# Patient Record
Sex: FEMALE | Race: WHITE | NOT HISPANIC OR LATINO | Employment: OTHER | URBAN - METROPOLITAN AREA
[De-identification: names, ages, dates, MRNs, and addresses within clinical notes are randomized per-mention and may not be internally consistent; named-entity substitution may affect disease eponyms.]

---

## 2017-01-31 ENCOUNTER — OFFICE VISIT (OUTPATIENT)
Dept: INTERNAL MEDICINE | Facility: CLINIC | Age: 77
End: 2017-01-31
Payer: COMMERCIAL

## 2017-01-31 VITALS
WEIGHT: 172.5 LBS | HEART RATE: 84 BPM | OXYGEN SATURATION: 95 % | TEMPERATURE: 98.1 F | BODY MASS INDEX: 29.45 KG/M2 | HEIGHT: 64 IN | SYSTOLIC BLOOD PRESSURE: 128 MMHG | DIASTOLIC BLOOD PRESSURE: 70 MMHG

## 2017-01-31 DIAGNOSIS — R30.0 DYSURIA: ICD-10-CM

## 2017-01-31 DIAGNOSIS — N39.46 MIXED INCONTINENCE URGE AND STRESS (MALE)(FEMALE): Primary | ICD-10-CM

## 2017-01-31 DIAGNOSIS — Z12.31 VISIT FOR SCREENING MAMMOGRAM: ICD-10-CM

## 2017-01-31 DIAGNOSIS — L30.9 DERMATITIS: ICD-10-CM

## 2017-01-31 DIAGNOSIS — Z23 NEED FOR PROPHYLACTIC VACCINATION AND INOCULATION AGAINST INFLUENZA: ICD-10-CM

## 2017-01-31 LAB
ALBUMIN UR-MCNC: NEGATIVE MG/DL
APPEARANCE UR: CLEAR
BACTERIA #/AREA URNS HPF: ABNORMAL /HPF
BILIRUB UR QL STRIP: NEGATIVE
COLOR UR AUTO: YELLOW
GLUCOSE UR STRIP-MCNC: NEGATIVE MG/DL
HGB UR QL STRIP: NEGATIVE
KETONES UR STRIP-MCNC: ABNORMAL MG/DL
LEUKOCYTE ESTERASE UR QL STRIP: NEGATIVE
MUCOUS THREADS #/AREA URNS LPF: PRESENT /LPF
NITRATE UR QL: NEGATIVE
NON-SQ EPI CELLS #/AREA URNS LPF: ABNORMAL /LPF
PH UR STRIP: 5.5 PH (ref 5–7)
RBC #/AREA URNS AUTO: ABNORMAL /HPF (ref 0–2)
SP GR UR STRIP: >1.03 (ref 1–1.03)
URN SPEC COLLECT METH UR: ABNORMAL
UROBILINOGEN UR STRIP-ACNC: 0.2 EU/DL (ref 0.2–1)
WBC #/AREA URNS AUTO: ABNORMAL /HPF (ref 0–2)

## 2017-01-31 PROCEDURE — G0008 ADMIN INFLUENZA VIRUS VAC: HCPCS | Performed by: INTERNAL MEDICINE

## 2017-01-31 PROCEDURE — 81001 URINALYSIS AUTO W/SCOPE: CPT | Performed by: INTERNAL MEDICINE

## 2017-01-31 PROCEDURE — 99214 OFFICE O/P EST MOD 30 MIN: CPT | Mod: 25 | Performed by: INTERNAL MEDICINE

## 2017-01-31 PROCEDURE — 90662 IIV NO PRSV INCREASED AG IM: CPT | Performed by: INTERNAL MEDICINE

## 2017-01-31 RX ORDER — TRIAMCINOLONE ACETONIDE 5 MG/G
CREAM TOPICAL
Qty: 15 G | Refills: 5 | Status: SHIPPED | OUTPATIENT
Start: 2017-01-31 | End: 2019-07-10

## 2017-01-31 NOTE — PROGRESS NOTES
SUBJECTIVE:                                                    Brielle Marvin is a 76 year old female who presents to clinic today for the following health issues:      URINARY TRACT SYMPTOMS     Onset: on going    Description:   Painful urination (Dysuria): no   Blood in urine (Hematuria): no   Delay in urine (Hesitency): no     Intensity: moderate    Progression of Symptoms:  spiratic    Accompanying Signs & Symptoms:  Fever/chills: no   Flank pain no   Nausea and vomiting: no   Any vaginal symptoms: none  Abdominal/Pelvic Pain: no    History:   History of frequent UTI's: no   History of kidney stones: no   Sexually Active: no   Possibility of pregnancy: No    Precipitating factors:   na         Therapies Tried and outcome:             Problem list and histories reviewed & adjusted, as indicated.  Additional history: as documented        Past Medical History:  ---------------------------  Past Medical History   Diagnosis Date     Unspecified menopausal and postmenopausal disorder      Family history of malignant neoplasm of gastrointestinal tract      Outcome of delivery, single liveborn 1972     Outcome of delivery, single liveborn 1977     Esophageal reflux      NONSPECIFIC MEDICAL HISTORY      [facility]bral [facility]x as teen in [facility]     Muscle weakness (generalized) 5/6/2010       Past Surgical History:  ---------------------------  Past Surgical History   Procedure Laterality Date     C curtis w/o facetec foramot/dskc 1/2 vrt seg, lumbar  1962     Lami, Lumbar     Hc correct bunion,simple  1981     Hc colonoscopy thru stoma, diagnostic  8/31/02       Current Medications:  ---------------------------  Current Outpatient Prescriptions   Medication Sig Dispense Refill     omeprazole (PRILOSEC) 20 MG capsule Take 1 capsule (20 mg) by mouth daily Take by mouth 30-60 minutes before a meal. Take 1 capsule by mouth daily 90 capsule 3     pravastatin (PRAVACHOL) 40 MG tablet Take 1 tablet (40 mg) by mouth At Bedtime 90 tablet 3      ORDER FOR DME, SET TO LOCAL PRINT, Equipment being ordered: tennis elbow strap 1 Device 0     vitamin E 400 UNIT capsule Take 1 capsule (400 Units) by mouth daily       triamcinolone (KENALOG) 0.5 % cream Apply sparingly once or twice per day as needed to affected area until the skin is better, then stop 60 g 2     Cholecalciferol (VITAMIN D) 2000 UNIT tablet Take 2,000 Units by mouth daily.       ALEVE 220 MG OR CAPS 1 CAPSULE EVERY 12 HOURS AS NEEDED       CALCIUM 500 + D OR None Entered       VITAMIN B-12 1000 MCG OR TABS 1 tablet daily       FISH  MG OR CAPS 1 tablet daily  0     VITAMIN C 500 MG OR TABS 1 tablet daily 60 0     ASPIRIN 81 MG OR TABS ONE DAILY       GLUCOSAMINE 500 MG OR TABS 1 tablet prn       naproxen (NAPROSYN) 500 MG tablet Take 1 tablet (500 mg) by mouth 2 times daily (with meals) 60 tablet 0       Allergies:  -------------  Allergies   Allergen Reactions     Codeine Nausea and Vomiting     vomiting     Morphine Nausea and Vomiting     nausea     Vicodin [Hydrocodone-Acetaminophen] Nausea and Vomiting       Social History:  -------------------  Social History     Social History     Marital Status:      Spouse Name: N/A     Number of Children: N/A     Years of Education: N/A     Occupational History      Retired     [employer]  fills in PT     Social History Main Topics     Smoking status: Never Smoker      Smokeless tobacco: Never Used     Alcohol Use: No     Drug Use: No     Sexual Activity: No      Comment: postmenopausal     Other Topics Concern     Not on file     Social History Narrative       Family Medical History:  ------------------------------  Family History   Problem Relation Age of Onset     CANCER Mother      D:84 Lung CA     Cardiovascular Father      D:72 heart Attack     CANCER Sister      D:62, probably colon cancer     Family History Negative Sister      B:1947 alive     Family History Negative Brother      B:1935 Alive     Family  "History Negative Brother      B:1943 Alive     Family History Negative Brother      B:1939 Alive     C.A.D. Brother      B: 1937  HTN, CAD, had PTCA         ROS:  REVIEW OF SYSTEMS:    RESP: negative for cough, dyspnea, wheezing, hemoptysis  CV: negative for chest pain, palpitations, PND, [place], orthopnea; reports no changes in their ability to perform physical activity (from cardiovascular standpoint)  GI: negative for dysphagia, N/V, pain, melena, diarrhea and constipation  NEURO: negative for numbness/tingling, paralysis, incoordination, or focal weakness     OBJECTIVE:                                                    /70 mmHg  Pulse 84  Temp(Src) 98.1  F (36.7  C) (Oral)  Ht 5' 4\" (1.626 m)  Wt 172 lb 8 oz (78.245 kg)  BMI 29.59 kg/m2  SpO2 95%  LMP 11/08/1991     GENERAL alert and no distress  EYES:  Normal sclera,conjunctiva, EOMI  HENT: oral and posterior pharynx without lesions or erythema, facies symmetric  NECK: Neck supple. No LAD, without thyroidmegaly or JVD., Carotids without bruits.  RESP: Clear to ausculation bilaterally without wheezes or crackles. Normal BS in all fields.  CV: RRR normal S1S2 without murmurs, rubs or gallops. PMI normal  LYMPH: no cervical lymph adenopathy appreciated  MS: extremities- no gross deformities of the visible extremities noted, no edema  PSYCH: Alert and oriented times 3; speech- coherent  SKIN:  No obvious significant skin lesions on visible portions of face     Results for orders placed or performed in visit on 01/31/17   UA with Microscopic reflex to Culture   Result Value Ref Range    Color Urine Yellow     Appearance Urine Clear     Glucose Urine Negative NEG mg/dL    Bilirubin Urine Negative NEG    Ketones Urine Trace (A) NEG mg/dL    Specific Gravity Urine >1.030 1.003 - 1.035    pH Urine 5.5 5.0 - 7.0 pH    Protein Albumin Urine Negative NEG mg/dL    Urobilinogen Urine 0.2 0.2 - 1.0 EU/dL    Nitrite Urine Negative NEG    Blood Urine Negative NEG    " Leukocyte Esterase Urine Negative NEG    Source Midstream Urine     WBC Urine O - 2 0 - 2 /HPF    RBC Urine O - 2 0 - 2 /HPF    Squamous Epithelial /LPF Urine Few FEW /LPF    Bacteria Urine Few (A) NEG /HPF    Mucous Urine Present (A) NEG /LPF         ASSESSMENT/PLAN:                                                      (N39.46) Mixed incontinence urge and stress (male)(female)  (primary encounter diagnosis)  Comment:   Plan: UROLOGY ADULT REFERRAL            (R30.0) Dysuria  Comment:   Plan: UA with Microscopic reflex to Culture, UROLOGY         ADULT REFERRAL            (Z23) Need for prophylactic vaccination and inoculation against influenza  Comment:   Plan: FLU VACCINE, INCREASED ANTIGEN, PRESV FREE, AGE        65+ [72892], Vaccine Administration, Initial         [10942]            *  Visit [facility] [place] [phone]   [email or web address] for further evaluation of the issue. Based on their evaluation, they can determien what the next best course of treatment might be.                 See Patient Instructions    EVELYNE MARC M.D., MD  [facility]         Injectable Influenza Immunization Documentation    1.  Is the person to be vaccinated sick today?  No    2. Does the person to be vaccinated have an allergy to eggs or to a component of the vaccine?  No    3. Has the person to be vaccinated today ever had a serious reaction to influenza vaccine in the past?  No    4. Has the person to be vaccinated ever had Guillain-Oxford syndrome?  No     Form completed by Coleen Rinaldi

## 2017-01-31 NOTE — NURSING NOTE
"Chief Complaint   Patient presents with     Dysuria     for awhile now        Initial /70 mmHg  Pulse 84  Temp(Src) 98.1  F (36.7  C) (Oral)  Ht 5' 4\" (1.626 m)  Wt 172 lb 8 oz (78.245 kg)  BMI 29.59 kg/m2  SpO2 95%  LMP 11/08/1991 Estimated body mass index is 29.59 kg/(m^2) as calculated from the following:    Height as of this encounter: 5' 4\" (1.626 m).    Weight as of this encounter: 172 lb 8 oz (78.245 kg).  BP completed using cuff size: regular    "

## 2017-01-31 NOTE — MR AVS SNAPSHOT
After Visit Summary   1/31/2017    Brielle Marvin    MRN: [id number]           Patient Information     Date Of Birth          1940        Visit Information        Provider Department      1/31/2017 10:00 AM Jared Emmanuel MD [facility]        Today's Diagnoses     Mixed incontinence urge and stress (male)(female)    -  1     Dysuria         Need for prophylactic vaccination and inoculation against influenza           Care Instructions    *  Visit [facility] [phone]   [email or web address] for further evaluation of the issue. Based on their evaluation, they can determien what the next best course of treatment might be.                Follow-ups after your visit        Additional Services     UROLOGY ADULT REFERRAL       Your provider has referred you to:     [facility]: [facility] - [place] [phone]   [email or web address]    Please be aware that coverage of these services is subject to the terms and limitations of your health insurance plan.  Call member services at your health plan with any benefit or coverage questions.      Please bring the following with you to your appointment:    (1) Any X-Rays, CTs or MRIs which have been performed.  Contact the facility where they were done to arrange for  prior to your scheduled appointment.    (2) List of current medications  (3) This referral request   (4) Any documents/labs given to you for this referral                  Who to contact     If you have questions or need follow up information about today's clinic visit or your schedule please contact [facility] directly at [phone].  Normal or non-critical lab and imaging results will be communicated to you by MyChart, letter or phone within 4 business days after the clinic has received the results. If you do  "not hear from us within 7 days, please contact the clinic through [employer] or phone. If you have a critical or abnormal lab result, we will notify you by phone as soon as possible.  Submit refill requests through [employer] or call your pharmacy and they will forward the refill request to us. Please allow 3 business days for your refill to be completed.          Additional Information About Your Visit        [facility] Information     [employer] gives you secure access to your electronic health record. If you see a primary care provider, you can also send messages to your care team and make appointments. If you have questions, please call your primary care clinic.  If you do not have a primary care provider, please call [phone] and they will assist you.        Care EveryWhere ID     This is your Care EveryWhere ID. This could be used by other organizations to access your [facility] medical records  [id number]        Your Vitals Were     Pulse Temperature Height BMI (Body Mass Index) Pulse Oximetry Last Period    84 98.1  F (36.7  C) (Oral) 5' 4\" (1.626 m) 29.59 kg/m2 95% 11/08/1991       Blood Pressure from Last 3 Encounters:   01/31/17 128/70   11/06/15 164/89   11/06/15 126/76    Weight from Last 3 Encounters:   01/31/17 172 lb 8 oz (78.245 kg)   11/06/15 160 lb (72.576 kg)   06/09/15 169 lb 1.6 oz (76.703 kg)              We Performed the Following     FLU VACCINE, INCREASED ANTIGEN, PRESV FREE, AGE 65+ [11305]     UA with Microscopic reflex to Culture     UROLOGY ADULT REFERRAL     Vaccine Administration, Initial [[id number]]        Primary Care Provider Office Phone # Fax #    Jared Emmanuel [phone]       [facility] [address]  [facility] [address]        Thank you!     Thank you for choosing [facility]  for your care. Our goal is always to provide you with excellent care. Hearing back from our patients is one way we can continue to improve our " services. Please take a few minutes to complete the written survey that you may receive in the mail after your visit with us. Thank you!             Your Updated Medication List - Protect others around you: Learn how to safely use, store and throw away your medicines at www.disposemymeds.org.          This list is accurate as of: 1/31/17 10:42 AM.  Always use your most recent med list.                   Brand Name Dispense Instructions for use    ALEVE 220 MG capsule   Generic drug:  naproxen sodium      1 CAPSULE EVERY 12 HOURS AS NEEDED       ascorbic acid 500 MG tablet    VITAMIN C    60    1 tablet daily       aspirin 81 MG tablet      ONE DAILY       CALCIUM 500 + D PO      None Entered       cyanocobalamin 1000 MCG tablet    vitamin  B-12     1 tablet daily       Fish Oil 500 MG Caps      1 tablet daily       glucosamine 500 MG Tabs      1 tablet prn       naproxen 500 MG tablet    NAPROSYN    60 tablet    Take 1 tablet (500 mg) by mouth 2 times daily (with meals)       omeprazole 20 MG CR capsule    priLOSEC    90 capsule    Take 1 capsule (20 mg) by mouth daily Take by mouth 30-60 minutes before a meal. Take 1 capsule by mouth daily       order for DME     1 Device    Equipment being ordered: tennis elbow strap       pravastatin 40 MG tablet    PRAVACHOL    90 tablet    Take 1 tablet (40 mg) by mouth At Bedtime       triamcinolone 0.5 % cream    KENALOG    60 g    Apply sparingly once or twice per day as needed to affected area until the skin is better, then stop       vitamin D 2000 UNITS tablet      Take 2,000 Units by mouth daily.       vitamin E 400 UNIT capsule      Take 1 capsule (400 Units) by mouth daily

## 2017-02-08 ENCOUNTER — OFFICE VISIT (OUTPATIENT)
Dept: UROLOGY | Facility: CLINIC | Age: 77
End: 2017-02-08
Payer: COMMERCIAL

## 2017-02-08 DIAGNOSIS — N39.46 MIXED INCONTINENCE: Primary | ICD-10-CM

## 2017-02-08 DIAGNOSIS — N32.81 OAB (OVERACTIVE BLADDER): ICD-10-CM

## 2017-02-08 DIAGNOSIS — N95.2 ATROPHIC VAGINITIS: ICD-10-CM

## 2017-02-08 LAB
ALBUMIN UR-MCNC: NEGATIVE MG/DL
APPEARANCE UR: CLEAR
BILIRUB UR QL STRIP: NEGATIVE
COLOR UR AUTO: YELLOW
GLUCOSE UR STRIP-MCNC: NEGATIVE MG/DL
HGB UR QL STRIP: ABNORMAL
KETONES UR STRIP-MCNC: NEGATIVE MG/DL
LEUKOCYTE ESTERASE UR QL STRIP: NEGATIVE
NITRATE UR QL: NEGATIVE
PH UR STRIP: 6.5 PH (ref 5–7)
SP GR UR STRIP: 1.02 (ref 1–1.03)
URN SPEC COLLECT METH UR: ABNORMAL
UROBILINOGEN UR STRIP-ACNC: 0.2 EU/DL (ref 0.2–1)

## 2017-02-08 PROCEDURE — 52000 CYSTOURETHROSCOPY: CPT | Performed by: UROLOGY

## 2017-02-08 PROCEDURE — 99205 OFFICE O/P NEW HI 60 MIN: CPT | Mod: 25 | Performed by: UROLOGY

## 2017-02-08 PROCEDURE — 81003 URINALYSIS AUTO W/O SCOPE: CPT | Mod: QW | Performed by: UROLOGY

## 2017-02-08 RX ORDER — TOLTERODINE 4 MG/1
4 CAPSULE, EXTENDED RELEASE ORAL DAILY
Qty: 30 CAPSULE | Refills: 1 | Status: SHIPPED | OUTPATIENT
Start: 2017-02-08 | End: 2017-12-05

## 2017-02-08 RX ORDER — ESTRADIOL 0.1 MG/G
1 CREAM VAGINAL
Qty: 42.5 G | Refills: 1 | Status: SHIPPED | OUTPATIENT
Start: 2017-02-09 | End: 2017-06-26

## 2017-02-08 NOTE — PROGRESS NOTES
Brielle Marvin is a 76 year old female seen in consultation for incont. Consult from Jared Emmanuel.    Pt reports a 2+ yr progression of urge and urge incont, now quite bothersome, now wearing 2 liners per day, one liner per nite. Voids q 2 hrs during the day, noc x 0-1. Denies dysuria, gross hematuria, signif UTI's, bladder surgery, use of bladder meds. Was seen by  about 5 yrs ago, encouraged to do Kegel's >> no improvement >> no f/u.    Hx 2 vag deliveries (both 10#), no hyster, not on HRT.     Denies constipation, fecal incont.    Moderate fluids, little caffeine. Retired, enjoys cards in the winter and golf in the summer. Reviewed voiding diary; reflects above.      Past Medical History   Diagnosis Date     Unspecified menopausal and postmenopausal disorder      Family history of malignant neoplasm of gastrointestinal tract      Outcome of delivery, single liveborn 1972     Outcome of delivery, single liveborn 1977     Esophageal reflux      NONSPECIFIC MEDICAL HISTORY      vertbral fx as teen in [facility]     Muscle weakness (generalized) 5/6/2010       Past Surgical History   Procedure Laterality Date     C curtis w/o facetec foramot/dskc 1/2 vrt seg, lumbar  1962     Lami, Lumbar     Hc correct bunion,simple  1981     Hc colonoscopy thru stoma, diagnostic  8/31/02       Social History     Social History     Marital Status:      Spouse Name: N/A     Number of Children: N/A     Years of Education: N/A     Occupational History      Retired     [employer]  fills in PT     Social History Main Topics     Smoking status: Never Smoker      Smokeless tobacco: Never Used     Alcohol Use: No     Drug Use: No     Sexual Activity: No      Comment: postmenopausal     Other Topics Concern     Not on file     Social History Narrative       Current Outpatient Prescriptions   Medication Sig Dispense Refill     triamcinolone (KENALOG) 0.5 % cream Apply sparingly once or twice per day as needed  to affected area until the skin is better, then stop; REPEAT AS NEEDED 15 g 5     omeprazole (PRILOSEC) 20 MG capsule Take 1 capsule (20 mg) by mouth daily Take by mouth 30-60 minutes before a meal. Take 1 capsule by mouth daily 90 capsule 3     pravastatin (PRAVACHOL) 40 MG tablet Take 1 tablet (40 mg) by mouth At Bedtime 90 tablet 3     ORDER FOR DME, SET TO LOCAL PRINT, Equipment being ordered: tennis elbow strap 1 Device 0     naproxen (NAPROSYN) 500 MG tablet Take 1 tablet (500 mg) by mouth 2 times daily (with meals) 60 tablet 0     vitamin E 400 UNIT capsule Take 1 capsule (400 Units) by mouth daily       Cholecalciferol (VITAMIN D) 2000 UNIT tablet Take 2,000 Units by mouth daily.       ALEVE 220 MG OR CAPS 1 CAPSULE EVERY 12 HOURS AS NEEDED       CALCIUM 500 + D OR None Entered       VITAMIN B-12 1000 MCG OR TABS 1 tablet daily       FISH  MG OR CAPS 1 tablet daily  0     VITAMIN C 500 MG OR TABS 1 tablet daily 60 0     ASPIRIN 81 MG OR TABS ONE DAILY       GLUCOSAMINE 500 MG OR TABS 1 tablet prn         Physical Exam:    GENL: NAD.    ABD: Soft, non-tender, no masses.     EG: Poorly-estrogenized, no masses.    VAGINA: Poorly-estrogenized, no masses.    BN HYPERMOBILITY: Minimal.    CYSTOCELE: Grade 1.    APICAL PROLAPSE: Minimal.    RECTOCELE: Minimal.    BIMANUAL: No mass or tenderness.    Cysto:    (Informed consent obtained. Pause for cause performed)   Sterile prep.    17 Fr scope inserted through urethra. Systematic examination w 70 degree lens.   PVR: 5 cc   MUCOSA: Normal without lesion   ORIFICES: Normal location and morphology   CAPACITY: 550 cc; no pain with filling >> apparent UDC at that point with small amount of leakage around the scope   Scope withdrawn without untoward effect.    Valsalva:   Mild hypermobility, moderate leakage noted.    (Pt tolerated procedure without difficulty).      Results for orders placed or performed in visit on 02/08/17   UA without Microscopic   Result Value  Ref Range    Color Urine Yellow     Appearance Urine Clear     Glucose Urine Negative NEG mg/dL    Bilirubin Urine Negative NEG    Ketones Urine Negative NEG mg/dL    Specific Gravity Urine 1.020 1.003 - 1.035    Blood Urine Trace (A) NEG    pH Urine 6.5 5.0 - 7.0 pH    Protein Albumin Urine Negative NEG mg/dL    Urobilinogen Urine 0.2 0.2 - 1.0 EU/dL    Nitrite Urine Negative NEG    Leukocyte Esterase Urine Negative NEG    Source Midstream Urine          IMP:  1. OAB wet  2. Atrophic vaginitis  3. PRINCESS; looks like ISD component      PLAN:  1. Discussed situation with patient in detail.  2. Consider Detrol; discussed in detail mech of action, rationale, side effects, etc; pt elects trial  3. Consider topical HRT; discussed rationale, mech of action, application, risk, etc; pt elects trial  4. RTC 2 mos to review progress  5. Set realistic expectations  6. Might benefit from sling at some point if PRINCESS is bothersome  7. 60 minutes spent with patient, more than 50% in counseling and coordination of care for OAB/incont/vaginitis, which did not include time spent for the procedure.  8. Copy C Emmanuel

## 2017-04-05 ENCOUNTER — OFFICE VISIT (OUTPATIENT)
Dept: UROLOGY | Facility: CLINIC | Age: 77
End: 2017-04-05
Payer: COMMERCIAL

## 2017-04-05 DIAGNOSIS — N39.46 MIXED INCONTINENCE: Primary | ICD-10-CM

## 2017-04-05 LAB
ALBUMIN UR-MCNC: NEGATIVE MG/DL
APPEARANCE UR: CLEAR
BILIRUB UR QL STRIP: NEGATIVE
COLOR UR AUTO: YELLOW
GLUCOSE UR STRIP-MCNC: NEGATIVE MG/DL
HGB UR QL STRIP: ABNORMAL
KETONES UR STRIP-MCNC: NEGATIVE MG/DL
LEUKOCYTE ESTERASE UR QL STRIP: NEGATIVE
NITRATE UR QL: NEGATIVE
PH UR STRIP: 5.5 PH (ref 5–7)
SP GR UR STRIP: 1.01 (ref 1–1.03)
URN SPEC COLLECT METH UR: ABNORMAL
UROBILINOGEN UR STRIP-ACNC: 0.2 EU/DL (ref 0.2–1)

## 2017-04-05 PROCEDURE — 81003 URINALYSIS AUTO W/O SCOPE: CPT | Mod: QW | Performed by: UROLOGY

## 2017-04-05 PROCEDURE — 99214 OFFICE O/P EST MOD 30 MIN: CPT | Performed by: UROLOGY

## 2017-04-05 RX ORDER — TOLTERODINE 4 MG/1
4 CAPSULE, EXTENDED RELEASE ORAL DAILY
Qty: 90 CAPSULE | Refills: 1 | Status: SHIPPED | OUTPATIENT
Start: 2017-04-05 | End: 2017-06-26

## 2017-04-05 NOTE — MR AVS SNAPSHOT
After Visit Summary   4/5/2017    Brielle Marvin    MRN: [id number]           Patient Information     Date Of Birth          1940        Visit Information        Provider Department      4/5/2017 9:00 AM Michael Lin MD [facility]        Today's Diagnoses     Mixed incontinence    -  1       Follow-ups after your visit        Follow-up notes from your care team     Return in about 6 months (around 10/5/2017).      Your next 10 appointments already scheduled     Apr 26, 2017  7:45 AM CDT   MA SCREENING DIGITAL BILATERAL with OXMA1   [facility] ([facility])    [address] [id number] [phone]           Do not use any powder, lotion or deodorant under your arms or on your breast. If you do, we will ask you to remove it before your exam.  Wear comfortable, two-piece clothing.  If you have any allergies, tell your care team.  Bring any previous mammograms from other facilities or have them mailed to the breast center.            Oct 05, 2017  9:00 AM CDT   Return Visit with Michael Lin MD   [facility] for [facility]adder Control [facility] ([facility] [place])    [facility] [address] [place] [address] [phone]              Who to contact     If you have questions or need follow up information about today's clinic visit or your schedule please contact [facility] BLADDER [facility] directly at [phone].  Normal or non-critical lab and imaging results will be communicated to you by MyChart, letter or phone within 4 business days after the clinic has received the results. If you do not hear from us within 7 days, please contact the clinic through [employer] or phone. If you have a critical or abnormal lab result, we will notify you by phone as soon as possible.  Submit refill requests through [employer]  or call your pharmacy and they will forward the refill request to us. Please allow 3 business days for your refill to be completed.          Additional Information About Your Visit        [employer] Information     [employer] gives you secure access to your electronic health record. If you see a primary care provider, you can also send messages to your care team and make appointments. If you have questions, please call your primary care clinic.  If you do not have a primary care provider, please call [phone] and they will assist you.        Care EveryWhere ID     This is your Care EveryWhere ID. This could be used by other organizations to access your [place] medical records  [id number]        Your Vitals Were     Last Period                   11/08/1991            Blood Pressure from Last 3 Encounters:   01/31/17 128/70   11/06/15 164/89   11/06/15 126/76    Weight from Last 3 Encounters:   01/31/17 172 lb 8 oz (78.2 kg)   11/06/15 160 lb (72.6 kg)   06/09/15 169 lb 1.6 oz (76.7 kg)              We Performed the Following     UA without Microscopic          Today's Medication Changes          These changes are accurate as of: 4/5/17  9:40 AM.  If you have any questions, ask your nurse or doctor.               These medicines have changed or have updated prescriptions.        Dose/Directions    * tolterodine 4 MG 24 hr capsule   Commonly known as:  DETROL LA   This may have changed:  Another medication with the same name was added. Make sure you understand how and when to take each.   Used for:  OAB (overactive bladder)        Dose:  4 mg   Take 1 capsule (4 mg) by mouth daily   Quantity:  30 capsule   Refills:  1       * tolterodine 4 MG 24 hr capsule   Commonly known as:  DETROL LA   This may have changed:  You were already taking a medication with the same name, and this prescription was added. Make sure you understand how and when to take each.   Used for:  Mixed incontinence        Dose:  4 mg   Take 1  capsule (4 mg) by mouth daily   Quantity:  90 capsule   Refills:  1       * Notice:  This list has 2 medication(s) that are the same as other medications prescribed for you. Read the directions carefully, and ask your doctor or other care provider to review them with you.         Where to get your medicines      These medications were sent to [id number] - [place], [place] - [address] AT [facility] [address]  [address], [facility] [address]     Phone:  [phone]     tolterodine 4 MG 24 hr capsule                Primary Care Provider Office Phone # Fax #    Jared Emmanuel [phone]       [facility] [address]  [facility] [address]        Thank you!     Thank you for choosing [facility] FOR [facility]ADDER [facility]  for your care. Our goal is always to provide you with excellent care. Hearing back from our patients is one way we can continue to improve our services. Please take a few minutes to complete the written survey that you may receive in the mail after your visit with us. Thank you!             Your Updated Medication List - Protect others around you: Learn how to safely use, store and throw away your medicines at www.disposemymeds.org.          This list is accurate as of: 4/5/17  9:40 AM.  Always use your most recent med list.                   Brand Name Dispense Instructions for use    ALEVE 220 MG capsule   Generic drug:  naproxen sodium      Reported on 4/5/2017       ascorbic acid 500 MG tablet    VITAMIN C    60    1 tablet daily       aspirin 81 MG tablet      ONE DAILY       CALCIUM 500 + D PO      None Entered       cyanocobalamin 1000 MCG tablet    vitamin  B-12     1 tablet daily       estradiol 0.1 MG/GM cream    ESTRACE VAGINAL    42.5 g    Place 1 g vaginally twice a week       Fish Oil 500 MG Caps      1 tablet daily       glucosamine 500 MG Tabs      1 tablet prn       omeprazole 20 MG  CR capsule    priLOSEC    90 capsule    Take 1 capsule (20 mg) by mouth daily Take by mouth 30-60 minutes before a meal. Take 1 capsule by mouth daily       order for DME     1 Device    Equipment being ordered: tennis elbow strap       pravastatin 40 MG tablet    PRAVACHOL    90 tablet    Take 1 tablet (40 mg) by mouth At Bedtime       * tolterodine 4 MG 24 hr capsule    DETROL LA    30 capsule    Take 1 capsule (4 mg) by mouth daily       * tolterodine 4 MG 24 hr capsule    DETROL LA    90 capsule    Take 1 capsule (4 mg) by mouth daily       triamcinolone 0.5 % cream    KENALOG    15 g    Apply sparingly once or twice per day as needed to affected area until the skin is better, then stop; REPEAT AS NEEDED       vitamin D 2000 UNITS tablet      Take 2,000 Units by mouth daily.       vitamin E 400 UNIT capsule      Take 1 capsule (400 Units) by mouth daily       * Notice:  This list has 2 medication(s) that are the same as other medications prescribed for you. Read the directions carefully, and ask your doctor or other care provider to review them with you.

## 2017-04-05 NOTE — PROGRESS NOTES
F/u OAB wet (Detrol), atrophic vaginitis (Estrace), PRINCESS with apparent ISD    Compliant with both meds.    Bladder control markedly improved, now with a single liner during the day and a single liner at nite; very pleased.    Control goes down if she drinks more coffee or postpones voiding.    Denies dysuria, gross hematuria, frequency, constipation, drug side effects. Very pleased.    Caring for several friends and family members with cancer.      Current Outpatient Prescriptions   Medication     tolterodine (DETROL LA) 4 MG 24 hr capsule     estradiol (ESTRACE VAGINAL) 0.1 MG/GM cream     omeprazole (PRILOSEC) 20 MG capsule     pravastatin (PRAVACHOL) 40 MG tablet     vitamin E 400 UNIT capsule     Cholecalciferol (VITAMIN D) 2000 UNIT tablet     CALCIUM 500 + D OR     VITAMIN B-12 1000 MCG OR TABS     FISH  MG OR CAPS     VITAMIN C 500 MG OR TABS     ASPIRIN 81 MG OR TABS     GLUCOSAMINE 500 MG OR TABS     triamcinolone (KENALOG) 0.5 % cream     ORDER FOR DME, SET TO LOCAL PRINT,     ALEVE 220 MG OR CAPS     No current facility-administered medications for this visit.          Results for orders placed or performed in visit on 04/05/17   UA without Microscopic   Result Value Ref Range    Color Urine Yellow     Appearance Urine Clear     Glucose Urine Negative NEG mg/dL    Bilirubin Urine Negative NEG    Ketones Urine Negative NEG mg/dL    Specific Gravity Urine 1.015 1.003 - 1.035    Blood Urine Trace (A) NEG    pH Urine 5.5 5.0 - 7.0 pH    Protein Albumin Urine Negative NEG mg/dL    Urobilinogen Urine 0.2 0.2 - 1.0 EU/dL    Nitrite Urine Negative NEG    Leukocyte Esterase Urine Negative NEG    Source Midstream Urine        IMP:  1. OAB wet and vaginitis, improving  2. Some emotional stressors      PLAN:  1. Discussed situation with patient in detail.  2. Continue Detrol LA4 and Estrace cream; again discussed in detail  3. Emotional support offered  4. RTC 6 mos or sooner prn  5. 25 minutes spent with  patient, more than 50% spent in counseling and coordination of care for OAB/vaginitis.  6. Copy C Emmanuel

## 2017-04-26 ENCOUNTER — RADIANT APPOINTMENT (OUTPATIENT)
Dept: MAMMOGRAPHY | Facility: CLINIC | Age: 77
End: 2017-04-26
Attending: INTERNAL MEDICINE
Payer: COMMERCIAL

## 2017-04-26 DIAGNOSIS — E78.5 HYPERLIPIDEMIA LDL GOAL <130: ICD-10-CM

## 2017-04-26 DIAGNOSIS — K21.9 GASTROESOPHAGEAL REFLUX DISEASE WITHOUT ESOPHAGITIS: ICD-10-CM

## 2017-04-26 DIAGNOSIS — Z12.31 VISIT FOR SCREENING MAMMOGRAM: ICD-10-CM

## 2017-04-26 PROCEDURE — G0202 SCR MAMMO BI INCL CAD: HCPCS | Mod: TC

## 2017-04-27 NOTE — TELEPHONE ENCOUNTER
pravastatin (PRAVACHOL) 40 MG tablet     Last Written Prescription Date: 2/16/16  Last Fill Quantity: 90, # refills: 3  Last Office Visit with [facility] or [facility] prescribing provider: 1/31/17       Lab Results   Component Value Date    CHOL 153 02/16/2016     Lab Results   Component Value Date    HDL 63 02/16/2016     Lab Results   Component Value Date    LDL 76 02/16/2016     Lab Results   Component Value Date    TRIG 70 02/16/2016     Lab Results   Component Value Date    CHOLHDLRATIO 3.1 05/22/2014     omeprazole (PRILOSEC) 20 MG capsule      Last Written Prescription Date: 2/16/16  Last Fill Quantity: 90,  # refills: 3   Last Office Visit with [facility], [facility] or [facility] prescribing provider: 1/31/17

## 2017-04-28 RX ORDER — PRAVASTATIN SODIUM 40 MG
TABLET ORAL
Qty: 30 TABLET | Refills: 0 | Status: SHIPPED | OUTPATIENT
Start: 2017-04-28 | End: 2017-05-31

## 2017-05-31 DIAGNOSIS — E78.5 HYPERLIPIDEMIA LDL GOAL <130: ICD-10-CM

## 2017-05-31 RX ORDER — PRAVASTATIN SODIUM 40 MG
40 TABLET ORAL DAILY
Qty: 30 TABLET | Refills: 0 | Status: SHIPPED | OUTPATIENT
Start: 2017-05-31 | End: 2017-06-20

## 2017-05-31 NOTE — TELEPHONE ENCOUNTER
Routing refill request to provider for review/approval because:  Ursula given x1 and patient did not follow up, please advise  Labs not current:  Lipids.  2/2016

## 2017-06-01 NOTE — TELEPHONE ENCOUNTER
1 month prescription sent electronically to the specified pharmacy.  Due for follow up appointment and labs.   Future orders placed.

## 2017-06-17 ENCOUNTER — HEALTH MAINTENANCE LETTER (OUTPATIENT)
Age: 77
End: 2017-06-17

## 2017-06-20 DIAGNOSIS — E78.5 HYPERLIPIDEMIA LDL GOAL <130: ICD-10-CM

## 2017-06-20 RX ORDER — PRAVASTATIN SODIUM 40 MG
40 TABLET ORAL DAILY
Qty: 90 TABLET | Refills: 0 | Status: SHIPPED | OUTPATIENT
Start: 2017-06-20 | End: 2017-06-26

## 2017-06-20 NOTE — TELEPHONE ENCOUNTER
Called patient and informed per Dr. Emmanuel's message from 5/31/17, patient due for follow up appointment and labs.  Patient scheduled appointment with Dr. Emmanuel on 6/26/17, transferred patient to scheduling to make lab appointment.  Patient requesting 3 months supply of pravastatin.  Please advise.    Pravastatin      Last Written Prescription Date: 5/31/17  Last Fill Quantity: 30, # refills: 0  Last Office Visit with [facility] or [facility] prescribing provider: 1/31/17  Next 5 appointments (look out 90 days)     Jun 26, 2017  8:40 AM CDT   PHYSICAL with Jared Emmanuel MD   [facility] ([facility])    [address] [address] [phone]                   Lab Results   Component Value Date    CHOL 153 02/16/2016     Lab Results   Component Value Date    HDL 63 02/16/2016     Lab Results   Component Value Date    LDL 76 02/16/2016     Lab Results   Component Value Date    TRIG 70 02/16/2016     Lab Results   Component Value Date    CHOLHDLRATIO 3.1 05/22/2014

## 2017-06-23 DIAGNOSIS — E78.5 HYPERLIPIDEMIA LDL GOAL <130: ICD-10-CM

## 2017-06-23 LAB
ALT SERPL W P-5'-P-CCNC: 19 U/L (ref 0–50)
AST SERPL W P-5'-P-CCNC: 17 U/L (ref 0–45)
CHOLEST SERPL-MCNC: 157 MG/DL
CREAT SERPL-MCNC: 0.66 MG/DL (ref 0.52–1.04)
GFR SERPL CREATININE-BSD FRML MDRD: 87 ML/MIN/1.7M2
GLUCOSE SERPL-MCNC: 97 MG/DL (ref 70–99)
HDLC SERPL-MCNC: 62 MG/DL
LDLC SERPL CALC-MCNC: 80 MG/DL
NONHDLC SERPL-MCNC: 95 MG/DL
TRIGL SERPL-MCNC: 77 MG/DL

## 2017-06-23 PROCEDURE — 82565 ASSAY OF CREATININE: CPT | Performed by: INTERNAL MEDICINE

## 2017-06-23 PROCEDURE — 84450 TRANSFERASE (AST) (SGOT): CPT | Performed by: INTERNAL MEDICINE

## 2017-06-23 PROCEDURE — 36415 COLL VENOUS BLD VENIPUNCTURE: CPT | Performed by: INTERNAL MEDICINE

## 2017-06-23 PROCEDURE — 84460 ALANINE AMINO (ALT) (SGPT): CPT | Performed by: INTERNAL MEDICINE

## 2017-06-23 PROCEDURE — 82947 ASSAY GLUCOSE BLOOD QUANT: CPT | Performed by: INTERNAL MEDICINE

## 2017-06-23 PROCEDURE — 80061 LIPID PANEL: CPT | Performed by: INTERNAL MEDICINE

## 2017-06-26 ENCOUNTER — OFFICE VISIT (OUTPATIENT)
Dept: INTERNAL MEDICINE | Facility: CLINIC | Age: 77
End: 2017-06-26
Payer: COMMERCIAL

## 2017-06-26 VITALS — DIASTOLIC BLOOD PRESSURE: 80 MMHG | HEART RATE: 68 BPM | SYSTOLIC BLOOD PRESSURE: 130 MMHG

## 2017-06-26 DIAGNOSIS — K21.9 GASTROESOPHAGEAL REFLUX DISEASE WITHOUT ESOPHAGITIS: ICD-10-CM

## 2017-06-26 DIAGNOSIS — N32.81 OAB (OVERACTIVE BLADDER): ICD-10-CM

## 2017-06-26 DIAGNOSIS — N95.2 ATROPHIC VAGINITIS: ICD-10-CM

## 2017-06-26 DIAGNOSIS — Z00.00 MEDICARE ANNUAL WELLNESS VISIT, SUBSEQUENT: Primary | ICD-10-CM

## 2017-06-26 DIAGNOSIS — E78.5 HYPERLIPIDEMIA LDL GOAL <130: ICD-10-CM

## 2017-06-26 PROCEDURE — 99397 PER PM REEVAL EST PAT 65+ YR: CPT | Performed by: INTERNAL MEDICINE

## 2017-06-26 RX ORDER — ESTRADIOL 0.1 MG/G
1 CREAM VAGINAL
Qty: 42.5 G | Refills: 5 | Status: SHIPPED | OUTPATIENT
Start: 2017-06-26 | End: 2019-04-03

## 2017-06-26 RX ORDER — TOLTERODINE 2 MG/1
2 CAPSULE, EXTENDED RELEASE ORAL DAILY
Qty: 90 CAPSULE | Refills: 1 | Status: SHIPPED | OUTPATIENT
Start: 2017-06-26 | End: 2017-12-05

## 2017-06-26 RX ORDER — PRAVASTATIN SODIUM 40 MG
40 TABLET ORAL DAILY
Qty: 90 TABLET | Refills: 3 | Status: SHIPPED | OUTPATIENT
Start: 2017-06-26 | End: 2018-06-12

## 2017-06-26 NOTE — MR AVS SNAPSHOT
"              After Visit Summary   6/26/2017    Brielle Marvin    MRN: [id number]           Patient Information     Date Of Birth          1940        Visit Information        Provider Department      6/26/2017 8:40 AM Jared Emmanuel MD [facility]        Today's Diagnoses     Medicare annual wellness visit, subsequent    -  1    Hyperlipidemia LDL goal <130        Gastroesophageal reflux disease without esophagitis        OAB (overactive bladder)        Atrophic vaginitis          Care Instructions      *  Trial of lower dose Detrol (Tolterodine) 2 mg to see if the bladder control happens with less lower extremity edema     *  Continue all other medications at the same doses.  Contact your usual pharmacy if you need refills.               5 GOALS TO PREVENT VASCULAR DISEASE:     1.  Aggressive blood pressure control, under 130/80 ideally.  Using medications if needed.    Your blood pressure is under good control    BP Readings from Last 4 Encounters:   06/26/17 130/80   01/31/17 128/70   11/06/15 164/89   11/06/15 126/76       2.  Aggressive LDL cholesterol (\"bad cholesterol\") lowering as indicated.    Your goal is an LDL under 130 for sure, preferably under 100.  (If you have diabetes or previous vascular disease, the the LDL goals would be under 100 for sure, preferably under 70.)    New guidelines identify four high-risk groups who could benefit from statins:   *people with pre-existing heart disease, such as those who have had a heart attack;   *people ages 40 to 75 who have diabetes of any type  *patients ages 40 to 75 with at least a 7.5% risk of developing cardiovascular disease over the next decade, according to a formula described in the guidelines  *patients with the sort of super-high cholesterol that sometimes runs in families, as evidenced by an LDL of 190 milligrams per deciliter or higher    Your cholesterol levels are well controlled.    Recent Labs   Lab Test  " "06/23/17   0957  02/16/16   0930  05/22/14   1013  06/03/13   0927   CHOL  157  153  171  161   HDL  62  63  54  51   LDL  80  76  101  91   TRIG  77  70  78  96   CHOLHDLRATIO   --    --   3.1  3.1       3.  Aggressive diabetic prevention, screening and/or management.      You do not have diabetes as of the most recent blood tests.     4.  No smoking    5.  Consider taking low dose aspirin (81 mg) tablet once per day over the age of 50, every day unless there is a specific reason that you cannot take aspirin (such as side effect, allergy, or you are on another \"blood thinner\").        --Based on your current cardiac risk factors, you should take Aspirin 81 mg once per day if you are over 50 years of age.             Preventive Health Recommendations    Female Ages 65 +    Yearly exam:     See your health care provider every year in order to  o Review health changes.   o Discuss preventive care.    o Review your medicines if your doctor has prescribed any.      You no longer need a yearly Pap test unless you've had an abnormal Pap test in the past 10 years. If you have vaginal symptoms, such as bleeding or discharge, be sure to talk with your provider about a Pap test.      Every 1 to 2 years, have a mammogram.  If you are over 69, talk with your health care provider about whether or not you want to continue having screening mammograms.      Every 10 years, have a colonoscopy. Or, have a yearly FIT test (stool test). These exams will check for colon cancer.       Have a cholesterol test every 5 years, or more often if your doctor advises it.       Have a diabetes test (fasting glucose) every three years. If you are at risk for diabetes, you should have this test more often.       At age 65, have a bone density scan (DEXA) to check for osteoporosis (brittle bone disease).    Shots:    Get a flu shot each year.    Get a tetanus shot every 10 years.    Talk to your doctor about your pneumonia vaccines. There are now " "two you should receive - Pneumovax (PPSV 23) and Prevnar (PCV 13).    Talk to your doctor about the shingles vaccine.    Talk to your doctor about the hepatitis B vaccine.    Nutrition:     Eat at least 5 servings of fruits and vegetables each day.      Eat whole-grain bread, whole-wheat pasta and brown rice instead of white grains and rice.      Talk to your provider about Calcium and Vitamin D.        --Good Grains:  Oats, brown rice, Quinoa (these do not raise the blood sugar as much)     --Bad grains:  Anything made from wheat or white rice     (because these raise the blood sugars significantly, and the possible gluten issue from wheat for some people).      --Proteins:  Aim for \"lean proteins\" including chicken, fish, seafood, pork, turkey, and eggs (in moderation); Eat red meat only occasionally        Lifestyle    Exercise at least 150 minutes a week (30 minutes a day, 5 days a week). This will help you control your weight and prevent disease.      Limit alcohol to one drink per day.      No smoking.       Wear sunscreen to prevent skin cancer.       See your dentist twice a year for an exam and cleaning.      See your eye doctor every 1 to 2 years to screen for conditions such as glaucoma, macular degeneration and cataracts.          Follow-ups after your visit        Who to contact     If you have questions or need follow up information about today's clinic visit or your schedule please contact [facility] directly at [phone].  Normal or non-critical lab and imaging results will be communicated to you by MyChart, letter or phone within 4 business days after the clinic has received the results. If you do not hear from us within 7 days, please contact the clinic through MyChart or phone. If you have a critical or abnormal lab result, we will notify you by phone as soon as possible.  Submit refill requests through [employer] or call your pharmacy and they will forward the refill " request to us. Please allow 3 business days for your refill to be completed.          Additional Information About Your Visit        [employer] Information     [employer] gives you secure access to your electronic health record. If you see a primary care provider, you can also send messages to your care team and make appointments. If you have questions, please call your primary care clinic.  If you do not have a primary care provider, please call [phone] and they will assist you.        Care EveryWhere ID     This is your Care EveryWhere ID. This could be used by other organizations to access your [place] medical records  [id number]        Your Vitals Were     Pulse Last Period                68 11/08/1991           Blood Pressure from Last 3 Encounters:   06/26/17 130/80   01/31/17 128/70   11/06/15 164/89    Weight from Last 3 Encounters:   01/31/17 172 lb 8 oz (78.2 kg)   11/06/15 160 lb (72.6 kg)   06/09/15 169 lb 1.6 oz (76.7 kg)              Today, you had the following     No orders found for display         Today's Medication Changes          These changes are accurate as of: 6/26/17  9:57 AM.  If you have any questions, ask your nurse or doctor.               These medicines have changed or have updated prescriptions.        Dose/Directions    omeprazole 20 MG CR capsule   Commonly known as:  priLOSEC   This may have changed:  See the new instructions.   Used for:  Gastroesophageal reflux disease without esophagitis   Changed by:  Jared Emmanuel MD        Dose:  20 mg   Take 1 capsule (20 mg) by mouth daily   Quantity:  90 capsule   Refills:  3       * tolterodine 4 MG 24 hr capsule   Commonly known as:  DETROL LA   This may have changed:  Another medication with the same name was added. Make sure you understand how and when to take each.   Used for:  OAB (overactive bladder)   Changed by:  Michael Lin MD        Dose:  4 mg   Take 1 capsule (4 mg) by mouth daily   Quantity:  30 capsule    Refills:  1       * tolterodine 2 MG 24 hr capsule   Commonly known as:  DETROL LA   This may have changed:  You were already taking a medication with the same name, and this prescription was added. Make sure you understand how and when to take each.   Used for:  OAB (overactive bladder)   Changed by:  Jared Emmanuel MD        Dose:  2 mg   Take 1 capsule (2 mg) by mouth daily   Quantity:  90 capsule   Refills:  1       * Notice:  This list has 2 medication(s) that are the same as other medications prescribed for you. Read the directions carefully, and ask your doctor or other care provider to review them with you.         Where to get your medicines      These medications were sent to [facility] - [facility] [address]  [address], [facility] [address]     Phone:  [phone]     estradiol 0.1 MG/GM cream    omeprazole 20 MG CR capsule    pravastatin 40 MG tablet    tolterodine 2 MG 24 hr capsule                Primary Care Provider Office Phone # Fax #    Jared Emmanuel [phone]       [facility] [address] [place] [id number]        Equal Access to Services     YADIRA [facility] County[facility]ARMEN : Hadii aad ku hadasho Soomaali, waaxda luqadaha, qaybta kaalmada adeegyada, gwyn hess haybettina duran . So [facility] [phone].    ATENCIÓN: Si habla español, tiene a lucio disposición servicios gratuitos de asistencia lingüística. [facility] [phone].    We comply with applicable federal civil rights laws and [place] laws. We do not discriminate on the basis of race, color, national origin, age, disability sex, sexual orientation or gender identity.            Thank you!     Thank you for choosing [facility]  for your care. Our goal is always to provide you with excellent care. Hearing back from our patients is one way we can continue to improve our services. Please take a few minutes to complete the  written survey that you may receive in the mail after your visit with us. Thank you!             Your Updated Medication List - Protect others around you: Learn how to safely use, store and throw away your medicines at www.disposemymeds.org.          This list is accurate as of: 6/26/17  9:57 AM.  Always use your most recent med list.                   Brand Name Dispense Instructions for use Diagnosis    ALEVE 220 MG capsule   Generic drug:  naproxen sodium      Reported on 4/5/2017        ascorbic acid 500 MG tablet    VITAMIN C    60    1 tablet daily    Routine gynecological examination       aspirin 81 MG tablet      ONE DAILY    Other and unspecified hyperlipidemia       CALCIUM 500 + D PO      None Entered        cyanocobalamin 1000 MCG tablet    vitamin  B-12     1 tablet daily    Esophageal reflux       estradiol 0.1 MG/GM cream    ESTRACE VAGINAL    42.5 g    Place 1 g vaginally twice a week    Atrophic vaginitis       Fish Oil 500 MG Caps      1 tablet daily    Routine gynecological examination       glucosamine 500 MG Tabs      1 tablet prn        omeprazole 20 MG CR capsule    priLOSEC    90 capsule    Take 1 capsule (20 mg) by mouth daily    Gastroesophageal reflux disease without esophagitis       order for DME     1 Device    Equipment being ordered: tennis elbow strap    Lateral epicondylitis, right       pravastatin 40 MG tablet    PRAVACHOL    90 tablet    Take 1 tablet (40 mg) by mouth daily    Hyperlipidemia LDL goal <130       * tolterodine 4 MG 24 hr capsule    DETROL LA    30 capsule    Take 1 capsule (4 mg) by mouth daily    OAB (overactive bladder)       * tolterodine 2 MG 24 hr capsule    DETROL LA    90 capsule    Take 1 capsule (2 mg) by mouth daily    OAB (overactive bladder)       triamcinolone 0.5 % cream    KENALOG    15 g    Apply sparingly once or twice per day as needed to affected area until the skin is better, then stop; REPEAT AS NEEDED    Dermatitis       vitamin D 2000 UNITS  tablet      Take 2,000 Units by mouth daily.    Major depressive disorder, single episode, mild (H)       vitamin E 400 UNIT capsule      Take 1 capsule (400 Units) by mouth daily        * Notice:  This list has 2 medication(s) that are the same as other medications prescribed for you. Read the directions carefully, and ask your doctor or other care provider to review them with you.
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
## 2017-06-26 NOTE — PATIENT INSTRUCTIONS
"  *  Trial of lower dose Detrol (Tolterodine) 2 mg to see if the bladder control happens with less lower extremity edema     *  Continue all other medications at the same doses.  Contact your usual pharmacy if you need refills.               5 GOALS TO PREVENT VASCULAR DISEASE:     1.  Aggressive blood pressure control, under 130/80 ideally.  Using medications if needed.    Your blood pressure is under good control    BP Readings from Last 4 Encounters:   06/26/17 130/80   01/31/17 128/70   11/06/15 164/89   11/06/15 126/76       2.  Aggressive LDL cholesterol (\"bad cholesterol\") lowering as indicated.    Your goal is an LDL under 130 for sure, preferably under 100.  (If you have diabetes or previous vascular disease, the the LDL goals would be under 100 for sure, preferably under 70.)    New guidelines identify four high-risk groups who could benefit from statins:   *people with pre-existing heart disease, such as those who have had a heart attack;   *people ages 40 to 75 who have diabetes of any type  *patients ages 40 to 75 with at least a 7.5% risk of developing cardiovascular disease over the next decade, according to a formula described in the guidelines  *patients with the sort of super-high cholesterol that sometimes runs in families, as evidenced by an LDL of 190 milligrams per deciliter or higher    Your cholesterol levels are well controlled.    Recent Labs   Lab Test  06/23/17   0957  02/16/16   0930  05/22/14   1013  06/03/13   0927   CHOL  157  153  171  161   HDL  62  63  54  51   LDL  80  76  101  91   TRIG  77  70  78  96   CHOLHDLRATIO   --    --   3.1  3.1       3.  Aggressive diabetic prevention, screening and/or management.      You do not have diabetes as of the most recent blood tests.     4.  No smoking    5.  Consider taking low dose aspirin (81 mg) tablet once per day over the age of 50, every day unless there is a specific reason that you cannot take aspirin (such as side effect, allergy, " "or you are on another \"blood thinner\").        --Based on your current cardiac risk factors, you should take Aspirin 81 mg once per day if you are over 50 years of age.             Preventive Health Recommendations    Female Ages 65 +    Yearly exam:     See your health care provider every year in order to  o Review health changes.   o Discuss preventive care.    o Review your medicines if your doctor has prescribed any.      You no longer need a yearly Pap test unless you've had an abnormal Pap test in the past 10 years. If you have vaginal symptoms, such as bleeding or discharge, be sure to talk with your provider about a Pap test.      Every 1 to 2 years, have a mammogram.  If you are over 69, talk with your health care provider about whether or not you want to continue having screening mammograms.      Every 10 years, have a colonoscopy. Or, have a yearly FIT test (stool test). These exams will check for colon cancer.       Have a cholesterol test every 5 years, or more often if your doctor advises it.       Have a diabetes test (fasting glucose) every three years. If you are at risk for diabetes, you should have this test more often.       At age 65, have a bone density scan (DEXA) to check for osteoporosis (brittle bone disease).    Shots:    Get a flu shot each year.    Get a tetanus shot every 10 years.    Talk to your doctor about your pneumonia vaccines. There are now two you should receive - Pneumovax (PPSV 23) and Prevnar (PCV 13).    Talk to your doctor about the shingles vaccine.    Talk to your doctor about the hepatitis B vaccine.    Nutrition:     Eat at least 5 servings of fruits and vegetables each day.      Eat whole-grain bread, whole-wheat pasta and brown rice instead of white grains and rice.      Talk to your provider about Calcium and Vitamin D.        --Good Grains:  Oats, brown rice, Quinoa (these do not raise the blood sugar as much)     --Bad grains:  Anything made from wheat or white " "rice     (because these raise the blood sugars significantly, and the possible gluten issue from wheat for some people).      --Proteins:  Aim for \"lean proteins\" including chicken, fish, seafood, pork, turkey, and eggs (in moderation); Eat red meat only occasionally        Lifestyle    Exercise at least 150 minutes a week (30 minutes a day, 5 days a week). This will help you control your weight and prevent disease.      Limit alcohol to one drink per day.      No smoking.       Wear sunscreen to prevent skin cancer.       See your dentist twice a year for an exam and cleaning.      See your eye doctor every 1 to 2 years to screen for conditions such as glaucoma, macular degeneration and cataracts.    Service Typically covered by medicare Recommended Completed   Vaccines    Pneumococcal     Once for patients after age 65       Influenza   Yearly       Hepatitis B           (if medium/high risk) Medium/high risk factors:    End Stage Kidney Disease    Hemophiliacs who received Factor XIII or IX concentrates    Clients of institutions for developmentally disabled    Persons who live in same house as a Hepatitis B carrier    Homosexual men    Illicit injectable drug users    Health care workers    Staff of institutions for developmentally disabled     Mammogram Covered: One-time screen between age 35-39, annually for age 40+     Pap and Pelvic Exam Covered: Annually if  high risk,  or childbearing age with abnormal Pap in last 3 years.  Q24 months for all other women     Prostate Cancer Screening    Digital rectal exam    PSA Covered: Annually for all men > age 50     Colorectal Cancer Screening Screening colonoscopy every 10 years, more often for high risk patients     Diabetes self-management training Requires referral by treating physician for patient with diabetes     Diabetes screening    Fasting blood sugar or glucose tolerance test Once yearly, twice yearly if prediabetic     Cardiovascular screening blood " tests    Total cholesterol    HDL    Triglycerides Every 5 years     Medical nutrition therapy for diabetes or renal disease Requires referral by treating physician for patient with diabetes or kidney disease     Glaucoma screening Annually for patients with one of the following risk factors:    Diabetes mellitus    Family history of glaucoma    -American age 50 and over    -American age 65 and over     Bone mass measurement Every 24 months if one of the following risk factors:    Estrogen deficiency    Vertebral abnormalities on x-ray indicative of osteoporosis, osteopenia, or vertebral fracture    Receiving/expected to receive the equivalent of at least 5 mg of Prednisone per day for > 3 months.    Hyperparathyroidism    Patient being monitored for response to osteoporosis therapy     One-time AAA screen  Must be ordered as part of Medicare IPPE   Any patient with a family history of AAA    Males Age 65-75, with history of smoking at least 100 cigarettes in lifetime     Smoking Cessation Counseling Beneficiaries who use tobacco are eligible to receive 2 cessation attempts per year; each attempt includes maximum of 4 sessions.     HIV Screening Annually for beneficiaries at increased risk:       Increased risk for HIV infection is defined in the  National Coverage Determinations (NCD) Manual,  Publication 100-03 Sections 190.14 (diagnostic) and 210.7 (screening). See http://www.cms.gov/manuals/downloads/kes981a9_Uvvy4.pdf and http://www.cms.gov/manuals/downloads/hgj320e7_Icmy1.pdf on the Internet.  Three times per pregnancy for beneficiaries who are pregnant.       Future Annual Wellness Visit Annually, for all beneficiaries.

## 2017-08-01 ENCOUNTER — RADIANT APPOINTMENT (OUTPATIENT)
Dept: GENERAL RADIOLOGY | Facility: CLINIC | Age: 77
End: 2017-08-01
Attending: PHYSICIAN ASSISTANT
Payer: COMMERCIAL

## 2017-08-01 ENCOUNTER — OFFICE VISIT (OUTPATIENT)
Dept: URGENT CARE | Facility: URGENT CARE | Age: 77
End: 2017-08-01
Payer: COMMERCIAL

## 2017-08-01 VITALS
BODY MASS INDEX: 28.39 KG/M2 | HEART RATE: 73 BPM | OXYGEN SATURATION: 97 % | WEIGHT: 165.4 LBS | SYSTOLIC BLOOD PRESSURE: 130 MMHG | TEMPERATURE: 98.1 F | DIASTOLIC BLOOD PRESSURE: 80 MMHG

## 2017-08-01 DIAGNOSIS — R07.81 RIB PAIN ON RIGHT SIDE: Primary | ICD-10-CM

## 2017-08-01 DIAGNOSIS — R07.81 RIB TENDERNESS: ICD-10-CM

## 2017-08-01 DIAGNOSIS — R07.81 RIB PAIN ON RIGHT SIDE: ICD-10-CM

## 2017-08-01 PROCEDURE — 71101 X-RAY EXAM UNILAT RIBS/CHEST: CPT | Mod: RT

## 2017-08-01 PROCEDURE — 99213 OFFICE O/P EST LOW 20 MIN: CPT | Performed by: PHYSICIAN ASSISTANT

## 2017-08-01 NOTE — MR AVS SNAPSHOT
After Visit Summary   8/1/2017    Brielle Marvin    MRN: [id number]           Patient Information     Date Of Birth          1940        Visit Information        Provider Department      8/1/2017 11:10 AM Enmanuel Carbajal PA-C [facility]        Today's Diagnoses     Rib pain on right side    -  1    Rib tenderness           Follow-ups after your visit        Who to contact     If you have questions or need follow up information about today's clinic visit or your schedule please contact [facility] directly at [phone].  Normal or non-critical lab and imaging results will be communicated to you by [employer], letter or phone within 4 business days after the clinic has received the results. If you do not hear from us within 7 days, please contact the clinic through [employer] or phone. If you have a critical or abnormal lab result, we will notify you by phone as soon as possible.  Submit refill requests through [employer] or call your pharmacy and they will forward the refill request to us. Please allow 3 business days for your refill to be completed.          Additional Information About Your Visit        MyChart Information     [employer] gives you secure access to your electronic health record. If you see a primary care provider, you can also send messages to your care team and make appointments. If you have questions, please call your primary care clinic.  If you do not have a primary care provider, please call [phone] and they will assist you.        Care EveryWhere ID     This is your Care EveryWhere ID. This could be used by other organizations to access your [facility] medical records  [id number]        Your Vitals Were     Pulse Temperature Last Period Pulse Oximetry BMI (Body Mass Index)       73 98.1  F (36.7  C) (Oral) 11/08/1991 97% 28.39 kg/m2        Blood Pressure from Last 3 Encounters:   08/01/17 130/80   06/26/17 130/80    01/31/17 128/70    Weight from Last 3 Encounters:   08/01/17 165 lb 6.4 oz (75 kg)   01/31/17 172 lb 8 oz (78.2 kg)   11/06/15 160 lb (72.6 kg)               Primary Care Provider Office Phone # Fax #    Jared Emmanuel [phone]       [facility] [address] [facility] [address]        Equal Access to Services     GINNA MERCEDES : Hadii aad ku hadasho Soomaali, waaxda luqadaha, qaybta kaalmada adeegyada, waxay idiin hayaan adeeg kharash la'aan ah. So [facility] [phone].    ATENCIÓN: Si habla español, tiene a lucio disposición servicios gratuitos de asistencia lingüística. [facility] [phone].    We comply with applicable federal civil rights laws and [place] laws. We do not discriminate on the basis of race, color, national origin, age, disability sex, sexual orientation or gender identity.            Thank you!     Thank you for choosing [facility]  for your care. Our goal is always to provide you with excellent care. Hearing back from our patients is one way we can continue to improve our services. Please take a few minutes to complete the written survey that you may receive in the mail after your visit with us. Thank you!             Your Updated Medication List - Protect others around you: Learn how to safely use, store and throw away your medicines at www.disposemymeds.org.          This list is accurate as of: 8/1/17 12:02 PM.  Always use your most recent med list.                   Brand Name Dispense Instructions for use Diagnosis    ALEVE 220 MG capsule   Generic drug:  naproxen sodium      Reported on 4/5/2017        ascorbic acid 500 MG tablet    VITAMIN C    60    1 tablet daily    Routine gynecological examination       aspirin 81 MG tablet      ONE DAILY    Other and unspecified hyperlipidemia       CALCIUM 500 + D PO      None Entered        cyanocobalamin 1000 MCG tablet    vitamin  B-12     1 tablet daily    Esophageal reflux        estradiol 0.1 MG/GM cream    ESTRACE VAGINAL    42.5 g    Place 1 g vaginally twice a week    Atrophic vaginitis       Fish Oil 500 MG Caps      1 tablet daily    Routine gynecological examination       glucosamine 500 MG Tabs      1 tablet prn        omeprazole 20 MG CR capsule    priLOSEC    90 capsule    Take 1 capsule (20 mg) by mouth daily    Gastroesophageal reflux disease without esophagitis       order for DME     1 Device    Equipment being ordered: tennis elbow strap    Lateral epicondylitis, right       pravastatin 40 MG tablet    PRAVACHOL    90 tablet    Take 1 tablet (40 mg) by mouth daily    Hyperlipidemia LDL goal <130       * tolterodine 4 MG 24 hr capsule    DETROL LA    30 capsule    Take 1 capsule (4 mg) by mouth daily    OAB (overactive bladder)       * tolterodine 2 MG 24 hr capsule    DETROL LA    90 capsule    Take 1 capsule (2 mg) by mouth daily    OAB (overactive bladder)       triamcinolone 0.5 % cream    KENALOG    15 g    Apply sparingly once or twice per day as needed to affected area until the skin is better, then stop; REPEAT AS NEEDED    Dermatitis       vitamin D 2000 UNITS tablet      Take 2,000 Units by mouth daily.    Major depressive disorder, single episode, mild (H)       vitamin E 400 UNIT capsule      Take 1 capsule (400 Units) by mouth daily        * Notice:  This list has 2 medication(s) that are the same as other medications prescribed for you. Read the directions carefully, and ask your doctor or other care provider to review them with you.
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
## 2017-08-01 NOTE — NURSING NOTE
"Chief Complaint   Patient presents with     Back Pain     Rt side back injury and pain x 2 days. Pt states that she got up in the middle of the night and hit her head in the closet which made her lose balance and hit her back in the door knob.        Initial /80 (BP Location: Right arm, Patient Position: Chair, Cuff Size: Adult Regular)  Pulse 73  Temp 98.1  F (36.7  C) (Oral)  Wt 165 lb 6.4 oz (75 kg)  LMP 11/08/1991  SpO2 97%  BMI 28.39 kg/m2 Estimated body mass index is 28.39 kg/(m^2) as calculated from the following:    Height as of 1/31/17: 5' 4\" (1.626 m).    Weight as of this encounter: 165 lb 6.4 oz (75 kg).  Medication Reconciliation: complete    "

## 2017-08-08 ENCOUNTER — TELEPHONE (OUTPATIENT)
Dept: INTERNAL MEDICINE | Facility: CLINIC | Age: 77
End: 2017-08-08

## 2017-08-08 NOTE — TELEPHONE ENCOUNTER
Patient called back. Writer asked about the redness to the patient's right side. Pt stated that the redness is from the right side below the bra line to the hip and is curving to the back. Per triage questions, patient stated she stopped using the Salonpas patch last Friday and the redness is still present, warm to the touch, and was itching earlier. Writer advised to be seen by MD. Appointment scheduled for tomorrow.

## 2017-08-08 NOTE — TELEPHONE ENCOUNTER
Reason for Call:  Other     Detailed comments: Patient was seen in urgent care on 08/01/17. She said, provider prescribe her jelly patch to put on her back. She didn't notice the first time, where she puts the patch on the back. Now it is red and hot. Would like to get a call back from nurse whether she should be seen for that?    Phone Number Patient can be reached at: Cell number on file:    Telephone Information:   Mobile [phone]       Best Time: Anytime    Can we leave a detailed message on this number? YES    Call taken on 8/8/2017 at 12:48 PM by AL ROACH

## 2017-08-09 ENCOUNTER — OFFICE VISIT (OUTPATIENT)
Dept: INTERNAL MEDICINE | Facility: CLINIC | Age: 77
End: 2017-08-09
Payer: COMMERCIAL

## 2017-08-09 VITALS
DIASTOLIC BLOOD PRESSURE: 72 MMHG | WEIGHT: 166.4 LBS | TEMPERATURE: 97.7 F | OXYGEN SATURATION: 95 % | BODY MASS INDEX: 28.56 KG/M2 | HEART RATE: 85 BPM | SYSTOLIC BLOOD PRESSURE: 132 MMHG

## 2017-08-09 DIAGNOSIS — R07.89 RIGHT-SIDED CHEST WALL PAIN: ICD-10-CM

## 2017-08-09 DIAGNOSIS — L23.1 CONTACT DERMATITIS DUE TO ADHESIVES, UNSPECIFIED CONTACT DERMATITIS TYPE: Primary | ICD-10-CM

## 2017-08-09 PROCEDURE — 99213 OFFICE O/P EST LOW 20 MIN: CPT | Performed by: INTERNAL MEDICINE

## 2017-08-09 RX ORDER — FLUOCINONIDE 0.5 MG/G
CREAM TOPICAL
Qty: 30 G | Refills: 1 | Status: SHIPPED | OUTPATIENT
Start: 2017-08-09 | End: 2019-07-10
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
## 2017-08-09 NOTE — NURSING NOTE
"Chief Complaint   Patient presents with     Derm Problem     reaction from med- Salonpas. Redness and warmth X 1 week       Initial /72  Pulse 85  Temp 97.7  F (36.5  C) (Oral)  Wt 166 lb 6.4 oz (75.5 kg)  LMP 11/08/1991  SpO2 95%  BMI 28.56 kg/m2 Estimated body mass index is 28.56 kg/(m^2) as calculated from the following:    Height as of 1/31/17: 5' 4\" (1.626 m).    Weight as of this encounter: 166 lb 6.4 oz (75.5 kg).  Medication Reconciliation: complete   Peggy Peres CMA      "

## 2017-08-09 NOTE — PATIENT INSTRUCTIONS
*  Contact dermatitis from the adhesive from the pain patch that you wore.     *  Steroid cream on the area:    Apply sparingly once or twice per day as needed to affected area until the skin is better, then stop; REPEAT AS NEEDED

## 2017-08-09 NOTE — MR AVS SNAPSHOT
After Visit Summary   8/9/2017    Brielle Marvin    MRN: [id number]           Patient Information     Date Of Birth          1940        Visit Information        Provider Department      8/9/2017 2:00 PM Jared Emmanuel MD [facility]        Today's Diagnoses     Contact dermatitis due to adhesives, unspecified contact dermatitis type    -  1    Right-sided chest wall pain          Care Instructions    *  Contact dermatitis from the adhesive from the pain patch that you wore.     *  Steroid cream on the area:    Apply sparingly once or twice per day as needed to affected area until the skin is better, then stop; REPEAT AS NEEDED               Follow-ups after your visit        Who to contact     If you have questions or need follow up information about today's clinic visit or your schedule please contact [facility] directly at [phone].  Normal or non-critical lab and imaging results will be communicated to you by [employer], letter or phone within 4 business days after the clinic has received the results. If you do not hear from us within 7 days, please contact the clinic through [employer] or phone. If you have a critical or abnormal lab result, we will notify you by phone as soon as possible.  Submit refill requests through [employer] or call your pharmacy and they will forward the refill request to us. Please allow 3 business days for your refill to be completed.          Additional Information About Your Visit        MyChart Information     [employer] gives you secure access to your electronic health record. If you see a primary care provider, you can also send messages to your care team and make appointments. If you have questions, please call your primary care clinic.  If you do not have a primary care provider, please call [phone] and they will assist you.        Care EveryWhere ID     This is your Care EveryWhere ID. This could be used  by other organizations to access your [facility] medical records  [id number]        Your Vitals Were     Pulse Temperature Last Period Pulse Oximetry BMI (Body Mass Index)       85 97.7  F (36.5  C) (Oral) 11/08/1991 95% 28.56 kg/m2        Blood Pressure from Last 3 Encounters:   08/09/17 132/72   08/01/17 130/80   06/26/17 130/80    Weight from Last 3 Encounters:   08/09/17 166 lb 6.4 oz (75.5 kg)   08/01/17 165 lb 6.4 oz (75 kg)   01/31/17 172 lb 8 oz (78.2 kg)              Today, you had the following     No orders found for display         Today's Medication Changes          These changes are accurate as of: 8/9/17  2:41 PM.  If you have any questions, ask your nurse or doctor.               Start taking these medicines.        Dose/Directions    fluocinonide 0.05 % cream   Commonly known as:  LIDEX   Used for:  Contact dermatitis due to adhesives, unspecified contact dermatitis type   Started by:  Jared Emmanuel MD        Apply sparingly once or twice per day as needed to affected area until the skin is better, then stop; REPEAT AS NEEDED   Quantity:  30 g   Refills:  1            Where to get your medicines      These medications were sent to [facility] [place] [id number]     Phone:  [phone]     fluocinonide 0.05 % cream                Primary Care Provider Office Phone # Fax #    Jared Emamnuel [phone]       [address] [place] [address]        Equal Access to Services     GINNA MERCEDES : Hadii aad ku hadasho Soomaali, waaxda luqadaha, qaybta kaalmada adevera, gwyn ididhiraj figueroa. So [facility] [phone].    ATENCIÓN: Si habla español, tiene a lucio disposición servicios gratuitos de asistencia lingüística. Llame al [phone].    We comply with applicable federal civil rights laws and [place] laws. We do not discriminate on the basis of race, color, national  origin, age, disability sex, sexual orientation or gender identity.            Thank you!     Thank you for choosing [facility]  for your care. Our goal is always to provide you with excellent care. Hearing back from our patients is one way we can continue to improve our services. Please take a few minutes to complete the written survey that you may receive in the mail after your visit with us. Thank you!             Your Updated Medication List - Protect others around you: Learn how to safely use, store and throw away your medicines at www.disposemymeds.org.          This list is accurate as of: 8/9/17  2:41 PM.  Always use your most recent med list.                   Brand Name Dispense Instructions for use Diagnosis    ALEVE 220 MG capsule   Generic drug:  naproxen sodium      Reported on 4/5/2017        ascorbic acid 500 MG tablet    VITAMIN C    60    1 tablet daily    Routine gynecological examination       aspirin 81 MG tablet      ONE DAILY    Other and unspecified hyperlipidemia       CALCIUM 500 + D PO      None Entered        cyanocobalamin 1000 MCG tablet    vitamin  B-12     1 tablet daily    Esophageal reflux       estradiol 0.1 MG/GM cream    ESTRACE VAGINAL    42.5 g    Place 1 g vaginally twice a week    Atrophic vaginitis       Fish Oil 500 MG Caps      1 tablet daily    Routine gynecological examination       fluocinonide 0.05 % cream    LIDEX    30 g    Apply sparingly once or twice per day as needed to affected area until the skin is better, then stop; REPEAT AS NEEDED    Contact dermatitis due to adhesives, unspecified contact dermatitis type       glucosamine 500 MG Tabs      1 tablet prn        omeprazole 20 MG CR capsule    priLOSEC    90 capsule    Take 1 capsule (20 mg) by mouth daily    Gastroesophageal reflux disease without esophagitis       pravastatin 40 MG tablet    PRAVACHOL    90 tablet    Take 1 tablet (40 mg) by mouth daily    Hyperlipidemia LDL goal <130        * tolterodine 4 MG 24 hr capsule    DETROL LA    30 capsule    Take 1 capsule (4 mg) by mouth daily    OAB (overactive bladder)       * tolterodine 2 MG 24 hr capsule    DETROL LA    90 capsule    Take 1 capsule (2 mg) by mouth daily    OAB (overactive bladder)       triamcinolone 0.5 % cream    KENALOG    15 g    Apply sparingly once or twice per day as needed to affected area until the skin is better, then stop; REPEAT AS NEEDED    Dermatitis       vitamin D 2000 UNITS tablet      Take 2,000 Units by mouth daily.    Major depressive disorder, single episode, mild (H)       vitamin E 400 UNIT capsule      Take 1 capsule (400 Units) by mouth daily        * Notice:  This list has 2 medication(s) that are the same as other medications prescribed for you. Read the directions carefully, and ask your doctor or other care provider to review them with you.

## 2017-11-22 ENCOUNTER — OFFICE VISIT (OUTPATIENT)
Dept: INTERNAL MEDICINE | Facility: CLINIC | Age: 77
End: 2017-11-22
Payer: COMMERCIAL

## 2017-11-22 VITALS
BODY MASS INDEX: 28.67 KG/M2 | HEART RATE: 90 BPM | RESPIRATION RATE: 16 BRPM | OXYGEN SATURATION: 96 % | TEMPERATURE: 97.6 F | WEIGHT: 167 LBS | DIASTOLIC BLOOD PRESSURE: 64 MMHG | SYSTOLIC BLOOD PRESSURE: 122 MMHG

## 2017-11-22 DIAGNOSIS — Z23 NEED FOR PROPHYLACTIC VACCINATION AND INOCULATION AGAINST INFLUENZA: ICD-10-CM

## 2017-11-22 DIAGNOSIS — M54.50 ACUTE RIGHT-SIDED LOW BACK PAIN WITHOUT SCIATICA: Primary | ICD-10-CM

## 2017-11-22 PROCEDURE — 99213 OFFICE O/P EST LOW 20 MIN: CPT | Mod: 25 | Performed by: INTERNAL MEDICINE

## 2017-11-22 PROCEDURE — 90662 IIV NO PRSV INCREASED AG IM: CPT | Performed by: INTERNAL MEDICINE

## 2017-11-22 PROCEDURE — G0008 ADMIN INFLUENZA VIRUS VAC: HCPCS | Performed by: INTERNAL MEDICINE

## 2017-11-22 NOTE — NURSING NOTE
"Chief Complaint   Patient presents with     Musculoskeletal Problem     Medication Problem     feels medication for incontinance is no longer working        Initial /64 (BP Location: Left arm, Cuff Size: Adult Regular)  Pulse 90  Temp 97.6  F (36.4  C) (Oral)  Resp 16  Wt 167 lb (75.8 kg)  LMP 11/08/1991  SpO2 96%  BMI 28.67 kg/m2 Estimated body mass index is 28.67 kg/(m^2) as calculated from the following:    Height as of 1/31/17: 5' 4\" (1.626 m).    Weight as of this encounter: 167 lb (75.8 kg).  Medication Reconciliation: complete   Yen Martinez MA      "

## 2017-11-22 NOTE — MR AVS SNAPSHOT
After Visit Summary   11/22/2017    Brielle Marvin    MRN: [id number]           Patient Information     Date Of Birth          1940        Visit Information        Provider Department      11/22/2017 3:00 PM Jared Emmanuel MD [facility]        Today's Diagnoses     Need for prophylactic vaccination and inoculation against influenza    -  1      Care Instructions    *  Lumbar strain/sprain/spasm    *  based on your history,  No evidence for herniated disc, spinal stenosis, or vertebral fracture.    *  take over the counter Motrin/Ibuprofen/Advil or Aleve to help relieve pain and inflammation.  follow the directions on the bottle.  Be sure to take with a small meal to prevent stomach upset.      --Motrin 600 mg ( 3 x 200 mg tablets) three times per day every day for 5-7 days, then 2-3 timers per day as needed (take with food to avoid stomach side effects)     OR     --Aleve 2 tablets twice per day for 5-7 days every day, then twice per day as needed     *  We may consider anti-spasm medication if the spasms continue.     *  do not go out of your way for activity, however, do not avoid basic activates and gentle activity.  Do not lay on the sofa, do not go on bed rest.      *  moist heat as needed ( do not use a heating pad for longer than 20 minutes to lower the risk of burns)    *  avoid any lifting for the next 1 week, then a slow return to activity.  Remember to always use proper lifting technique, always bending at the knees rather than the waist.  Your thigh muscles are MUCH stronger than the smaller muscle of your lower back.    *  Physical therapy through [facility] (many locations throughout the south and [facility]) as needed.    *  expect your back to be more easily re-aggravated over the next few months.  the soft tissue and muscles are going to be more easily re-irritated over the next several weeks so be careful to return to physical  action slowly.         resouces for low back pain exercises:     --[email or web address]/[email or web address]     --[email or web address]                     Follow-ups after your visit        Who to contact     If you have questions or need follow up information about today's clinic visit or your schedule please contact [facility] directly at [phone].  Normal or non-critical lab and imaging results will be communicated to you by [employer], letter or phone within 4 business days after the clinic has received the results. If you do not hear from us within 7 days, please contact the clinic through [employer] or phone. If you have a critical or abnormal lab result, we will notify you by phone as soon as possible.  Submit refill requests through [employer] or call your pharmacy and they will forward the refill request to us. Please allow 3 business days for your refill to be completed.          Additional Information About Your Visit        MyChart Information     [employer] gives you secure access to your electronic health record. If you see a primary care provider, you can also send messages to your care team and make appointments. If you have questions, please call your primary care clinic.  If you do not have a primary care provider, please call [phone] and they will assist you.        Care EveryWhere ID     This is your Care EveryWhere ID. This could be used by other organizations to access your [place] medical records  [id number]        Your Vitals Were     Pulse Temperature Respirations Last Period Pulse Oximetry BMI (Body Mass Index)    90 97.6  F (36.4  C) (Oral) 16 11/08/1991 96% 28.67 kg/m2       Blood Pressure from Last 3 Encounters:   11/22/17 122/64   08/09/17 132/72   08/01/17 130/80    Weight from Last 3 Encounters:   11/22/17 167 lb (75.8 kg)   08/09/17 166 lb 6.4 oz (75.5 kg)   08/01/17 165  lb 6.4 oz (75 kg)              We Performed the Following     FLU VACCINE, INCREASED ANTIGEN, PRESV FREE, AGE 65+ [12716]     Vaccine Administration, Initial [00013]        Primary Care Provider Office Phone # Fax #    Jared Emmanuel [phone]       [address] [facility] [address]        Equal Access to Services     [facility]: Hadii aad ku hadasho Soomaali, waaxda luqadaha, qaybta kaalmada adeegyada, waxay idiin hayaan adeeg kharash la'aan . So [facility] [phone].    ATENCIÓN: Si habla español, tiene a lucio disposición servicios gratuitos de asistencia lingüística. Llame al [phone].    We comply with applicable federal civil rights laws and [place] laws. We do not discriminate on the basis of race, color, national origin, age, disability, sex, sexual orientation, or gender identity.            Thank you!     Thank you for choosing [facility]  for your care. Our goal is always to provide you with excellent care. Hearing back from our patients is one way we can continue to improve our services. Please take a few minutes to complete the written survey that you may receive in the mail after your visit with us. Thank you!             Your Updated Medication List - Protect others around you: Learn how to safely use, store and throw away your medicines at www.disposemymeds.org.          This list is accurate as of: 11/22/17  4:05 PM.  Always use your most recent med list.                   Brand Name Dispense Instructions for use Diagnosis    ALEVE 220 MG capsule   Generic drug:  naproxen sodium      Reported on 4/5/2017        ascorbic acid 500 MG tablet    VITAMIN C    60    1 tablet daily    Routine gynecological examination       aspirin 81 MG tablet      ONE DAILY    Other and unspecified hyperlipidemia       CALCIUM 500 + D PO      None Entered        cyanocobalamin 1000 MCG tablet    vitamin  B-12     1 tablet daily    Esophageal reflux        estradiol 0.1 MG/GM cream    ESTRACE VAGINAL    42.5 g    Place 1 g vaginally twice a week    Atrophic vaginitis       Fish Oil 500 MG Caps      1 tablet daily    Routine gynecological examination       fluocinonide 0.05 % cream    LIDEX    30 g    Apply sparingly once or twice per day as needed to affected area until the skin is better, then stop; REPEAT AS NEEDED    Contact dermatitis due to adhesives, unspecified contact dermatitis type       glucosamine 500 MG Tabs      1 tablet prn        omeprazole 20 MG CR capsule    priLOSEC    90 capsule    Take 1 capsule (20 mg) by mouth daily    Gastroesophageal reflux disease without esophagitis       pravastatin 40 MG tablet    PRAVACHOL    90 tablet    Take 1 tablet (40 mg) by mouth daily    Hyperlipidemia LDL goal <130       * tolterodine 4 MG 24 hr capsule    DETROL LA    30 capsule    Take 1 capsule (4 mg) by mouth daily    OAB (overactive bladder)       * tolterodine 2 MG 24 hr capsule    DETROL LA    90 capsule    Take 1 capsule (2 mg) by mouth daily    OAB (overactive bladder)       triamcinolone 0.5 % cream    KENALOG    15 g    Apply sparingly once or twice per day as needed to affected area until the skin is better, then stop; REPEAT AS NEEDED    Dermatitis       vitamin D 2000 UNITS tablet      Take 2,000 Units by mouth daily.    Major depressive disorder, single episode, mild (H)       vitamin E 400 UNIT capsule      Take 1 capsule (400 Units) by mouth daily        * Notice:  This list has 2 medication(s) that are the same as other medications prescribed for you. Read the directions carefully, and ask your doctor or other care provider to review them with you.

## 2017-11-22 NOTE — PATIENT INSTRUCTIONS
*  Lumbar strain/sprain/spasm    *  based on your history,  No evidence for herniated disc, spinal stenosis, or vertebral fracture.    *  take over the counter Motrin/Ibuprofen/Advil or Aleve to help relieve pain and inflammation.  follow the directions on the bottle.  Be sure to take with a small meal to prevent stomach upset.      --Motrin 600 mg ( 3 x 200 mg tablets) three times per day every day for 5-7 days, then 2-3 timers per day as needed (take with food to avoid stomach side effects)     OR     --Aleve 2 tablets twice per day for 5-7 days every day, then twice per day as needed     *  We may consider anti-spasm medication if the spasms continue.     *  do not go out of your way for activity, however, do not avoid basic activates and gentle activity.  Do not lay on the sofa, do not go on bed rest.      *  moist heat as needed ( do not use a heating pad for longer than 20 minutes to lower the risk of burns)    *  avoid any lifting for the next 1 week, then a slow return to activity.  Remember to always use proper lifting technique, always bending at the knees rather than the waist.  Your thigh muscles are MUCH stronger than the smaller muscle of your lower back.    *  Physical therapy through [facility] (many locations throughout the south and [facility]) as needed.    *  expect your back to be more easily re-aggravated over the next few months.  the soft tissue and muscles are going to be more easily re-irritated over the next several weeks so be careful to return to physical action slowly.         resouces for low back pain exercises:     --[email or web address]     --[email or web address]
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
## 2017-12-05 ENCOUNTER — TELEPHONE (OUTPATIENT)
Dept: INTERNAL MEDICINE | Facility: CLINIC | Age: 77
End: 2017-12-05

## 2017-12-05 DIAGNOSIS — N32.81 OAB (OVERACTIVE BLADDER): ICD-10-CM

## 2017-12-05 RX ORDER — MIRABEGRON 25 MG/1
25 TABLET, FILM COATED, EXTENDED RELEASE ORAL DAILY
Qty: 30 TABLET | Refills: 0 | Status: SHIPPED | OUTPATIENT
Start: 2017-12-05 | End: 2017-12-29

## 2017-12-05 NOTE — TELEPHONE ENCOUNTER
If the Detrol did not help at all, then she should stop it.    There is a newer ( and possibly much more expensive) medication to try called Myrbetriq.  Take 25 mg once per day.  Prescription(s) sent electronically to [facility] pharmacy.  I dont want to send a 90 day RX for her until we know it works and no side effects.      If this does not do much or if she does not want to pay for it, then she needs to see either GYN Urology (Dr. Suarez) or else Urology clinic    No referral needed to see Dr. Suarez, simply make an appointment to see him in the GYN department.     Referral ordered for [facility] -[phone] if she wants to go this route.

## 2017-12-05 NOTE — TELEPHONE ENCOUNTER
Brielle Marvin is a 77 year old female who calls with urinary frequency.    NURSING ASSESSMENT:  Description:  Pt states that she has been on Detrol LA for urinary frequency and it is not working well. Continues to have issues with the medication.  Is urinating every 2 hours and at least once at noc.  Pt was changed from 4mg to 2mg due to leg edema but she had the 4mg tablets left and she started them again, but the symptoms did not change.  She is questioning if she would need to try a different medication or what PCP would advise.  Onset/duration:  Several months  Precip. factors:  none  Associated symptoms:  None, is afebrile and denies any dysuria  Improves/worsens symptoms:  none  Pain scale (0-10)   0/10  LMP/preg/breast feeding:  NA  Last exam/Treatment:  11/22/17  Allergies:   Allergies   Allergen Reactions     Codeine Nausea and Vomiting     vomiting     Morphine Nausea and Vomiting     nausea     Vicodin [Hydrocodone-Acetaminophen] Nausea and Vomiting       MEDICATIONS:   Taking medication(s) as prescribed? Yes  Taking over the counter medication(s?) No  Any medication side effects? No significant side effects    Any barriers to taking medication(s) as prescribed?  No  Medication(s) improving/managing symptoms?  No  Medication reconciliation completed: Yes      NURSING PLAN: Routed to provider YES, pt also wanted to report that her back pain that she was seen for last week is much better.  Please advise on urinary symmptoms.     RECOMMENDED DISPOSITION:  Home care advice - see above  Will comply with recommendation: Yes  If further questions/concerns or if symptoms do not improve, worsen or new symptoms develop, call your PCP or [facility] as soon as possible.      Guideline used:  Telephone Triage Protocols for Nurses, Fifth Edition, Emperatriz Gandhi RN

## 2017-12-05 NOTE — TELEPHONE ENCOUNTER
Patient has been advised.    Prescription has been called to [facility] pharmacy per pt request.  Cancelled RX at [facility] pharmacy.

## 2017-12-29 DIAGNOSIS — N32.81 OAB (OVERACTIVE BLADDER): ICD-10-CM

## 2017-12-29 RX ORDER — MIRABEGRON 25 MG/1
25 TABLET, FILM COATED, EXTENDED RELEASE ORAL DAILY
Qty: 90 TABLET | Refills: 0 | Status: SHIPPED | OUTPATIENT
Start: 2017-12-29 | End: 2018-03-28

## 2018-01-18 ENCOUNTER — MYC MEDICAL ADVICE (OUTPATIENT)
Dept: INTERNAL MEDICINE | Facility: CLINIC | Age: 78
End: 2018-01-18

## 2018-02-06 DIAGNOSIS — K21.9 GASTROESOPHAGEAL REFLUX DISEASE WITHOUT ESOPHAGITIS: ICD-10-CM

## 2018-02-22 ENCOUNTER — OFFICE VISIT (OUTPATIENT)
Dept: INTERNAL MEDICINE | Facility: CLINIC | Age: 78
End: 2018-02-22
Payer: COMMERCIAL

## 2018-02-22 VITALS
DIASTOLIC BLOOD PRESSURE: 82 MMHG | RESPIRATION RATE: 12 BRPM | BODY MASS INDEX: 27.81 KG/M2 | HEIGHT: 64 IN | SYSTOLIC BLOOD PRESSURE: 138 MMHG | TEMPERATURE: 97.6 F | WEIGHT: 162.9 LBS | HEART RATE: 66 BPM

## 2018-02-22 DIAGNOSIS — Z71.84 TRAVEL ADVICE ENCOUNTER: ICD-10-CM

## 2018-02-22 DIAGNOSIS — Z23 NEED FOR SHINGLES VACCINE: ICD-10-CM

## 2018-02-22 DIAGNOSIS — Z23 NEED FOR TDAP VACCINATION: ICD-10-CM

## 2018-02-22 DIAGNOSIS — Z23 NEED FOR HEPATITIS A AND B VACCINATION: Primary | ICD-10-CM

## 2018-02-22 PROCEDURE — 90636 HEP A/HEP B VACC ADULT IM: CPT | Performed by: INTERNAL MEDICINE

## 2018-02-22 PROCEDURE — 90471 IMMUNIZATION ADMIN: CPT | Performed by: INTERNAL MEDICINE

## 2018-02-22 PROCEDURE — 90715 TDAP VACCINE 7 YRS/> IM: CPT | Performed by: INTERNAL MEDICINE

## 2018-02-22 PROCEDURE — 90472 IMMUNIZATION ADMIN EACH ADD: CPT | Performed by: INTERNAL MEDICINE

## 2018-02-22 PROCEDURE — 99213 OFFICE O/P EST LOW 20 MIN: CPT | Mod: 25 | Performed by: INTERNAL MEDICINE

## 2018-02-22 ASSESSMENT — PATIENT HEALTH QUESTIONNAIRE - PHQ9
SUM OF ALL RESPONSES TO PHQ QUESTIONS 1-9: 0
SUM OF ALL RESPONSES TO PHQ QUESTIONS 1-9: 0
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
## 2018-02-22 NOTE — MR AVS SNAPSHOT
After Visit Summary   2/22/2018    Brielle Marvin    MRN: [id number]           Patient Information     Date Of Birth          1940        Visit Information        Provider Department      2/22/2018 8:00 AM Jared Emmanuel MD [facility]        Today's Diagnoses     Need for hepatitis A and B vaccination    -  1    Need for Tdap vaccination        Need for shingles vaccine        Travel advice encounter          Care Instructions    Issue to consider for travel overseas:  =========================================================    Try to drink only clean water.  This will lower the chance of traveller's diarrhea.    Bring OTC meds such as Tylenol, Mortin, cold medication and benadryl.    Benadryl can help with sleeping, help with allergic reactions.    TUMS, Maalox, Zantac all can help upset stomachs.    Insect bite prevention.    Malaria prevention as indicated by prevalence of malaria in your destination:    *  Based on your destination, malaria prevetnion is not indicated.      *  Based on your destination, vaccination against Typhoid fever is probably indicated.  The typhoid vaccine shot that you received is good for 2 years.     *  Antiobiotic for 3-5 the event of SEVERE diarrhea only.    Do not take for simple loose stools or mild diarrhea.       *  For simple mild loose stools, take Pepto Bismol tablest or liquid.  This will usually work.  Beware, this may make your stools turn dark or black, but not the kind of black stools that we consider dangerous or indicating intestinal problems.      *  Tetanus shot up to date, but you are due this year.   We will update this vaccine today.     *  Hepatitis A vaccine would be a good idea.       *  Hepatis B vaccine less likely needed, but are a good idea.     *  Hepatitis A and B combination vaccine first shot now, second in 1 month, then thrid anf final in 6 months.    *  If you find out about any other vaccinations  "or recommendation specific to your destination, be sure to let us know.        SHINGLES VACCINE:     I would recommend that you consider getting a \"shingles vaccine\".  The shingles vaccine does not stop you from getting shingles, but it decreases the intensity of the event, the duration of the event, and decreases the painful nerve condition that results     There are two options available:     --Shingrix (approved late 2017), 2 shots, 2-6 months apart    OR   --Zostavax, one shot    --Based on the available data, the Shingrix vaccine has superior benefit and should be considered even if you have had the Zostavax vaccine before.      --Contact your insurance provider and ask them if either shingles vaccine is covered and is so, how much it will cost you.  Usually this will be cheaper to get in a pharmacy given by the pharmacist.    --Regardless of the coverage, I would recommend that you consider the vaccine since shingles is very painful, just ask anyone who has ever had it.                       Follow-ups after your visit        Future tests that were ordered for you today     Open Standing Orders        Priority Remaining Interval Expires Ordered    HEPA/HEPB VACCINE ADULT IM Routine 3/3 one month, then 6 month 2/22/2019 2/22/2018            Who to contact     If you have questions or need follow up information about today's clinic visit or your schedule please contact [facility] directly at [phone].  Normal or non-critical lab and imaging results will be communicated to you by MyChart, letter or phone within 4 business days after the clinic has received the results. If you do not hear from us within 7 days, please contact the clinic through MyChart or phone. If you have a critical or abnormal lab result, we will notify you by phone as soon as possible.  Submit refill requests through [employer] or call your pharmacy and they will forward the refill request to us. Please allow 3 " "business days for your refill to be completed.          Additional Information About Your Visit        [employer] Information     [employer] gives you secure access to your electronic health record. If you see a primary care provider, you can also send messages to your care team and make appointments. If you have questions, please call your primary care clinic.  If you do not have a primary care provider, please call [phone] and they will assist you.        Care EveryWhere ID     This is your Care EveryWhere ID. This could be used by other organizations to access your [facility] medical records  [id number]        Your Vitals Were     Pulse Temperature Respirations Height Last Period BMI (Body Mass Index)    66 97.6  F (36.4  C) (Oral) 12 5' 4\" (1.626 m) 11/08/1991 27.96 kg/m2       Blood Pressure from Last 3 Encounters:   02/22/18 138/82   11/22/17 122/64   08/09/17 132/72    Weight from Last 3 Encounters:   02/22/18 162 lb 14.4 oz (73.9 kg)   11/22/17 167 lb (75.8 kg)   08/09/17 166 lb 6.4 oz (75.5 kg)              We Performed the Following     TDAP VACCINE (ADACEL)          Today's Medication Changes          These changes are accurate as of 2/22/18  9:08 AM.  If you have any questions, ask your nurse or doctor.               Start taking these medicines.        Dose/Directions    zoster vaccine recombinant adjuvanted injection   Commonly known as:  SHINGRIX   Used for:  Need for shingles vaccine        Dose:  1 each   Inject 0.5 mLs into the muscle once for 1 dose REPEAT SECOND VACCINE DOSE IN 2-6 MONTHS   Quantity:  0.5 mL   Refills:  1            Where to get your medicines      Some of these will need a paper prescription and others can be bought over the counter.  Ask your nurse if you have questions.     Bring a paper prescription for each of these medications     zoster vaccine recombinant adjuvanted injection                Primary Care Provider Office Phone # Fax #    Jared Emmanuel MD " [phone]       [address] [facility] [address]        Equal Access to Services     GINNA MERCEDES : Hadii aad ku hadsharron Millard, grisda bianca, samantha kamatias obregon, gwyn rangelbettina carolina. So [facility] [phone].    ATENCIÓN: Si habla español, tiene a lucio disposición servicios gratuitos de asistencia lingüística. Llame al [phone].    We comply with applicable federal civil rights laws and [place] laws. We do not discriminate on the basis of race, color, national origin, age, disability, sex, sexual orientation, or gender identity.            Thank you!     Thank you for choosing [facility]  for your care. Our goal is always to provide you with excellent care. Hearing back from our patients is one way we can continue to improve our services. Please take a few minutes to complete the written survey that you may receive in the mail after your visit with us. Thank you!             Your Updated Medication List - Protect others around you: Learn how to safely use, store and throw away your medicines at www.disposemymeds.org.          This list is accurate as of 2/22/18  9:08 AM.  Always use your most recent med list.                   Brand Name Dispense Instructions for use Diagnosis    ALEVE 220 MG capsule   Generic drug:  naproxen sodium      Reported on 4/5/2017        ascorbic acid 500 MG tablet    VITAMIN C    60    1 tablet daily    Routine gynecological examination       aspirin 81 MG tablet      ONE DAILY    Other and unspecified hyperlipidemia       CALCIUM 500 + D PO      None Entered        cyanocobalamin 1000 MCG tablet    vitamin  B-12     1 tablet daily    Esophageal reflux       estradiol 0.1 MG/GM cream    ESTRACE VAGINAL    42.5 g    Place 1 g vaginally twice a week    Atrophic vaginitis       Fish Oil 500 MG Caps      1 tablet daily    Routine gynecological examination       fluocinonide 0.05 % cream    LIDEX    30 g    Apply  sparingly once or twice per day as needed to affected area until the skin is better, then stop; REPEAT AS NEEDED    Contact dermatitis due to adhesives, unspecified contact dermatitis type       glucosamine 500 MG Tabs      1 tablet prn        mirabegron 25 MG 24 hr tablet    MYRBETRIQ    90 tablet    Take 1 tablet (25 mg) by mouth daily    OAB (overactive bladder)       omeprazole 20 MG CR capsule    priLOSEC    90 capsule    TAKE 1 CAPSULE DAILY 30 TO 60 MINUTES BEFORE A MEAL    Gastroesophageal reflux disease without esophagitis       pravastatin 40 MG tablet    PRAVACHOL    90 tablet    Take 1 tablet (40 mg) by mouth daily    Hyperlipidemia LDL goal <130       triamcinolone 0.5 % cream    KENALOG    15 g    Apply sparingly once or twice per day as needed to affected area until the skin is better, then stop; REPEAT AS NEEDED    Dermatitis       vitamin D 2000 UNITS tablet      Take 2,000 Units by mouth daily.    Major depressive disorder, single episode, mild (H)       vitamin E 400 UNIT capsule      Take 1 capsule (400 Units) by mouth daily        zoster vaccine recombinant adjuvanted injection    SHINGRIX    0.5 mL    Inject 0.5 mLs into the muscle once for 1 dose REPEAT SECOND VACCINE DOSE IN 2-6 MONTHS    Need for shingles vaccine

## 2018-02-22 NOTE — PATIENT INSTRUCTIONS
"Issue to consider for travel overseas:  =========================================================    Try to drink only clean water.  This will lower the chance of traveller's diarrhea.    Bring OTC meds such as Tylenol, Mortin, cold medication and benadryl.    Benadryl can help with sleeping, help with allergic reactions.    TUMS, Maalox, Zantac all can help upset stomachs.    Insect bite prevention.    Malaria prevention as indicated by prevalence of malaria in your destination:    *  Based on your destination, malaria prevetnion is not indicated.      *  Based on your destination, vaccination against Typhoid fever is probably indicated.  The typhoid vaccine shot that you received is good for 2 years.     *  Antiobiotic for 3-5 the event of SEVERE diarrhea only.    Do not take for simple loose stools or mild diarrhea.       *  For simple mild loose stools, take Pepto Bismol tablest or liquid.  This will usually work.  Beware, this may make your stools turn dark or black, but not the kind of black stools that we consider dangerous or indicating intestinal problems.      *  Tetanus shot up to date, but you are due this year.   We will update this vaccine today.     *  Hepatitis A vaccine would be a good idea.       *  Hepatis B vaccine less likely needed, but are a good idea.     *  Hepatitis A and B combination vaccine first shot now, second in 1 month, then thrid anf final in 6 months.    *  If you find out about any other vaccinations or recommendation specific to your destination, be sure to let us know.        SHINGLES VACCINE:     I would recommend that you consider getting a \"shingles vaccine\".  The shingles vaccine does not stop you from getting shingles, but it decreases the intensity of the event, the duration of the event, and decreases the painful nerve condition that results     There are two options available:     --Shingrix (approved late 2017), 2 shots, 2-6 months apart    OR   --Zostavax, one " shot    --Based on the available data, the Shingrix vaccine has superior benefit and should be considered even if you have had the Zostavax vaccine before.      --Contact your insurance provider and ask them if either shingles vaccine is covered and is so, how much it will cost you.  Usually this will be cheaper to get in a pharmacy given by the pharmacist.    --Regardless of the coverage, I would recommend that you consider the vaccine since shingles is very painful, just ask anyone who has ever had it.

## 2018-02-23 ASSESSMENT — PATIENT HEALTH QUESTIONNAIRE - PHQ9: SUM OF ALL RESPONSES TO PHQ QUESTIONS 1-9: 0

## 2018-03-22 ENCOUNTER — ALLIED HEALTH/NURSE VISIT (OUTPATIENT)
Dept: NURSING | Facility: CLINIC | Age: 78
End: 2018-03-22
Payer: COMMERCIAL

## 2018-03-22 DIAGNOSIS — Z23 NEED FOR HEPATITIS A AND B VACCINATION: ICD-10-CM

## 2018-03-22 PROCEDURE — 90636 HEP A/HEP B VACC ADULT IM: CPT

## 2018-03-22 PROCEDURE — 90471 IMMUNIZATION ADMIN: CPT

## 2018-03-28 DIAGNOSIS — N32.81 OAB (OVERACTIVE BLADDER): ICD-10-CM

## 2018-03-29 RX ORDER — MIRABEGRON 25 MG/1
TABLET, FILM COATED, EXTENDED RELEASE ORAL
Qty: 90 TABLET | Refills: 0 | Status: SHIPPED | OUTPATIENT
Start: 2018-03-29 | End: 2018-06-12

## 2018-03-29 NOTE — TELEPHONE ENCOUNTER
Requested Prescriptions   Pending Prescriptions Disp Refills     MYRBETRIQ 25 MG 24 hr tablet [Pharmacy Med Name: MYRBETRIQ 25MG  Last Written Prescription Date:  12/29/2017  Last Fill Quantity: 90,  # refills: 0   Last office visit: 2/22/2018 with prescribing provider:  2/22/2018   Future Office Visit:     TABLETS 30'S] 90 tablet 0     Sig: TAKE 1 TABLET(25 MG) BY MOUTH DAILY    There is no refill protocol information for this order

## 2018-04-23 ENCOUNTER — APPOINTMENT (OUTPATIENT)
Dept: GENERAL RADIOLOGY | Facility: CLINIC | Age: 78
End: 2018-04-23
Attending: NURSE PRACTITIONER
Payer: COMMERCIAL

## 2018-04-23 ENCOUNTER — OFFICE VISIT (OUTPATIENT)
Dept: URGENT CARE | Facility: URGENT CARE | Age: 78
End: 2018-04-23
Payer: COMMERCIAL

## 2018-04-23 ENCOUNTER — RADIANT APPOINTMENT (OUTPATIENT)
Dept: GENERAL RADIOLOGY | Facility: CLINIC | Age: 78
End: 2018-04-23
Attending: FAMILY MEDICINE
Payer: COMMERCIAL

## 2018-04-23 ENCOUNTER — HOSPITAL ENCOUNTER (EMERGENCY)
Facility: CLINIC | Age: 78
Discharge: HOME | End: 2018-04-23
Attending: NURSE PRACTITIONER | Admitting: NURSE PRACTITIONER
Payer: COMMERCIAL

## 2018-04-23 VITALS
OXYGEN SATURATION: 96 % | WEIGHT: 165 LBS | BODY MASS INDEX: 27.49 KG/M2 | TEMPERATURE: 98.5 F | HEART RATE: 75 BPM | DIASTOLIC BLOOD PRESSURE: 104 MMHG | SYSTOLIC BLOOD PRESSURE: 184 MMHG | HEIGHT: 65 IN | RESPIRATION RATE: 14 BRPM

## 2018-04-23 VITALS
DIASTOLIC BLOOD PRESSURE: 70 MMHG | WEIGHT: 165.9 LBS | HEART RATE: 72 BPM | BODY MASS INDEX: 28.48 KG/M2 | SYSTOLIC BLOOD PRESSURE: 144 MMHG | OXYGEN SATURATION: 95 % | TEMPERATURE: 97.9 F

## 2018-04-23 DIAGNOSIS — R55 SYNCOPE, UNSPECIFIED SYNCOPE TYPE: ICD-10-CM

## 2018-04-23 DIAGNOSIS — M25.561 ACUTE PAIN OF RIGHT KNEE: Primary | ICD-10-CM

## 2018-04-23 DIAGNOSIS — R03.0 ELEVATED BLOOD PRESSURE READING WITHOUT DIAGNOSIS OF HYPERTENSION: ICD-10-CM

## 2018-04-23 DIAGNOSIS — M25.561 ACUTE PAIN OF RIGHT KNEE: ICD-10-CM

## 2018-04-23 PROCEDURE — 99284 EMERGENCY DEPT VISIT MOD MDM: CPT | Mod: 25

## 2018-04-23 PROCEDURE — 73562 X-RAY EXAM OF KNEE 3: CPT | Mod: RT

## 2018-04-23 PROCEDURE — 29505 APPLICATION LONG LEG SPLINT: CPT | Mod: RT

## 2018-04-23 PROCEDURE — 99214 OFFICE O/P EST MOD 30 MIN: CPT | Performed by: FAMILY MEDICINE

## 2018-04-23 PROCEDURE — 73560 X-RAY EXAM OF KNEE 1 OR 2: CPT | Mod: RT

## 2018-04-23 ASSESSMENT — ENCOUNTER SYMPTOMS
JOINT SWELLING: 0
ARTHRALGIAS: 1
FEVER: 0
WOUND: 0
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
## 2018-04-23 NOTE — MR AVS SNAPSHOT
After Visit Summary   4/23/2018    Brielle Marvin    MRN: [id number]           Patient Information     Date Of Birth          1940        Visit Information        Provider Department      4/23/2018 6:15 PM Robinson North,  [facility]        Today's Diagnoses     Acute pain of right knee    -  1       Follow-ups after your visit        Additional Services     ORTHOPEDICS ADULT REFERRAL       Your provider has referred you to: UMP: Orthopaedic Clinic [facility] [phone]   [email or web address]    UMP: [facility] [place] [phone]   [email or web address]  [facility] [place] [phone]   [email or web address] [phone]   [email or web address]    Please be aware that coverage of these services is subject to the terms and limitations of your health insurance plan.  Call member services at your health plan with any benefit or coverage questions.      Please bring the following to your appointment:    >>   Any x-rays, CTs or MRIs which have been performed.  Contact the facility where they were done to arrange for  prior to your scheduled appointment.    >>   List of current medications   >>   This referral request   >>   Any documents/labs given to you for this referral                  Your next 10 appointments already scheduled     Aug 22, 2018  2:00 PM CDT   Nurse Only with [facility] - NURSE   [facility]    [address] [address] [phone]              Future tests that were ordered for you today     Open Future Orders        Priority Expected Expires Ordered    XR Knee Right 3 Views Routine 4/23/2018 4/23/2019 4/23/2018            Who to contact     If you have questions or need follow up  information about today's clinic visit or your schedule please contact [facility] directly at [phone].  Normal or non-critical lab and imaging results will be communicated to you by [employer], letter or phone within 4 business days after the clinic has received the results. If you do not hear from us within 7 days, please contact the clinic through [employer] or phone. If you have a critical or abnormal lab result, we will notify you by phone as soon as possible.  Submit refill requests through [employer] or call your pharmacy and they will forward the refill request to us. Please allow 3 business days for your refill to be completed.          Additional Information About Your Visit        [employer] Information     [employer] gives you secure access to your electronic health record. If you see a primary care provider, you can also send messages to your care team and make appointments. If you have questions, please call your primary care clinic.  If you do not have a primary care provider, please call [phone] and they will assist you.        Care EveryWhere ID     This is your Care EveryWhere ID. This could be used by other organizations to access your [place] medical records  [id number]        Your Vitals Were     Pulse Temperature Last Period Pulse Oximetry BMI (Body Mass Index)       72 97.9  F (36.6  C) (Oral) 11/08/1991 95% 28.48 kg/m2        Blood Pressure from Last 3 Encounters:   04/23/18 144/70   02/22/18 138/82   11/22/17 122/64    Weight from Last 3 Encounters:   04/23/18 165 lb 14.4 oz (75.3 kg)   02/22/18 162 lb 14.4 oz (73.9 kg)   11/22/17 167 lb (75.8 kg)              We Performed the Following     ORTHOPEDICS ADULT REFERRAL          Today's Medication Changes          These changes are accurate as of 4/23/18  7:00 PM.  If you have any questions, ask your nurse or doctor.               Start taking these medicines.        Dose/Directions    order for DME   Used for:  Acute  pain of right knee   Started by:  Robinson North, DO        Knee brace   Quantity:  1 Device   Refills:  0       order for DME   Used for:  Acute pain of right knee   Started by:  Robinson North, DO        crutches   Quantity:  1 Device   Refills:  0            Where to get your medicines      Some of these will need a paper prescription and others can be bought over the counter.  Ask your nurse if you have questions.     Bring a paper prescription for each of these medications     order for DME    order for DME                Primary Care Provider Office Phone # Fax #    Jared Nitish Emmanuel [phone]       [address] [facility] [address]        Equal Access to Services     [facility]: Hadii gayathri baker hadasho Sokishor, waaxda luqadaha, qaybta kaalmada adeegyada, gwyn duran . So [facility] [phone].    ATENCIÓN: Si habla español, tiene a lucio disposición servicios gratuitos de asistencia lingüística. [facility] [phone].    We comply with applicable federal civil rights laws and [place] laws. We do not discriminate on the basis of race, color, national origin, age, disability, sex, sexual orientation, or gender identity.            Thank you!     Thank you for choosing [facility]  for your care. Our goal is always to provide you with excellent care. Hearing back from our patients is one way we can continue to improve our services. Please take a few minutes to complete the written survey that you may receive in the mail after your visit with us. Thank you!             Your Updated Medication List - Protect others around you: Learn how to safely use, store and throw away your medicines at www.disposemymeds.org.          This list is accurate as of 4/23/18  7:00 PM.  Always use your most recent med list.                   Brand Name Dispense Instructions for use Diagnosis    ALEVE 220 MG capsule   Generic drug:  naproxen sodium       Reported on 4/5/2017        ascorbic acid 500 MG tablet    VITAMIN C    60    1 tablet daily    Routine gynecological examination       aspirin 81 MG tablet      ONE DAILY    Other and unspecified hyperlipidemia       BIOTIN PO           CALCIUM 500 + D PO      None Entered        cyanocobalamin 1000 MCG tablet    vitamin  B-12     1 tablet daily    Esophageal reflux       estradiol 0.1 MG/GM cream    ESTRACE VAGINAL    42.5 g    Place 1 g vaginally twice a week    Atrophic vaginitis       Fish Oil 500 MG Caps      1 tablet daily    Routine gynecological examination       fluocinonide 0.05 % cream    LIDEX    30 g    Apply sparingly once or twice per day as needed to affected area until the skin is better, then stop; REPEAT AS NEEDED    Contact dermatitis due to adhesives, unspecified contact dermatitis type       glucosamine 500 MG Tabs      1 tablet prn        MYRBETRIQ 25 MG 24 hr tablet   Generic drug:  mirabegron     90 tablet    TAKE 1 TABLET(25 MG) BY MOUTH DAILY    OAB (overactive bladder)       omeprazole 20 MG CR capsule    priLOSEC    90 capsule    TAKE 1 CAPSULE DAILY 30 TO 60 MINUTES BEFORE A MEAL    Gastroesophageal reflux disease without esophagitis       order for DME     1 Device    Knee brace    Acute pain of right knee       order for DME     1 Device    crutches    Acute pain of right knee       pravastatin 40 MG tablet    PRAVACHOL    90 tablet    Take 1 tablet (40 mg) by mouth daily    Hyperlipidemia LDL goal <130       triamcinolone 0.5 % cream    KENALOG    15 g    Apply sparingly once or twice per day as needed to affected area until the skin is better, then stop; REPEAT AS NEEDED    Dermatitis       vitamin D 2000 units tablet      Take 2,000 Units by mouth daily.    Major depressive disorder, single episode, mild (H)       vitamin E 400 UNIT capsule      Take 1 capsule (400 Units) by mouth daily

## 2018-04-23 NOTE — ED AVS SNAPSHOT
Emergency Department    [address] [facility] [address]    Phone:  [phone]    Fax:  [phone]                                       Brielle Marvin   MRN: [id number]    Department:   Emergency Department   Date of Visit:  4/23/2018           Patient Information     Date Of Birth          1940        Your diagnoses for this visit were:     Acute pain of right knee Patellofemoral compartment advanced degenerative changes along the lateral facet with bone contacting bone. Lateral facet joint space narrowing results in lateral subluxation and rotation of the patella. Medial and lateral compartment joint space widths appear more relatively normal. Moderate lateral compartment marginal osteophytic spurring is noted. No apparent fracture.    Elevated blood pressure reading without diagnosis of hypertension        You were seen by Lisa Ambriz CNP.      Follow-up Information     Follow up with Amador Lutz MD In 3 days.    Specialty:  Orthopedics    Why:  3-5 days for follow-up    Contact information:    [facility] ORTHOPEDICS  [address] [address] [phone]          Follow up with Jared Emmanuel MD In 2 days.    Specialty:  Internal Medicine    Why:  for recheck of your blood pressure    Contact information:    [address]  [facility] [address]  [phone]          Follow up with  Emergency Department.    Specialty:  EMERGENCY MEDICINE    Why:  As needed, If symptoms worsen    Contact information:    [address] [facility] [address] [phone]        Discharge Instructions         Knee Pain  Knee pain is very common. It s especially common in active people who put a lot of pressure on their knees, like runners. It affects women more often than men.  Your kneecap (patella) is a thick, round bone. It covers and protects the front portion of your knee joint. It moves along a groove in your thighbone (femur)  as part of the patellofemoral joint. A layer of cartilage surrounds the underside of your kneecap. This layer protects it from grinding against your femur.  When this cartilage softens and breaks down, it can cause knee pain. This is partly because of repetitive stress. The stress irritates the lining of the joint. This causes pain in the underlying bone.  What causes knee pain?  Many things can cause knee pain. You may have more than one cause. Some of these include:    Overuse of the knee joint    The kneecap doesn t line up with the tissue around it    Damage to small nerves in the area    Damage to the ligament-like structure that holds the kneecap in place (retinaculum)    Breakdown of the bone under the cartilage    Swelling in the soft tissues around the kneecap    Injury  You might be more likely to have knee pain if you:    Exercise a lot    Recently increased the intensity of your workouts    Have a body mass index (BMI) greater than 25    Have poor alignment of your kneecap    Walk with your feet turned overly outward or inward    Have weakness in surrounding muscle groups (inner quad or hip adductor muscles)    Have too much tightness in surrounding muscle groups (hamstrings or iliotibial band)    Have a recent history of injury to the area    Are female  Symptoms of knee pain  This type of knee pain is a dull, aching pain in the front of the knee in the area under and around the kneecap. This pain may start quickly or slowly. Your pain might be worse when you squat, run, or sit for a long time. You might also sometimes feel like your knee is giving out. You may have symptoms in one or both of your knees.  Diagnosing knee pain  Your healthcare provider will ask about your medical history and your symptoms. Be sure to describe any activities that make your knee pain worse. He or she will look at your knee. This will include tests of your range of motion, strength, and areas of pain of your knee. Your knee  alignment will be checked.  Your healthcare provider will need to rule out other causes of your knee pain, such as arthritis. You may need an imaging test, such as an X-ray or MRI.  Treatment for knee pain  Treatments that can help ease your symptoms may include:    Avoiding activities for a while that make your pain worse, returning to activity over time    Icing the outside of your knee when it causes you pain    Taking over-the-counter pain medicine    Wearing a knee brace or taping your knee to support it    Wearing special shoe inserts to help keep your feet in the proper alignment    Doing special exercises to stretch and strengthen the muscles around your hip and your knee  These steps help most people manage knee pain. But some cases of knee pain need to be treated with surgery. You may need surgery right away. Or you may need it later if other treatments don t work. Your healthcare provider may refer you to an orthopedic surgeon. He or she will talk with you about your choices.  Preventing knee pain  Losing weight and correcting excess muscle tightness or muscle weakness may help lower your risk.  In some cases, you can prevent knee pain. To help prevent a flare-up of knee pain, you do these things:    Regularly do all the exercises your doctor or physical therapist advises    Support your knee as advised by your doctor or physical therapist    Increase training gradually, and ease up on training when needed    Have an expert check your gait for running or other sporting activities    Stretch properly before and after exercise    Replace your running shoes regularly    Lose excess weight     When to call your healthcare provider  Call your healthcare provider right away if:    Your symptoms don t get better after a few weeks of treatment    You have any new symptoms   Date Last Reviewed: 4/1/2017 2000-2017 [employer] (by [employer]). [address] [place] [address], [place] [address]. All rights reserved. This  information is not intended as a substitute for professional medical care. Always follow your healthcare professional's instructions.    Discharge Instructions  Hypertension - High Blood Pressure    During you visit to the Emergency Department, your blood pressure was higher than the recommended blood pressure.  This may be related to stress, pain, medication or other temporary conditions. In these cases, your blood pressure may return to normal on its own. If you have a history of high blood pressure, you may need to have your provider adjust your medications. Sometimes, your high measurement here may indicate that you have developed high blood pressure that will stay high unless it is treated. As a general rule, high blood pressure causes problems over years rather than days, weeks, or months. So, while it is important to treat blood pressure, it is rarely important to treat blood pressure immediately. Occasionally we will begin a medication in the Emergency Department; more often we will recommend close follow-up for medications with a primary doctor/clinic.    Generally, every Emergency Department visit should have a follow-up clinic visit with either a primary or a specialty clinic/provider. Please follow-up as instructed by your emergency provider today.    Return to the Emergency Department if you start to have:    A severe headache.    Chest pain.    Shortness of breath.    Weakness or numbness that affects one part of the body.    Confusion.    Vision changes.    Significant swelling of legs and/or eyes.    A reaction to any medication started in the Emergency Department.    What can I do to help myself?    Avoid alcohol.    Take any blood pressure medicine that you are prescribed.    Get a good night s sleep.    Lower your salt intake.    Exercise.    Lose weight.    Manage stress.    See your doctor regularly    If blood pressure medication was started in the Emergency Department:    The medicine may not  have an immediate effect. The body and brain determine what blood pressure you have. The medicine s job is to retrain the body s  thermostat  to a lower blood pressure.    You will need to follow up with your provider to see how this medicine is working for you.  If you were given a prescription for medicine here today, be sure to read all of the information (including the package insert) that comes with your prescription.  This will include important information about the medicine, its side effects, and any warnings that you need to know about.  The pharmacist who fills the prescription can provide more information and answer questions you may have about the medicine.  If you have questions or concerns that the pharmacist cannot address, please call or return to the Emergency Department.   Remember that you can always come back to the Emergency Department if you are not able to see your regular provider in the amount of time listed above, if you get any new symptoms, or if there is anything that worries you.        Your next 10 appointments already scheduled     Aug 22, 2018  2:00 PM CDT   Nurse Only with [facility] - [facility]URSE   [facility]    [address] [address] [phone]              24 Hour Appointment Hotline       To make an appointment at any [facility], call [phone] ([phone]). If you don't have a family doctor or clinic, we will help you find one. [facility] are conveniently located to serve the needs of you and your family.             Review of your medicines      Our records show that you are taking the medicines listed below. If these are incorrect, please call your family doctor or clinic.        Dose / Directions Last dose taken    ALEVE 220 MG capsule   Generic drug:  naproxen sodium        Reported on 4/5/2017   Refills:  0        ascorbic acid 500 MG tablet   Commonly known as:   VITAMIN C   Quantity:  60        1 tablet daily   Refills:  0        aspirin 81 MG tablet        ONE DAILY   Refills:  0        BIOTIN PO        Refills:  0        CALCIUM 500 + D PO        None Entered   Refills:  0        cyanocobalamin 1000 MCG tablet   Commonly known as:  vitamin  B-12        1 tablet daily   Refills:  0        estradiol 0.1 MG/GM cream   Commonly known as:  ESTRACE VAGINAL   Dose:  1 g   Quantity:  42.5 g        Place 1 g vaginally twice a week   Refills:  5        Fish Oil 500 MG Caps        1 tablet daily   Refills:  0        fluocinonide 0.05 % cream   Commonly known as:  LIDEX   Quantity:  30 g        Apply sparingly once or twice per day as needed to affected area until the skin is better, then stop; REPEAT AS NEEDED   Refills:  1        glucosamine 500 MG Tabs        1 tablet prn   Refills:  0        MYRBETRIQ 25 MG 24 hr tablet   Quantity:  90 tablet   Generic drug:  mirabegron        TAKE 1 TABLET(25 MG) BY MOUTH DAILY   Refills:  0        omeprazole 20 MG CR capsule   Commonly known as:  priLOSEC   Quantity:  90 capsule        TAKE 1 CAPSULE DAILY 30 TO 60 MINUTES BEFORE A MEAL   Refills:  2        order for DME   Quantity:  1 Device        Knee brace   Refills:  0        order for DME   Quantity:  1 Device        crutches   Refills:  0        pravastatin 40 MG tablet   Commonly known as:  PRAVACHOL   Dose:  40 mg   Quantity:  90 tablet        Take 1 tablet (40 mg) by mouth daily   Refills:  3        triamcinolone 0.5 % cream   Commonly known as:  KENALOG   Quantity:  15 g        Apply sparingly once or twice per day as needed to affected area until the skin is better, then stop; REPEAT AS NEEDED   Refills:  5        vitamin D 2000 units tablet   Dose:  2000 Units        Take 2,000 Units by mouth daily.   Refills:  0        vitamin E 400 UNIT capsule   Dose:  400 Units        Take 1 capsule (400 Units) by mouth daily   Refills:  0                Procedures and tests performed during  your visit     XR Knee Right 3 Views      Orders Needing Specimen Collection     None      Pending Results     Date and Time Order Name Status Description    4/23/2018 2020 XR Knee Right 3 Views Preliminary     4/23/2018 1848 XR KNEE PORT RT 1/2 VW In process             Pending Culture Results     No orders found from 4/21/2018 to 4/24/2018.            Pending Results Instructions     If you had any lab results that were not finalized at the time of your Discharge, you can call the ED Lab Result RN at [phone]. You will be contacted by this team for any positive Lab results or changes in treatment. The nurses are available 7 days a week from 10A to 6:30P.  You can leave a message 24 hours per day and they will return your call.        Test Results From Your Hospital Stay        4/23/2018  8:56 PM      Narrative     RIGHT KNEE THREE VIEWS 4/23/2018 8:39 PM     HISTORY: Fall with lateral knee pain.        Impression     IMPRESSION: Patellofemoral compartment advanced degenerative changes  along the lateral facet with bone contacting bone. Lateral facet joint  space narrowing results in lateral subluxation and rotation of the  patella. Medial and lateral compartment joint space widths appear more  relatively normal. Moderate lateral compartment marginal osteophytic  spurring is noted. No apparent fracture.                Clinical Quality Measure: Blood Pressure Screening     Your blood pressure was checked while you were in the emergency department today. The last reading we obtained was  BP: (!) 184/104 . Please read the guidelines below about what these numbers mean and what you should do about them.  If your systolic blood pressure (the top number) is less than 120 and your diastolic blood pressure (the bottom number) is less than 80, then your blood pressure is normal. There is nothing more that you need to do about it.  If your systolic blood pressure (the top number) is 120-139 or your diastolic blood  pressure (the bottom number) is 80-89, your blood pressure may be higher than it should be. You should have your blood pressure rechecked within a year by a primary care provider.  If your systolic blood pressure (the top number) is 140 or greater or your diastolic blood pressure (the bottom number) is 90 or greater, you may have high blood pressure. High blood pressure is treatable, but if left untreated over time it can put you at risk for heart attack, stroke, or kidney failure. You should have your blood pressure rechecked by a primary care provider within the next 4 weeks.  If your provider in the emergency department today gave you specific instructions to follow-up with your doctor or provider even sooner than that, you should follow that instruction and not wait for up to 4 weeks for your follow-up visit.        Thank you for choosing [facility]       Thank you for choosing [facility] for your care. Our goal is always to provide you with excellent care. Hearing back from our patients is one way we can continue to improve our services. Please take a few minutes to complete the written survey that you may receive in the mail after you visit with us. Thank you!        [employer] Information     [employer] gives you secure access to your electronic health record. If you see a primary care provider, you can also send messages to your care team and make appointments. If you have questions, please call your primary care clinic.  If you do not have a primary care provider, please call [phone] and they will assist you.        Care EveryWhere ID     This is your Care EveryWhere ID. This could be used by other organizations to access your [place] medical records  [id number]        Equal Access to Services     GINNA MERCEDES : Hadii gayathri grimeso Sokishor, waaxda luenaadaha, qaybta kaalmagwyn rojas . So [facility] [phone].    ATENCIÓN: Si simón alvarado a lucio disposición  servicios gratuitos de asistencia lingüística. Felix cochran [phone].    We comply with applicable federal civil rights laws and [place] laws. We do not discriminate on the basis of race, color, national origin, age, disability, sex, sexual orientation, or gender identity.            After Visit Summary       This is your record. Keep this with you and show to your community pharmacist(s) and doctor(s) at your next visit.

## 2018-04-23 NOTE — ED AVS SNAPSHOT
Emergency Department    [address] [place] [address]    Phone:  [phone]    Fax:  [phone]                                       Brielle Marvin   MRN: [id number]    Department:   Emergency Department   Date of Visit:  4/23/2018           After Visit Summary Signature Page     I have received my discharge instructions, and my questions have been answered. I have discussed any challenges I see with this plan with the nurse or doctor.    ..........................................................................................................................................  Patient/Patient Representative Signature      ..........................................................................................................................................  Patient Representative Print Name and Relationship to Patient    ..................................................               ................................................  Date                                            Time    ..........................................................................................................................................  Reviewed by Signature/Title    ...................................................              ..............................................  Date                                                            Time

## 2018-04-24 ENCOUNTER — TELEPHONE (OUTPATIENT)
Dept: INTERNAL MEDICINE | Facility: CLINIC | Age: 78
End: 2018-04-24

## 2018-04-24 DIAGNOSIS — M25.561 ACUTE PAIN OF RIGHT KNEE: Primary | ICD-10-CM

## 2018-04-24 NOTE — TELEPHONE ENCOUNTER
That is unusual.  I have not heard that one doctor in the same clinic is covered differently.   Dr. Lutz may have been just the orthopedic surgeon on call for referral that particaular day you were seen.     Fortunately, there are many good orthopedic surgeons at [facility].    The orthopedic surgeons that I most commonly send people to are Katina Hayden Vorlicky, Alex Rosales.   Any of these providers should be able to take good care of you.      Referral entered:  [facility] - [place] [phone]   [email or web address]

## 2018-04-24 NOTE — TELEPHONE ENCOUNTER
Brielle fell and was in the ER yesterday with knee pain. They recommended she follow up with Amador Lutz at [facility] but her insurance company is telling her that she would have to pay a higher out of pocket to see him.   Could you recommend another orthopedic doctor at [facility] for her to see?   Patient would like a call back as soon as possible at [phone].

## 2018-04-24 NOTE — ED PROVIDER NOTES
History     Chief Complaint:  Knee Injury     HPI   Brielle Marvin is a 78 year old female who presents to the emergency department today for evaluation of a knee injury. The patient reports four days ago she accidentally fell on ice, landing on her left hip and twisting her right knee.  She did not strike her head or have loss of consciousness.  She initially did not have right knee pain but a couple days later developed right knee pain. She describes her pain as if something is pulling on it. There is no pain while at rest. The pain is worse with ambulation and she has been taking aleve with mild relief. She did not take any aleve today. Due to worsening pain and inability to walk normally today she initially presented [facility]. At this visit, patient had a one view knee x-ray .She was given crutches and a knee brace and referred to the ED for further evaluation for a better x-ray. The patient denies right or left hip pain.      Allergies:  Codeine  Morphine  Vicodin [Hydrocodone-Acetaminophen]     Medications:    ALEVE 220 MG OR CAPS  ASPIRIN 81 MG OR TABS  estradiol (ESTRACE VAGINAL) 0.1 MG/GM cream  FISH  MG OR CAPS  GLUCOSAMINE 500 MG OR TABS  MYRBETRIQ 25 MG 24 hr tablet  omeprazole (PRILOSEC) 20 MG CR capsule  pravastatin (PRAVACHOL) 40 MG tablet    Past Medical History:    Esophageal reflux  Hyperlipidemia  Generalized muscle weakness  Unspecified menopausal and postmenopausal disorder    Past Surgical History:    Resendiz w/o facetec formaot/dskc 1/2 vrt seg, lumbar  Colonoscopy  Correct bunion, simple    Family History:    Lung cancer  MI  Colon cancer  CAD  Hypertension  PTCA    Social History:  The patient was alone.  Smoking Status: Never  Smokeless Tobacco: Never  Alcohol Use: No  Marital Status:        Review of Systems   Constitutional: Negative for fever.   Musculoskeletal: Positive for arthralgias (right knee, negative hip) and gait problem. Negative for joint swelling.  "  Skin: Negative for wound.   All other systems reviewed and are negative.    Physical Exam     Patient Vitals for the past 24 hrs:   BP Temp Temp src Pulse Resp SpO2 Height Weight   04/23/18 2016 (!) 184/104 - - - - - - -   04/23/18 1952 (!) 181/99 98.5  F (36.9  C) Oral 75 14 96 % 1.651 m (5' 5\") 74.8 kg (165 lb)     Physical Exam  Nursing notes reviewed. Vitals reviewed.  General: Alert. Well kept.  Eyes: Conjunctiva non-injected, non-icteric.  Neck/Throat: Moist mucous membranes. Normal voice.  Cardiac: Regular rhythm. Normal heart sounds. Intact distal pulses. 2+ DP pulses bilateral.   Pulmonary: Clear and equal breath sounds bilaterally.   Musculoskeletal:   Right lower extremity: No foot or ankle deformity, tenderness or swelling. Able to flex and extend toes. Full range of motion of ankle. No knee effusion. No joint line tenderness. Tenderness to palpation of lateral inferior knee. No laxity with varus or valgus stress testing. Negative anterior and posterior drawer test. Full active flexion and extension. Full painless ROM at hip on left and right.  Neurological: Alert and oriented x4. 5/5 strength bilateral lower extremity. Distal sensation intact.  Skin: No laceration or ecchymosis to affected extremity.   Psych: Affect normal. Good eye contact.    Emergency Department Course     Imaging:  Radiology findings were communicated with the patient who voiced understanding of the findings.  XR Knee Right 3 Views  IMPRESSION: Patellofemoral compartment advanced degenerative changes  along the lateral facet with bone contacting bone. Lateral facet joint  space narrowing results in lateral subluxation and rotation of the  patella. Medial and lateral compartment joint space widths appear more  relatively normal. Moderate lateral compartment marginal osteophytic  spurring is noted. No apparent fracture.  Report per radiology     Emergency Department Course:  Nursing notes and vitals reviewed.  The patient was sent " "for a XR Knee Right 3 Views while in the emergency department, results above.   2015: I performed an exam of the patient as documented above.   2110: Patient rechecked and updated.   Findings and plan explained to the Patient. Patient discharged home with instructions regarding supportive care, medications, and reasons to return. The importance of close follow-up was reviewed.   I personally reviewed the imaging results with the Patient and answered all related questions prior to discharge.    Impression & Plan      Medical Decision Making:  Brielle Marvin is a 78 year old female who presents today with a right knee injury as described above. Exam reveals no joint instability, neurovascular injury, or evidence of compartment syndrome. Xray reveals degenerative changes including Lateral facet joint space narrowing results in lateral subluxation and rotation of the patella without acute dislocation or fracture. She notes history of several times she had patellar \"looseness\" with no recent history.  History and exam are not consistent with septic joint. Plan is for immobilization with a knee immobilizer and crutches. Patient will take ibuprofen first line as needed for pain. Patient will follow up with primary care or orthopedics in 3-5 days. Advised outpatient MRI may be indicated, but there is currently no emergent indication for advanced imaging. Patient will return immediately if she develops any acutely worsening pain, redness, fever, numbness or tingling in the distal leg, or any other new or worsening symptoms.  No indication for head CT using [place] CT guidelines.  Next cleared clinically using NEXUS criteria.  The patient is also noted to be hypertensive while in the ED. There is no history of hypertension in the past.  There is no signs of hypertensive emergency or urgency. The patient denies any chest discomfort, dyspnea, headache, focal weakness, numbness. Supportive outpatient management is " therefore indicated with close follow-up of primary care physician.      Diagnosis:    ICD-10-CM    1. Acute pain of right knee M25.561     Patellofemoral compartment advanced degenerative changes along the lateral facet with bone contacting bone. Lateral facet joint space narrowing results in lateral subluxation and rotation of the patella. Medial and lateral compartment joint space widths appear more relatively normal. Moderate lateral compartment marginal osteophytic spurring is noted. No apparent fracture.     Disposition:  Discharged to home    Scribe Disclosure:  I, Nevaeh Barnhart, am serving as a scribe at 8:06 PM on 4/23/2018 to document services personally performed by Lisa Ambriz CNP based on my observations and the provider's statements to me.     4/23/2018    EMERGENCY DEPARTMENT       Lisa Ambriz CNP  04/23/18 3636

## 2018-04-24 NOTE — DISCHARGE INSTRUCTIONS
Knee Pain  Knee pain is very common. It s especially common in active people who put a lot of pressure on their knees, like runners. It affects women more often than men.  Your kneecap (patella) is a thick, round bone. It covers and protects the front portion of your knee joint. It moves along a groove in your thighbone (femur) as part of the patellofemoral joint. A layer of cartilage surrounds the underside of your kneecap. This layer protects it from grinding against your femur.  When this cartilage softens and breaks down, it can cause knee pain. This is partly because of repetitive stress. The stress irritates the lining of the joint. This causes pain in the underlying bone.  What causes knee pain?  Many things can cause knee pain. You may have more than one cause. Some of these include:    Overuse of the knee joint    The kneecap doesn t line up with the tissue around it    Damage to small nerves in the area    Damage to the ligament-like structure that holds the kneecap in place (retinaculum)    Breakdown of the bone under the cartilage    Swelling in the soft tissues around the kneecap    Injury  You might be more likely to have knee pain if you:    Exercise a lot    Recently increased the intensity of your workouts    Have a body mass index (BMI) greater than 25    Have poor alignment of your kneecap    Walk with your feet turned overly outward or inward    Have weakness in surrounding muscle groups (inner quad or hip adductor muscles)    Have too much tightness in surrounding muscle groups (hamstrings or iliotibial band)    Have a recent history of injury to the area    Are female  Symptoms of knee pain  This type of knee pain is a dull, aching pain in the front of the knee in the area under and around the kneecap. This pain may start quickly or slowly. Your pain might be worse when you squat, run, or sit for a long time. You might also sometimes feel like your knee is giving out. You may have symptoms in  one or both of your knees.  Diagnosing knee pain  Your healthcare provider will ask about your medical history and your symptoms. Be sure to describe any activities that make your knee pain worse. He or she will look at your knee. This will include tests of your range of motion, strength, and areas of pain of your knee. Your knee alignment will be checked.  Your healthcare provider will need to rule out other causes of your knee pain, such as arthritis. You may need an imaging test, such as an X-ray or MRI.  Treatment for knee pain  Treatments that can help ease your symptoms may include:    Avoiding activities for a while that make your pain worse, returning to activity over time    Icing the outside of your knee when it causes you pain    Taking over-the-counter pain medicine    Wearing a knee brace or taping your knee to support it    Wearing special shoe inserts to help keep your feet in the proper alignment    Doing special exercises to stretch and strengthen the muscles around your hip and your knee  These steps help most people manage knee pain. But some cases of knee pain need to be treated with surgery. You may need surgery right away. Or you may need it later if other treatments don t work. Your healthcare provider may refer you to an orthopedic surgeon. He or she will talk with you about your choices.  Preventing knee pain  Losing weight and correcting excess muscle tightness or muscle weakness may help lower your risk.  In some cases, you can prevent knee pain. To help prevent a flare-up of knee pain, you do these things:    Regularly do all the exercises your doctor or physical therapist advises    Support your knee as advised by your doctor or physical therapist    Increase training gradually, and ease up on training when needed    Have an expert check your gait for running or other sporting activities    Stretch properly before and after exercise    Replace your running shoes regularly    Lose excess  weight     When to call your healthcare provider  Call your healthcare provider right away if:    Your symptoms don t get better after a few weeks of treatment    You have any new symptoms   Date Last Reviewed: 4/1/2017 2000-2017 [employer]. [address] [place] [address], [place] [address]. All rights reserved. This information is not intended as a substitute for professional medical care. Always follow your healthcare professional's instructions.    Discharge Instructions  Hypertension - High Blood Pressure    During you visit to the Emergency Department, your blood pressure was higher than the recommended blood pressure.  This may be related to stress, pain, medication or other temporary conditions. In these cases, your blood pressure may return to normal on its own. If you have a history of high blood pressure, you may need to have your provider adjust your medications. Sometimes, your high measurement here may indicate that you have developed high blood pressure that will stay high unless it is treated. As a general rule, high blood pressure causes problems over years rather than days, weeks, or months. So, while it is important to treat blood pressure, it is rarely important to treat blood pressure immediately. Occasionally we will begin a medication in the Emergency Department; more often we will recommend close follow-up for medications with a primary doctor/clinic.    Generally, every Emergency Department visit should have a follow-up clinic visit with either a primary or a specialty clinic/provider. Please follow-up as instructed by your emergency provider today.    Return to the Emergency Department if you start to have:    A severe headache.    Chest pain.    Shortness of breath.    Weakness or numbness that affects one part of the body.    Confusion.    Vision changes.    Significant swelling of legs and/or eyes.    A reaction to any medication started in the Emergency Department.    What can  I do to help myself?    Avoid alcohol.    Take any blood pressure medicine that you are prescribed.    Get a good night s sleep.    Lower your salt intake.    Exercise.    Lose weight.    Manage stress.    See your doctor regularly    If blood pressure medication was started in the Emergency Department:    The medicine may not have an immediate effect. The body and brain determine what blood pressure you have. The medicine s job is to retrain the body s  thermostat  to a lower blood pressure.    You will need to follow up with your provider to see how this medicine is working for you.  If you were given a prescription for medicine here today, be sure to read all of the information (including the package insert) that comes with your prescription.  This will include important information about the medicine, its side effects, and any warnings that you need to know about.  The pharmacist who fills the prescription can provide more information and answer questions you may have about the medicine.  If you have questions or concerns that the pharmacist cannot address, please call or return to the Emergency Department.   Remember that you can always come back to the Emergency Department if you are not able to see your regular provider in the amount of time listed above, if you get any new symptoms, or if there is anything that worries you.

## 2018-05-10 ENCOUNTER — TELEPHONE (OUTPATIENT)
Dept: NURSING | Facility: CLINIC | Age: 78
End: 2018-05-10

## 2018-05-10 DIAGNOSIS — R03.0 ELEVATED BLOOD PRESSURE READING WITHOUT DIAGNOSIS OF HYPERTENSION: Primary | ICD-10-CM

## 2018-05-10 DIAGNOSIS — M25.569 ACUTE KNEE PAIN, UNSPECIFIED LATERALITY: ICD-10-CM

## 2018-05-10 NOTE — TELEPHONE ENCOUNTER
Patient reports she doesn't have any pain, doesn't feel it's r/t pain.  Patient is going into the UC/ED to have leg swelling evaluated.      Patient will call back after ER visit to inform us of BP/treatment done at ED before calling in atenolol script.

## 2018-05-10 NOTE — TELEPHONE ENCOUNTER
"Patient calling to report that she has been having BP issues, which is unusual for her.  Fell on 4/23, [place], and went to UC and ER and had high BPs there. Thinks SBP was 208 at one point in ER. She felt good after the fall but a week following the fall she could hardly walk. Walking better now. Has been taking BP at home as ER MD told her to watch it. SBP usually 170s-180s. In [place] now, soonest she could come to clinic would be Monday. /81 last night. This /96, 170/96. Then went to [facility] and BP was 149/94. No chest pain. No trouble breathing. Denies any symptoms with high BP except top of head feels heavy but has this before, also said eyes feel foggy but denies blurred vision, said eyes feel heavy. Also feels \"not as perky\". Not on any meds for BP. Denies being on any OTC cold meds. DBP has been as high as 101, 104. Highest has seen SBP at home was 181, maybe 188. Appointment made for Monday but advised ER if DBP>110, SBP>180, or any other symptoms with high BP. Please advise if differently. Also patient wondering how often she should check BP? She will bring readings to appt.    Patient then said yesterday she developed a swollen right leg, first time this had happened since fall. Swelling is better today but still present. Denies fever. No redness or warmth. Was sitting a lot yesterday. Around knee down calf is swollen. Advised d/t this would recommend to be seen right away. She will go to UC/ED in [place] but still also plans on seeing Dr. Emmanuel on Monday.     Please advise on questions pertaining to BP above (guidelines for seeking ER treatment and how often she should check BP)    Guideline used:  Telephone Triage Protocols for Nurses, Fifth Edition, Emperatriz Nicholson RN        "

## 2018-05-10 NOTE — TELEPHONE ENCOUNTER
H elevated blood pressure is most likely related to her pain.   It could elevated form pain and from Motrin type medications.   If her pain and swelling in the leg is worsening, it should be evaluated to make sure not something like a blood clot.     She could start a temporary medication for the blood pressure for a little while until the pain dies down.   I would start Atenolol 25 mg once per day.     OK to call in to the pharmacy closest to her.     Follow up with me next week when she is back in town.

## 2018-05-11 NOTE — TELEPHONE ENCOUNTER
Pt went to [facility].  Ultrasound negative.  Gave her 10 day supply of atenolol.  Pt has appt with Dr. Emmanuel on Monday

## 2018-05-12 ENCOUNTER — NURSE TRIAGE (OUTPATIENT)
Dept: NURSING | Facility: CLINIC | Age: 78
End: 2018-05-12

## 2018-05-12 NOTE — TELEPHONE ENCOUNTER
Brielle just started 10mg lisinopril on 5/10/2018. Her blood pressure has remained high at > 180/100. Per protocol I instructed that Brielle be seen again within the next 4 hours  Reason for Disposition    [1] BP  >= 200/120  AND [2] having NO cardiac or neurologic symptoms    Additional Information    Negative: Difficult to awaken or acting confused  (e.g., disoriented, slurred speech)    Negative: Severe difficulty breathing (e.g., struggling for each breath, speaks in single words)    Negative: [1] Weakness of the face, arm or leg on one side of the body AND [2] new onset    Negative: [1] Numbness (i.e., loss of sensation) of the face, arm or leg on one side of the body AND [2] new onset    Negative: [1] Chest pain lasts > 5 minutes AND [2] history of heart disease  (i.e., heart attack, bypass surgery, angina, angioplasty, CHF)    Negative: [1] Chest pain AND [2] took nitrogylcerin AND [3] pain was not relieved    Negative: Sounds like a life-threatening emergency to the triager    Negative: Symptom is main concern  (e.g., headache, chest pain)    Negative: Low blood pressure is main concern    Negative: [1] BP  >= 160 / 100 AND [2] cardiac or neurologic symptoms    (e.g., chest pain, difficulty breathing, unsteady gait, blurred vision)    Negative: [1] Pregnant AND [2] new hand or face swelling    Negative: [1] Pregnant > 20 weeks AND [2] BP  >= 140/90    Protocols used: HIGH BLOOD PRESSURE-ADULT-  Dinorah GIRON RN [facility]

## 2018-05-14 ENCOUNTER — OFFICE VISIT (OUTPATIENT)
Dept: INTERNAL MEDICINE | Facility: CLINIC | Age: 78
End: 2018-05-14
Payer: COMMERCIAL

## 2018-05-14 VITALS
DIASTOLIC BLOOD PRESSURE: 64 MMHG | TEMPERATURE: 98.4 F | BODY MASS INDEX: 27.47 KG/M2 | RESPIRATION RATE: 16 BRPM | WEIGHT: 165.1 LBS | SYSTOLIC BLOOD PRESSURE: 108 MMHG | HEART RATE: 88 BPM

## 2018-05-14 DIAGNOSIS — E78.5 HYPERLIPIDEMIA LDL GOAL <130: ICD-10-CM

## 2018-05-14 DIAGNOSIS — R55 SYNCOPE, UNSPECIFIED SYNCOPE TYPE: ICD-10-CM

## 2018-05-14 DIAGNOSIS — I10 BENIGN ESSENTIAL HYPERTENSION: Primary | ICD-10-CM

## 2018-05-14 PROCEDURE — 93000 ELECTROCARDIOGRAM COMPLETE: CPT | Performed by: INTERNAL MEDICINE

## 2018-05-14 PROCEDURE — 99214 OFFICE O/P EST MOD 30 MIN: CPT | Performed by: INTERNAL MEDICINE

## 2018-05-14 RX ORDER — LISINOPRIL 10 MG/1
10 TABLET ORAL DAILY
COMMUNITY
Start: 2018-05-14 | End: 2018-05-14

## 2018-05-14 RX ORDER — LISINOPRIL 10 MG/1
10 TABLET ORAL DAILY
Qty: 30 TABLET | Refills: 1 | Status: SHIPPED | OUTPATIENT
Start: 2018-05-14 | End: 2018-05-29 | Stop reason: ALTCHOICE

## 2018-05-14 NOTE — MR AVS SNAPSHOT
"              After Visit Summary   5/14/2018    Brielle Marvin    MRN: [id number]           Patient Information     Date Of Birth          1940        Visit Information        Provider Department      5/14/2018 2:20 PM Jared Emmanuel MD [facility]        Today's Diagnoses     Benign essential hypertension    -  1    Syncope, unspecified syncope type        Hyperlipidemia LDL goal <130          Care Instructions    *  Most likely syncope was from vasovagal event (like faiting)    *  Need to rule otu cardiac cause with Echocardogrioam to check for heart structures and function.    *  Holter monitor to check for any arrhythmias.     *  Fasting labs at your convenience.     *  Continue on lisinopril 10 mg once per day.    Main side effects are cough, dry throat, throat irritaiton, swelling of lip, tongue, or cheek.     *  Return to see me in 1 month regardless of how you feel, return sooner if needed.  Call [phone] to schedule this appointment.        .  5 GOALS TO PREVENT VASCULAR DISEASE:     1.  Aggressive blood pressure control, under 130/80 ideally.  Using medications if needed.    Your blood pressure is under good control    BP Readings from Last 4 Encounters:   05/14/18 108/64   04/23/18 (!) 184/104   04/23/18 144/70   02/22/18 138/82       2.  Aggressive LDL cholesterol (\"bad cholesterol\") lowering as indicated.    Your goal is an LDL under 130 for sure, preferably under 100.  (If you have diabetes or previous vascular disease, the the LDL goals would be under 100 for sure, preferably under 70.)    New guidelines identify four high-risk groups who could benefit from statins:   *people with pre-existing heart disease, such as those who have had a heart attack;   *people ages 40 to 75 who have diabetes of any type  *patients ages 40 to 75 with at least a 7.5% risk of developing cardiovascular disease over the next decade, according to a formula described in the " "guidelines  *patients with the sort of super-high cholesterol that sometimes runs in families, as evidenced by an LDL of 190 milligrams per deciliter or higher    Your cholesterol levels are well controlled.    Recent Labs   Lab Test  06/23/17   0957  02/16/16   0930  05/22/14   1013  06/03/13   0927   CHOL  157  153  171  161   HDL  62  63  54  51   LDL  80  76  101  91   TRIG  77  70  78  96   CHOLHDLRATIO   --    --   3.1  3.1       3.  Aggressive diabetic prevention, screening and/or management.      You do not have diabetes as of the most recent blood tests.     4.  No smoking    5.  Consider taking low dose aspirin (81 mg) tablet once per day over the age of 50, every day unless there is a specific reason that you cannot take aspirin (such as side effect, allergy, or you are on another \"blood thinner\").        --Based on your current cardiac risk factors, you should take Aspirin 81 mg once per day if you are over 50 years of age.                 Follow-ups after your visit        Your next 10 appointments already scheduled     May 22, 2018  7:45 AM CDT   Ech Complete with [id number]   [facility] CV Echocardiography (Cardiovascular Imaging at [facility])    [address] [place] [address] [phone]           1. Please bring or wear a comfortable two-piece outfit. 2. You may eat, drink and take your normal medicines. 3. For any questions that cannot be answered, please contact the ordering physician            Aug 22, 2018  2:00 PM CDT   Nurse Only with [facility] - NURSE   [facility]    [address] [address] [phone]              Who to contact     If you have questions or need follow up information about today's clinic visit or your schedule please contact [facility] directly at [phone].  Normal or non-critical lab and imaging results " will be communicated to you by MyChart, letter or phone within 4 business days after the clinic has received the results. If you do not hear from us within 7 days, please contact the clinic through [employer] or phone. If you have a critical or abnormal lab result, we will notify you by phone as soon as possible.  Submit refill requests through [employer] or call your pharmacy and they will forward the refill request to us. Please allow 3 business days for your refill to be completed.          Additional Information About Your Visit        [employer] Information     [employer] gives you secure access to your electronic health record. If you see a primary care provider, you can also send messages to your care team and make appointments. If you have questions, please call your primary care clinic.  If you do not have a primary care provider, please call [phone] and they will assist you.        Care EveryWhere ID     This is your Care EveryWhere ID. This could be used by other organizations to access your [facility] medical records  [id number]        Your Vitals Were     Pulse Temperature Respirations Last Period BMI (Body Mass Index)       88 98.4  F (36.9  C) (Oral) 16 11/08/1991 27.47 kg/m2        Blood Pressure from Last 3 Encounters:   05/16/18 140/80   05/14/18 108/64   04/23/18 (!) 184/104    Weight from Last 3 Encounters:   05/14/18 165 lb 1.6 oz (74.9 kg)   04/23/18 165 lb (74.8 kg)   04/23/18 165 lb 14.4 oz (75.3 kg)              We Performed the Following     EKG 12-lead complete w/read - Clinics          Where to get your medicines      These medications were sent to [id number] - [place], [place] - [address] AT [facility] [address]  [address], [facility] [address]     Phone:  [phone]     lisinopril 10 MG tablet          Primary Care Provider Office Phone # Fax #    Jared Emmanuel [phone]       [address] [facility]  [address]        Equal Access to Services     [facility] Heart[facility] : Hadii aad ku hadlaramilly Saeidali, wablanquitada luqelaineha, qaanthonyta chrissieestradagwyn rojas. So [facility] [phone].    ATENCIÓN: Si habla español, tiene a lucio disposición servicios gratuitos de asistencia lingüística. Llame al [phone].    We comply with applicable federal civil rights laws and [place] laws. We do not discriminate on the basis of race, color, national origin, age, disability, sex, sexual orientation, or gender identity.            Thank you!     Thank you for choosing [facility]  for your care. Our goal is always to provide you with excellent care. Hearing back from our patients is one way we can continue to improve our services. Please take a few minutes to complete the written survey that you may receive in the mail after your visit with us. Thank you!             Your Updated Medication List - Protect others around you: Learn how to safely use, store and throw away your medicines at www.disposemymeds.org.          This list is accurate as of 5/14/18 11:59 PM.  Always use your most recent med list.                   Brand Name Dispense Instructions for use Diagnosis    ALEVE 220 MG capsule   Generic drug:  naproxen sodium      Reported on 4/5/2017        ascorbic acid 500 MG tablet    VITAMIN C    60    1 tablet daily    Routine gynecological examination       aspirin 81 MG tablet      ONE DAILY    Other and unspecified hyperlipidemia       BIOTIN PO           CALCIUM 500 + D PO      None Entered        cyanocobalamin 1000 MCG tablet    vitamin  B-12     1 tablet daily    Esophageal reflux       estradiol 0.1 MG/GM cream    ESTRACE VAGINAL    42.5 g    Place 1 g vaginally twice a week    Atrophic vaginitis       Fish Oil 500 MG Caps      1 tablet daily    Routine gynecological examination       fluocinonide 0.05 % cream    LIDEX    30 g    Apply sparingly once or twice per day as needed  to affected area until the skin is better, then stop; REPEAT AS NEEDED    Contact dermatitis due to adhesives, unspecified contact dermatitis type       glucosamine 500 MG Tabs      1 tablet prn        lisinopril 10 MG tablet    PRINIVIL/ZESTRIL    30 tablet    Take 1 tablet (10 mg) by mouth daily    Benign essential hypertension       MYRBETRIQ 25 MG 24 hr tablet   Generic drug:  mirabegron     90 tablet    TAKE 1 TABLET(25 MG) BY MOUTH DAILY    OAB (overactive bladder)       omeprazole 20 MG CR capsule    priLOSEC    90 capsule    TAKE 1 CAPSULE DAILY 30 TO 60 MINUTES BEFORE A MEAL    Gastroesophageal reflux disease without esophagitis       order for DME     1 Device    Knee brace    Acute pain of right knee       order for DME     1 Device    crutches    Acute pain of right knee       pravastatin 40 MG tablet    PRAVACHOL    90 tablet    Take 1 tablet (40 mg) by mouth daily    Hyperlipidemia LDL goal <130       triamcinolone 0.5 % cream    KENALOG    15 g    Apply sparingly once or twice per day as needed to affected area until the skin is better, then stop; REPEAT AS NEEDED    Dermatitis       vitamin D 2000 units tablet      Take 2,000 Units by mouth daily.    Major depressive disorder, single episode, mild (H)       vitamin E 400 UNIT capsule      Take 1 capsule (400 Units) by mouth daily
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
## 2018-05-14 NOTE — PATIENT INSTRUCTIONS
"*  Most likely syncope was from vasovagal event (like faiting)    *  Need to rule otu cardiac cause with Echocardogrioam to check for heart structures and function.    *  Holter monitor to check for any arrhythmias.     *  Fasting labs at your convenience.     *  Continue on lisinopril 10 mg once per day.    Main side effects are cough, dry throat, throat irritaiton, swelling of lip, tongue, or cheek.     *  Return to see me in 1 month regardless of how you feel, return sooner if needed.  Call [phone] to schedule this appointment.        .  5 GOALS TO PREVENT VASCULAR DISEASE:     1.  Aggressive blood pressure control, under 130/80 ideally.  Using medications if needed.    Your blood pressure is under good control    BP Readings from Last 4 Encounters:   05/14/18 108/64   04/23/18 (!) 184/104   04/23/18 144/70   02/22/18 138/82       2.  Aggressive LDL cholesterol (\"bad cholesterol\") lowering as indicated.    Your goal is an LDL under 130 for sure, preferably under 100.  (If you have diabetes or previous vascular disease, the the LDL goals would be under 100 for sure, preferably under 70.)    New guidelines identify four high-risk groups who could benefit from statins:   *people with pre-existing heart disease, such as those who have had a heart attack;   *people ages 40 to 75 who have diabetes of any type  *patients ages 40 to 75 with at least a 7.5% risk of developing cardiovascular disease over the next decade, according to a formula described in the guidelines  *patients with the sort of super-high cholesterol that sometimes runs in families, as evidenced by an LDL of 190 milligrams per deciliter or higher    Your cholesterol levels are well controlled.    Recent Labs   Lab Test  06/23/17   0957  02/16/16   0930  05/22/14   1013  06/03/13   0927   CHOL  157  153  171  161   HDL  62  63  54  51   LDL  80  76  101  91   TRIG  77  70  78  96   CHOLHDLRATIO   --    --   3.1  3.1       3.  Aggressive diabetic " "prevention, screening and/or management.      You do not have diabetes as of the most recent blood tests.     4.  No smoking    5.  Consider taking low dose aspirin (81 mg) tablet once per day over the age of 50, every day unless there is a specific reason that you cannot take aspirin (such as side effect, allergy, or you are on another \"blood thinner\").        --Based on your current cardiac risk factors, you should take Aspirin 81 mg once per day if you are over 50 years of age.         "

## 2018-05-16 ENCOUNTER — ALLIED HEALTH/NURSE VISIT (OUTPATIENT)
Dept: NURSING | Facility: CLINIC | Age: 78
End: 2018-05-16
Payer: COMMERCIAL

## 2018-05-16 VITALS — SYSTOLIC BLOOD PRESSURE: 140 MMHG | DIASTOLIC BLOOD PRESSURE: 80 MMHG

## 2018-05-16 DIAGNOSIS — E78.5 HYPERLIPIDEMIA LDL GOAL <130: ICD-10-CM

## 2018-05-16 DIAGNOSIS — R55 SYNCOPE, UNSPECIFIED SYNCOPE TYPE: ICD-10-CM

## 2018-05-16 DIAGNOSIS — I10 BENIGN ESSENTIAL HYPERTENSION: ICD-10-CM

## 2018-05-16 DIAGNOSIS — R55 SYNCOPE: Primary | ICD-10-CM

## 2018-05-16 LAB
ERYTHROCYTE [DISTWIDTH] IN BLOOD BY AUTOMATED COUNT: 12.2 % (ref 10–15)
HCT VFR BLD AUTO: 39.7 % (ref 35–47)
HGB BLD-MCNC: 13.3 G/DL (ref 11.7–15.7)
MCH RBC QN AUTO: 32 PG (ref 26.5–33)
MCHC RBC AUTO-ENTMCNC: 33.5 G/DL (ref 31.5–36.5)
MCV RBC AUTO: 96 FL (ref 78–100)
PLATELET # BLD AUTO: 194 10E9/L (ref 150–450)
RBC # BLD AUTO: 4.15 10E12/L (ref 3.8–5.2)
WBC # BLD AUTO: 7.4 10E9/L (ref 4–11)

## 2018-05-16 PROCEDURE — 80053 COMPREHEN METABOLIC PANEL: CPT | Performed by: INTERNAL MEDICINE

## 2018-05-16 PROCEDURE — 36415 COLL VENOUS BLD VENIPUNCTURE: CPT | Performed by: INTERNAL MEDICINE

## 2018-05-16 PROCEDURE — 93225 XTRNL ECG REC<48 HRS REC: CPT

## 2018-05-16 PROCEDURE — 84443 ASSAY THYROID STIM HORMONE: CPT | Performed by: INTERNAL MEDICINE

## 2018-05-16 PROCEDURE — 93226 XTRNL ECG REC<48 HR SCAN A/R: CPT

## 2018-05-16 PROCEDURE — 85027 COMPLETE CBC AUTOMATED: CPT | Performed by: INTERNAL MEDICINE

## 2018-05-16 PROCEDURE — 80061 LIPID PANEL: CPT | Performed by: INTERNAL MEDICINE

## 2018-05-16 NOTE — MR AVS SNAPSHOT
After Visit Summary   5/16/2018    Brielle Marvin    MRN: [id number]           Patient Information     Date Of Birth          1940        Visit Information        Provider Department      5/16/2018 11:00 AM [facility] NURSE [facility]        Today's Diagnoses     Syncope    -  1       Follow-ups after your visit        Your next 10 appointments already scheduled     May 22, 2018  7:45 AM CDT   Ech Complete with [id number]   [facility] CV Echocardiography (Cardiovascular Imaging at [facility])    [address] [place] [address] [phone]           1. Please bring or wear a comfortable two-piece outfit. 2. You may eat, drink and take your normal medicines. 3. For any questions that cannot be answered, please contact the ordering physician            Aug 22, 2018  2:00 PM CDT   Nurse Only with [facility] - NURSE   [facility] ([facility])    [address] [address] [phone]              Who to contact     If you have questions or need follow up information about today's clinic visit or your schedule please contact [facility] directly at [phone].  Normal or non-critical lab and imaging results will be communicated to you by BitWinehart, letter or phone within 4 business days after the clinic has received the results. If you do not hear from us within 7 days, please contact the clinic through BitWinehart or phone. If you have a critical or abnormal lab result, we will notify you by phone as soon as possible.  Submit refill requests through Conviva or call your pharmacy and they will forward the refill request to us. Please allow 3 business days for your refill to be completed.          Additional Information About Your Visit        BitWinehart Information     Conviva gives you secure access to your electronic health record. If you  see a primary care provider, you can also send messages to your care team and make appointments. If you have questions, please call your primary care clinic.  If you do not have a primary care provider, please call [phone] and they will assist you.        Care EveryWhere ID     This is your Care EveryWhere ID. This could be used by other organizations to access your [facility] medical records  [id number]        Your Vitals Were     Last Period                   11/08/1991            Blood Pressure from Last 3 Encounters:   05/16/18 140/80   05/14/18 108/64   04/23/18 (!) 184/104    Weight from Last 3 Encounters:   05/14/18 165 lb 1.6 oz (74.9 kg)   04/23/18 165 lb (74.8 kg)   04/23/18 165 lb 14.4 oz (75.3 kg)              Today, you had the following     No orders found for display       Primary Care Provider Office Phone # Fax #    Jared Emmanuel [phone]       [address] [place] [id number]        Equal Access to Services     [facility]: Hadii aad ku hadasho Soomaali, waaxda luqadaha, qaybta kaalmada adeegyada, waxay idiin haymiguel angeln alida duran . So [facility] [phone].    ATENCIÓN: Si habla español, tiene a lucio disposición servicios gratuitos de asistencia lingüística. Llame al [phone].    We comply with applicable federal civil rights laws and [place] laws. We do not discriminate on the basis of race, color, national origin, age, disability, sex, sexual orientation, or gender identity.            Thank you!     Thank you for choosing [facility]  for your care. Our goal is always to provide you with excellent care. Hearing back from our patients is one way we can continue to improve our services. Please take a few minutes to complete the written survey that you may receive in the mail after your visit with us. Thank you!             Your Updated Medication List - Protect others around you: Learn how to safely use, store and throw away  your medicines at www.disposemymeds.org.          This list is accurate as of 5/16/18 11:59 PM.  Always use your most recent med list.                   Brand Name Dispense Instructions for use Diagnosis    ALEVE 220 MG capsule   Generic drug:  naproxen sodium      Reported on 4/5/2017        ascorbic acid 500 MG tablet    VITAMIN C    60    1 tablet daily    Routine gynecological examination       aspirin 81 MG tablet      ONE DAILY    Other and unspecified hyperlipidemia       BIOTIN PO           CALCIUM 500 + D PO      None Entered        cyanocobalamin 1000 MCG tablet    vitamin  B-12     1 tablet daily    Esophageal reflux       estradiol 0.1 MG/GM cream    ESTRACE VAGINAL    42.5 g    Place 1 g vaginally twice a week    Atrophic vaginitis       Fish Oil 500 MG Caps      1 tablet daily    Routine gynecological examination       fluocinonide 0.05 % cream    LIDEX    30 g    Apply sparingly once or twice per day as needed to affected area until the skin is better, then stop; REPEAT AS NEEDED    Contact dermatitis due to adhesives, unspecified contact dermatitis type       glucosamine 500 MG Tabs      1 tablet prn        lisinopril 10 MG tablet    PRINIVIL/ZESTRIL    30 tablet    Take 1 tablet (10 mg) by mouth daily    Benign essential hypertension       MYRBETRIQ 25 MG 24 hr tablet   Generic drug:  mirabegron     90 tablet    TAKE 1 TABLET(25 MG) BY MOUTH DAILY    OAB (overactive bladder)       omeprazole 20 MG CR capsule    priLOSEC    90 capsule    TAKE 1 CAPSULE DAILY 30 TO 60 MINUTES BEFORE A MEAL    Gastroesophageal reflux disease without esophagitis       order for DME     1 Device    Knee brace    Acute pain of right knee       order for DME     1 Device    crutches    Acute pain of right knee       pravastatin 40 MG tablet    PRAVACHOL    90 tablet    Take 1 tablet (40 mg) by mouth daily    Hyperlipidemia LDL goal <130       triamcinolone 0.5 % cream    KENALOG    15 g    Apply sparingly once or twice per  day as needed to affected area until the skin is better, then stop; REPEAT AS NEEDED    Dermatitis       vitamin D 2000 units tablet      Take 2,000 Units by mouth daily.    Major depressive disorder, single episode, mild (H)       vitamin E 400 UNIT capsule      Take 1 capsule (400 Units) by mouth daily

## 2018-05-17 LAB
ALBUMIN SERPL-MCNC: 3.6 G/DL (ref 3.4–5)
ALP SERPL-CCNC: 50 U/L (ref 40–150)
ALT SERPL W P-5'-P-CCNC: 23 U/L (ref 0–50)
ANION GAP SERPL CALCULATED.3IONS-SCNC: 8 MMOL/L (ref 3–14)
AST SERPL W P-5'-P-CCNC: 17 U/L (ref 0–45)
BILIRUB SERPL-MCNC: 0.6 MG/DL (ref 0.2–1.3)
BUN SERPL-MCNC: 15 MG/DL (ref 7–30)
CALCIUM SERPL-MCNC: 9.1 MG/DL (ref 8.5–10.1)
CHLORIDE SERPL-SCNC: 106 MMOL/L (ref 94–109)
CHOLEST SERPL-MCNC: 157 MG/DL
CO2 SERPL-SCNC: 29 MMOL/L (ref 20–32)
CREAT SERPL-MCNC: 0.58 MG/DL (ref 0.52–1.04)
GFR SERPL CREATININE-BSD FRML MDRD: >90 ML/MIN/1.7M2
GLUCOSE SERPL-MCNC: 98 MG/DL (ref 70–99)
HDLC SERPL-MCNC: 58 MG/DL
INTERPRETATION MONITOR -MUSE: NORMAL
LDLC SERPL CALC-MCNC: 84 MG/DL
NONHDLC SERPL-MCNC: 99 MG/DL
POTASSIUM SERPL-SCNC: 4.2 MMOL/L (ref 3.4–5.3)
PROT SERPL-MCNC: 7.4 G/DL (ref 6.8–8.8)
SODIUM SERPL-SCNC: 143 MMOL/L (ref 133–144)
TRIGL SERPL-MCNC: 73 MG/DL
TSH SERPL DL<=0.005 MIU/L-ACNC: 2.25 MU/L (ref 0.4–4)

## 2018-05-17 NOTE — NURSING NOTE
Holter monitor was placed on 5/14/18 and pt returned it on 5/16/18.    Data transferred to computer.

## 2018-05-22 ENCOUNTER — HOSPITAL ENCOUNTER (OUTPATIENT)
Dept: CARDIOLOGY | Facility: CLINIC | Age: 78
Discharge: HOME | End: 2018-05-22
Attending: INTERNAL MEDICINE | Admitting: INTERNAL MEDICINE
Payer: COMMERCIAL

## 2018-05-22 DIAGNOSIS — R55 SYNCOPE, UNSPECIFIED SYNCOPE TYPE: ICD-10-CM

## 2018-05-22 PROCEDURE — 93306 TTE W/DOPPLER COMPLETE: CPT

## 2018-05-22 PROCEDURE — 93306 TTE W/DOPPLER COMPLETE: CPT | Mod: 26 | Performed by: INTERNAL MEDICINE

## 2018-05-23 ENCOUNTER — TELEPHONE (OUTPATIENT)
Dept: INTERNAL MEDICINE | Facility: CLINIC | Age: 78
End: 2018-05-23

## 2018-05-23 NOTE — TELEPHONE ENCOUNTER
Please call the patient.   Her echocardiogram was normal.  The heart valves and muscles all looked good.

## 2018-05-29 ENCOUNTER — TELEPHONE (OUTPATIENT)
Dept: INTERNAL MEDICINE | Facility: CLINIC | Age: 78
End: 2018-05-29

## 2018-05-29 DIAGNOSIS — I10 BENIGN ESSENTIAL HYPERTENSION: ICD-10-CM

## 2018-05-29 RX ORDER — ATENOLOL 25 MG/1
TABLET ORAL
Qty: 30 TABLET | Refills: 0 | Status: SHIPPED | OUTPATIENT
Start: 2018-05-29 | End: 2018-07-02

## 2018-05-29 NOTE — TELEPHONE ENCOUNTER
Lisinopril - Blood pressure medication . Seems to be conteracting the incotonuience medication . Adverse meciation reaction .  Bladder medication .130/80 bp ok /.Daphne Calderón RN

## 2018-05-29 NOTE — TELEPHONE ENCOUNTER
Brielle Marvin is a 78 year old female who calls with adverse reaction between Lisinopril and Myrbetriq. Pt is requesting change in blood pressure medication based on this adverse reaction . She was not having any bladder issues and sx were well controlled prior to blood pressure medication being introduced after falling on knee .     NURSING ASSESSMENT:  Description:  Urinary frequency and incontinence increase  Onset/duration:  Since starting Lisinopril  Precip. factors:  Blood pressure medication   Associated symptoms:  No pain no dizziness   Improves/worsens symptoms:  none  Pain scale (0-10)   0/10  LMP/preg/breast feeding:  na  Last exam/Treatment:    Allergies:   Allergies   Allergen Reactions     Codeine Nausea and Vomiting     vomiting     Morphine Nausea and Vomiting     nausea     Vicodin [Hydrocodone-Acetaminophen] Nausea and Vomiting       MEDICATIONS:   Taking medication(s) as prescribed? Yes  Taking over the counter medication(s?) No  Any medication side effects? Increased urinary sx    Any barriers to taking medication(s) as prescribed?  Yes- see above  Medication(s) improving/managing symptoms?  Yes blood pressure controlled at 130/80  Medication reconciliation completed: Yes      NURSING PLAN: Routed to provider Yes      Guideline used:  Telephone Triage Protocols for Nurses, Fourth  Edition, Epmeratriz Calderón RN

## 2018-05-29 NOTE — TELEPHONE ENCOUNTER
I did not see any specific listing for specific drug interactions or complications with using Lisinopril and Myrbetriq together.   However, if she feels that she is experiencing some possible side effects, then Ok to try a different medication.     Stop Lisinopril.   Take low dose Atenolol, 12.5 MG (1/2 of 25 mg tablet) ONCE PER DAY FOR 10 DAYS, then 1 tablet per day.   There are no listed interactions between this and the myrbetriq, either.   Prescription(s) sent electronically to [facility].     Return to see me in 2 weeks to re-evaluate the blood pressure, sooner if needed.  Call [phone] to schedule this appointment.

## 2018-05-29 NOTE — TELEPHONE ENCOUNTER
Patient notified and will  medication and try this plan . Pt verbalizes understanding . Pt states that no bladder issues with not taking lisinopril today per her choice .Daphne Calderón RN

## 2018-05-31 ENCOUNTER — OFFICE VISIT (OUTPATIENT)
Dept: FAMILY MEDICINE | Facility: CLINIC | Age: 78
End: 2018-05-31
Payer: COMMERCIAL

## 2018-05-31 VITALS
WEIGHT: 166.5 LBS | DIASTOLIC BLOOD PRESSURE: 64 MMHG | BODY MASS INDEX: 27.74 KG/M2 | RESPIRATION RATE: 12 BRPM | TEMPERATURE: 99.1 F | OXYGEN SATURATION: 96 % | SYSTOLIC BLOOD PRESSURE: 118 MMHG | HEART RATE: 86 BPM | HEIGHT: 65 IN

## 2018-05-31 DIAGNOSIS — H91.93 BILATERAL HEARING LOSS, UNSPECIFIED HEARING LOSS TYPE: ICD-10-CM

## 2018-05-31 DIAGNOSIS — H61.23 BILATERAL IMPACTED CERUMEN: Primary | ICD-10-CM

## 2018-05-31 DIAGNOSIS — H93.8X1 PLUGGED FEELING IN EAR, RIGHT: ICD-10-CM

## 2018-05-31 DIAGNOSIS — F32.0 MAJOR DEPRESSIVE DISORDER, SINGLE EPISODE, MILD (H): ICD-10-CM

## 2018-05-31 PROCEDURE — 99213 OFFICE O/P EST LOW 20 MIN: CPT | Mod: 25 | Performed by: FAMILY MEDICINE

## 2018-05-31 PROCEDURE — 69209 REMOVE IMPACTED EAR WAX UNI: CPT | Mod: RT | Performed by: FAMILY MEDICINE

## 2018-05-31 NOTE — LETTER
My Depression Action Plan  Name: Brielle Marvin   Date of Birth 1940  Date: 5/31/2018    My doctor: Jared Emmanuel   My clinic: [address] [address]  [phone]          GREEN    ZONE   Good Control    What it looks like:     Things are going generally well. You have normal up s and down s. You may even feel depressed from time to time, but bad moods usually last less than a day.   What you need to do:  1. Continue to care for yourself (see self care plan)  2. Check your depression survival kit and update it as needed  3. Follow your physician s recommendations including any medication.  4. Do not stop taking medication unless you consult with your physician first.           YELLOW         ZONE Getting Worse    What it looks like:     Depression is starting to interfere with your life.     It may be hard to get out of bed; you may be starting to isolate yourself from others.    Symptoms of depression are starting to last most all day and this has happened for several days.     You may have suicidal thoughts but they are not constant.   What you need to do:     1. Call your care team, your response to treatment will improve if you keep your care team informed of your progress. Yellow periods are signs an adjustment may need to be made.     2. Continue your self-care, even if you have to fake it!    3. Talk to someone in your support network    4. Open up your depression survival kit           RED    ZONE Medical Alert - Get Help    What it looks like:     Depression is seriously interfering with your life.     You may experience these or other symptoms: You can t get out of bed most days, can t work or engage in other necessary activities, you have trouble taking care of basic hygiene, or basic responsibilities, thoughts of suicide or death that will not go away, self-injurious behavior.     What you need  to do:  1. Call your care team and request a same-day appointment. If they are not available (weekends or after hours) call your local crisis line, emergency room or 911.            Depression Self Care Plan / Survival Kit    Self-Care for Depression  Here s the deal. Your body and mind are really not as separate as most people think.  What you do and think affects how you feel and how you feel influences what you do and think. This means if you do things that people who feel good do, it will help you feel better.  Sometimes this is all it takes.  There is also a place for medication and therapy depending on how severe your depression is, so be sure to consult with your medical provider and/ or Behavioral Health Consultant if your symptoms are worsening or not improving.     In order to better manage my stress, I will:    Exercise  Get some form of exercise, every day. This will help reduce pain and release endorphins, the  feel good  chemicals in your brain. This is almost as good as taking antidepressants!  This is not the same as joining a gym and then never going! (they count on that by the way ) It can be as simple as just going for a walk or doing some gardening, anything that will get you moving.      Hygiene   Maintain good hygiene (Get out of bed in the morning, Make your bed, Brush your teeth, Take a shower, and Get dressed like you were going to work, even if you are unemployed).  If your clothes don't fit try to get ones that do.    Diet  I will strive to eat foods that are good for me, drink plenty of water, and avoid excessive sugar, caffeine, alcohol, and other mood-altering substances.  Some foods that are helpful in depression are: complex carbohydrates, B vitamins, flaxseed, fish or fish oil, fresh fruits and vegetables.    Psychotherapy  I agree to participate in Individual Therapy (if recommended).    Medication  If prescribed medications, I agree to take them.  Missing doses can result in  serious side effects.  I understand that drinking alcohol, or other illicit drug use, may cause potential side effects.  I will not stop my medication abruptly without first discussing it with my provider.    Staying Connected With Others  I will stay in touch with my friends, family members, and my primary care provider/team.    Use your imagination  Be creative.  We all have a creative side; it doesn t matter if it s oil painting, sand castles, or mud pies! This will also kick up the endorphins.    Witness Beauty  (AKA stop and smell the roses) Take a look outside, even in mid-winter. Notice colors, textures. Watch the squirrels and birds.     Service to others  Be of service to others.  There is always someone else in need.  By helping others we can  get out of ourselves  and remember the really important things.  This also provides opportunities for practicing all the other parts of the program.    Humor  Laugh and be silly!  Adjust your TV habits for less news and crime-drama and more comedy.    Control your stress  Try breathing deep, massage therapy, biofeedback, and meditation. Find time to relax each day.     My support system    Clinic Contact:  Phone number:    Contact 1:  Phone number:    Contact 2:  Phone number:    Holiness/:  Phone number:    Therapist:  Phone number:    Local crisis center:    Phone number:    Other community support:  Phone number:

## 2018-05-31 NOTE — MR AVS SNAPSHOT
After Visit Summary   5/31/2018    Brielle Marvin    MRN: [id number]           Patient Information     Date Of Birth          1940        Visit Information        Provider Department      5/31/2018 2:10 PM Maryjane Burch DO [facility]        Today's Diagnoses     Bilateral impacted cerumen    -  1    Plugged feeling in ear, right        Major depressive disorder, single episode, mild (H)          Care Instructions      Earwax, Home Treatment    Everyone produces earwax from the lining of the ear canal. It serves to lubricate and protect the ear. The wax that forms in the canal naturally moves toward the outside of the ear and falls out. Sometimes the ear canal may contain too much wax. This can cause a blockage and loss of hearing. Directions are given below for home treatment.  Home care  If your doctor has advised you to remove a wax blockage yourself, follow these directions:    Unless a medicine was prescribed, you may use an over-the-counter product made for clearing earwax. These contain carbamide peroxide. Lie down with the blocked ear facing upward. Apply one dropper full of medicine and wait a few minutes. Grasp the outer ear and wiggle it to help the solution enter the canal.    Lean over a sink or basin with the blocked ear facing downward. Use a bulb syringe filled with warm (not hot or cold) water to rinse the ear several times. Use gentle pressure only.    If you are having trouble draining the water out of your ear canal, put a few drops of rubbing alcohol (isopropyl alcohol) into the ear canal. This will help remove the remaining water.    Repeat this procedure once a day for up to three days, or until your hearing is back to normal. Do not use this treatment for more than three days in a row.  Don ts    Don t use cold water to rinse the ear. This will make you dizzy.    Don t perform this procedure if you have an ear infection.    Don t perform  this procedure if you have a ruptured eardrum.    Don t use cotton swabs, matches, hairpins, keys, or other objects to  clean  the ear canal. This can cause infection of the ear canal or rupture the eardrum. Because of their size and shape, cotton swabs can push earwax deeper into the ear canal instead of removing it.  Follow-up care  Follow up with your health care provider if you are not improving after three cleaning attempts, or as advised.  When to seek medical advice  Call your health care provider right away if any of these occur:    Worsening ear pain    Fever of 101 F (38.3 C) or higher, or as directed by your health care provider    Hearing does not return to normal after three days of treatment    Fluid drainage or bleeding from the ear canal    Swelling, redness, or tenderness of the outer ear    Headache, neck pain, or stiff neck    4238-1941 [employer]. [address] [place] [address]. All rights reserved. This information is not intended as a substitute for professional medical care. Always follow your healthcare professional's instructions.        Earwax Removal    The ear canal makes earwax from the canal s lining. The ears make wax to lubricate and protect the ear canal. The ear canal is the tube that connects the middle ear to the outside of the ear. The wax protects the ear from bacteria, infection, and damage from water or trauma.  The wax that forms in the canal naturally moves toward the outside of the ear and falls out. In some cases, the ear may make too much wax. If the wax causes problems or keeps the healthcare provider from seeing into the ear, the extra wax may be removed.  Too much wax can affect your hearing. It can cause itching. In rare cases, it can be painful. Earwax should not be removed unless it is causing a problem. You should not stick objects into your ear to remove wax unless told to do so by your healthcare provider.  Healthcare providers can  remove earwax safely. It is important to stay still during the procedure to avoid damage to the ear canal. But removing earwax generally doesn t hurt. You will not usually need anesthesia or pain medicine when the provider removes the earwax.  A number of conditions lead to earwax buildup. These include some skin problems, a narrow ear canal, or ears that make too much earwax. Using cotton swabs in the canal pushes earwax deeper into the ear and contributes to the buildup of earwax.  Home care    The healthcare provider may recommend mineral oil or an over-the-counter eardrop to use at home to soften the earwax. Use these products only if the provider recommends them. Carefully follow the instructions given.    Don t use mineral oil or OTC eardrops if you might have an ear infection or a ruptured eardrum. Tell your healthcare provider right away if you have diabetes or an immune disorder.    Don t use cotton swabs in your ears. Cotton swabs may push wax deeper into the ear canal or damage the eardrum. Use cotton gauze or a wet washcloth  to gently remove wax on the outside of the ear and around the opening to the ear canal.    Don't use any probing device or object such as cotton-tipped swabs or jose luis pins to clean the inside of your ears.    Don t use ear candles to clean your ears. Candling can be dangerous. It can burn the ear canal. It can also make the condition worse instead of better.    Don t use cold water to rinse the ear. This will make you dizzy. If your provider tells you to rinse your ear, use only warm water or follow his or her instructions.    Check the ear for signs of infection or irritation listed below under When to seek medical advice.  Steps for using eardrops  1. Warm the medicine bottle by rubbing it between your hands for a few minutes.  2. Lie down on your side, with the affected ear up.  3. Place the recommended number of drops in the ear. Wet a cotton ball with the medicine. Gently put  the cotton ball into the ear opening.  Follow-up care  Follow up with your healthcare provider, or as directed.  When to seek medical advice  Call the provider right away if you have:    Ear pain that gets worse    Fever of 100.4F F (38 C) or higher, or as directed by your healthcare provider    Worsening wax buildup    Severe pain, dizziness, or nausea    Bleeding from the ear    Hearing problems    Signs of irritation from the eardrops, such as burning, stinging, or swelling and tenderness    Foul-smelling fluid draining from the ear    Swelling, redness, or tenderness of the outer ear    Headache, neck pain, or stiff neck  Date Last Reviewed: 6/1/2017 2000-2017 [employer]. [address] [place] [address]. All rights reserved. This information is not intended as a substitute for professional medical care. Always follow your healthcare professional's instructions.                Follow-ups after your visit        Follow-up notes from your care team     Return if symptoms worsen or fail to improve.      Your next 10 appointments already scheduled     Jun 12, 2018  8:20 AM CDT   Office Visit with Jared Emmanuel MD   [facility]    [address] [id number] [phone]           Bring a current list of meds and any records pertaining to this visit. For Physicals, please bring immunization records and any forms needing to be filled out. Please arrive 10 minutes early to complete paperwork.            Aug 22, 2018  2:00 PM CDT   Nurse Only with [facility] - NURSE   [facility]    [address] [place] [id number] [phone]              Who to contact     If you have questions or need follow up information about today's clinic visit or your schedule please contact [facility] directly at  "[phone].  Normal or non-critical lab and imaging results will be communicated to you by MyChart, letter or phone within 4 business days after the clinic has received the results. If you do not hear from us within 7 days, please contact the clinic through [employer] or phone. If you have a critical or abnormal lab result, we will notify you by phone as soon as possible.  Submit refill requests through [employer] or call your pharmacy and they will forward the refill request to us. Please allow 3 business days for your refill to be completed.          Additional Information About Your Visit        [employer] Information     [employer] gives you secure access to your electronic health record. If you see a primary care provider, you can also send messages to your care team and make appointments. If you have questions, please call your primary care clinic.  If you do not have a primary care provider, please call [phone] and they will assist you.        Care EveryWhere ID     This is your Care EveryWhere ID. This could be used by other organizations to access your [place] medical records  [id number]        Your Vitals Were     Pulse Temperature Respirations Height Last Period Pulse Oximetry    86 99.1  F (37.3  C) (Tympanic) 12 5' 5\" (1.651 m) 11/08/1991 96%    Breastfeeding? BMI (Body Mass Index)                No 27.71 kg/m2           Blood Pressure from Last 3 Encounters:   05/31/18 118/64   05/16/18 140/80   05/14/18 108/64    Weight from Last 3 Encounters:   05/31/18 166 lb 8 oz (75.5 kg)   05/14/18 165 lb 1.6 oz (74.9 kg)   04/23/18 165 lb (74.8 kg)              We Performed the Following     DEPRESSION ACTION PLAN (DAP)     REMOVE IMPACTED CERUMEN        Primary Care Provider Office Phone # Fax #    Jared Emmanuel [phone]       [address] [place] [id number]        Equal Access to Services     GINNA MERCEDES AH: Hadii gayathri grimeso Freeman, waaxda luqadaha, qaybta kaalmada " gwyn obregonrafael maryteresa rangelaan ah. So [facility] [phone].    ATENCIÓN: Si habla cortes, tiene a lucio disposición servicios gratuitos de asistencia lingüística. Felix al [phone].    We comply with applicable federal civil rights laws and [place] laws. We do not discriminate on the basis of race, color, national origin, age, disability, sex, sexual orientation, or gender identity.            Thank you!     Thank you for choosing [facility]  for your care. Our goal is always to provide you with excellent care. Hearing back from our patients is one way we can continue to improve our services. Please take a few minutes to complete the written survey that you may receive in the mail after your visit with us. Thank you!             Your Updated Medication List - Protect others around you: Learn how to safely use, store and throw away your medicines at www.disposemymeds.org.          This list is accurate as of 5/31/18  2:45 PM.  Always use your most recent med list.                   Brand Name Dispense Instructions for use Diagnosis    ALEVE 220 MG capsule   Generic drug:  naproxen sodium      Reported on 4/5/2017        ascorbic acid 500 MG tablet    VITAMIN C    60    1 tablet daily    Routine gynecological examination       aspirin 81 MG tablet      ONE DAILY    Other and unspecified hyperlipidemia       atenolol 25 MG tablet    TENORMIN    30 tablet    1/2 tablet per day for 10 days, then 1 tablet per day    Benign essential hypertension       BIOTIN PO           CALCIUM 500 + D PO      None Entered        cyanocobalamin 1000 MCG tablet    vitamin  B-12     1 tablet daily    Esophageal reflux       estradiol 0.1 MG/GM cream    ESTRACE VAGINAL    42.5 g    Place 1 g vaginally twice a week    Atrophic vaginitis       Fish Oil 500 MG Caps      1 tablet daily    Routine gynecological examination       fluocinonide 0.05 % cream    LIDEX    30 g    Apply sparingly once  or twice per day as needed to affected area until the skin is better, then stop; REPEAT AS NEEDED    Contact dermatitis due to adhesives, unspecified contact dermatitis type       glucosamine 500 MG Tabs      1 tablet prn        MYRBETRIQ 25 MG 24 hr tablet   Generic drug:  mirabegron     90 tablet    TAKE 1 TABLET(25 MG) BY MOUTH DAILY    OAB (overactive bladder)       omeprazole 20 MG CR capsule    priLOSEC    90 capsule    TAKE 1 CAPSULE DAILY 30 TO 60 MINUTES BEFORE A MEAL    Gastroesophageal reflux disease without esophagitis       pravastatin 40 MG tablet    PRAVACHOL    90 tablet    Take 1 tablet (40 mg) by mouth daily    Hyperlipidemia LDL goal <130       triamcinolone 0.5 % cream    KENALOG    15 g    Apply sparingly once or twice per day as needed to affected area until the skin is better, then stop; REPEAT AS NEEDED    Dermatitis       vitamin D 2000 units tablet      Take 2,000 Units by mouth daily.    Major depressive disorder, single episode, mild (H)       vitamin E 400 UNIT capsule      Take 1 capsule (400 Units) by mouth daily

## 2018-05-31 NOTE — PROGRESS NOTES
"  SUBJECTIVE:   Brielle Marvin is a 78 year old female who presents to clinic today for the following health issues:      Ear Problem      Duration: Sudden onset of plugged ear today    Description (location/character/radiation): right ear    Intensity:  mild    Accompanying signs and symptoms: None    History (similar episodes/previous evaluation): None    Precipitating or alleviating factors: None    Therapies tried and outcome: None     Brielle is a 77 y/o female c/o plugged right ear.  Wears hearing aids typically so has some difficulty hearing in general.    No fever.  No throat pain or coughing/SOB/wheezing.     Problem list and histories reviewed & adjusted, as indicated.  Additional history: as documented    Labs reviewed in EPIC    Reviewed and updated as needed this visit by clinical staff  Tobacco  Allergies  Meds  Problems  Med Hx  Surg Hx  Fam Hx  Soc Hx        Reviewed and updated as needed this visit by Provider  Allergies  Meds  Problems         ROS:  CONSTITUTIONAL: NEGATIVE for fever, chills, change in weight  INTEGUMENTARY/SKIN: NEGATIVE for worrisome rashes, moles or lesions  EYES: NEGATIVE for vision changes or irritation  RESP: NEGATIVE for significant cough or SOB  CV: NEGATIVE for chest pain, palpitations or peripheral edema  GI: NEGATIVE for nausea, abdominal pain, heartburn, or change in bowel habits  : NEGATIVE for frequency, dysuria, or hematuria  MUSCULOSKELETAL: NEGATIVE for significant arthralgias or myalgia  NEURO: NEGATIVE for weakness, dizziness or paresthesias  HEME: NEGATIVE for bleeding problems  PSYCHIATRIC: NEGATIVE for changes in mood or affect    OBJECTIVE:     /64 (BP Location: Right arm, Patient Position: Sitting, Cuff Size: Adult Regular)  Pulse 86  Temp 99.1  F (37.3  C) (Tympanic)  Resp 12  Ht 5' 5\" (1.651 m)  Wt 166 lb 8 oz (75.5 kg)  LMP 11/08/1991  SpO2 96%  Breastfeeding? No  BMI 27.71 kg/m2  Body mass index is 27.71 kg/(m^2).   GENERAL: " alert and no distress  EYES: Eyes grossly normal to inspection, PERRL and conjunctivae and sclerae normal  HENT: normal cephalic/atraumatic, nose and mouth without ulcers or lesions, oropharynx clear and oral mucous membranes moist.  B/l ears occluded with ear wax  NECK: no adenopathy, no asymmetry, masses, or scars and thyroid normal to palpation  RESP: lungs clear to auscultation - no rales, rhonchi or wheezes  CV: regular rate and rhythm, normal S1 S2, no S3 or S4, no murmur, click or rub, no peripheral edema and peripheral pulses strong  ABDOMEN: soft, nontender, no hepatosplenomegaly, no masses and bowel sounds normal  MS: no gross musculoskeletal defects noted, no edema  SKIN: no suspicious lesions or rashes  NEURO: Normal strength and tone, mentation intact and speech normal  PSYCH: mentation appears normal, affect normal/bright    Diagnostic Test Results:  none     ASSESSMENT/PLAN:     Problem List Items Addressed This Visit     Major depressive disorder, single episode, mild (H)    Relevant Orders    DEPRESSION ACTION PLAN (DAP) (Completed)      Other Visit Diagnoses     Bilateral impacted cerumen    -  Primary    Plugged feeling in ear, right        Relevant Orders    REMOVE IMPACTED CERUMEN (Completed)         --DAP completed as well; Letter provided and discussed with patient.  --Both ear canals were found to be occluded with wax - my nurse was able to remove the ear wax successfully with irrigation (half warm water and half hydrogen peroxide.)  Hearing and plugged-feeling (in both ears) improved significantly.  Will return to clinic if symptoms persist or worsen.  See Patient Instructions for details and follow-up instructions  Information on cerumen impaction/removal, home care discussed with patient who expressed understanding.      Maryjane Burch, [facility]

## 2018-05-31 NOTE — PATIENT INSTRUCTIONS
Earwax, Home Treatment    Everyone produces earwax from the lining of the ear canal. It serves to lubricate and protect the ear. The wax that forms in the canal naturally moves toward the outside of the ear and falls out. Sometimes the ear canal may contain too much wax. This can cause a blockage and loss of hearing. Directions are given below for home treatment.  Home care  If your doctor has advised you to remove a wax blockage yourself, follow these directions:    Unless a medicine was prescribed, you may use an over-the-counter product made for clearing earwax. These contain carbamide peroxide. Lie down with the blocked ear facing upward. Apply one dropper full of medicine and wait a few minutes. Grasp the outer ear and wiggle it to help the solution enter the canal.    Lean over a sink or basin with the blocked ear facing downward. Use a bulb syringe filled with warm (not hot or cold) water to rinse the ear several times. Use gentle pressure only.    If you are having trouble draining the water out of your ear canal, put a few drops of rubbing alcohol (isopropyl alcohol) into the ear canal. This will help remove the remaining water.    Repeat this procedure once a day for up to three days, or until your hearing is back to normal. Do not use this treatment for more than three days in a row.  Don ts    Don t use cold water to rinse the ear. This will make you dizzy.    Don t perform this procedure if you have an ear infection.    Don t perform this procedure if you have a ruptured eardrum.    Don t use cotton swabs, matches, hairpins, keys, or other objects to  clean  the ear canal. This can cause infection of the ear canal or rupture the eardrum. Because of their size and shape, cotton swabs can push earwax deeper into the ear canal instead of removing it.  Follow-up care  Follow up with your health care provider if you are not improving after three cleaning attempts, or as advised.  When to seek medical  advice  Call your health care provider right away if any of these occur:    Worsening ear pain    Fever of 101 F (38.3 C) or higher, or as directed by your health care provider    Hearing does not return to normal after three days of treatment    Fluid drainage or bleeding from the ear canal    Swelling, redness, or tenderness of the outer ear    Headache, neck pain, or stiff neck    9653-9913 [employer]. [address] [place] [address]. All rights reserved. This information is not intended as a substitute for professional medical care. Always follow your healthcare professional's instructions.        Earwax Removal    The ear canal makes earwax from the canal s lining. The ears make wax to lubricate and protect the ear canal. The ear canal is the tube that connects the middle ear to the outside of the ear. The wax protects the ear from bacteria, infection, and damage from water or trauma.  The wax that forms in the canal naturally moves toward the outside of the ear and falls out. In some cases, the ear may make too much wax. If the wax causes problems or keeps the healthcare provider from seeing into the ear, the extra wax may be removed.  Too much wax can affect your hearing. It can cause itching. In rare cases, it can be painful. Earwax should not be removed unless it is causing a problem. You should not stick objects into your ear to remove wax unless told to do so by your healthcare provider.  Healthcare providers can remove earwax safely. It is important to stay still during the procedure to avoid damage to the ear canal. But removing earwax generally doesn t hurt. You will not usually need anesthesia or pain medicine when the provider removes the earwax.  A number of conditions lead to earwax buildup. These include some skin problems, a narrow ear canal, or ears that make too much earwax. Using cotton swabs in the canal pushes earwax deeper into the ear and contributes to the buildup  of earwax.  Home care    The healthcare provider may recommend mineral oil or an over-the-counter eardrop to use at home to soften the earwax. Use these products only if the provider recommends them. Carefully follow the instructions given.    Don t use mineral oil or OTC eardrops if you might have an ear infection or a ruptured eardrum. Tell your healthcare provider right away if you have diabetes or an immune disorder.    Don t use cotton swabs in your ears. Cotton swabs may push wax deeper into the ear canal or damage the eardrum. Use cotton gauze or a wet washcloth  to gently remove wax on the outside of the ear and around the opening to the ear canal.    Don't use any probing device or object such as cotton-tipped swabs or jose luis pins to clean the inside of your ears.    Don t use ear candles to clean your ears. Candling can be dangerous. It can burn the ear canal. It can also make the condition worse instead of better.    Don t use cold water to rinse the ear. This will make you dizzy. If your provider tells you to rinse your ear, use only warm water or follow his or her instructions.    Check the ear for signs of infection or irritation listed below under When to seek medical advice.  Steps for using eardrops  1. Warm the medicine bottle by rubbing it between your hands for a few minutes.  2. Lie down on your side, with the affected ear up.  3. Place the recommended number of drops in the ear. Wet a cotton ball with the medicine. Gently put the cotton ball into the ear opening.  Follow-up care  Follow up with your healthcare provider, or as directed.  When to seek medical advice  Call the provider right away if you have:    Ear pain that gets worse    Fever of 100.4F F (38 C) or higher, or as directed by your healthcare provider    Worsening wax buildup    Severe pain, dizziness, or nausea    Bleeding from the ear    Hearing problems    Signs of irritation from the eardrops, such as burning, stinging, or  swelling and tenderness    Foul-smelling fluid draining from the ear    Swelling, redness, or tenderness of the outer ear    Headache, neck pain, or stiff neck  Date Last Reviewed: 6/1/2017 2000-2017 [employer]. [address] [place] [address], [place] [address]. All rights reserved. This information is not intended as a substitute for professional medical care. Always follow your healthcare professional's instructions.

## 2018-06-12 ENCOUNTER — OFFICE VISIT (OUTPATIENT)
Dept: INTERNAL MEDICINE | Facility: CLINIC | Age: 78
End: 2018-06-12
Payer: COMMERCIAL

## 2018-06-12 VITALS
TEMPERATURE: 98.1 F | OXYGEN SATURATION: 99 % | DIASTOLIC BLOOD PRESSURE: 60 MMHG | SYSTOLIC BLOOD PRESSURE: 100 MMHG | WEIGHT: 169.4 LBS | BODY MASS INDEX: 28.19 KG/M2 | HEART RATE: 64 BPM

## 2018-06-12 DIAGNOSIS — N32.81 OAB (OVERACTIVE BLADDER): ICD-10-CM

## 2018-06-12 DIAGNOSIS — E78.5 HYPERLIPIDEMIA LDL GOAL <130: ICD-10-CM

## 2018-06-12 DIAGNOSIS — I10 BENIGN ESSENTIAL HYPERTENSION: Primary | ICD-10-CM

## 2018-06-12 PROCEDURE — 99213 OFFICE O/P EST LOW 20 MIN: CPT | Performed by: INTERNAL MEDICINE

## 2018-06-12 RX ORDER — MIRABEGRON 25 MG/1
TABLET, FILM COATED, EXTENDED RELEASE ORAL
Qty: 90 TABLET | Refills: 1 | Status: SHIPPED | OUTPATIENT
Start: 2018-06-12 | End: 2018-10-03

## 2018-06-12 RX ORDER — PRAVASTATIN SODIUM 40 MG
40 TABLET ORAL DAILY
Qty: 90 TABLET | Refills: 3 | Status: SHIPPED | OUTPATIENT
Start: 2018-06-12 | End: 2018-07-26

## 2018-06-12 NOTE — MR AVS SNAPSHOT
"              After Visit Summary   6/12/2018    Brielle Marvin    MRN: [id number]           Patient Information     Date Of Birth          1940        Visit Information        Provider Department      6/12/2018 8:20 AM Jared Emmanuel MD [facility]        Today's Diagnoses     Benign essential hypertension    -  1    Hyperlipidemia LDL goal <130        OAB (overactive bladder)          Care Instructions    *  Blood pressure look OK.     *  Our goal for your blood pressure is 110-130 most of the time.      *  Contact me at the end of your current bottle with an update on your blood pressure readings outside the clinic.     *  Return to see me in 6 months, sooner if needed.  Call [phone] to schedule this appointment.       5 GOALS TO PREVENT VASCULAR DISEASE:     1.  Aggressive blood pressure control, under 130/80 ideally.  Using medications if needed.    Your blood pressure is under good control    BP Readings from Last 4 Encounters:   06/12/18 100/60   05/31/18 118/64   05/16/18 140/80   05/14/18 108/64       2.  Aggressive LDL cholesterol (\"bad cholesterol\") lowering as indicated.    Your goal is an LDL under 130 for sure, preferably under 100.  (If you have diabetes or previous vascular disease, the the LDL goals would be under 100 for sure, preferably under 70.)    New guidelines identify four high-risk groups who could benefit from statins:   *people with pre-existing heart disease, such as those who have had a heart attack;   *people ages 40 to 75 who have diabetes of any type  *patients ages 40 to 75 with at least a 7.5% risk of developing cardiovascular disease over the next decade, according to a formula described in the guidelines  *patients with the sort of super-high cholesterol that sometimes runs in families, as evidenced by an LDL of 190 milligrams per deciliter or higher    Your cholesterol levels are well controlled.    Recent Labs   Lab Test  05/16/18   " "1039  06/23/17   0957   05/22/14   1013  06/03/13   0927   CHOL  157  157   < >  171  161   HDL  58  62   < >  54  51   LDL  84  80   < >  101  91   TRIG  73  77   < >  78  96   CHOLHDLRATIO   --    --    --   3.1  3.1    < > = values in this interval not displayed.       3.  Aggressive diabetic prevention, screening and/or management.      You do not have diabetes as of the most recent blood tests.     4.  No smoking    5.  Consider taking low dose aspirin (81 mg) tablet once per day over the age of 50, every day unless there is a specific reason that you cannot take aspirin (such as side effect, allergy, or you are on another \"blood thinner\").        --Based on your current cardiac risk factors, you should take Aspirin 81 mg once per day if you are over 50 years of age.       SHINGLES VACCINE:     I would recommend that you consider getting a \"shingles vaccine\".  The shingles vaccine does not stop you from getting shingles, but it decreases the intensity of the event, the duration of the event, and decreases the painful nerve condition that results     There are two options available:     --Shingrix (available starting early 2018), 2 shots, 2-6 months apart  **recommended**   OR   --Zostavax, one shot    --Based on the available data, the Shingrix vaccine has superior benefit and should be considered even if you have had the Zostavax vaccine before.      --Contact your insurance provider and ask them if either shingles vaccine is covered and is so, how much it will cost you.  Usually this will be cheaper to get in a pharmacy given by the pharmacist.    --Regardless of the coverage, I would recommend that you consider the vaccine since shingles is very painful, just ask anyone who has ever had it.                       Follow-ups after your visit        Your next 10 appointments already scheduled     Aug 22, 2018  2:00 PM CDT   Nurse Only with [facility] - NURSE   [facility] [facility])    [address] [phone]              Who to contact     If you have questions or need follow up information about today's clinic visit or your schedule please contact [facility] directly at [phone].  Normal or non-critical lab and imaging results will be communicated to you by MyChart, letter or phone within 4 business days after the clinic has received the results. If you do not hear from us within 7 days, please contact the clinic through [employer] or phone. If you have a critical or abnormal lab result, we will notify you by phone as soon as possible.  Submit refill requests through [employer] or call your pharmacy and they will forward the refill request to us. Please allow 3 business days for your refill to be completed.          Additional Information About Your Visit        MyChart Information     [employer] gives you secure access to your electronic health record. If you see a primary care provider, you can also send messages to your care team and make appointments. If you have questions, please call your primary care clinic.  If you do not have a primary care provider, please call [phone] and they will assist you.        Care EveryWhere ID     This is your Care EveryWhere ID. This could be used by other organizations to access your [facility] medical records  [id number]        Your Vitals Were     Pulse Temperature Last Period Pulse Oximetry BMI (Body Mass Index)       64 98.1  F (36.7  C) (Oral) 11/08/1991 99% 28.19 kg/m2        Blood Pressure from Last 3 Encounters:   06/12/18 100/60   05/31/18 118/64   05/16/18 140/80    Weight from Last 3 Encounters:   06/12/18 169 lb 6.4 oz (76.8 kg)   05/31/18 166 lb 8 oz (75.5 kg)   05/14/18 165 lb 1.6 oz (74.9 kg)              Today, you had the following     No orders found for display         Where to get your medicines      These medications were sent to [facility] [place], [place] - [address].  [address] [address]     Phone:  [phone]     mirabegron 25 MG 24 hr tablet    pravastatin 40 MG tablet          Primary Care Provider Office Phone # Fax #    Jared Emmanuel [phone]       [address] [facility] [address]        Equal Access to Services     GINNA MERCEDES : Hadii aad ku hadasho Soomaali, waaxda luqadaha, qaybta kaalmada adeegyada, waxay idiin hayaan adeeg kharash la'aan . So [facility] [phone].    ATENCIÓN: Si habla español, tiene a lucio disposición servicios gratuitos de asistencia lingüística. Llame al [phone].    We comply with applicable federal civil rights laws and [place] laws. We do not discriminate on the basis of race, color, national origin, age, disability, sex, sexual orientation, or gender identity.            Thank you!     Thank you for choosing [facility]  for your care. Our goal is always to provide you with excellent care. Hearing back from our patients is one way we can continue to improve our services. Please take a few minutes to complete the written survey that you may receive in the mail after your visit with us. Thank you!             Your Updated Medication List - Protect others around you: Learn how to safely use, store and throw away your medicines at www.disposemymeds.org.          This list is accurate as of 6/12/18  9:20 AM.  Always use your most recent med list.                   Brand Name Dispense Instructions for use Diagnosis    ALEVE 220 MG capsule   Generic drug:  naproxen sodium      Reported on 4/5/2017        ascorbic acid 500 MG tablet    VITAMIN C    60    1 tablet daily    Routine gynecological examination       aspirin 81 MG tablet      ONE DAILY    Other and unspecified hyperlipidemia       atenolol 25 MG tablet    TENORMIN    30 tablet    1/2 tablet per day for 10 days, then 1 tablet per day    Benign essential  hypertension       BIOTIN PO           CALCIUM 500 + D PO      None Entered        cyanocobalamin 1000 MCG tablet    vitamin  B-12     1 tablet daily    Esophageal reflux       estradiol 0.1 MG/GM cream    ESTRACE VAGINAL    42.5 g    Place 1 g vaginally twice a week    Atrophic vaginitis       Fish Oil 500 MG Caps      1 tablet daily    Routine gynecological examination       fluocinonide 0.05 % cream    LIDEX    30 g    Apply sparingly once or twice per day as needed to affected area until the skin is better, then stop; REPEAT AS NEEDED    Contact dermatitis due to adhesives, unspecified contact dermatitis type       glucosamine 500 MG Tabs      1 tablet prn        mirabegron 25 MG 24 hr tablet    MYRBETRIQ    90 tablet    TAKE 1 TABLET(25 MG) BY MOUTH DAILY    OAB (overactive bladder)       omeprazole 20 MG CR capsule    priLOSEC    90 capsule    TAKE 1 CAPSULE DAILY 30 TO 60 MINUTES BEFORE A MEAL    Gastroesophageal reflux disease without esophagitis       pravastatin 40 MG tablet    PRAVACHOL    90 tablet    Take 1 tablet (40 mg) by mouth daily    Hyperlipidemia LDL goal <130       triamcinolone 0.5 % cream    KENALOG    15 g    Apply sparingly once or twice per day as needed to affected area until the skin is better, then stop; REPEAT AS NEEDED    Dermatitis       vitamin D 2000 units tablet      Take 2,000 Units by mouth daily.    Major depressive disorder, single episode, mild (H)       vitamin E 400 UNIT capsule      Take 1 capsule (400 Units) by mouth daily

## 2018-06-12 NOTE — PATIENT INSTRUCTIONS
"*  Blood pressure look OK.     *  Our goal for your blood pressure is 110-130 most of the time.      *  Contact me at the end of your current bottle with an update on your blood pressure readings outside the clinic.     *  Return to see me in 6 months, sooner if needed.  Call [phone] to schedule this appointment.       5 GOALS TO PREVENT VASCULAR DISEASE:     1.  Aggressive blood pressure control, under 130/80 ideally.  Using medications if needed.    Your blood pressure is under good control    BP Readings from Last 4 Encounters:   06/12/18 100/60   05/31/18 118/64   05/16/18 140/80   05/14/18 108/64       2.  Aggressive LDL cholesterol (\"bad cholesterol\") lowering as indicated.    Your goal is an LDL under 130 for sure, preferably under 100.  (If you have diabetes or previous vascular disease, the the LDL goals would be under 100 for sure, preferably under 70.)    New guidelines identify four high-risk groups who could benefit from statins:   *people with pre-existing heart disease, such as those who have had a heart attack;   *people ages 40 to 75 who have diabetes of any type  *patients ages 40 to 75 with at least a 7.5% risk of developing cardiovascular disease over the next decade, according to a formula described in the guidelines  *patients with the sort of super-high cholesterol that sometimes runs in families, as evidenced by an LDL of 190 milligrams per deciliter or higher    Your cholesterol levels are well controlled.    Recent Labs   Lab Test  05/16/18   1039  06/23/17   0957   05/22/14   1013  06/03/13   0927   CHOL  157  157   < >  171  161   HDL  58  62   < >  54  51   LDL  84  80   < >  101  91   TRIG  73  77   < >  78  96   CHOLHDLRATIO   --    --    --   3.1  3.1    < > = values in this interval not displayed.       3.  Aggressive diabetic prevention, screening and/or management.      You do not have diabetes as of the most recent blood tests.     4.  No smoking    5.  Consider taking low " "dose aspirin (81 mg) tablet once per day over the age of 50, every day unless there is a specific reason that you cannot take aspirin (such as side effect, allergy, or you are on another \"blood thinner\").        --Based on your current cardiac risk factors, you should take Aspirin 81 mg once per day if you are over 50 years of age.       SHINGLES VACCINE:     I would recommend that you consider getting a \"shingles vaccine\".  The shingles vaccine does not stop you from getting shingles, but it decreases the intensity of the event, the duration of the event, and decreases the painful nerve condition that results     There are two options available:     --Shingrix (available starting early 2018), 2 shots, 2-6 months apart  **recommended**   OR   --Zostavax, one shot    --Based on the available data, the Shingrix vaccine has superior benefit and should be considered even if you have had the Zostavax vaccine before.      --Contact your insurance provider and ask them if either shingles vaccine is covered and is so, how much it will cost you.  Usually this will be cheaper to get in a pharmacy given by the pharmacist.    --Regardless of the coverage, I would recommend that you consider the vaccine since shingles is very painful, just ask anyone who has ever had it.               "

## 2018-06-12 NOTE — PROGRESS NOTES
SUBJECTIVE:   Brielle Marvin is a 78 year old female who presents to clinic today for the following health issues:      Hypertension Follow-up      Outpatient blood pressures are being checked at home.  Results are 121/81.    Low Salt Diet: no added salt      Amount of exercise or physical activity: 6-7 days/week for an average of less than 15 minutes    Problems taking medications regularly: No    Medication side effects: none    Diet: regular (no restrictions)            Problem list and histories reviewed & adjusted, as indicated.  Additional history: as documented        Reviewed and updated as needed this visit by clinical staff  Allergies  Meds       Reviewed and updated as needed this visit by Provider           Past Medical History:  ---------------------------  Past Medical History:   Diagnosis Date     Esophageal reflux      Family history of malignant neoplasm of gastrointestinal tract      Muscle weakness (generalized) 5/6/2010     NONSPECIFIC MEDICAL HISTORY     vertbral fx as teen in [facility]     Outcome of delivery, single liveborn 1972     Outcome of delivery, single liveborn 1977     Unspecified menopausal and postmenopausal disorder        Past Surgical History:  ---------------------------  Past Surgical History:   Procedure Laterality Date     C REYNOSO W/O FACETEC FORAMOT/DSKC 1/2 VRT SEG, LUMBAR  1962    Lami, Lumbar     HC COLONOSCOPY THRU STOMA, DIAGNOSTIC  8/31/02     HC CORRECT BUNZACKERYSIMPLE  1981       Current Medications:  ---------------------------  Current Outpatient Prescriptions   Medication Sig Dispense Refill     ALEVE 220 MG OR CAPS Reported on 4/5/2017       ASPIRIN 81 MG OR TABS ONE DAILY       atenolol (TENORMIN) 25 MG tablet 1/2 tablet per day for 10 days, then 1 tablet per day 30 tablet 0     BIOTIN PO        CALCIUM 500 + D OR None Entered       Cholecalciferol (VITAMIN D) 2000 UNIT tablet Take 2,000 Units by mouth daily.       estradiol (ESTRACE VAGINAL) 0.1 MG/GM cream Place 1  g vaginally twice a week 42.5 g 5     FISH  MG OR CAPS 1 tablet daily  0     fluocinonide (LIDEX) 0.05 % cream Apply sparingly once or twice per day as needed to affected area until the skin is better, then stop; REPEAT AS NEEDED 30 g 1     GLUCOSAMINE 500 MG OR TABS 1 tablet prn       MYRBETRIQ 25 MG 24 hr tablet TAKE 1 TABLET(25 MG) BY MOUTH DAILY 90 tablet 0     omeprazole (PRILOSEC) 20 MG CR capsule TAKE 1 CAPSULE DAILY 30 TO 60 MINUTES BEFORE A MEAL 90 capsule 2     pravastatin (PRAVACHOL) 40 MG tablet Take 1 tablet (40 mg) by mouth daily 90 tablet 3     triamcinolone (KENALOG) 0.5 % cream Apply sparingly once or twice per day as needed to affected area until the skin is better, then stop; REPEAT AS NEEDED 15 g 5     VITAMIN B-12 1000 MCG OR TABS 1 tablet daily       VITAMIN C 500 MG OR TABS 1 tablet daily 60 0     vitamin E 400 UNIT capsule Take 1 capsule (400 Units) by mouth daily         Allergies:  -------------  Allergies   Allergen Reactions     Codeine Nausea and Vomiting     vomiting     Morphine Nausea and Vomiting     nausea     Vicodin [Hydrocodone-Acetaminophen] Nausea and Vomiting       Social History:  -------------------  Social History     Social History     Marital status:      Spouse name: N/A     Number of children: N/A     Years of education: N/A     Occupational History      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 [place]     Social History Main Topics     Smoking status: Never Smoker     Smokeless tobacco: Never Used     Alcohol use No     Drug use: No     Sexual activity: No      Comment: postmenopausal     Other Topics Concern     Not on file     Social History Narrative       Family Medical History:  ------------------------------  Family History   Problem Relation Age of Onset     CANCER Mother      D:84 Lung CA     Cardiovascular Father      D:72 heart Attack     CANCER Sister      D:62, probably colon cancer     Family History Negative Sister      B:1947  alive     Family History Negative Brother      B:1935 Alive     Family History Negative Brother      B:1943 Alive     Family History Negative Brother      B:1939 Alive     C.A.D. Brother      B: 1937  HTN, CAD, had PTCA         ROS:  REVIEW OF SYSTEMS:    RESP: negative for cough, dyspnea, wheezing, hemoptysis  CV: negative for chest pain, palpitations, PND, [place], orthopnea; reports no changes in their ability to perform physical activity (from cardiovascular standpoint)  GI: negative for dysphagia, N/V, pain, melena, diarrhea and constipation  NEURO: negative for numbness/tingling, paralysis, incoordination, or focal weakness     OBJECTIVE:                                                    /60  Pulse 64  Temp 98.1  F (36.7  C) (Oral)  Wt 169 lb 6.4 oz (76.8 kg)  LMP 11/08/1991  SpO2 99%  BMI 28.19 kg/m2     GENERAL alert and no distress  EYES:  Normal sclera,conjunctiva, EOMI  HENT: oral and posterior pharynx without lesions or erythema, facies symmetric  NECK: Neck supple. No LAD, without thyroidmegaly or JVD., Carotids without bruits.  RESP: Clear to ausculation bilaterally without wheezes or crackles. Normal BS in all fields.  CV: RRR normal S1S2 without murmurs, rubs or gallops. PMI normal  LYMPH: no cervical lymph adenopathy appreciated  MS: extremities- no gross deformities of the visible extremities noted, no edema  PSYCH: Alert and oriented times 3; speech- coherent  SKIN:  No obvious significant skin lesions on visible portions of face          ASSESSMENT/PLAN:                                                      (I10) Benign essential hypertension  (primary encounter diagnosis)  Comment: This condition is currently controlled on the current medical regimen.  Continue current therapy.  Discussed current hypertension treatment guidelines, including indications for treatment and treatment options.  Discussed the importance for aggressive management of HTN to prevent vascular complications later.   Recommended lower fat, lower carbohydrate, and lower sodium (<2000 mg)diet.  Discussed required intervals for follow up on HTN, lab studies.  Recommened pt. follow their blood pressures outside the clinic to ensure that BPs are remaining within guidelines, and to contact me if the readings are not within guidelines on a regular basis so we can adjust treatment as needed.   Plan:     *  Blood pressure look OK.     *  Our goal for your blood pressure is 110-130 most of the time.      *  Contact me at the end of your current bottle with an update on your blood pressure readings outside the clinic.     *  Return to see me in 6 months, sooner if needed.  Call [phone] to schedule this appointment.         (T58.5) Hyperlipidemia LDL goal <130  Comment: Discussed current lipid results, previous results (if available) current guidelines (NCEP) for treatment and goals for lipids.  Discussed lifestyle modification, dietary changes (low fat, low simple carb) and regular aerobic exercise.  Discussed the link between dysmetabolic syndrome and impaired glucose tolerance seen in certain patterns of lipids.  Briefly discussed medication used for lipid lowering, including the statins are their possible side effects of myalgias, rhabdomyolysis, and liver toxicity.   Plan: pravastatin (PRAVACHOL) 40 MG tablet            (N32.81) OAB (overactive bladder)  Comment: This condition is currently controlled on the current medical regimen.  Continue current therapy.   Plan: mirabegron (MYRBETRIQ) 25 MG 24 hr tablet               See Patient Instructions    EVELYNE MARC M.D., MD  [facility]

## 2018-06-30 ENCOUNTER — MYC MEDICAL ADVICE (OUTPATIENT)
Dept: INTERNAL MEDICINE | Facility: CLINIC | Age: 78
End: 2018-06-30

## 2018-06-30 DIAGNOSIS — I10 BENIGN ESSENTIAL HYPERTENSION: ICD-10-CM

## 2018-07-02 RX ORDER — ATENOLOL 25 MG/1
25 TABLET ORAL DAILY
Qty: 90 TABLET | Refills: 1 | Status: SHIPPED | OUTPATIENT
Start: 2018-07-02 | End: 2019-01-04

## 2018-07-02 NOTE — TELEPHONE ENCOUNTER
Dr. Emmanuel, below are Brielle's bp per request at the visit on 6/12.    Please advise.     Dr. Emmanuel  This is a rough account of my Blood Pressure reading since I last saw you. Not sure how detailed account that you wanted, but this will give you a pretty accurate reading.    6/11  129/69  6/12  121/81  am           134/49  pm  6/13  107/55  pm           115/56  6/14  135/71  pm  6/15  143/75  am  6/17  101/58  pm  6/22  123/64  pm  6/23  132/69  pm  6/24  127/67  pm  6/25  123/68  am           117/67  pm  6/26  115/70 pm  6/27  96/57  pm           119/72  pm  6/30  119/71 pm    I have 6 pills left, do you want me to refill them, if so you will have to call the prescription in to either [facility] or [facility], and will it be for 30 or 90 days. Do you think I should stay on whole pill daily or half.    Also do you still want me to monitor my Blood Pressure?    Brielle Marvin

## 2018-07-02 NOTE — TELEPHONE ENCOUNTER
BPs look good.   Assuming no side effects, continue same dose.   Prescription(s) sent electronically to specified pharmacy.   Return to see me in 6 months, sooner if needed.  Call [phone] to schedule this appointment.

## 2018-07-06 ENCOUNTER — OFFICE VISIT (OUTPATIENT)
Dept: FAMILY MEDICINE | Facility: CLINIC | Age: 78
End: 2018-07-06
Payer: COMMERCIAL

## 2018-07-06 VITALS
OXYGEN SATURATION: 94 % | TEMPERATURE: 97.8 F | HEART RATE: 72 BPM | DIASTOLIC BLOOD PRESSURE: 76 MMHG | HEIGHT: 65 IN | SYSTOLIC BLOOD PRESSURE: 124 MMHG | RESPIRATION RATE: 14 BRPM | WEIGHT: 171 LBS | BODY MASS INDEX: 28.49 KG/M2

## 2018-07-06 DIAGNOSIS — F32.5 MAJOR DEPRESSION IN COMPLETE REMISSION (H): ICD-10-CM

## 2018-07-06 DIAGNOSIS — E78.00 HYPERCHOLESTEROLEMIA: ICD-10-CM

## 2018-07-06 DIAGNOSIS — M25.511 ACUTE PAIN OF RIGHT SHOULDER: ICD-10-CM

## 2018-07-06 DIAGNOSIS — E66.3 OVERWEIGHT (BMI 25.0-29.9): ICD-10-CM

## 2018-07-06 DIAGNOSIS — H61.23 BILATERAL IMPACTED CERUMEN: Primary | ICD-10-CM

## 2018-07-06 PROCEDURE — 99214 OFFICE O/P EST MOD 30 MIN: CPT | Performed by: FAMILY MEDICINE

## 2018-07-06 ASSESSMENT — ANXIETY QUESTIONNAIRES
5. BEING SO RESTLESS THAT IT IS HARD TO SIT STILL: NOT AT ALL
1. FEELING NERVOUS, ANXIOUS, OR ON EDGE: NOT AT ALL
IF YOU CHECKED OFF ANY PROBLEMS ON THIS QUESTIONNAIRE, HOW DIFFICULT HAVE THESE PROBLEMS MADE IT FOR YOU TO DO YOUR WORK, TAKE CARE OF THINGS AT HOME, OR GET ALONG WITH OTHER PEOPLE: NOT DIFFICULT AT ALL
7. FEELING AFRAID AS IF SOMETHING AWFUL MIGHT HAPPEN: NOT AT ALL
6. BECOMING EASILY ANNOYED OR IRRITABLE: NOT AT ALL
2. NOT BEING ABLE TO STOP OR CONTROL WORRYING: NOT AT ALL
3. WORRYING TOO MUCH ABOUT DIFFERENT THINGS: NOT AT ALL
GAD7 TOTAL SCORE: 0

## 2018-07-06 ASSESSMENT — PATIENT HEALTH QUESTIONNAIRE - PHQ9: 5. POOR APPETITE OR OVEREATING: NOT AT ALL

## 2018-07-06 NOTE — MR AVS SNAPSHOT
After Visit Summary   7/6/2018    Brielle Marvin    MRN: [id number]           Patient Information     Date Of Birth          1940        Visit Information        Provider Department      7/6/2018 3:20 PM Jacqueline Damian MD [facility]        Today's Diagnoses     Screen for colon cancer    -  1    Hypercholesterolemia        Major depression in complete remission (H)        Overweight (BMI 25.0-29.9)          Care Instructions    1. ICE  decreases inflammation & kills the pain coming from the nerves     WARM SOAKS/PACKS  Relax & loosen up tight muscles to reduce the pain of the        muscle tightness & spasm    2. Use Debrox or cerumenex Put one to 2  Drops of this in  Each ear one at a time and hold with that ear up  For 15-30min until you get relief  NO Q tips in the ear !! Nothing smaller than your elbow in your ear You may use the shower or a sink faucet to run warm water in the ear to clean it out    3. Shingrex is a 2 shot series that prevents shingles 97% of the time, as opposed to the old shingles shot that only prevented it at 40-50%  It costs less for medicare at a pharmacy  You should get it starting at 50 yrs old     4. No difference was  Noted by patients in a double blind study when given codeine, tylenol ( acetaminophen) or ibuprofen (all in identical pills). They felt no difference in pain relief. Since ibuprofen and the NSAIDs  causes kidney damage, esophageal damage with heartburn, and can increase the risk of esophageal and stomach cancer and ulcers,and colonic strictures. They also cause increased risk of heart attack .   I recommend that you use tylenol(acetaminophen) for pain. Use the acetaminophen ES  Which has 500mgm/tablet You can take up to 2 tablets 4 times a day as need for pain.  If this is not enough, you can add in ibuprofen or aleve(naprosyn) with 2 glasses of fluid and some food-to protect the stomach and esophagus. Please  let us know if you are continuing to take ibuprofen or aleve, as we will need to periodically check your kidney function with a blood test.    4. Keep the arm full range of motion     If continues to hurt, call me for PT order           Follow-ups after your visit        Your next 10 appointments already scheduled     Aug 22, 2018  2:00 PM CDT   Nurse Only with [facility] - [facility]URSE   [facility]    [address] [address] [phone]              Who to contact     If you have questions or need follow up information about today's clinic visit or your schedule please contact [facility] directly at [phone].  Normal or non-critical lab and imaging results will be communicated to you by [employer], letter or phone within 4 business days after the clinic has received the results. If you do not hear from us within 7 days, please contact the clinic through [employer] or phone. If you have a critical or abnormal lab result, we will notify you by phone as soon as possible.  Submit refill requests through [employer] or call your pharmacy and they will forward the refill request to us. Please allow 3 business days for your refill to be completed.          Additional Information About Your Visit        [employer] Information     [employer] gives you secure access to your electronic health record. If you see a primary care provider, you can also send messages to your care team and make appointments. If you have questions, please call your primary care clinic.  If you do not have a primary care provider, please call [phone] and they will assist you.        Care EveryWhere ID     This is your Care EveryWhere ID. This could be used by other organizations to access your [place] medical records  [id number]        Your Vitals Were     Pulse Temperature Respirations Height Last Period Pulse Oximetry    72 97.8  F (36.6  " C) (Tympanic) 14 5' 5\" (1.651 m) 11/08/1991 94%    Breastfeeding? BMI (Body Mass Index)                No 28.46 kg/m2           Blood Pressure from Last 3 Encounters:   07/06/18 124/76   06/12/18 100/60   05/31/18 118/64    Weight from Last 3 Encounters:   07/06/18 171 lb (77.6 kg)   06/12/18 169 lb 6.4 oz (76.8 kg)   05/31/18 166 lb 8 oz (75.5 kg)              Today, you had the following     No orders found for display       Primary Care Provider Office Phone # Fax #    Jared Emmanuel [phone]       [address] [place] [address]        Equal Access to Services     GINNA MERCEDES : Hadii gayathri baker hadasho Soomaali, waaxda luqadaha, qaybta kaalmada adeegyada, gwyn hess haybettina duran . So [facility] [place] [phone].    ATENCIÓN: Si habla español, tiene a lucio disposición servicios gratuitos de asistencia lingüística. Llame al [phone].    We comply with applicable federal civil rights laws and [place] laws. We do not discriminate on the basis of race, color, national origin, age, disability, sex, sexual orientation, or gender identity.            Thank you!     Thank you for choosing [facility]  for your care. Our goal is always to provide you with excellent care. Hearing back from our patients is one way we can continue to improve our services. Please take a few minutes to complete the written survey that you may receive in the mail after your visit with us. Thank you!             Your Updated Medication List - Protect others around you: Learn how to safely use, store and throw away your medicines at www.disposemymeds.org.          This list is accurate as of 7/6/18  4:14 PM.  Always use your most recent med list.                   Brand Name Dispense Instructions for use Diagnosis    ALEVE 220 MG capsule   Generic drug:  naproxen sodium      Reported on 4/5/2017        ascorbic acid 500 MG tablet    VITAMIN C    60    1 tablet daily    Routine " gynecological examination       aspirin 81 MG tablet      ONE DAILY    Other and unspecified hyperlipidemia       atenolol 25 MG tablet    TENORMIN    90 tablet    Take 1 tablet (25 mg) by mouth daily    Benign essential hypertension       BIOTIN PO           CALCIUM 500 + D PO      None Entered        cyanocobalamin 1000 MCG tablet    vitamin  B-12     1 tablet daily    Esophageal reflux       estradiol 0.1 MG/GM cream    ESTRACE VAGINAL    42.5 g    Place 1 g vaginally twice a week    Atrophic vaginitis       Fish Oil 500 MG Caps      1 tablet daily    Routine gynecological examination       fluocinonide 0.05 % cream    LIDEX    30 g    Apply sparingly once or twice per day as needed to affected area until the skin is better, then stop; REPEAT AS NEEDED    Contact dermatitis due to adhesives, unspecified contact dermatitis type       glucosamine 500 MG Tabs      1 tablet prn        mirabegron 25 MG 24 hr tablet    MYRBETRIQ    90 tablet    TAKE 1 TABLET(25 MG) BY MOUTH DAILY    OAB (overactive bladder)       omeprazole 20 MG CR capsule    priLOSEC    90 capsule    TAKE 1 CAPSULE DAILY 30 TO 60 MINUTES BEFORE A MEAL    Gastroesophageal reflux disease without esophagitis       pravastatin 40 MG tablet    PRAVACHOL    90 tablet    Take 1 tablet (40 mg) by mouth daily    Hyperlipidemia LDL goal <130       triamcinolone 0.5 % cream    KENALOG    15 g    Apply sparingly once or twice per day as needed to affected area until the skin is better, then stop; REPEAT AS NEEDED    Dermatitis       vitamin D 2000 units tablet      Take 2,000 Units by mouth daily.    Major depressive disorder, single episode, mild (H)       vitamin E 400 UNIT capsule      Take 1 capsule (400 Units) by mouth daily

## 2018-07-06 NOTE — PROGRESS NOTES
SUBJECTIVE:   Brielle Marvin is a 78 year old female who presents to clinic today for the following health issues:    Ear Problem-Recurrent Bilat Cerumen Impaction - now clear       Duration: Ongoing    Description (location/character/radiation): Impacted  Earwax    Intensity:  mild    Accompanying signs and symptoms: None    History (similar episodes/previous evaluation): Yes    Precipitating or alleviating factors: None    Therapies tried and outcome: Recent Ear Cleaning and Debrox--still dowwn hearing      Musculoskeletal problem/pain-Dominant Rt Shoulder/ Arm Strain       Duration: 07/05/18-onset in pm after sat at a luncheon for several hrs in the same position     Description  Location: Right Arm/Shoulder    Intensity:  mild    Accompanying signs and symptoms: none    History  Previous similar problem: no   Previous evaluation:  none    Precipitating or alleviating factors:  Trauma or overuse: no   Aggravating factors include: none    Therapies tried and outcome: NSAID - Aleve/Some Relief    OVERWEIGHT    -BMI = 28.5  -comorbid HTN, hi LDL       Hyperlipidemia:LDL Follow-Up      Rate your low fat/cholesterol diet?: good    Taking statin?  Yes, no muscle aches fr om 40mgm pravasttatin    Other lipid medications/supplements?:  none    Depression and Anxiety Follow-Up    Status since last visit: No change-in remission     Other associated symptoms:None    Complicating factors:     Significant life event: No     Current substance abuse: None    PHQ-9 2/16/2016 2/22/2018 7/6/2018   Total Score 0 0 0   Q9: Suicide Ideation Not at all Not at all Not at all     KAILEE-7 SCORE 7/6/2018   Total Score 0       PHQ-9  English  PHQ-9   Any Language  KAILEE-7  Suicide Assessment Five-step Evaluation and Treatment (SAFE-T)    Problem list and histories reviewed & adjusted, as indicated.  Additional history: as documented    Labs reviewed in EPIC    Reviewed and updated as needed this visit by clinical staff  Tobacco  Allergies   "Meds  Problems  Med Hx  Surg Hx  Fam Hx  Soc Hx        Reviewed and updated as needed this visit by Provider         ROS:  CONSTITUTIONAL: NEGATIVE for fever, chills, change in weight  INTEGUMENTARY/SKIN: NEGATIVE for worrisome rashes, moles or lesions  EYES: NEGATIVE for vision changes or irritation  ENT/MOUTH: NEGATIVE for ear, mouth and throat problems  RESP: NEGATIVE for significant cough or SOB  BREAST: NEGATIVE for masses, tenderness or discharge  CV: NEGATIVE for chest pain, palpitations or peripheral edema  GI: NEGATIVE for nausea, abdominal pain, heartburn, or change in bowel habits  : NEGATIVE for frequency, dysuria, or hematuria  MUSCULOSKELETAL:POSITIVE  for  and joint stiffness Rt shoulder   NEURO: NEGATIVE for weakness, dizziness or paresthesias  ENDOCRINE: NEGATIVE for temperature intolerance, skin/hair changes  HEME: NEGATIVE for bleeding problems  PSYCHIATRIC: NEGATIVE for changes in mood or affect    OBJECTIVE:     /76  Pulse 72  Temp 97.8  F (36.6  C) (Tympanic)  Resp 14  Ht 5' 5\" (1.651 m)  Wt 171 lb (77.6 kg)  LMP 11/08/1991  SpO2 94%  Breastfeeding? No  BMI 28.46 kg/m2  Body mass index is 28.46 kg/(m^2).  GENERAL: healthy, alert and no distress  EYES: Eyes grossly normal to inspection, PERRL and conjunctivae and sclerae normal  HENT: ear canals and TM's normal, nose and mouth without ulcers or lesions  NECK: no adenopathy, no asymmetry, masses, or scars and thyroid normal to palpation  RESP: lungs clear to auscultation - no rales, rhonchi or wheezes  CV: regular rate and rhythm, normal S1 S2, no S3 or S4, no murmur, click or rub, no peripheral edema and peripheral pulses strong  MS: no gross musculoskeletal defects noted, no edema  POS pain with full ROM-nontender and has FROM  SKIN: no suspicious lesions or rashes  NEURO: Normal strength and tone, mentation intact and speech normal  PSYCH: mentation appears normal, affect normal/bright        ASSESSMENT/PLAN:              "  ICD-10-CM    1. Bilateral impacted cerumen H61.23 REMOVE IMPACTED CERUMEN   2. Acute pain of right shoulder M25.511    3. Hypercholesterolemia E78.00    4. Major depression in complete remission (H) F32.5    5. Overweight (BMI 25.0-29.9) E66.3        Patient Instructions   1. ICE  decreases inflammation & kills the pain coming from the nerves     WARM SOAKS/PACKS  Relax & loosen up tight muscles to reduce the pain of the        muscle tightness & spasm    2. Use Debrox or cerumenex Put one to 2  Drops of this in  Each ear one at a time and hold with that ear up  For 15-30min until you get relief  NO Q tips in the ear !! Nothing smaller than your elbow in your ear You may use the shower or a sink faucet to run warm water in the ear to clean it out    3. Shingrex is a 2 shot series that prevents shingles 97% of the time, as opposed to the old shingles shot that only prevented it at 40-50%  It costs less for medicare at a pharmacy  You should get it starting at 50 yrs old     4. No difference was  Noted by patients in a double blind study when given codeine, tylenol ( acetaminophen) or ibuprofen (all in identical pills). They felt no difference in pain relief. Since ibuprofen and the NSAIDs  causes kidney damage, esophageal damage with heartburn, and can increase the risk of esophageal and stomach cancer and ulcers,and colonic strictures. They also cause increased risk of heart attack .   I recommend that you use tylenol(acetaminophen) for pain. Use the acetaminophen ES  Which has 500mgm/tablet You can take up to 2 tablets 4 times a day as need for pain.  If this is not enough, you can add in ibuprofen or aleve(naprosyn) with 2 glasses of fluid and some food-to protect the stomach and esophagus. Please let us know if you are continuing to take ibuprofen or aleve, as we will need to periodically check your kidney function with a blood test.    4. Keep the arm full range of motion     If continues to hurt, call me for  PT order       Jacqueline Damian MD  [facility]      Weight management plan: Discussed healthy diet and exercise guidelines and patient will follow up in 6 months in clinic to re-evaluate.    Jacqueline Damian MD

## 2018-07-06 NOTE — NURSING NOTE
"Chief Complaint   Patient presents with     Ear Problem     Musculoskeletal Problem     /76  Pulse 72  Temp 97.8  F (36.6  C) (Tympanic)  Resp 14  Ht 5' 5\" (1.651 m)  Wt 171 lb (77.6 kg)  LMP 11/08/1991  SpO2 94%  Breastfeeding? No  BMI 28.46 kg/m2 Estimated body mass index is 28.46 kg/(m^2) as calculated from the following:    Height as of this encounter: 5' 5\" (1.651 m).    Weight as of this encounter: 171 lb (77.6 kg).  BP completed using cuff size: anny Ayala CMA    Health Maintenance Due   Topic Date Due     ADVANCE DIRECTIVE PLANNING Q5 YRS  10/29/2017     COLONOSCOPY Q10 YR  07/10/2018     Health Maintenance reviewed at today's visit patient asked to schedule/complete:   Colon Cancer:  Patient agrees to schedule    "

## 2018-07-06 NOTE — PATIENT INSTRUCTIONS
1. ICE  decreases inflammation & kills the pain coming from the nerves     WARM SOAKS/PACKS  Relax & loosen up tight muscles to reduce the pain of the        muscle tightness & spasm    2. Use Debrox or cerumenex Put one to 2  Drops of this in  Each ear one at a time and hold with that ear up  For 15-30min until you get relief  NO Q tips in the ear !! Nothing smaller than your elbow in your ear You may use the shower or a sink faucet to run warm water in the ear to clean it out    3. Shingrex is a 2 shot series that prevents shingles 97% of the time, as opposed to the old shingles shot that only prevented it at 40-50%  It costs less for medicare at a pharmacy  You should get it starting at 50 yrs old     4. No difference was  Noted by patients in a double blind study when given codeine, tylenol ( acetaminophen) or ibuprofen (all in identical pills). They felt no difference in pain relief. Since ibuprofen and the NSAIDs  causes kidney damage, esophageal damage with heartburn, and can increase the risk of esophageal and stomach cancer and ulcers,and colonic strictures. They also cause increased risk of heart attack .   I recommend that you use tylenol(acetaminophen) for pain. Use the acetaminophen ES  Which has 500mgm/tablet You can take up to 2 tablets 4 times a day as need for pain.  If this is not enough, you can add in ibuprofen or aleve(naprosyn) with 2 glasses of fluid and some food-to protect the stomach and esophagus. Please let us know if you are continuing to take ibuprofen or aleve, as we will need to periodically check your kidney function with a blood test.    4. Keep the arm full range of motion     If continues to hurt, call me for PT order

## 2018-07-07 ASSESSMENT — PATIENT HEALTH QUESTIONNAIRE - PHQ9: SUM OF ALL RESPONSES TO PHQ QUESTIONS 1-9: 0

## 2018-07-07 ASSESSMENT — ANXIETY QUESTIONNAIRES: GAD7 TOTAL SCORE: 0

## 2018-07-14 DIAGNOSIS — N32.81 OAB (OVERACTIVE BLADDER): ICD-10-CM

## 2018-07-14 NOTE — TELEPHONE ENCOUNTER
mirabegron (MYRBETRIQ) 25 MG 24 hr tablet      Last Written Prescription Date:  06/12/2018  Last Fill Quantity: 90 tablet,   # refills: 1  Last Office Visit: 06/12/2018 Jared Emmanuel MD  Future Office visit:    Next 5 appointments (look out 90 days)     Aug 22, 2018  2:00 PM CDT   Nurse Only with [facility] - NURSE   [facility]    [address] [address] [phone]                   Routing refill request to provider for review/approval because:  Drug not on the [facility], [facility] or  Health refill protocol or controlled substance

## 2018-07-17 RX ORDER — MIRABEGRON 25 MG/1
TABLET, FILM COATED, EXTENDED RELEASE ORAL
Qty: 90 TABLET | Refills: 0 | Status: SHIPPED | OUTPATIENT
Start: 2018-07-17 | End: 2019-01-18

## 2018-07-26 DIAGNOSIS — E78.5 HYPERLIPIDEMIA LDL GOAL <130: ICD-10-CM

## 2018-07-26 RX ORDER — PRAVASTATIN SODIUM 40 MG
TABLET ORAL
Qty: 90 TABLET | Refills: 3 | Status: SHIPPED | OUTPATIENT
Start: 2018-07-26 | End: 2019-04-03

## 2018-07-26 NOTE — TELEPHONE ENCOUNTER
"Requested Prescriptions   Pending Prescriptions Disp Refills     pravastatin (PRAVACHOL) 40 MG tablet [Pharmacy Med Name: PRAVASTATIN SODIUM 40 MG Tablet] 90 tablet 3    Last Written Prescription Date:  6/12/2018  Last Fill Quantity: 90,  # refills: 3   Last office visit: 6/12/2018 with prescribing provider:  6/12/2018   Future Office Visit:   Next 5 appointments (look out 90 days)     Aug 22, 2018  2:00 PM CDT   [facility]urse Only with [facility] - NURSE   [facility]    [address] [address] [phone]                  Sig: TAKE 1 TABLET EVERY DAY    Statins Protocol Passed    7/26/2018  2:01 PM       Passed - LDL on file in past 12 months    Recent Labs   Lab Test  05/16/18   1039   LDL  84            Passed - No abnormal creatine kinase in past 12 months    No lab results found.            Passed - Recent (12 mo) or future (30 days) visit within the authorizing provider's specialty    Patient had office visit in the last 12 months or has a visit in the next 30 days with authorizing provider or within the authorizing provider's specialty.  See \"Patient Info\" tab in inbasket, or \"Choose Columns\" in Meds & Orders section of the refill encounter.           Passed - Patient is age 18 or older       Passed - No active pregnancy on record       Passed - No positive pregnancy test in past 12 months          "

## 2018-09-15 ENCOUNTER — HEALTH MAINTENANCE LETTER (OUTPATIENT)
Age: 78
End: 2018-09-15

## 2018-10-03 DIAGNOSIS — N32.81 OAB (OVERACTIVE BLADDER): ICD-10-CM

## 2018-10-03 RX ORDER — MIRABEGRON 25 MG/1
TABLET, FILM COATED, EXTENDED RELEASE ORAL
Qty: 90 TABLET | Refills: 1 | Status: SHIPPED | OUTPATIENT
Start: 2018-10-03 | End: 2019-04-03

## 2018-10-03 NOTE — TELEPHONE ENCOUNTER
"Requested Prescriptions   Pending Prescriptions Disp Refills     mirabegron (MYRBETRIQ) 25 MG 24 hr tablet 90 tablet 1     Sig: TAKE 1 TABLET(25 MG) BY MOUTH DAILY    Beta 3 Adrenergic Agonists Passed    10/3/2018  1:04 PM       Passed - Most recent BP less than 140/90 on record    BP Readings from Last 3 Encounters:   07/06/18 124/76   06/12/18 100/60   05/31/18 118/64                Passed - Recent or future visit with authorizing provider's specialty    Patient had office visit in the last 12 months or has a visit in the next 30 days with authorizing provider or within the authorizing provider's specialty.  See \"Patient Info\" tab in inbasket, or \"Choose Columns\" in Meds & Orders section of the refill encounter.           Passed - Most recent eGFR greater then or equal to 30 within past 12 months    Recent Labs   Lab Test  05/16/18   1039   GFRESTIMATED  >90   GFRESTBLACK  >90            Passed - Patient is of age 18 years or older       Passed - Patient is not pregnant       Passed - Patient has not had a positive pregnancy test within the past 12 months        Prescription approved per [facility] Refill Protocol.    "

## 2018-10-03 NOTE — TELEPHONE ENCOUNTER
Requested Prescriptions   Pending Prescriptions Disp Refills     mirabegron (MYRBETRIQ) 25 MG 24 hr tablet  Last Written Prescription Date:  7/17/2018  Last Fill Quantity: 90 tablet,  # refills: 0   Last Office Visit: 6/12/2018   Future Office Visit:      90 tablet 1     Sig: TAKE 1 TABLET(25 MG) BY MOUTH DAILY    There is no refill protocol information for this order

## 2018-11-02 ENCOUNTER — RADIANT APPOINTMENT (OUTPATIENT)
Dept: GENERAL RADIOLOGY | Facility: CLINIC | Age: 78
End: 2018-11-02
Attending: INTERNAL MEDICINE
Payer: COMMERCIAL

## 2018-11-02 ENCOUNTER — OFFICE VISIT (OUTPATIENT)
Dept: INTERNAL MEDICINE | Facility: CLINIC | Age: 78
End: 2018-11-02
Payer: COMMERCIAL

## 2018-11-02 VITALS
SYSTOLIC BLOOD PRESSURE: 118 MMHG | HEART RATE: 72 BPM | WEIGHT: 175 LBS | DIASTOLIC BLOOD PRESSURE: 70 MMHG | BODY MASS INDEX: 29.12 KG/M2 | TEMPERATURE: 97.8 F | OXYGEN SATURATION: 93 %

## 2018-11-02 DIAGNOSIS — M25.561 ACUTE PAIN OF RIGHT KNEE: Primary | ICD-10-CM

## 2018-11-02 DIAGNOSIS — M25.561 ACUTE PAIN OF RIGHT KNEE: ICD-10-CM

## 2018-11-02 PROCEDURE — 99213 OFFICE O/P EST LOW 20 MIN: CPT | Performed by: INTERNAL MEDICINE

## 2018-11-02 PROCEDURE — 73560 X-RAY EXAM OF KNEE 1 OR 2: CPT | Mod: RT

## 2018-11-02 ASSESSMENT — PAIN SCALES - GENERAL: PAINLEVEL: EXTREME PAIN (9)

## 2018-11-02 NOTE — MR AVS SNAPSHOT
After Visit Summary   11/2/2018    Brielle Marvin    MRN: [id number]           Patient Information     Date Of Birth          1940        Visit Information        Provider Department      11/2/2018 3:00 PM Jared Emmanuel MD [facility]        Today's Diagnoses     Acute pain of right knee    -  1      Care Instructions    *  X-rays show no evidence for fracture.    *  Your symptoms are not consistent with a blood clot int he lower leg.     *  Ice the knee joint,     *  Heat to the calf and posterior knee area.     *  Minimize weight bearing for the next couple of days:  Use Knee immoblizer to the knee for the weekend, use crutches.     *  Follow up with the Orthopedic Clinic at [facility] as needed.      *  take over the counter Motrin/Ibuprofen/Advil or Aleve to help relieve pain and inflammation.  follow the directions on the bottle.  Be sure to take with a small meal to prevent stomach upset.      --Motrin 600 mg ( 3 x 200 mg tablets) three times per day every day for 5-7 days, then 2-3 timers per day as needed (take with food to avoid stomach side effects)     OR     --Aleve 2 tablets twice per day for 5-7 days every day, then twice per day as needed             Follow-ups after your visit        Follow-up notes from your care team     Return if symptoms worsen or fail to improve.      Who to contact     If you have questions or need follow up information about today's clinic visit or your schedule please contact [facility] directly at [phone].  Normal or non-critical lab and imaging results will be communicated to you by [employer], letter or phone within 4 business days after the clinic has received the results. If you do not hear from us within 7 days, please contact the clinic through [employer] or phone. If you have a critical or abnormal lab result, we will notify you by phone as soon as possible.  Submit refill requests through [employer]  or call your pharmacy and they will forward the refill request to us. Please allow 3 business days for your refill to be completed.          Additional Information About Your Visit        MyChart Information     [employer] gives you secure access to your electronic health record. If you see a primary care provider, you can also send messages to your care team and make appointments. If you have questions, please call your primary care clinic.  If you do not have a primary care provider, please call [phone] and they will assist you.        Care EveryWhere ID     This is your Care EveryWhere ID. This could be used by other organizations to access your [facility] medical records  [id number]        Your Vitals Were     Pulse Temperature Last Period Pulse Oximetry BMI (Body Mass Index)       72 97.8  F (36.6  C) (Oral) 11/08/1991 93% 29.12 kg/m2        Blood Pressure from Last 3 Encounters:   11/02/18 118/70   07/06/18 124/76   06/12/18 100/60    Weight from Last 3 Encounters:   11/02/18 175 lb (79.4 kg)   07/06/18 171 lb (77.6 kg)   06/12/18 169 lb 6.4 oz (76.8 kg)               Primary Care Provider Office Phone # Fax #    Jared Emmanuel [phone]       [address] [place] [id number]        Equal Access to Services     GINNA MERCEDES : Hadii aad ku hadasho Soomaali, waaxda luqadaha, qaybta kaalmada adeegyada, gwyn figueroa. So [facility] [phone].    ATENCIÓN: Si habla español, tiene a lucio disposición servicios gratuitos de asistencia lingüística. Llame al [phone].    We comply with applicable federal civil rights laws and [place] laws. We do not discriminate on the basis of race, color, national origin, age, disability, sex, sexual orientation, or gender identity.            Thank you!     Thank you for choosing [facility]  for your care. Our goal is always to provide you with excellent care. Hearing back from our patients is one  way we can continue to improve our services. Please take a few minutes to complete the written survey that you may receive in the mail after your visit with us. Thank you!             Your Updated Medication List - Protect others around you: Learn how to safely use, store and throw away your medicines at www.disposemymeds.org.          This list is accurate as of 11/2/18  4:44 PM.  Always use your most recent med list.                   Brand Name Dispense Instructions for use Diagnosis    ALEVE 220 MG capsule   Generic drug:  naproxen sodium      Reported on 4/5/2017        ascorbic acid 500 MG tablet    VITAMIN C    60    1 tablet daily    Routine gynecological examination       aspirin 81 MG tablet      ONE DAILY    Other and unspecified hyperlipidemia       atenolol 25 MG tablet    TENORMIN    90 tablet    Take 1 tablet (25 mg) by mouth daily    Benign essential hypertension       BIOTIN PO           CALCIUM 500 + D PO      None Entered        cyanocobalamin 1000 MCG tablet    vitamin  B-12     1 tablet daily    Esophageal reflux       estradiol 0.1 MG/GM cream    ESTRACE VAGINAL    42.5 g    Place 1 g vaginally twice a week    Atrophic vaginitis       Fish Oil 500 MG Caps      1 tablet daily    Routine gynecological examination       fluocinonide 0.05 % cream    LIDEX    30 g    Apply sparingly once or twice per day as needed to affected area until the skin is better, then stop; REPEAT AS NEEDED    Contact dermatitis due to adhesives, unspecified contact dermatitis type       glucosamine 500 MG Tabs      1 tablet prn        * MYRBETRIQ 25 MG 24 hr tablet   Generic drug:  mirabegron     90 tablet    TAKE 1 TABLET(25 MG) BY MOUTH DAILY    OAB (overactive bladder)       * mirabegron 25 MG 24 hr tablet    MYRBETRIQ    90 tablet    TAKE 1 TABLET(25 MG) BY MOUTH DAILY    OAB (overactive bladder)       omeprazole 20 MG CR capsule    priLOSEC    90 capsule    TAKE 1 CAPSULE DAILY 30 TO 60 MINUTES BEFORE A MEAL     Gastroesophageal reflux disease without esophagitis       pravastatin 40 MG tablet    PRAVACHOL    90 tablet    TAKE 1 TABLET EVERY DAY    Hyperlipidemia LDL goal <130       triamcinolone 0.5 % cream    KENALOG    15 g    Apply sparingly once or twice per day as needed to affected area until the skin is better, then stop; REPEAT AS NEEDED    Dermatitis       vitamin D 2000 units tablet      Take 2,000 Units by mouth daily.    Major depressive disorder, single episode, mild (H)       vitamin E 400 UNIT capsule      Take 1 capsule (400 Units) by mouth daily        * Notice:  This list has 2 medication(s) that are the same as other medications prescribed for you. Read the directions carefully, and ask your doctor or other care provider to review them with you.

## 2018-11-02 NOTE — PATIENT INSTRUCTIONS
*  X-rays show no evidence for fracture.    *  Your symptoms are not consistent with a blood clot int he lower leg.     *  Ice the knee joint,     *  Heat to the calf and posterior knee area.     *  Minimize weight bearing for the next couple of days:  Use Knee immoblizer to the knee for the weekend, use crutches.     *  Follow up with the Orthopedic Clinic at [facility] as needed.      *  take over the counter Motrin/Ibuprofen/Advil or Aleve to help relieve pain and inflammation.  follow the directions on the bottle.  Be sure to take with a small meal to prevent stomach upset.      --Motrin 600 mg ( 3 x 200 mg tablets) three times per day every day for 5-7 days, then 2-3 timers per day as needed (take with food to avoid stomach side effects)     OR     --Aleve 2 tablets twice per day for 5-7 days every day, then twice per day as needed
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
## 2018-11-05 ENCOUNTER — TELEPHONE (OUTPATIENT)
Dept: NURSING | Facility: CLINIC | Age: 78
End: 2018-11-05

## 2018-11-05 NOTE — TELEPHONE ENCOUNTER
Patient calling to schedule appointment and give PCP update.  After seeing Dr. Emmanuel on Friday pain worsened that evening.  Pain was unbearable Saturday so patient went to [facility] where they advised patient be seen at [facility] for ultrasound.  It was determined at the hospital that patient had blood clot in right knee along with Bakers cyst.  Patient was admitted.  She was started on Rivaroxaban 15mg 1 tablet BID and told to follow up with DVT physician and PCP.  Patient currently staying with her daughter in [place] but will be returning for appointment with DVT physician on Friday.  Writer scheduled patient follow up appointment for Friday afternoon with PCP.

## 2018-11-06 ENCOUNTER — TELEPHONE (OUTPATIENT)
Dept: INTERNAL MEDICINE | Facility: CLINIC | Age: 78
End: 2018-11-06

## 2018-11-06 ENCOUNTER — MEDICAL CORRESPONDENCE (OUTPATIENT)
Dept: HEALTH INFORMATION MANAGEMENT | Facility: CLINIC | Age: 78
End: 2018-11-06

## 2018-11-06 DIAGNOSIS — M25.561 ACUTE PAIN OF RIGHT KNEE: Primary | ICD-10-CM

## 2018-11-06 DIAGNOSIS — M17.11 PRIMARY OSTEOARTHRITIS OF RIGHT KNEE: ICD-10-CM

## 2018-11-06 NOTE — TELEPHONE ENCOUNTER
Patient's daughter informed to bring patient to ER per Dr. Emmanuel. Patient's daughter will call patient to see if she is agreeable.

## 2018-11-06 NOTE — TELEPHONE ENCOUNTER
Lulu from [facility] called requesting a prior auth for the MRI.  The pt's [employer] insurance requires it.  Please call Lulu at [phone] 9am- 5:30pm.

## 2018-11-06 NOTE — TELEPHONE ENCOUNTER
If her symptoms are getting so much worse, then she should probably go to the ER.  They should be able to perform advanced imaging there.   I can order an MRI, but she is still in worsening severe pain that needs to be managed, regardless of the MRI findings.    We are not going to be able to get her more powerful narcotic pain medications because she is out of town.

## 2018-11-06 NOTE — TELEPHONE ENCOUNTER
"See below. Spoke with patient's daughter. Says that patient's symptoms are worse since being in hospital. Just had moderate pain in knee and calf when went to hospital. They found she has a blood clot in calf. Knee is swollen. Bakers cyst behind knee. Not very mobile at all now. Right knee is more swollen since in hospital. Trying to elevate it. Patient is miserable at night. Has a walker but can only make it to bathroom and back. Taking tylenol for pain, said this is all she can take since on Xarelto. Patient's daughter called doctor from [facility] and he can see her next Tuesday but recommended she call Dr. Emmanuel to get MRI of right knee. No chest pain or SOB. No numbness/tingling. Usually patient is \"over the top\" active and now she is struggling. Cannot stand on leg. More swelling and more pain since being in hospital. Weaker since in hospital. Patient is seeing thrombology and Dr. Emmanuel on Friday per daughter. Spoke with patient's daughter about patient possibly needing to go back to ED but will speak with PCP as well since [facility] had recommended patient get MRI knee when daughter spoke to him this morning. Do not think that [facility] has seen patient yet, patient's daughter says that [facility] used to see patient's .  "

## 2018-11-06 NOTE — TELEPHONE ENCOUNTER
Update:  Trying to schedule the MRI procedure.     Spoke with [facility] representative.   PA obtained from [facility] for the MRI right knee.     CPT code MRI right knee [id number]  NPI for [facility] [id number]     Tracking/approval number for the approved imaging procedure:  [id number]    They need to authorize the imaging center located in [facility] because this location is not a free standing imaging center and is attached to a hospital.    I had to speak with [facility] (who manages imaging for [facility]) to try and get the procedure approved to be done in [place].     [facility]  [phone]    Once the approval to be done in [place], then the procedure can be done.      This may take a few hours.  May not know until tomorrow.      I spoke with Lulu at [facility] with an update and gave her the tracking # for reference.   Will await authorization to schedule the procedure.     (Spent 39 minutes on the phone between [facility] and [facility])

## 2018-11-06 NOTE — TELEPHONE ENCOUNTER
Patient's daughter informed. She previously spoke to patient and patient is refusing ER at this point. Daughter will continue to encourage her to go there at least for better pain management. She said she spoke to ER there and someone told her they would just observe patient. Daughter unsure if they can even do MRIs, [place]. Is hoping Dr. Emmanuel will order it on Friday because Dr. Burch from [facility] would like to have results before appt on Tuesday with him. Patient is currently using tylenol and ice for pain.

## 2018-11-06 NOTE — TELEPHONE ENCOUNTER
Called patient's daughter back. Wants patient to have MRI. Informed of Dr. Emmanuel's message regarding MRI. She wants to make sure Dr. Emmanuel just wants MRI of right knee and that calf, etc would not be added due to blood clot in right calf. Spoke with Dr. Emmanuel, just MRI knee needed.    DWAIN Castro Fax # [phone]    Order faxed, daughter informed, she will call to make appt.

## 2018-11-06 NOTE — TELEPHONE ENCOUNTER
Pt's daughter called to say that the pt wants to have an MRI of the knee and calf.  The order should go to [facility] in [place]--ph. [phone].  Please call daughterRita when the order has been completed.  See notes below.

## 2018-11-06 NOTE — TELEPHONE ENCOUNTER
Reason for Call: Request for an order or referral:    Order or referral being requested: MRI - right knee    Date needed: as soon as possible    Has the patient been seen by the PCP for this problem? YES - right knee    Additional comments: the patient would like to have an MRI of her right knee done asap, she is in a lot of pain and can hardly walk to the restroom with her walker.  She is staying with her daughter out of town for recovery, and the daughter/Rita would like to have the MRI done in [facility] -  # [phone].  The patient was at [facility] this last Saturday, and they diagnosed a non-compress able blood clot in there right leg.  Please advise with the daughter/Rita for the status of this request, they would like to have the MRI asap    Phone number Patient can be reached at:   [phone]    Best Time:  anytime    Can we leave a detailed message on this number?  YES - detailed    Call taken on 11/6/2018 at 10:28 AM by Natalia Barrios

## 2018-11-06 NOTE — TELEPHONE ENCOUNTER
Patient informed that Dr. Emmanuel could order the MRI if she wants per huddle with Dr. Emmanuel. Did advise ER for worsening symptoms as well but patient declines at this time. She says she cannot walk. In a lot of pain when gets up. Using heat on bottom of leg where blood clot is and ice on knee. Pain subsides when sitting in chair with ice/heat. Says they cannot do anything about her Bakers cyst d/t blood clot. Patient wants to keep doing ice and tylenol and wait to see how it goes. Says it is manageable but very painful without ice or heat or when moves.     Dr. Emmanuel already filled out order form for MRI, at nurses desk. Patient wants to talk to daughter and call us back to see if she wants to go through with having MRI at [facility]. Thinks there could be a large copay. Patient is fine with us talking to daughter. Please advise on all of Dr. Emmanuel's note below when they call back if they want MRI.

## 2018-11-06 NOTE — TELEPHONE ENCOUNTER
We can order the MRI whenever they want to get it done.    Order form filled out, can be faxed to [facility] ([facility] location was not listed, but can be sent there).  They should be given a disc with the images to carry with them to the ortho appointment.     If the pain is still significant and getting worse, then she may need to be seen in the ER simply because the pain was is bad.

## 2018-11-07 ENCOUNTER — TRANSFERRED RECORDS (OUTPATIENT)
Dept: HEALTH INFORMATION MANAGEMENT | Facility: CLINIC | Age: 78
End: 2018-11-07

## 2018-11-07 NOTE — TELEPHONE ENCOUNTER
Spoke with Lulu. Patient is scheduled for MRI today at 1 pm, said Dr. Emmanuel gave them tracking/approval number last night. They have not seen an approval yet and need approval number faxed to them to do the procedure. Spoke with [facility], they gave authorization, valid 30 days beginning 11/7/18, not a guarantee of payment, authorization #[id number]. They said procedure is approved to be done in [place] and will fax [facility] this #. [facility] informed.

## 2018-11-09 ENCOUNTER — OFFICE VISIT (OUTPATIENT)
Dept: INTERNAL MEDICINE | Facility: CLINIC | Age: 78
End: 2018-11-09
Payer: COMMERCIAL

## 2018-11-09 VITALS
OXYGEN SATURATION: 96 % | DIASTOLIC BLOOD PRESSURE: 60 MMHG | WEIGHT: 172.9 LBS | BODY MASS INDEX: 28.77 KG/M2 | HEART RATE: 72 BPM | SYSTOLIC BLOOD PRESSURE: 118 MMHG | TEMPERATURE: 97.9 F

## 2018-11-09 DIAGNOSIS — S83.281D TEAR OF LATERAL MENISCUS OF RIGHT KNEE, CURRENT, UNSPECIFIED TEAR TYPE, SUBSEQUENT ENCOUNTER: ICD-10-CM

## 2018-11-09 DIAGNOSIS — M17.11 PRIMARY OSTEOARTHRITIS OF RIGHT KNEE: Primary | ICD-10-CM

## 2018-11-09 DIAGNOSIS — I82.412 ACUTE DEEP VEIN THROMBOSIS (DVT) OF FEMORAL VEIN OF LEFT LOWER EXTREMITY (H): ICD-10-CM

## 2018-11-09 PROCEDURE — 99214 OFFICE O/P EST MOD 30 MIN: CPT | Performed by: INTERNAL MEDICINE

## 2018-11-09 NOTE — MR AVS SNAPSHOT
After Visit Summary   11/9/2018    Brielle Marvin    MRN: [id number]           Patient Information     Date Of Birth          1940        Visit Information        Provider Department      11/9/2018 2:20 PM Jared Emmanuel MD [facility]        Today's Diagnoses     Primary osteoarthritis of right knee    -  1    Tear of lateral meniscus of right knee, current, unspecified tear type, subsequent encounter        Acute deep vein thrombosis (DVT) of femoral vein of left lower extremity (H)           Follow-ups after your visit        Who to contact     If you have questions or need follow up information about today's clinic visit or your schedule please contact [facility] directly at [phone].  Normal or non-critical lab and imaging results will be communicated to you by [employer], letter or phone within 4 business days after the clinic has received the results. If you do not hear from us within 7 days, please contact the clinic through [employer] or phone. If you have a critical or abnormal lab result, we will notify you by phone as soon as possible.  Submit refill requests through [email or web address] or call your pharmacy and they will forward the refill request to us. Please allow 3 business days for your refill to be completed.          Additional Information About Your Visit        MyChart Information     [email or web address] gives you secure access to your electronic health record. If you see a primary care provider, you can also send messages to your care team and make appointments. If you have questions, please call your primary care clinic.  If you do not have a primary care provider, please call [phone] and they will assist you.        Care EveryWhere ID     This is your Care EveryWhere ID. This could be used by other organizations to access your [place] medical records  [id number]        Your Vitals Were     Pulse Temperature Last Period Pulse Oximetry  BMI (Body Mass Index)       72 97.9  F (36.6  C) (Oral) 11/08/1991 96% 28.77 kg/m2        Blood Pressure from Last 3 Encounters:   11/09/18 118/60   11/02/18 118/70   07/06/18 124/76    Weight from Last 3 Encounters:   11/09/18 172 lb 14.4 oz (78.4 kg)   11/02/18 175 lb (79.4 kg)   07/06/18 171 lb (77.6 kg)              Today, you had the following     No orders found for display       Primary Care Provider Office Phone # Fax #    Jared Emmanuel [phone]       [address] [facility] [address]        Equal Access to Services     GINNA MERCEDES : Hadii gayathri grimeso Sokishor, waaxda luqadaha, qaybta kaalmada adeegyada, gwyn duran . [facility] [phone].    ATENCIÓN: Si habla español, tiene a lucio disposición servicios gratuitos de asistencia lingüística. [facility] [phone].    We comply with applicable federal civil rights laws and [place] laws. We do not discriminate on the basis of race, color, national origin, age, disability, sex, sexual orientation, or gender identity.            Thank you!     Thank you for choosing [facility]  for your care. Our goal is always to provide you with excellent care. Hearing back from our patients is one way we can continue to improve our services. Please take a few minutes to complete the written survey that you may receive in the mail after your visit with us. Thank you!             Your Updated Medication List - Protect others around you: Learn how to safely use, store and throw away your medicines at www.disposemymeds.org.          This list is accurate as of 11/9/18 11:59 PM.  Always use your most recent med list.                   Brand Name Dispense Instructions for use Diagnosis    ascorbic acid 500 MG tablet    VITAMIN C    60    1 tablet daily    Routine gynecological examination       aspirin 81 MG tablet      ONE DAILY    Other and unspecified hyperlipidemia       atenolol 25 MG  tablet    TENORMIN    90 tablet    Take 1 tablet (25 mg) by mouth daily    Benign essential hypertension       BIOTIN PO           CALCIUM 500 + D PO      None Entered        cyanocobalamin 1000 MCG tablet    vitamin  B-12     1 tablet daily    Esophageal reflux       estradiol 0.1 MG/GM cream    ESTRACE VAGINAL    42.5 g    Place 1 g vaginally twice a week    Atrophic vaginitis       Fish Oil 500 MG Caps      1 tablet daily    Routine gynecological examination       fluocinonide 0.05 % cream    LIDEX    30 g    Apply sparingly once or twice per day as needed to affected area until the skin is better, then stop; REPEAT AS NEEDED    Contact dermatitis due to adhesives, unspecified contact dermatitis type       * MYRBETRIQ 25 MG 24 hr tablet   Generic drug:  mirabegron     90 tablet    TAKE 1 TABLET(25 MG) BY MOUTH DAILY    OAB (overactive bladder)       * mirabegron 25 MG 24 hr tablet    MYRBETRIQ    90 tablet    TAKE 1 TABLET(25 MG) BY MOUTH DAILY    OAB (overactive bladder)       omeprazole 20 MG CR capsule    priLOSEC    90 capsule    TAKE 1 CAPSULE DAILY 30 TO 60 MINUTES BEFORE A MEAL    Gastroesophageal reflux disease without esophagitis       pravastatin 40 MG tablet    PRAVACHOL    90 tablet    TAKE 1 TABLET EVERY DAY    Hyperlipidemia LDL goal <130       triamcinolone 0.5 % cream    KENALOG    15 g    Apply sparingly once or twice per day as needed to affected area until the skin is better, then stop; REPEAT AS NEEDED    Dermatitis       vitamin D3 2000 units tablet    CHOLECALCIFEROL     Take 2,000 Units by mouth daily.    Major depressive disorder, single episode, mild (H)       vitamin E 400 UNIT capsule      Take 1 capsule (400 Units) by mouth daily        * Notice:  This list has 2 medication(s) that are the same as other medications prescribed for you. Read the directions carefully, and ask your doctor or other care provider to review them with you.
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
## 2018-11-10 ENCOUNTER — NURSE TRIAGE (OUTPATIENT)
Dept: NURSING | Facility: CLINIC | Age: 78
End: 2018-11-10

## 2018-11-10 NOTE — TELEPHONE ENCOUNTER
Brielle is in [facility] and is out of medication.  Atenolol and Pravastatin.  Brielle states that she will call back if blood pressure increases.  Brielle is coming back on Tuesday.

## 2018-12-10 DIAGNOSIS — K21.9 GASTROESOPHAGEAL REFLUX DISEASE WITHOUT ESOPHAGITIS: ICD-10-CM

## 2018-12-11 NOTE — TELEPHONE ENCOUNTER
"Requested Prescriptions   Pending Prescriptions Disp Refills     omeprazole (PRILOSEC) 20 MG DR capsule [Pharmacy Med Name: OMEPRAZOLE 20 MG Capsule Delayed Release]  Last Written Prescription Date:  02/07/2018  Last Fill Quantity: 90,  # refills: 02   Last Office Visit: 11/9/2018   Future Office Visit:      90 capsule 2     Sig: TAKE 1 CAPSULE DAILY 30 TO 60 MINUTES BEFORE A MEAL    PPI Protocol Passed - 12/10/2018  7:48 PM       Passed - Not on Clopidogrel (unless Pantoprazole ordered)       Passed - No diagnosis of osteoporosis on record       Passed - Recent (12 mo) or future (30 days) visit within the authorizing provider's specialty    Patient had office visit in the last 12 months or has a visit in the next 30 days with authorizing provider or within the authorizing provider's specialty.  See \"Patient Info\" tab in inbasket, or \"Choose Columns\" in Meds & Orders section of the refill encounter.             Passed - Patient is age 18 or older       Passed - No active pregnacy on record       Passed - No positive pregnancy test in past 12 months          "

## 2018-12-13 ENCOUNTER — TELEPHONE (OUTPATIENT)
Dept: INTERNAL MEDICINE | Facility: CLINIC | Age: 78
End: 2018-12-13

## 2018-12-13 NOTE — TELEPHONE ENCOUNTER
Reason for call:  Patient reporting a symptom    Symptom or request: Patient woke up this morning with red blotch the size of a dime/nickel on the outside of her eye. She is on blood thinners and is concerned. Please call and advise.    Duration (how long have symptoms been present): this morning    Have you been treated for this before? No    Phone Number patient can be reached at:  [phone] daughters phone [phone]  Best Time:  anytime    Can we leave a detailed message on this number:  YES    Call taken on 12/13/2018 at 2:55 PM by Freida Machado

## 2019-01-04 DIAGNOSIS — I10 BENIGN ESSENTIAL HYPERTENSION: ICD-10-CM

## 2019-01-04 RX ORDER — ATENOLOL 25 MG/1
25 TABLET ORAL DAILY
Qty: 90 TABLET | Refills: 2 | Status: SHIPPED | OUTPATIENT
Start: 2019-01-04 | End: 2019-04-03

## 2019-01-04 NOTE — TELEPHONE ENCOUNTER
Requested Prescriptions   Pending Prescriptions Disp Refills     atenolol (TENORMIN) 25 MG tablet 90 tablet 1     Sig: Take 1 tablet (25 mg) by mouth daily    There is no refill protocol information for this order        Last Written Prescription Date:  7/2/2018  Last Fill Quantity: 90,  # refills: 1   Last office visit: 11/9/2018 with prescribing provider:  11/9/2018   Future Office Visit:

## 2019-01-04 NOTE — TELEPHONE ENCOUNTER
"Requested Prescriptions   Pending Prescriptions Disp Refills     atenolol (TENORMIN) 25 MG tablet 90 tablet 1     Sig: Take 1 tablet (25 mg) by mouth daily    Beta-Blockers Protocol Passed - 1/4/2019  9:32 AM       Passed - Blood pressure under 140/90 in past 12 months    BP Readings from Last 3 Encounters:   11/09/18 118/60   11/02/18 118/70   07/06/18 124/76                Passed - Patient is age 6 or older       Passed - Recent (12 mo) or future (30 days) visit within the authorizing provider's specialty    Patient had office visit in the last 12 months or has a visit in the next 30 days with authorizing provider or within the authorizing provider's specialty.  See \"Patient Info\" tab in inbasket, or \"Choose Columns\" in Meds & Orders section of the refill encounter.                "

## 2019-01-18 DIAGNOSIS — N32.81 OAB (OVERACTIVE BLADDER): ICD-10-CM

## 2019-01-18 RX ORDER — MIRABEGRON 25 MG/1
25 TABLET, FILM COATED, EXTENDED RELEASE ORAL DAILY
Qty: 90 TABLET | Refills: 0 | Status: SHIPPED | OUTPATIENT
Start: 2019-01-18 | End: 2019-04-03

## 2019-01-18 NOTE — TELEPHONE ENCOUNTER
Requested Prescriptions   Pending Prescriptions Disp Refills     mirabegron (MYRBETRIQ) 25 MG 24 hr tablet 90 tablet 0     Sig: Take 1 tablet (25 mg) by mouth daily    There is no refill protocol information for this order        Last Written Prescription Date:  7/17/2018  Last Fill Quantity: 90,  # refills: 0   Last office visit: 11/9/2018 with prescribing provider:  11/9/2018   Future Office Visit:

## 2019-01-18 NOTE — TELEPHONE ENCOUNTER
Prescription approved per [facility] Refill Protocol.    Merlyn RICHARD RN, BSN, PHN

## 2019-01-18 NOTE — TELEPHONE ENCOUNTER
"Requested Prescriptions   Pending Prescriptions Disp Refills     mirabegron (MYRBETRIQ) 25 MG 24 hr tablet 90 tablet 0     Sig: Take 1 tablet (25 mg) by mouth daily    Beta 3 Adrenergic Agonists Passed - 1/18/2019  4:03 PM       Passed - Most recent BP less than 140/90 on record    BP Readings from Last 3 Encounters:   11/09/18 118/60   11/02/18 118/70   07/06/18 124/76                Passed - Recent or future visit with authorizing provider's specialty    Patient had office visit in the last 12 months or has a visit in the next 30 days with authorizing provider or within the authorizing provider's specialty.  See \"Patient Info\" tab in inbasket, or \"Choose Columns\" in Meds & Orders section of the refill encounter.             Passed - Medication is active on med list       Passed - Most recent eGFR greater then or equal to 30 within past 12 months    Recent Labs   Lab Test 05/16/18  1039   GFRESTIMATED >90   GFRESTBLACK >90            Passed - Patient is of age 18 years or older       Passed - Patient is not pregnant       Passed - Patient has not had a positive pregnancy test within the past 12 months        "

## 2019-03-15 ENCOUNTER — OFFICE VISIT (OUTPATIENT)
Dept: FAMILY MEDICINE | Facility: CLINIC | Age: 79
End: 2019-03-15
Payer: COMMERCIAL

## 2019-03-15 VITALS
HEIGHT: 65 IN | BODY MASS INDEX: 28.99 KG/M2 | SYSTOLIC BLOOD PRESSURE: 120 MMHG | WEIGHT: 174 LBS | TEMPERATURE: 98.1 F | RESPIRATION RATE: 16 BRPM | HEART RATE: 66 BPM | DIASTOLIC BLOOD PRESSURE: 62 MMHG

## 2019-03-15 DIAGNOSIS — H61.23 BILATERAL IMPACTED CERUMEN: Primary | ICD-10-CM

## 2019-03-15 PROCEDURE — 69209 REMOVE IMPACTED EAR WAX UNI: CPT | Mod: LT | Performed by: PHYSICIAN ASSISTANT

## 2019-03-15 PROCEDURE — 99213 OFFICE O/P EST LOW 20 MIN: CPT | Mod: 25 | Performed by: PHYSICIAN ASSISTANT

## 2019-03-15 ASSESSMENT — ENCOUNTER SYMPTOMS
CARDIOVASCULAR NEGATIVE: 1
CONSTITUTIONAL NEGATIVE: 1
MUSCULOSKELETAL NEGATIVE: 1
ENDOCRINE NEGATIVE: 1
NEUROLOGICAL NEGATIVE: 1
EYES NEGATIVE: 1
RESPIRATORY NEGATIVE: 1
GASTROINTESTINAL NEGATIVE: 1
PSYCHIATRIC NEGATIVE: 1

## 2019-03-15 ASSESSMENT — MIFFLIN-ST. JEOR: SCORE: 1270.14

## 2019-03-15 NOTE — PROGRESS NOTES
SUBJECTIVE:   Brielle Marvin is a 79 year old female who presents to clinic today for the following health issues:      Ear Problem      Duration: went to Audiologist on 3/12/19    Description (location/character/radiation): both ears     Intensity:  mild    Accompanying signs and symptoms: cerumen impacted     History (similar episodes/previous evaluation): None    Precipitating or alleviating factors: Patient wears hearing aids     Therapies tried and outcome: Debrux     Problem list and histories reviewed & adjusted, as indicated.  Additional history: as documented    Patient Active Problem List   Diagnosis     Lumbago     Enthesopathy of hip region     Muscle weakness (generalized)     ACP (advance care planning)     Major depressive disorder, single episode, mild (H)     Sprain of neck     Upper back strain     Neck pain     Gastroesophageal reflux disease without esophagitis     Benign essential hypertension     Hypercholesterolemia     Major depression in complete remission (H)     Overweight (BMI 25.0-29.9)     Past Surgical History:   Procedure Laterality Date     C REYNOSO W/O FACETEC FORAMOT/DSKC 1/2 VRT SEG, LUMBAR  1962    Lami, Lumbar     HC COLONOSCOPY THRU STOMA, DIAGNOSTIC  8/31/02     HC CORRECT BUNION,SIMPLE  1981       Social History     Tobacco Use     Smoking status: Never Smoker     Smokeless tobacco: Never Used   Substance Use Topics     Alcohol use: No     Family History   Problem Relation Age of Onset     Cancer Mother         D:84 Lung CA     Cardiovascular Father         D:72 heart Attack     Cancer Sister         D:62, probably colon cancer     Family History Negative Sister         B:1947 alive     Family History Negative Brother         B:1935 Alive     Family History Negative Brother         B:1943 Alive     Family History Negative Brother         B:1939 Alive     C.A.D. Brother         B: 1937  HTN, CAD, had PTCA         Current Outpatient Medications   Medication Sig Dispense Refill      ASPIRIN 81 MG OR TABS ONE DAILY       atenolol (TENORMIN) 25 MG tablet Take 1 tablet (25 mg) by mouth daily 90 tablet 2     BIOTIN PO        CALCIUM 500 + D OR None Entered       Cholecalciferol (VITAMIN D) 2000 UNIT tablet Take 2,000 Units by mouth daily.       estradiol (ESTRACE VAGINAL) 0.1 MG/GM cream Place 1 g vaginally twice a week 42.5 g 5     FISH  MG OR CAPS 1 tablet daily  0     fluocinonide (LIDEX) 0.05 % cream Apply sparingly once or twice per day as needed to affected area until the skin is better, then stop; REPEAT AS NEEDED 30 g 1     mirabegron (MYRBETRIQ) 25 MG 24 hr tablet Take 1 tablet (25 mg) by mouth daily 90 tablet 0     mirabegron (MYRBETRIQ) 25 MG 24 hr tablet TAKE 1 TABLET(25 MG) BY MOUTH DAILY 90 tablet 1     omeprazole (PRILOSEC) 20 MG DR capsule TAKE 1 CAPSULE DAILY 30 TO 60 MINUTES BEFORE A MEAL 90 capsule 2     pravastatin (PRAVACHOL) 40 MG tablet TAKE 1 TABLET EVERY DAY 90 tablet 3     triamcinolone (KENALOG) 0.5 % cream Apply sparingly once or twice per day as needed to affected area until the skin is better, then stop; REPEAT AS NEEDED 15 g 5     VITAMIN B-12 1000 MCG OR TABS 1 tablet daily       VITAMIN C 500 MG OR TABS 1 tablet daily 60 0     vitamin E 400 UNIT capsule Take 1 capsule (400 Units) by mouth daily       rivaroxaban ANTICOAGULANT (XARELTO) 20 MG TABS tablet Take 1 tablet (20 mg) by mouth daily (with dinner) (Patient not taking: Reported on 3/15/2019) 30 tablet 5     Allergies   Allergen Reactions     Codeine Nausea and Vomiting     vomiting     Codeine Nausea and Vomiting     Morphine Nausea and Vomiting     nausea     Vicodin [Hydrocodone-Acetaminophen] Nausea and Vomiting       Reviewed and updated as needed this visit by clinical staff  Tobacco  Allergies  Meds  Med Hx  Surg Hx  Fam Hx  Soc Hx      Reviewed and updated as needed this visit by Provider         Review of Systems   Constitutional: Negative.    Eyes: Negative.    Respiratory: Negative.   "  Cardiovascular: Negative.    Gastrointestinal: Negative.    Endocrine: Negative.    Genitourinary: Negative.    Musculoskeletal: Negative.    Skin: Negative.    Neurological: Negative.    Psychiatric/Behavioral: Negative.        OBJECTIVE:     /62 (Cuff Size: Adult Large)   Pulse 66   Temp 98.1  F (36.7  C) (Tympanic)   Resp 16   Ht 1.651 m (5' 5\")   Wt 78.9 kg (174 lb)   LMP 11/08/1991   BMI 28.96 kg/m    Body mass index is 28.96 kg/m .    Physical Exam   Constitutional: She is oriented to person, place, and time. She appears well-developed and well-nourished. No distress.   HENT:   Head: Normocephalic.   Right Ear: External ear normal.   Left Ear: External ear normal.   Nose: Nose normal.   Right ear - dry cerumen removed with loop curette.    Left ear - deep cerumen impaction.    Eyes: Conjunctivae and EOM are normal.   Neck: Normal range of motion.   Pulmonary/Chest: Effort normal.   Neurological: She is alert and oriented to person, place, and time.   Skin: She is not diaphoretic.   Psychiatric: She has a normal mood and affect. Judgment normal.     PROCEDURE NOTE:  Left ear was irrigated with water and cerumen was successfully removed.  Patient tolerated the procedure well.      No results found for this or any previous visit (from the past 24 hour(s)).    ASSESSMENT/PLAN:       ICD-10-CM    1. Bilateral impacted cerumen H61.23 HC REMOVAL IMPACTED CERUMEN IRRIGATION/LVG UNILAT      Both ears were cleared of wax  Patient was instructed to soften wax with Debrox or liquid colace as needed  She can attempt to gentle rinse out wax at home in the shower - or come in a see us as needed for cerumen removal.     No Follow-up on file.    There are no Patient Instructions on file for this visit.    Catarino Espinoza PA-C  [facility]    "

## 2019-04-03 ENCOUNTER — OFFICE VISIT (OUTPATIENT)
Dept: INTERNAL MEDICINE | Facility: CLINIC | Age: 79
End: 2019-04-03
Payer: COMMERCIAL

## 2019-04-03 VITALS
SYSTOLIC BLOOD PRESSURE: 114 MMHG | HEART RATE: 72 BPM | WEIGHT: 174 LBS | TEMPERATURE: 97.6 F | BODY MASS INDEX: 28.96 KG/M2 | DIASTOLIC BLOOD PRESSURE: 70 MMHG | OXYGEN SATURATION: 96 %

## 2019-04-03 DIAGNOSIS — Z12.11 SCREEN FOR COLON CANCER: ICD-10-CM

## 2019-04-03 DIAGNOSIS — E78.5 HYPERLIPIDEMIA LDL GOAL <130: ICD-10-CM

## 2019-04-03 DIAGNOSIS — N32.81 OAB (OVERACTIVE BLADDER): ICD-10-CM

## 2019-04-03 DIAGNOSIS — K21.9 GASTROESOPHAGEAL REFLUX DISEASE WITHOUT ESOPHAGITIS: ICD-10-CM

## 2019-04-03 DIAGNOSIS — I10 BENIGN ESSENTIAL HYPERTENSION: ICD-10-CM

## 2019-04-03 DIAGNOSIS — Z01.818 PREOP GENERAL PHYSICAL EXAM: Primary | ICD-10-CM

## 2019-04-03 DIAGNOSIS — F32.5 MAJOR DEPRESSION IN COMPLETE REMISSION (H): ICD-10-CM

## 2019-04-03 DIAGNOSIS — M17.11 PRIMARY OSTEOARTHRITIS OF RIGHT KNEE: ICD-10-CM

## 2019-04-03 PROBLEM — I82.412 ACUTE DEEP VEIN THROMBOSIS (DVT) OF FEMORAL VEIN OF LEFT LOWER EXTREMITY (H): Status: RESOLVED | Noted: 2019-04-03 | Resolved: 2019-04-03

## 2019-04-03 PROBLEM — I82.412 ACUTE DEEP VEIN THROMBOSIS (DVT) OF FEMORAL VEIN OF LEFT LOWER EXTREMITY (H): Status: ACTIVE | Noted: 2019-04-03

## 2019-04-03 LAB
ALT SERPL W P-5'-P-CCNC: 21 U/L (ref 0–50)
ANION GAP SERPL CALCULATED.3IONS-SCNC: 1 MMOL/L (ref 3–14)
AST SERPL W P-5'-P-CCNC: 22 U/L (ref 0–45)
BUN SERPL-MCNC: 13 MG/DL (ref 7–30)
CALCIUM SERPL-MCNC: 8.9 MG/DL (ref 8.5–10.1)
CHLORIDE SERPL-SCNC: 105 MMOL/L (ref 94–109)
CHOLEST SERPL-MCNC: 170 MG/DL
CO2 SERPL-SCNC: 34 MMOL/L (ref 20–32)
CREAT SERPL-MCNC: 0.69 MG/DL (ref 0.52–1.04)
ERYTHROCYTE [DISTWIDTH] IN BLOOD BY AUTOMATED COUNT: 12.4 % (ref 10–15)
GFR SERPL CREATININE-BSD FRML MDRD: 83 ML/MIN/{1.73_M2}
GLUCOSE SERPL-MCNC: 107 MG/DL (ref 70–99)
HCT VFR BLD AUTO: 44.4 % (ref 35–47)
HDLC SERPL-MCNC: 55 MG/DL
HGB BLD-MCNC: 14.4 G/DL (ref 11.7–15.7)
LDLC SERPL CALC-MCNC: 98 MG/DL
MCH RBC QN AUTO: 31.1 PG (ref 26.5–33)
MCHC RBC AUTO-ENTMCNC: 32.4 G/DL (ref 31.5–36.5)
MCV RBC AUTO: 96 FL (ref 78–100)
MRSA DNA SPEC QL NAA+PROBE: NEGATIVE
NONHDLC SERPL-MCNC: 115 MG/DL
PLATELET # BLD AUTO: 206 10E9/L (ref 150–450)
POTASSIUM SERPL-SCNC: 4.4 MMOL/L (ref 3.4–5.3)
RBC # BLD AUTO: 4.63 10E12/L (ref 3.8–5.2)
SODIUM SERPL-SCNC: 140 MMOL/L (ref 133–144)
SPECIMEN SOURCE: NORMAL
TRIGL SERPL-MCNC: 84 MG/DL
WBC # BLD AUTO: 6.9 10E9/L (ref 4–11)

## 2019-04-03 PROCEDURE — 80048 BASIC METABOLIC PNL TOTAL CA: CPT | Performed by: INTERNAL MEDICINE

## 2019-04-03 PROCEDURE — 93000 ELECTROCARDIOGRAM COMPLETE: CPT | Performed by: INTERNAL MEDICINE

## 2019-04-03 PROCEDURE — 84450 TRANSFERASE (AST) (SGOT): CPT | Performed by: INTERNAL MEDICINE

## 2019-04-03 PROCEDURE — 87640 STAPH A DNA AMP PROBE: CPT | Performed by: INTERNAL MEDICINE

## 2019-04-03 PROCEDURE — 84460 ALANINE AMINO (ALT) (SGPT): CPT | Performed by: INTERNAL MEDICINE

## 2019-04-03 PROCEDURE — 99215 OFFICE O/P EST HI 40 MIN: CPT | Performed by: INTERNAL MEDICINE

## 2019-04-03 PROCEDURE — 87641 MR-STAPH DNA AMP PROBE: CPT | Performed by: INTERNAL MEDICINE

## 2019-04-03 PROCEDURE — 80061 LIPID PANEL: CPT | Performed by: INTERNAL MEDICINE

## 2019-04-03 PROCEDURE — 85027 COMPLETE CBC AUTOMATED: CPT | Performed by: INTERNAL MEDICINE

## 2019-04-03 PROCEDURE — 36415 COLL VENOUS BLD VENIPUNCTURE: CPT | Performed by: INTERNAL MEDICINE

## 2019-04-03 RX ORDER — MIRABEGRON 25 MG/1
25 TABLET, FILM COATED, EXTENDED RELEASE ORAL DAILY
Qty: 90 TABLET | Refills: 3 | Status: SHIPPED | OUTPATIENT
Start: 2019-04-03 | End: 2019-12-16

## 2019-04-03 RX ORDER — PRAVASTATIN SODIUM 40 MG
40 TABLET ORAL DAILY
Qty: 90 TABLET | Refills: 3 | Status: SHIPPED | OUTPATIENT
Start: 2019-04-03 | End: 2020-01-02

## 2019-04-03 RX ORDER — ATENOLOL 25 MG/1
25 TABLET ORAL DAILY
Qty: 90 TABLET | Refills: 2 | Status: SHIPPED | OUTPATIENT
Start: 2019-04-03 | End: 2020-01-02

## 2019-04-03 ASSESSMENT — PATIENT HEALTH QUESTIONNAIRE - PHQ9: SUM OF ALL RESPONSES TO PHQ QUESTIONS 1-9: 0
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
## 2019-04-03 NOTE — PATIENT INSTRUCTIONS
PRE-OPERATIVE INSTRUCTIONS:     *  Call your surgeon if there is any change in your health. This includes signs of a cold or flu (such as a sore throat, runny nose, cough, rash or fever).    *  Stop aspirin of any kind for 7 days before procedure.   (even low dose daily aspirin).    *  No NSAIDs (Motrin, Advil, ibuprofen, Aleve, etc) within 5-7 days of surgery.    *  Tylenol (acetaminophen) OK to take if needed for pain or headache.  Follow instructions on the bottle    *  Stop any Fish Oil or vitamin E supplements for 7 days prior to surgery because these can affect platelet function.      *  Prepare your body as instructed by the surgery clinic.  Take a shower or bath the night before surgery. Use any special soaps or cleaning instructions according to instructions from your surgeon.  If you do not have soap from your surgeon, use your regular soap. Do not shave or scrub the surgery site.  Wear clean pajamas and have clean sheets on your bed     *  ON THE MORNING OF SURGERY:     --Take only the Atenolol with a small sip of water     --Hold all other medications.      *  Resume all medications at the same doses after surgery, unless instructed otherwise by the medical staff.     *  Attend all follow up appointments with the surgeons (and/or therapists if applicable) as instructed.     *  Contact the surgeon's office for any specific questions about after-surgery cares and follow up instructions.        IF YOU REQUIRE NARCOTIC PAIN MEDICATION AFTER YOUR SURGERY:    --Take the narcotic pain medication exactly as prescribed, and use the absolute lowest dose needed for pain control.   The goal of pain medication is not complete pain relief, aim to just make it manageable.    --Beware of drowsiness, nausea, vomiting, when taking this medication.  Do not drive, or operate dangerous equipment after taking this.    --The main side effects from narcotic pain medication can also include intestinal side effects including  nausea, vomiting, constipation, or diarrhea.    --If your pain is not able to be controlled with the pain medication supplied to you, contact the surgeons because uncontrolled pain can be a sign of possible surgical complications.    --In case of constipation from pain medications, take over the counter Miralax powder or stool softner Senokot.  If you have a history of constipation with narcotic pain medication, consider taking Miralax powder on any day that you take a pain tablet.

## 2019-04-05 ENCOUNTER — TELEPHONE (OUTPATIENT)
Dept: INTERNAL MEDICINE | Facility: CLINIC | Age: 79
End: 2019-04-05

## 2019-04-07 PROCEDURE — 82274 ASSAY TEST FOR BLOOD FECAL: CPT | Performed by: INTERNAL MEDICINE

## 2019-04-10 ENCOUNTER — TELEPHONE (OUTPATIENT)
Dept: INTERNAL MEDICINE | Facility: CLINIC | Age: 79
End: 2019-04-10

## 2019-04-10 DIAGNOSIS — Z12.11 SCREEN FOR COLON CANCER: ICD-10-CM

## 2019-04-10 LAB — HEMOCCULT STL QL IA: NEGATIVE

## 2019-04-10 NOTE — TELEPHONE ENCOUNTER
Most likely transient hypoglycemia, which will happen to anyone if too long in between meals.    I am not suspicious for anything bad going on, especially if this episode lasted only seconds.     No need to be seen.      There should be no impact on her surgery.

## 2019-04-10 NOTE — TELEPHONE ENCOUNTER
Patient calling. Was at a luncheon yesterday and only had had a glass of milk and orange and small cookie in the morning and was on the go. When at luncheon and started to eat, she felt lightheaded. Is having surgery on Friday and had preop with PCP on 4/3. Patient felt fine after the lightheadedness episode and continues to feel fine today. Had this happen many years ago too and was told it was from the way she was sitting when eating. Did not have any other symptoms with lightheadedness- no chest pain, HA, vision changes, trouble breathing, weakness, or other symptoms. Episode did not even last for a few minutes, was just seconds. She is just concerned with surgery coming up. She is wondering if lightheadedness could have been caused by not having much to eat in the morning and being on the go.    Should patient be re-evaluated in clinic or any other testing needed before surgery? Advised she call at any time if symptoms happen again.

## 2019-04-17 PROBLEM — Z96.659 STATUS POST TOTAL KNEE REPLACEMENT: Status: ACTIVE | Noted: 2019-04-12

## 2019-04-26 ENCOUNTER — TRANSFERRED RECORDS (OUTPATIENT)
Dept: HEALTH INFORMATION MANAGEMENT | Facility: CLINIC | Age: 79
End: 2019-04-26

## 2019-04-28 ENCOUNTER — MEDICAL CORRESPONDENCE (OUTPATIENT)
Dept: HEALTH INFORMATION MANAGEMENT | Facility: CLINIC | Age: 79
End: 2019-04-28

## 2019-04-29 ENCOUNTER — MEDICAL CORRESPONDENCE (OUTPATIENT)
Dept: HEALTH INFORMATION MANAGEMENT | Facility: CLINIC | Age: 79
End: 2019-04-29

## 2019-04-29 ENCOUNTER — TELEPHONE (OUTPATIENT)
Dept: INTERNAL MEDICINE | Facility: CLINIC | Age: 79
End: 2019-04-29

## 2019-04-29 NOTE — TELEPHONE ENCOUNTER
Caroline from  [facility] calling for HC orders.  Pt recent Knee replacement, residing with daughter in [place].    SN [id number]  Pt eval  Ot eval.    Orders approved by writer per RN michael.    Pt does not have her medications with her. Reviewed meds pt to  be on, Xarelto post surgery, hx of DVT.   Family is mailing meds to her, [facility] will fill 5 days of listed meds.    Paper work with assessment by HC done, needs signature from MD within 24 hrs, will have partner sign and return via fax to them.

## 2019-05-07 ENCOUNTER — MEDICAL CORRESPONDENCE (OUTPATIENT)
Dept: HEALTH INFORMATION MANAGEMENT | Facility: CLINIC | Age: 79
End: 2019-05-07

## 2019-05-07 DIAGNOSIS — Z53.9 DIAGNOSIS NOT YET DEFINED: Primary | ICD-10-CM

## 2019-05-07 PROCEDURE — G0180 MD CERTIFICATION HHA PATIENT: HCPCS | Performed by: INTERNAL MEDICINE

## 2019-05-20 ENCOUNTER — MEDICAL CORRESPONDENCE (OUTPATIENT)
Dept: HEALTH INFORMATION MANAGEMENT | Facility: CLINIC | Age: 79
End: 2019-05-20

## 2019-05-31 ENCOUNTER — MEDICAL CORRESPONDENCE (OUTPATIENT)
Dept: HEALTH INFORMATION MANAGEMENT | Facility: CLINIC | Age: 79
End: 2019-05-31

## 2019-06-03 ENCOUNTER — MEDICAL CORRESPONDENCE (OUTPATIENT)
Dept: HEALTH INFORMATION MANAGEMENT | Facility: CLINIC | Age: 79
End: 2019-06-03

## 2019-06-04 ENCOUNTER — MEDICAL CORRESPONDENCE (OUTPATIENT)
Dept: HEALTH INFORMATION MANAGEMENT | Facility: CLINIC | Age: 79
End: 2019-06-04

## 2019-06-27 ENCOUNTER — ANCILLARY PROCEDURE (OUTPATIENT)
Dept: MAMMOGRAPHY | Facility: CLINIC | Age: 79
End: 2019-06-27
Attending: INTERNAL MEDICINE
Payer: COMMERCIAL

## 2019-06-27 DIAGNOSIS — Z12.31 VISIT FOR SCREENING MAMMOGRAM: ICD-10-CM

## 2019-06-27 PROCEDURE — 77067 SCR MAMMO BI INCL CAD: CPT | Mod: TC

## 2019-06-27 PROCEDURE — 77063 BREAST TOMOSYNTHESIS BI: CPT | Mod: TC

## 2019-07-10 ENCOUNTER — OFFICE VISIT (OUTPATIENT)
Dept: INTERNAL MEDICINE | Facility: CLINIC | Age: 79
End: 2019-07-10
Payer: COMMERCIAL

## 2019-07-10 VITALS
TEMPERATURE: 97.3 F | DIASTOLIC BLOOD PRESSURE: 60 MMHG | HEART RATE: 64 BPM | OXYGEN SATURATION: 97 % | WEIGHT: 173 LBS | BODY MASS INDEX: 28.79 KG/M2 | RESPIRATION RATE: 14 BRPM | SYSTOLIC BLOOD PRESSURE: 100 MMHG

## 2019-07-10 DIAGNOSIS — N32.81 OAB (OVERACTIVE BLADDER): ICD-10-CM

## 2019-07-10 DIAGNOSIS — Z00.00 MEDICARE ANNUAL WELLNESS VISIT, SUBSEQUENT: Primary | ICD-10-CM

## 2019-07-10 DIAGNOSIS — F32.5 MAJOR DEPRESSION IN COMPLETE REMISSION (H): ICD-10-CM

## 2019-07-10 DIAGNOSIS — I10 BENIGN ESSENTIAL HYPERTENSION: ICD-10-CM

## 2019-07-10 DIAGNOSIS — Z96.651 STATUS POST TOTAL RIGHT KNEE REPLACEMENT: ICD-10-CM

## 2019-07-10 DIAGNOSIS — R53.82 CHRONIC FATIGUE: ICD-10-CM

## 2019-07-10 DIAGNOSIS — E78.5 HYPERLIPIDEMIA LDL GOAL <130: ICD-10-CM

## 2019-07-10 DIAGNOSIS — Z78.0 POST-MENOPAUSAL: ICD-10-CM

## 2019-07-10 DIAGNOSIS — Z12.11 SCREEN FOR COLON CANCER: ICD-10-CM

## 2019-07-10 LAB
ANION GAP SERPL CALCULATED.3IONS-SCNC: 4 MMOL/L (ref 3–14)
BUN SERPL-MCNC: 16 MG/DL (ref 7–30)
CALCIUM SERPL-MCNC: 8.6 MG/DL (ref 8.5–10.1)
CHLORIDE SERPL-SCNC: 105 MMOL/L (ref 94–109)
CO2 SERPL-SCNC: 32 MMOL/L (ref 20–32)
CREAT SERPL-MCNC: 0.64 MG/DL (ref 0.52–1.04)
ERYTHROCYTE [DISTWIDTH] IN BLOOD BY AUTOMATED COUNT: 12.5 % (ref 10–15)
GFR SERPL CREATININE-BSD FRML MDRD: 85 ML/MIN/{1.73_M2}
GLUCOSE SERPL-MCNC: 96 MG/DL (ref 70–99)
HCT VFR BLD AUTO: 41.9 % (ref 35–47)
HGB BLD-MCNC: 13.7 G/DL (ref 11.7–15.7)
MCH RBC QN AUTO: 31.4 PG (ref 26.5–33)
MCHC RBC AUTO-ENTMCNC: 32.7 G/DL (ref 31.5–36.5)
MCV RBC AUTO: 96 FL (ref 78–100)
PLATELET # BLD AUTO: 219 10E9/L (ref 150–450)
POTASSIUM SERPL-SCNC: 4.5 MMOL/L (ref 3.4–5.3)
RBC # BLD AUTO: 4.36 10E12/L (ref 3.8–5.2)
SODIUM SERPL-SCNC: 141 MMOL/L (ref 133–144)
TSH SERPL DL<=0.005 MIU/L-ACNC: 1.98 MU/L (ref 0.4–4)
VIT B12 SERPL-MCNC: 851 PG/ML (ref 193–986)
WBC # BLD AUTO: 8.5 10E9/L (ref 4–11)

## 2019-07-10 PROCEDURE — 99207 C PAF COMPLETED  NO CHARGE: CPT | Mod: 25 | Performed by: INTERNAL MEDICINE

## 2019-07-10 PROCEDURE — 80048 BASIC METABOLIC PNL TOTAL CA: CPT | Performed by: INTERNAL MEDICINE

## 2019-07-10 PROCEDURE — 82607 VITAMIN B-12: CPT | Performed by: INTERNAL MEDICINE

## 2019-07-10 PROCEDURE — 84443 ASSAY THYROID STIM HORMONE: CPT | Performed by: INTERNAL MEDICINE

## 2019-07-10 PROCEDURE — 36415 COLL VENOUS BLD VENIPUNCTURE: CPT | Performed by: INTERNAL MEDICINE

## 2019-07-10 PROCEDURE — 99214 OFFICE O/P EST MOD 30 MIN: CPT | Mod: 25 | Performed by: INTERNAL MEDICINE

## 2019-07-10 PROCEDURE — G0439 PPPS, SUBSEQ VISIT: HCPCS | Performed by: INTERNAL MEDICINE

## 2019-07-10 PROCEDURE — 85027 COMPLETE CBC AUTOMATED: CPT | Performed by: INTERNAL MEDICINE

## 2019-07-10 ASSESSMENT — ACTIVITIES OF DAILY LIVING (ADL): CURRENT_FUNCTION: NO ASSISTANCE NEEDED

## 2019-07-10 NOTE — PATIENT INSTRUCTIONS
*  Continue all medications at the same doses.  Contact your usual pharmacy if you need refills.     *  DEXA scan (bone density scan) to recheck bone density.     *  Return to see me in 1 year, sooner if needed.  Use [employer] or Call [phone] to schedule the appointment with me.       *  Preventive Health Recommendations  Female Ages 75+    Yearly exam: See your health care provider every year in order to  o Review health changes.   o Discuss preventive care.    o Review your medicines if your doctor has prescribed any.      Get a Pap test every three years (unless you have an abnormal result and your provider advises testing more often).    No more PAP smear needed.      You do not need a Pap test if your uterus was removed (hysterectomy) and you have not had cancer.    You should be tested each year for STDs (sexually transmitted diseases) if you're at risk.     Routine screening mammogram no longer indicated.    No routine screening colonoscopy indiacted     Have a cholesterol test every 5 years, or more often if advised.    Have a diabetes test (fasting glucose) every three years. If you are at risk for diabetes, you should have this test more often.     If you are at risk for osteoporosis (brittle bone disease), think about having a bone density scan (DEXA).    Shots:   *  Get a flu shot each year.   *  Get a tetanus shot every 10 years.    *  Pneumonia vaccine up to date.      Nutrition:     Eat at least 5 servings of fruits and vegetables each day.    Eat whole-grain bread, whole-wheat pasta and brown rice instead of white grains and rice.    Talk to your provider about Calcium and Vitamin D.    --Calcium: aim for 1200 mg per day (any brand is fine)   --Vitamin D3 at least 1000 units per day (any brand is fine)       --Good Grains:  Oats, brown rice, Quinoa (these do not raise the blood sugar as much)     --Bad grains:  Anything made from wheat or white rice     (because these raise the blood sugars  "significantly, and the possible gluten issue from wheat for some people).      --Proteins:  Aim for \"lean proteins\" including chicken, fish, seafood, pork, turkey, and eggs (in moderation); Eat red meat only occasionally        Lifestyle    Exercise at least 150 minutes a week (30 minutes a day, 5 days a week). This will help you control your weight and prevent disease.    Limit alcohol to one drink per day.    No smoking.     Wear sunscreen to prevent skin cancer.     See your dentist every six months for an exam and cleaning.    See your eye doctor every 1 to 2 years.        "

## 2019-07-10 NOTE — LETTER
July 10, 2019      Brielle Marvin  [address] [facility] [address]        To whom it may concern:    I am writing on behalf of Brielle Marvin who underwent total knee arthroplasty at [facility] 4/12/19 and was discharged to a transitional care unit on 4/16/19 for further recovery.  She was not able to be sent directly home after her knee replacement surgery due to the severe ongoing post operative pain, not being able managing her stairs due to balance and strength issues, and not being able to manage her ADLs all of which would create an unsafe situation at home, placing her at risk for bodily injury or other failure.  I would consider the TCU medically indicated based on the issues listed above.      If you have any questions or concerns, please call the clinic at the number listed above.       Sincerely,        Jared Emmanuel MD

## 2019-07-10 NOTE — PROGRESS NOTES
"   SUBJECTIVE:   CC: Brielle Marvin is an 79 year old woman who presents for preventive health visit.     Healthy Habits:    In general, how would you rate your overall health?  Good    Frequency of exercise:  None    Duration of exercise:  Other    Do you usually eat at least 4 servings of fruit and vegetables a day, include whole grains    & fiber and avoid regularly eating high fat or \"junk\" foods?  Yes    Taking medications regularly:  Yes    Barriers to taking medications:  Not applicable    Medication side effects:  Not applicable    Ability to successfully perform activities of daily living:  No assistance needed    Home Safety:  No safety concerns identified    Hearing Impairment:  No hearing concerns    In the past 6 months, have you been bothered by leaking of urine?  No    In general, how would you rate your overall mental or emotional health?  Excellent      PHQ-2 Total Score:    Additional concerns today:  Yes      1.    Blood presure remains well controlled at home  Readings outside clinic are within normal limits.  Reviewed last 6 BP readings in chart:  BP Readings from Last 6 Encounters:   07/10/19 100/60   04/03/19 114/70   03/15/19 120/62   11/09/18 118/60   11/02/18 118/70   07/06/18 124/76     He has not experienced any significant side effects from medicaiotns for hypertension.    NO active cardiac complaints or symptoms with exercise.     2.  Doing well on myrbetriq, controlling her overactive bladder symptosm without reported side effects     3.  Depression is stable.   Denies signifciant symptoms a this time.     PHQ-9 SCORE 2/22/2018 7/6/2018 4/3/2019   PHQ-9 Total Score - - -   PHQ-9 Total Score MyChart 0 - -   PHQ-9 Total Score 0 0 0       Today's PHQ-2 Score:   PHQ-2 ( 1999 [employer]) 7/10/2019   Q1: Little interest or pleasure in doing things -   Q2: Feeling down, depressed or hopeless -   PHQ-2 Score -   PHQ-2 Score Incomplete       Abuse: Current or Past(Physical, Sexual or Emotional)- " No  Do you feel safe in your environment? Yes    Social History     Tobacco Use     Smoking status: Never Smoker     Smokeless tobacco: Never Used   Substance Use Topics     Alcohol use: No     If you drink alcohol do you typically have >3 drinks per day or >7 drinks per week? No    No flowsheet data found.    Reviewed orders with patient.  Reviewed health maintenance and updated orders accordingly - Yes    Cognitive screening:  Mini Mental status normal  Clock draw normal  3 word recall normal    Six Item Cognitive Impairment Test   (6CIT):      What year is it?                               Correct - 0 points    What month is it?                               Correct - 0 points      Give the patient an address to remember with five components:   Syl[place] Emmanuel ( first and last name - 2 components)   [address]  (number and name of street - 2 components)   [place] ( city - 1 component)      About what time is it (within the hour)? Correct - 0 points    Count backwards from 20 to 1:   Correct - 0 points    Say the months of the year in reverse: Correct - 0 points    Repeat the address phrase:   Correct - 0 points    Total 6CIT Score:      0/28    Interpretation: The 6CIT uses an inverse score and questions are weighted to produce a total out of 28. Scores of 0-7 are considered normal and 8 or more significant.    Advantages The test has high sensitivity without compromising specificity even in mild dementia. It is easy to translate linguistically and culturally.  Disadvantages The main disadvantage is in the scoring and weighting of the test, which is initially confusing, however computer models have simplified this greatly.    Probability Statistics: At the 7/8 cut off: Overall figures sensitivity 90% specificity 100%, in mild dementia sensitivity = 78% , specificity = 100%    Copyright 2000 [facility], [facility], [place]. Courtesy of Dr. Theron Maya        Pertinent mammograms are reviewed under  the imaging tab.  History of abnormal Pap smear: NO - age 30- 65 PAP every 3 years recommended  PAP / HPV 2/17/2014 12/21/2009 4/23/2008   PAP NIL NIL NIL     Reviewed and updated as needed this visit by clinical staff  Tobacco  Allergies         Reviewed and updated as needed this visit by Provider            Review of Systems  CONSTITUTIONAL: NEGATIVE for fever, chills, change in weight  INTEGUMENTARY/SKIN: NEGATIVE for worrisome rashes, moles or lesions  EYES: NEGATIVE for vision changes or irritation  ENT: NEGATIVE for ear, mouth and throat problems  RESP: NEGATIVE for significant cough or SOB  BREAST: NEGATIVE for masses, tenderness or discharge  CV: NEGATIVE for chest pain, palpitations or peripheral edema  GI: NEGATIVE for nausea, abdominal pain, heartburn, or change in bowel habits  : NEGATIVE for unusual urinary or vaginal symptoms. No vaginal bleeding.  MUSCULOSKELETAL: NEGATIVE for significant arthralgias or myalgia  NEURO: NEGATIVE for weakness, dizziness or paresthesias  PSYCHIATRIC: NEGATIVE for changes in mood or affect      OBJECTIVE:   /60   Pulse 64   Temp 97.3  F (36.3  C) (Temporal)   Resp 14   Wt 78.5 kg (173 lb)   LMP 11/08/1991   SpO2 97%   BMI 28.79 kg/m    Physical Exam  GENERAL alert and no distress  EYES:  Normal sclera,conjunctiva, EOMI  HENT: oral and posterior pharynx without lesions or erythema, facies symmetric  NECK: Neck supple. No LAD, without thyroidmegaly.  RESP: Clear to ausculation bilaterally without wheezes or crackles. Normal BS in all fields.  CV: RRR normal S1S2 without murmurs, rubs or gallops.  LYMPH: no cervical lymph adenopathy appreciated  MS: extremities- no gross deformities of the visible extremities noted,   EXT:  no lower extremity edema  PSYCH: Alert and oriented times 3; speech- coherent  SKIN:  No obvious significant skin lesions on visible portions of face         ASSESSMENT/PLAN:     (Z00.00) Medicare annual wellness visit, subsequent   (primary encounter diagnosis)  Comment: Discussed cardiac disease risk factors and cardiac disease risk factor modification, including diabetes screening, blood pressure screening (and management if indicated), and cholesterol screening.   Reviewed immunzation guidelines, including pneumococcal vaccines, annual influenza, and shingles vaccines.   Discussed routine cancer screenings, including skin cancer, colon cancer screening for everyone until age 80, prostate cancer screening in men until age 75, mammogram and PAP/pelvic for women until age 75.   Recommended regular dentist visits to care for remaining teeth.   Recommended regular screening for vision and glaucoma.   Recommended safe driving and accident avoidance.   Plan: PAF COMPLETED            (Z12.11) Screen for colon cancer  Comment: Discussed cold sores (herpes simplex) and their treatments.  Recommended Valtrex 2 g BID for 2 doses as soon as they appear.   Plan: PAF COMPLETED            (I10) Benign essential hypertension  Comment: This condition is currently controlled on the current medical regimen.  Continue current therapy.   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  Continue current therapy.   Plan: PAF COMPLETED            (Z96.651) Status post total right knee replacement  Comment: doing well, walkign well.   Plan: PAF COMPLETED            (N32.81) OAB (overactive bladder)  Comment: This condition is currently controlled on the current medical regimen.  Continue current therapy.   She has not experienced any significant side effects of this medication.   Plan: PAF COMPLETED            (E78.5) Hyperlipidemia LDL goal <130  Comment: Discussed current lipid results, previous results (if available) current guidelines (NCEP) for treatment and goals for lipids.  Discussed lifestyle  "modification, dietary changes (low fat, low simple carb) and regular aerobic exercise.  Discussed the link between dysmetabolic syndrome and impaired glucose tolerance seen in certain patterns of lipids.  Briefly discussed medication used for lipid lowering, including the statins are their possible side effects of myalgias, rhabdomyolysis, and liver toxicity.   Plan: PAF COMPLETED            (R53.82) Chronic fatigue  Comment: No obvious medical cause for the fatigue.  Will check for routine medical cause with the labs as ordered, suspect they will return abnormal.  Will follow up any abnormal labs as indicated.  Discussed many of the other cases for fatigue including situational things such as stress and problems at home or work.  Also discussed the possibility of underlying mood disorders in general.  Dicussed other things as well including sleep apnea, chronic fatigue syndrome, post viral syndromes, fibromyalgia, connective tissue disease etc.   Plan: PAF COMPLETED, CBC with platelets, Basic         metabolic panel, Vitamin B12, TSH with free T4         reflex            (Z78.0) Post-menopausal  Comment: needs DEXA scan  Plan: DX Hip/Pelvis/Spine               COUNSELING:  Reviewed preventive health counseling, as reflected in patient instructions       Regular exercise       Healthy diet/nutrition       Vision screening       Hearing screening       Osteoporosis Prevention/Bone Health    Estimated body mass index is 28.79 kg/m  as calculated from the following:    Height as of 3/15/19: 1.651 m (5' 5\").    Weight as of this encounter: 78.5 kg (173 lb).         reports that she has never smoked. She has never used smokeless tobacco.      Counseling Resources:  ATP IV Guidelines  Pooled Cohorts Equation Calculator  Breast Cancer Risk Calculator  FRAX Risk Assessment  ICSI Preventive Guidelines  Dietary Guidelines for Americans, 2010  USDA's MyPlate  ASA Prophylaxis  Lung CA Screening    Jared Emmanuel, " MD  [facility]

## 2019-07-31 ENCOUNTER — HOSPITAL ENCOUNTER (OUTPATIENT)
Dept: BONE DENSITY | Facility: CLINIC | Age: 79
Discharge: HOME | End: 2019-07-31
Attending: INTERNAL MEDICINE | Admitting: INTERNAL MEDICINE
Payer: COMMERCIAL

## 2019-07-31 DIAGNOSIS — Z78.0 POST-MENOPAUSAL: ICD-10-CM

## 2019-07-31 PROCEDURE — 77080 DXA BONE DENSITY AXIAL: CPT

## 2019-08-09 ENCOUNTER — OFFICE VISIT (OUTPATIENT)
Dept: FAMILY MEDICINE | Facility: CLINIC | Age: 79
End: 2019-08-09
Payer: COMMERCIAL

## 2019-08-09 ENCOUNTER — TELEPHONE (OUTPATIENT)
Dept: FAMILY MEDICINE | Facility: CLINIC | Age: 79
End: 2019-08-09

## 2019-08-09 ENCOUNTER — TRANSFERRED RECORDS (OUTPATIENT)
Dept: HEALTH INFORMATION MANAGEMENT | Facility: CLINIC | Age: 79
End: 2019-08-09

## 2019-08-09 VITALS
BODY MASS INDEX: 29.12 KG/M2 | WEIGHT: 175 LBS | TEMPERATURE: 97.3 F | HEART RATE: 68 BPM | SYSTOLIC BLOOD PRESSURE: 130 MMHG | DIASTOLIC BLOOD PRESSURE: 80 MMHG | OXYGEN SATURATION: 96 % | RESPIRATION RATE: 16 BRPM

## 2019-08-09 DIAGNOSIS — M79.661 PAIN AND SWELLING OF RIGHT LOWER LEG: Primary | ICD-10-CM

## 2019-08-09 DIAGNOSIS — Z96.651 HISTORY OF TOTAL KNEE ARTHROPLASTY, RIGHT: ICD-10-CM

## 2019-08-09 DIAGNOSIS — M79.89 PAIN AND SWELLING OF RIGHT LOWER LEG: Primary | ICD-10-CM

## 2019-08-09 DIAGNOSIS — Z86.718 PERSONAL HISTORY OF DVT (DEEP VEIN THROMBOSIS): ICD-10-CM

## 2019-08-09 PROCEDURE — 99214 OFFICE O/P EST MOD 30 MIN: CPT | Performed by: PHYSICIAN ASSISTANT

## 2019-08-09 ASSESSMENT — ENCOUNTER SYMPTOMS
RESPIRATORY NEGATIVE: 1
EYES NEGATIVE: 1
GASTROINTESTINAL NEGATIVE: 1
PSYCHIATRIC NEGATIVE: 1
CARDIOVASCULAR NEGATIVE: 1
CONSTITUTIONAL NEGATIVE: 1
NEUROLOGICAL NEGATIVE: 1

## 2019-08-09 NOTE — PROGRESS NOTES
Subjective     Brielle Marvin is a 79 year old female who presents to clinic today for the following health issues:    HPI   Musculoskeletal problem/pain      Duration: since rt knee replacement surgery in April    Description  Location: RT calf up and  Back of thigh    Intensity:  moderate    Accompanying signs and symptoms: was just soreness at the end of the day but past few days have been all day    History  Previous similar problem: no   Previous evaluation:  none    Precipitating or alleviating factors:  Trauma or overuse: no   Aggravating factors include: walking    Therapies tried and outcome: aspercream, aleve, tylenol    History of DVT  On Xarelto fo three months  Off for two months  Then right TKA  Restarted 81 mg   She had one US of the right calf postop within the first few weeks postop - negative for DVT  Today she has had gradually worsening pain in the right calf, and now right thigh  The pain is now constant, and she is concerned        Patient Active Problem List   Diagnosis     Lumbago     Enthesopathy of hip region     Muscle weakness (generalized)     ACP (advance care planning)     Major depressive disorder, single episode, mild (H)     Sprain of neck     Upper back strain     Neck pain     Gastroesophageal reflux disease without esophagitis     Benign essential hypertension     Hypercholesterolemia     Major depression in complete remission (H)     Overweight (BMI 25.0-29.9)     Status post total knee replacement     Past Surgical History:   Procedure Laterality Date     C REYNOSO W/O FACETEC FORAMOT/DSKC 1/2 VRT SEG, LUMBAR  1962    Lami, Lumbar     C TOTAL KNEE ARTHROPLASTY Right 04/12/2019    right TKA at [facility]      COLONOSCOPY THRU STOMA, DIAGNOSTIC  8/31/02      CORRECT BUNION,SIMPLE  1981       Social History     Tobacco Use     Smoking status: Never Smoker     Smokeless tobacco: Never Used   Substance Use Topics     Alcohol use: No     Family History   Problem Relation Age of Onset      Cancer Mother         D:84 Lung CA     Cardiovascular Father         D:72 heart Attack     Cancer Sister         D:62, probably colon cancer     Family History Negative Sister         B:1947 alive     Family History Negative Brother         B:1935 Alive     Family History Negative Brother         B:1943 Alive     Family History Negative Brother         B:1939 Alive     C.A.D. Brother         B: 1937  HTN, CAD, had PTCA         Current Outpatient Medications   Medication Sig Dispense Refill     ASPIRIN 81 MG OR TABS ONE DAILY       atenolol (TENORMIN) 25 MG tablet Take 1 tablet (25 mg) by mouth daily 90 tablet 2     BIOTIN PO        CALCIUM 500 + D OR None Entered       Cholecalciferol (VITAMIN D) 2000 UNIT tablet Take 2,000 Units by mouth daily.       FISH  MG OR CAPS 1 tablet daily  0     mirabegron (MYRBETRIQ) 25 MG 24 hr tablet Take 1 tablet (25 mg) by mouth daily 90 tablet 3     omeprazole (PRILOSEC) 20 MG DR capsule Take 1 capsule (20 mg) by mouth daily 90 capsule 3     pravastatin (PRAVACHOL) 40 MG tablet Take 1 tablet (40 mg) by mouth daily 90 tablet 3     VITAMIN B-12 1000 MCG OR TABS 1 tablet daily       VITAMIN C 500 MG OR TABS 1 tablet daily 60 0     vitamin E 400 UNIT capsule Take 1 capsule (400 Units) by mouth daily       Allergies   Allergen Reactions     Codeine Nausea and Vomiting     vomiting     Codeine Nausea and Vomiting     Morphine Nausea and Vomiting     nausea     Vicodin [Hydrocodone-Acetaminophen] Nausea and Vomiting       Reviewed and updated as needed this visit by Provider         Review of Systems   Constitutional: Negative.    HENT: Negative.    Eyes: Negative.    Respiratory: Negative.    Cardiovascular: Negative.    Gastrointestinal: Negative.    Genitourinary: Negative.    Musculoskeletal:        As in HPI   Skin: Negative.    Neurological: Negative.    Psychiatric/Behavioral: Negative.          Objective    /80 (Cuff Size: Adult Large)   Pulse 68   Temp 97.3  F  "(36.3  C) (Tympanic)   Resp 16   Wt 79.4 kg (175 lb)   LMP 11/08/1991   SpO2 96%   BMI 29.12 kg/m    Physical Exam   Constitutional: She is oriented to person, place, and time. She appears well-developed and well-nourished. No distress.   HENT:   Head: Normocephalic.   Right Ear: External ear normal.   Left Ear: External ear normal.   Nose: Nose normal.   Eyes: Conjunctivae and EOM are normal.   Neck: Normal range of motion.   Pulmonary/Chest: Effort normal.   Musculoskeletal:        Right knee: She exhibits swelling. She exhibits normal range of motion. No tenderness found.        Right lower leg: She exhibits swelling (2 cm > than left calf circumference) and edema. She exhibits no tenderness and no bony tenderness.   Right total knee scar is healing well, knee ROM is normal without pain, no erythema or warmth.    Neurological: She is alert and oriented to person, place, and time.   Skin: She is not diaphoretic.   Psychiatric: She has a normal mood and affect. Judgment normal.       Diagnostic Test Results:  No results found for this or any previous visit (from the past 24 hour(s)).        Assessment & Plan   Problem List Items Addressed This Visit     None      Visit Diagnoses     Pain and swelling of right lower leg    -  Primary    Relevant Orders    US Lower Extremity Venous Duplex Right    History of total knee arthroplasty, right        Relevant Orders    US Lower Extremity Venous Duplex Right    Personal history of DVT (deep vein thrombosis)             STAT venous doppler US ordered to rule out DVT  Patient has history of DVT - treated with 81 mg ASA postop for DVT prophylaxis (also her baseline ASA dose).    BMI:   Estimated body mass index is 29.12 kg/m  as calculated from the following:    Height as of 3/15/19: 1.651 m (5' 5\").    Weight as of this encounter: 79.4 kg (175 lb).     There are no Patient Instructions on file for this visit.    Return today (on 8/9/2019) for Imaging.    Kary Escobar" DENNY Espinoza  [facility]

## 2019-08-09 NOTE — TELEPHONE ENCOUNTER
Yes, I called to inform her the handicap form was ready to be picked up. I was unable to leave a vm because her vm was full.

## 2019-08-09 NOTE — TELEPHONE ENCOUNTER
Her handicap form is now available to be picked up.  I am guessing that was the call.  Nothing new from me.     Catarino Espinoza PA-C

## 2019-08-09 NOTE — TELEPHONE ENCOUNTER
Called patient to notify that handicap form is available to be picked up at [facility]. No further action needed.

## 2019-08-09 NOTE — TELEPHONE ENCOUNTER
Patient calling stating she received a phone call at 12:07 pm. Unable to find message in chart. Patient had MRI today and is wondering if it is regarding results. Routing to provider - did you call patient?

## 2019-08-29 ENCOUNTER — OFFICE VISIT (OUTPATIENT)
Dept: INTERNAL MEDICINE | Facility: CLINIC | Age: 79
End: 2019-08-29
Payer: COMMERCIAL

## 2019-08-29 VITALS
OXYGEN SATURATION: 95 % | SYSTOLIC BLOOD PRESSURE: 130 MMHG | BODY MASS INDEX: 28.32 KG/M2 | RESPIRATION RATE: 16 BRPM | DIASTOLIC BLOOD PRESSURE: 70 MMHG | WEIGHT: 170.2 LBS | TEMPERATURE: 98.7 F | HEART RATE: 97 BPM

## 2019-08-29 DIAGNOSIS — M62.838 MUSCLE SPASM: Primary | ICD-10-CM

## 2019-08-29 PROCEDURE — 99213 OFFICE O/P EST LOW 20 MIN: CPT | Performed by: INTERNAL MEDICINE

## 2019-08-29 RX ORDER — METHOCARBAMOL 750 MG/1
750 TABLET, FILM COATED ORAL 4 TIMES DAILY PRN
Qty: 45 TABLET | Refills: 1 | Status: SHIPPED | OUTPATIENT
Start: 2019-08-29 | End: 2020-03-10

## 2019-08-29 NOTE — PROGRESS NOTES
Subjective     Brielle Marvin is a 79 year old female who presents to clinic today for the following health issues:    HPI   Musculoskeletal problem/pain      Duration: A few weeks    Description  Location: Right leg spams (had knee replacement in April so after surgery started having the spasms)    Intensity:  Can be severe (pt states today they are severe)    Accompanying signs and symptoms: none    History  Previous similar problem: no   Previous evaluation:  none    Precipitating or alleviating factors:  Trauma or overuse: no   Aggravating factors include: In bed and sitting    Therapies tried and outcome: ice and NSAID - Aleve with little to know relief      Just had right knee replaced fairly recently, and even more recently a right Baker's cyst drained.  Has had painful muscle spasms throughout recovery from TKA, which are somewhat better after drainage of Baker's cyst, but still present and bothersome.  Particularly worse when she tries to stretch.  No noticeable swelling in right lower      Reviewed and updated as needed this visit by Provider  Tobacco  Allergies  Meds  Problems  Med Hx  Surg Hx  Fam Hx         Review of Systems   ROS COMP: Constitutional, HEENT, cardiovascular, pulmonary, gi and gu systems are negative, except as otherwise noted.      Objective    /70 (BP Location: Left arm, Patient Position: Chair, Cuff Size: Adult Regular)   Pulse 97   Temp 98.7  F (37.1  C) (Oral)   Resp 16   Wt 77.2 kg (170 lb 3.2 oz)   LMP 11/08/1991   SpO2 95%   BMI 28.32 kg/m    Body mass index is 28.32 kg/m .  Physical Exam   GENERAL: healthy, alert and no distress  NECK: no adenopathy, no asymmetry, masses, or scars and thyroid normal to palpation  RESP: lungs clear to auscultation - no rales, rhonchi or wheezes  CV: regular rate and rhythm, normal S1 S2, no S3 or S4, no murmur, click or rub, no peripheral edema and peripheral pulses strong  ABDOMEN: soft, nontender, no hepatosplenomegaly, no  "masses and bowel sounds normal  MS: no gross musculoskeletal defects noted, no edema.  Right calf circumference is equal to left.            Assessment & Plan     1. Muscle spasm  Reiterated the importance of maintaining hydration, trial of nonsedating muscle relaxant as written.  - methocarbamol (ROBAXIN) 750 MG tablet; Take 1 tablet (750 mg) by mouth 4 times daily as needed for muscle spasms  Dispense: 45 tablet; Refill: 1     BMI:   Estimated body mass index is 28.32 kg/m  as calculated from the following:    Height as of 3/15/19: 1.651 m (5' 5\").    Weight as of this encounter: 77.2 kg (170 lb 3.2 oz).               Return in about 4 weeks (around 9/26/2019) for recheck with primary care provider if no better.    Brian Galo MD  [facility]        "

## 2019-12-10 ENCOUNTER — OFFICE VISIT (OUTPATIENT)
Dept: INTERNAL MEDICINE | Facility: CLINIC | Age: 79
End: 2019-12-10
Payer: COMMERCIAL

## 2019-12-10 ENCOUNTER — TELEPHONE (OUTPATIENT)
Dept: INTERNAL MEDICINE | Facility: CLINIC | Age: 79
End: 2019-12-10

## 2019-12-10 VITALS
RESPIRATION RATE: 14 BRPM | BODY MASS INDEX: 29.66 KG/M2 | WEIGHT: 178 LBS | DIASTOLIC BLOOD PRESSURE: 64 MMHG | TEMPERATURE: 97.8 F | OXYGEN SATURATION: 96 % | SYSTOLIC BLOOD PRESSURE: 122 MMHG | HEART RATE: 86 BPM | HEIGHT: 65 IN

## 2019-12-10 DIAGNOSIS — M54.9 UPPER BACK PAIN ON RIGHT SIDE: Primary | ICD-10-CM

## 2019-12-10 PROCEDURE — 99214 OFFICE O/P EST MOD 30 MIN: CPT | Performed by: PHYSICIAN ASSISTANT

## 2019-12-10 ASSESSMENT — MIFFLIN-ST. JEOR: SCORE: 1288.28

## 2019-12-11 NOTE — PROGRESS NOTES
"Subjective     Brielle Marvin is a 79 year old female who presents to clinic today for the following health issues:    HPI   Upper back pain      Duration: 1 month and getting worse    Description (location/character/radiation): right shoulder-    Upper back pain that radiates to the shoulder/arm on the right        Intensity:  moderate    Accompanying signs and symptoms: radiating down to upper and lower arm-pain with pressure    Worse at night    History (similar episodes/previous evaluation): None    Precipitating or alleviating factors: None    Therapies tried and outcome: excedrin, aleve,   Excedrin helped some with the pain.  Muscles feel tight right upper back  Did have a fall around this time, but states no pain at the time of the fall and she felt fine.  Pain started several days later.   -------------------------------------    BP Readings from Last 3 Encounters:   12/10/19 122/64   08/29/19 130/70   08/09/19 130/80    Wt Readings from Last 3 Encounters:   12/10/19 80.7 kg (178 lb)   08/29/19 77.2 kg (170 lb 3.2 oz)   08/09/19 79.4 kg (175 lb)                    Reviewed and updated as needed this visit by Provider  Allergies  Meds         Review of Systems   ROS COMP: Constitutional, HEENT, cardiovascular, pulmonary, gi and gu systems are negative, except as otherwise noted.      Objective    /64   Pulse 86   Temp 97.8  F (36.6  C) (Oral)   Resp 14   Ht 1.651 m (5' 5\")   Wt 80.7 kg (178 lb)   LMP 11/08/1991   SpO2 96%   Breastfeeding No   BMI 29.62 kg/m    Body mass index is 29.62 kg/m .  Physical Exam   GENERAL: healthy, alert and no distress  NECK: no adenopathy, no asymmetry, masses, or scars and thyroid normal to palpation  RESP: lungs clear to auscultation - no rales, rhonchi or wheezes  CV: regular rates and rhythm and normal S1 S2, no S3 or S4  MS: tenderness mid trapezius on the right with spasm noted.  Mild biceps tenderness.  Full ROM of the shoulder no pain at the joint at " all  No swelling at the shoulder or the arm.     SKIN: no suspicious lesions or rashes    Diagnostic Test Results:  none        Assessment & Plan     1. Upper back pain on right side  Reviewed options.  She declined any pills today. No muscle relaxers etc.  Recommended good deep tissue massage.  She declined thoracic spine xray today   Reviewed if not improving then that would be next step.  Monitor    25 minutes spent with patient> 50% of time on counseling and plan of care.               Return in about 2 weeks (around 12/24/2019) for recheck if not improving, regular primary provider.    Freida Pandya PA-C  [facility]

## 2019-12-16 ENCOUNTER — MYC MEDICAL ADVICE (OUTPATIENT)
Dept: PEDIATRICS | Facility: CLINIC | Age: 79
End: 2019-12-16

## 2019-12-16 ENCOUNTER — TELEPHONE (OUTPATIENT)
Dept: INTERNAL MEDICINE | Facility: CLINIC | Age: 79
End: 2019-12-16

## 2019-12-16 DIAGNOSIS — N32.81 OAB (OVERACTIVE BLADDER): ICD-10-CM

## 2019-12-16 RX ORDER — OXYBUTYNIN CHLORIDE 5 MG/1
5 TABLET ORAL 2 TIMES DAILY PRN
Qty: 60 TABLET | Refills: 1 | Status: SHIPPED | OUTPATIENT
Start: 2019-12-16 | End: 2020-01-02

## 2019-12-16 NOTE — TELEPHONE ENCOUNTER
Patient calling and needs a medication substitution for Mirabegron . Pt states she does not remember trying Oxybutynin- this is better covered by her insurance and would be willing to try this or another medication .Daphne Calderón RN

## 2019-12-16 NOTE — TELEPHONE ENCOUNTER
Message sent to pt thru [employer].  Also, spoke to pt, and informed her of new RX for oxybutynin (DITROPAN) 5 MG tablet.

## 2019-12-16 NOTE — TELEPHONE ENCOUNTER
"Okay to try an alternative to mirabegron (Myrbetriq)  If she has not tried oxybutynin, she can try this.    *  Trial of medication to help reduce night time trips to the bathroom.      --Oxybutynin 5 mg once per day at bedtime.  This helps the bladder remain \"less active\".   This is the lowest dose, you may require a higher dose if it does not work well.      If you are still having troubles with the once at bedtime dose, then go to twice per day.    The prescription will say twice per day as needed.       *  I would recommend getting this from her usual [facility] on [address] to try it first before sending for a large supply from [facility].      --Main side effects include dry mouth, troubles starting the urine stream.  Contact us if you have any significant side effects.      *  If you continue to have troublesome symptoms despite these conservative measures and this prescription medication, then you may require more advance cares.    "

## 2019-12-17 RX ORDER — OXYBUTYNIN CHLORIDE 5 MG/1
5 TABLET ORAL 2 TIMES DAILY PRN
Qty: 180 TABLET | Refills: 0 | Status: CANCELLED | OUTPATIENT
Start: 2019-12-17

## 2019-12-17 NOTE — TELEPHONE ENCOUNTER
Spoke to pt, and she is ok with the 1 month supply. Sounds like [facility] called this morning requesting a 90 day supply (she did not call us).   She will follow-up in 1 month, to give Dr. Emmanuel an update of the new medication: oxybutynin.

## 2019-12-17 NOTE — TELEPHONE ENCOUNTER
To Dr. Emmanuel,     Pt calling this morning, requesting a 90 day supply of medication per pharmacy/insurance.

## 2019-12-17 NOTE — TELEPHONE ENCOUNTER
"No, do NOT send a 90 day prescription because we are not sure this will work, we dont know what dose is needed, and we do not know what side effects could happen for her.   Start with just one month as ordered.     Send an \"e-visit\" follow up appointment in one month to see how things are going.  At that time, we can send in 90 day RXs if everything is going well.     Close encounter when done.   "

## 2019-12-17 NOTE — TELEPHONE ENCOUNTER
Reason for Call:  Medication or medication refill:    Do you use a [facility]?  Name of the pharmacy and phone number for the current request:  [facility] tele # [phone]    Name of the medication requested: oxybutynin; pt requesting 90 day refill per pharmacy    Other request:     Can we leave a detailed message on this number?     Phone number patient can be reached at: Other phone number:  See above tele #    Best Time: soon    Call taken on 12/17/2019 at 8:51 AM by Hazel Alvarez

## 2020-01-02 DIAGNOSIS — E78.5 HYPERLIPIDEMIA LDL GOAL <130: ICD-10-CM

## 2020-01-02 DIAGNOSIS — I10 BENIGN ESSENTIAL HYPERTENSION: ICD-10-CM

## 2020-01-02 DIAGNOSIS — N32.81 OAB (OVERACTIVE BLADDER): ICD-10-CM

## 2020-01-02 RX ORDER — OXYBUTYNIN CHLORIDE 5 MG/1
5 TABLET ORAL 2 TIMES DAILY PRN
Qty: 60 TABLET | Refills: 0 | Status: SHIPPED | OUTPATIENT
Start: 2020-01-02 | End: 2020-04-30

## 2020-01-02 RX ORDER — ATENOLOL 25 MG/1
25 TABLET ORAL DAILY
Qty: 90 TABLET | Refills: 0 | Status: SHIPPED | OUTPATIENT
Start: 2020-01-02 | End: 2020-02-21

## 2020-01-02 RX ORDER — PRAVASTATIN SODIUM 40 MG
40 TABLET ORAL DAILY
Qty: 90 TABLET | Refills: 0 | Status: SHIPPED | OUTPATIENT
Start: 2020-01-02 | End: 2020-04-21

## 2020-01-02 NOTE — TELEPHONE ENCOUNTER
"Requested Prescriptions   Pending Prescriptions Disp Refills     atenolol (TENORMIN) 25 MG tablet 90 tablet 2     Sig: Take 1 tablet (25 mg) by mouth daily       Beta-Blockers Protocol Passed - 1/2/2020 10:56 AM        Passed - Blood pressure under 140/90 in past 12 months     BP Readings from Last 3 Encounters:   12/10/19 122/64   08/29/19 130/70   08/09/19 130/80                 Passed - Patient is age 6 or older        Passed - Recent (12 mo) or future (30 days) visit within the authorizing provider's specialty     Patient has had an office visit with the authorizing provider or a provider within the authorizing providers department within the previous 12 mos or has a future within next 30 days. See \"Patient Info\" tab in inbasket, or \"Choose Columns\" in Meds & Orders section of the refill encounter.              Passed - Medication is active on med list        pravastatin (PRAVACHOL) 40 MG tablet 90 tablet 3     Sig: Take 1 tablet (40 mg) by mouth daily       Statins Protocol Passed - 1/2/2020 10:56 AM        Passed - LDL on file in past 12 months     Recent Labs   Lab Test 04/03/19  0938   LDL 98             Passed - No abnormal creatine kinase in past 12 months     No lab results found.             Passed - Recent (12 mo) or future (30 days) visit within the authorizing provider's specialty     Patient has had an office visit with the authorizing provider or a provider within the authorizing providers department within the previous 12 mos or has a future within next 30 days. See \"Patient Info\" tab in inbasket, or \"Choose Columns\" in Meds & Orders section of the refill encounter.              Passed - Medication is active on med list        Passed - Patient is age 18 or older        Passed - No active pregnancy on record        Passed - No positive pregnancy test in past 12 months        oxybutynin (DITROPAN) 5 MG tablet 60 tablet 1     Sig: Take 1 tablet (5 mg) by mouth 2 times daily as needed for bladder " "spasms       Muscarinic Antagonists (Urinary Incontinence Agents) Passed - 1/2/2020 10:56 AM        Passed - Recent (12 mo) or future (30 days) visit within the authorizing provider's specialty     Patient has had an office visit with the authorizing provider or a provider within the authorizing providers department within the previous 12 mos or has a future within next 30 days. See \"Patient Info\" tab in inbasket, or \"Choose Columns\" in Meds & Orders section of the refill encounter.              Passed - Medication is Oxybutynin and patient is 5 years of age or older        Passed - Patient does not have a diagnosis of glaucoma on the problem list     If glaucoma diagnosis is new, refer refill to physician.          Passed - Medication is active on med list        Passed - Patient is 18 years of age or older        "

## 2020-01-02 NOTE — TELEPHONE ENCOUNTER
Requested Prescriptions   Pending Prescriptions Disp Refills     atenolol (TENORMIN) 25 MG tablet 90 tablet 2     Sig: Take 1 tablet (25 mg) by mouth daily  Last Written Prescription Date:  04/03/19  Last Fill Quantity: 90,  # refills: 2   Last office visit: 12/10/2019 with prescribing provider:  07/10/19   Future Office Visit:  0       There is no refill protocol information for this order        pravastatin (PRAVACHOL) 40 MG tablet 90 tablet 3     Sig: Take 1 tablet (40 mg) by mouth daily  Last Written Prescription Date:  04/03/19  Last Fill Quantity: 90,  # refills: 3   Last office visit: 12/10/2019 with prescribing provider:  07/10/19   Future Office Visit:  0       There is no refill protocol information for this order        oxybutynin (DITROPAN) 5 MG tablet 60 tablet 1     Sig: Take 1 tablet (5 mg) by mouth 2 times daily as needed for bladder spasms  Last Written Prescription Date:  12/16/19  Last Fill Quantity: 60,  # refills: 1   Last office visit: 12/10/2019 with prescribing provider:  07/10/19   Future Office Visit:  0       There is no refill protocol information for this order

## 2020-02-21 DIAGNOSIS — I10 BENIGN ESSENTIAL HYPERTENSION: ICD-10-CM

## 2020-02-21 RX ORDER — ATENOLOL 25 MG/1
25 TABLET ORAL DAILY
Qty: 90 TABLET | Refills: 2 | Status: SHIPPED | OUTPATIENT
Start: 2020-02-21 | End: 2020-09-08

## 2020-02-21 NOTE — TELEPHONE ENCOUNTER
"Requested Prescriptions   Pending Prescriptions Disp Refills     ATENOLOL 25 MG PO tablet [Pharmacy Med Name: ATENOLOL 25 MG Tablet] 90 tablet 0     Sig: TAKE 1 TABLET (25 MG) BY MOUTH DAILY       Beta-Blockers Protocol Passed - 2/21/2020  8:01 AM        Passed - Blood pressure under 140/90 in past 12 months     BP Readings from Last 3 Encounters:   12/10/19 122/64   08/29/19 130/70   08/09/19 130/80                 Passed - Patient is age 6 or older        Passed - Recent (12 mo) or future (30 days) visit within the authorizing provider's specialty     Patient has had an office visit with the authorizing provider or a provider within the authorizing providers department within the previous 12 mos or has a future within next 30 days. See \"Patient Info\" tab in inbasket, or \"Choose Columns\" in Meds & Orders section of the refill encounter.              Passed - Medication is active on med list          "

## 2020-03-10 ENCOUNTER — ANCILLARY PROCEDURE (OUTPATIENT)
Dept: GENERAL RADIOLOGY | Facility: CLINIC | Age: 80
End: 2020-03-10
Attending: PHYSICIAN ASSISTANT
Payer: COMMERCIAL

## 2020-03-10 ENCOUNTER — OFFICE VISIT (OUTPATIENT)
Dept: INTERNAL MEDICINE | Facility: CLINIC | Age: 80
End: 2020-03-10
Payer: COMMERCIAL

## 2020-03-10 VITALS
WEIGHT: 173 LBS | SYSTOLIC BLOOD PRESSURE: 100 MMHG | TEMPERATURE: 97.5 F | RESPIRATION RATE: 16 BRPM | HEART RATE: 77 BPM | DIASTOLIC BLOOD PRESSURE: 60 MMHG | BODY MASS INDEX: 28.79 KG/M2

## 2020-03-10 DIAGNOSIS — M54.9 UPPER BACK PAIN ON RIGHT SIDE: ICD-10-CM

## 2020-03-10 DIAGNOSIS — M54.9 UPPER BACK PAIN ON RIGHT SIDE: Primary | ICD-10-CM

## 2020-03-10 PROCEDURE — 99213 OFFICE O/P EST LOW 20 MIN: CPT | Performed by: PHYSICIAN ASSISTANT

## 2020-03-10 PROCEDURE — 72070 X-RAY EXAM THORAC SPINE 2VWS: CPT

## 2020-03-10 NOTE — PROGRESS NOTES
Subjective     Brielle Marvin is a 80 year old female who presents to clinic today for the following health issues:    HPI   Joint Pain    Onset: 1 week    Description:   Location: right upper back shoulder blade area   Character: ache    Intensity: moderate to severe    Progression of Symptoms: worse    Accompanying Signs & Symptoms:  Other symptoms: radiation of pain to right hand, some mild tingling in fingers    History:   Previous similar pain: YES      Precipitating factors:   Trauma or overuse: no     Alleviating factors:  Improved by: nothing    Therapies Tried and outcome: aleve- helps a little    Patient with the same issue last summer. At that time declined any xray or muscle relaxer  Just recently with return of pain over the past week.       -------------------------------------    BP Readings from Last 3 Encounters:   03/10/20 100/60   12/10/19 122/64   08/29/19 130/70    Wt Readings from Last 3 Encounters:   03/10/20 78.5 kg (173 lb)   12/10/19 80.7 kg (178 lb)   08/29/19 77.2 kg (170 lb 3.2 oz)                    -------------------------------------  Reviewed and updated as needed this visit by Provider  Allergies  Meds         Review of Systems   ROS COMP: Constitutional, HEENT, cardiovascular, pulmonary, gi and gu systems are negative, except as otherwise noted.      Objective    /60 (BP Location: Left arm, Patient Position: Chair, Cuff Size: Adult Regular)   Pulse 77   Temp 97.5  F (36.4  C) (Temporal)   Resp 16   Wt 78.5 kg (173 lb)   LMP 11/08/1991   BMI 28.79 kg/m    Body mass index is 28.79 kg/m .  Physical Exam   GENERAL: healthy, alert and no distress  NECK: no adenopathy, no asymmetry, masses, or scars and thyroid normal to palpation  RESP: lungs clear to auscultation - no rales, rhonchi or wheezes  CV: regular rates and rhythm  MS: tenderness rhomboids. Para spinous thoracic and trapezius right  No pain with ROM of the shoulder. No pain over ac joint or lateral shoulder  "joint.   SKIN: no suspicious lesions or rashes    Diagnostic Test Results:  Pending         Assessment & Plan       ICD-10-CM    1. Upper back pain on right side  M54.9 XR Thoracic Spine 2 Views     [facility]        BMI:   Estimated body mass index is 28.79 kg/m  as calculated from the following:    Height as of 12/10/19: 1.651 m (5' 5\").    Weight as of this encounter: 78.5 kg (173 lb).     Heat   Icing  See PT  Will notify of xray report  when available       See Patient Instructions    No follow-ups on file.    Freida Pandya PA-C  [facility]      "

## 2020-03-12 ENCOUNTER — THERAPY VISIT (OUTPATIENT)
Dept: PHYSICAL THERAPY | Facility: CLINIC | Age: 80
End: 2020-03-12
Payer: COMMERCIAL

## 2020-03-12 DIAGNOSIS — M54.9 UPPER BACK PAIN ON RIGHT SIDE: ICD-10-CM

## 2020-03-12 PROCEDURE — 97110 THERAPEUTIC EXERCISES: CPT | Mod: GP | Performed by: PHYSICAL THERAPIST

## 2020-03-12 PROCEDURE — 97161 PT EVAL LOW COMPLEX 20 MIN: CPT | Mod: GP | Performed by: PHYSICAL THERAPIST

## 2020-03-12 NOTE — PROGRESS NOTES
[facility] Initial Evaluation -- Cervical    Evaluation Date: March 12, 2020  Brielle Marvin is a 80 year old female with a R scapula condition.   Referral: IM  Work mechanical stresses: retired   Employment status: retired  Leisure mechanical stresses: sedentary, exercises at [place] 1x/week  Functional disability score (NDI):  6%  VAS score (0-10): 7/10  Patient goals/expectations:  To get the pain to go away    HISTORY:    Present symptoms:  R scapular pain.  Pain quality (sharp/shooting/stabbing/aching/burning/cramping):   aching.  Paresthesia (yes/no):  no    Present since (onset date): 02/27/2020.     Symptoms (improving/unchanging/worsening):  unchanging.    Symptoms commenced as a result of: unknown   Condition occurred in the following environment:  unknown    Symptoms at onset (neck/arm/forearm/headache): R scapula  Constant symptoms (neck/arm/forearm/headache):   Intermittent symptoms (neck/arm/forearm/headache): R scapula and R UE to hand    Symptoms are made worse with the following: Always Sitting 1 hour, lying on L side  Symptoms are made better with the following: changing positions    Disturbed sleep (yes/no): no  Number of pillows: 1  Sleeping postures (prone/sup/side R/L): R side    Previous episodes (0/1-5/6-10/11+): 1 Year of first episode: 2019    Previous history: 1 encounter in Epic 12/2019 of similar pain--pt does not recall  Previous treatments: none    Specific Questions: (as reported by the patient)  Dizziness/Tinnitus/Nausea/Swallowing (pos/neg): neg  Gait/Upper Limbs (normal/abnormal): normal  Medications (nil/NSAIDS/anlag/steroids/anticoag/other):  Other - Bone densisty and High blood pressure  Medical allergies:  none  General health (excellent/good/fair/poor):  good  Pertinent medical history:  High blood pressure  Imaging (None/Xray/MRI/Other):  X-ray--thoracic--DDD, mild scoliosis, negative for fracture  Recent or major surgery (yes/no): no; history of knee  surgery, back surgery in 1961  Night pain (yes/no): no  Accidents (yes/no): no  Unexplained weight loss (yes/no): no  Barriers at home: no  Other red flags: no    EXAMINATION    Posture:   Sitting (good/fair/poor): poor  Standing (good/fair/poor): fair     Protruded head (yes/no): yes    [place] (right/left/nil):  nil  Relevant (yes/no):  na     Correction of posture(better/worse/no effect): NE  Other observations:  none    Neurological:    Motor Deficit:  normal   Reflexes:  Not assessed  Sensory Deficit:  normal   Dural signs:  Not assessed    Movement Loss:   Chirag Mod Min Nil Pain   Protrusion    x NE   Flexion   x  NE   Retraction  x   Increases R scapular pain   Extension  x   NE   Lateral flexion R x    Increases R scapular pain   Lateral flexion L x    NE   Rotation R   x  Increases R scapular pain   Rotation L  x   NE     Test Movements:   During: produces, abolishes, increases, decreases, no effect, centralizing, peripheralizing  After: better, worse, no better, no worse, no effect, centralized, peripheralized    Pretest symptoms sitting: R scapular pain 5/10   Symptoms During Symptoms After ROM increased ROM decreased No Effect   PRO        Rep PRO        RET Increases    No Worse         Rep RET Decreases    Better      x   RET EXT        Rep RET EXT        Pretest symptoms lying:     Symptoms During Symptoms After ROM increased ROM decreased No Effect   RET        Rep RET        RET EXT        Rep RET EXT        If required, pretest symptoms sitting:      Symptoms During Symptoms After ROM increased ROM decreased No Effect   LF-R        Rep LF-R        LF-L        Rep LF-L        ROT-R        Rep ROT-R        ROT-L        Rep ROT-L        FLEX        Rep FLEX            Static Tests:   Protrusion:    Flexion:    Retraction:    Extension (sitting/prone/supine):      Other Tests: seated repeated t-ext--decreases R scapular pain, better--better improvement with retraction    Provisional Classification:   Derangement - Asymmetrical, unilateral, symptoms below elbow    Principle of Management:  Education:  Posture--use of lumbar roll in sitting, avoidance of fwd head, importance/impact of posture, POC, treatment rationale    Equipment provided:  none  Mechanical therapy (Y/N):  y   Extension principle:  Repeated cervical retraction in sitting x10 reps, every 2 hours, goal 100 reps/day   Lateral principle:    Flexion principle:     Other:      ASSESSMENT/PLAN:    Patient is a 80 year old female with R scapular and UE complaints.  Provisional classification of derangement with directional preference for retraction.  She had decreased R scapular and UE pain after repeated cervical retraction with slight flexion.  She should make good progress with treatment focusing on cervical retraction and posture training to decrease pain and improve function.          Patient has the following significant findings with corresponding treatment plan.                Diagnosis 1:  R scapular pain--cervical  Pain -  self management, education, directional preference exercise and home program  Decreased ROM/flexibility - manual therapy, therapeutic exercise and home program  Decreased function - therapeutic activities and home program  Impaired posture - neuro re-education and home program    Cumulative Therapy Evaluation is: Low complexity.    Previous and current functional limitations:  (See Goal Flow Sheet for this information)    Short term and Long term goals: (See Goal Flow Sheet for this information)     Communication ability:  Patient appears to be able to clearly communicate and understand verbal and written communication and follow directions correctly.  Treatment Explanation - The following has been discussed with the patient:   RX ordered/plan of care  Anticipated outcomes  Possible risks and side effects  This patient would benefit from PT intervention to resume normal activities.   Rehab potential is good.    Frequency:  1  X week, once daily  Duration:  for 4 weeks tapering to 2 X a month over 1 month  Discharge Plan:  Achieve all LTG.  Independent in home treatment program.  Reach maximal therapeutic benefit.    Please refer to the daily flowsheet for treatment today, total treatment time and time spent performing 1:1 timed codes.

## 2020-04-08 ENCOUNTER — TELEPHONE (OUTPATIENT)
Dept: PHYSICAL THERAPY | Facility: CLINIC | Age: 80
End: 2020-04-08

## 2020-04-08 NOTE — TELEPHONE ENCOUNTER
Spoke with patient (2nd attempt after VM on 4/6) for courtesy call. Pt reports she is doing well and is not in need of ongoing PT services through virtual care.

## 2020-04-17 NOTE — PROGRESS NOTES
Subjective:  HPI  Physical Exam                    Objective:  System    Physical Exam    General     ROS    Assessment/Plan:    DISCHARGE REPORT    Progress reporting period is from 03/12/2020 to 03/12/2020.       SUBJECTIVE  Subjective changes noted by patient:  Patient was seen for initial evaluation of her R scapular and UE pain on 03/12/2020.  She was contacted on 04/08/2020 and reported she was doing well and no longer needed treatment.        Current Pain level: Unknown as patient did not return for additional follow-up visits.  Initial Pain level: 7/10.   Changes in function:  Unknown as patient did not return for additional follow-up visits.  Adverse reaction to treatment or activity: Unknown as patient did not return for additional follow-up visits.    OBJECTIVE  Changes noted in objective findings:  Current objective measurements are unavailable as patient did not return for additional follow-up visits.        ASSESSMENT/PLAN  Patient was only seen for 1 visit with treatment focusing on cervical retraction exercises with self-overpressure in addition to posture training to address R scapular and UE symptoms.  She had decreased R scapular and UE pain after repeated cervical retraction with slight flexion at her initial visit.  She reported doing well when contacted on 04/08/2020 and no longer needs treatment.    Updated problem list and treatment plan: Diagnosis 1:    R scapular and UE pain---cervical    No updated problem list or treatment plan as patient did not return for additional visits and is discharged from PT at this time.    STG/LTGs have been met or progress has been made towards goals:  Unknown as patient did not return for additional follow-up visits.  Assessment of Progress: The patient has not returned to therapy. Current status is unknown.  Self Management Plans:  Patient has been instructed in a home treatment program.  Patient  has been instructed in self management of symptoms.  Brielle  continues to require the following intervention to meet STG and LTG's:  PT intervention is no longer required to meet STG/LTG.    Recommendations:  Patient is discharged from PT as she did not return for further follow-up visits.  She was contacted on 04/08/2020 and reported she is doing well and does not need further treatment.      Please refer to the daily flowsheet for treatment today, total treatment time and time spent performing 1:1 timed codes.

## 2020-04-20 DIAGNOSIS — E78.5 HYPERLIPIDEMIA LDL GOAL <130: ICD-10-CM

## 2020-04-20 NOTE — TELEPHONE ENCOUNTER
"Requested Prescriptions   Pending Prescriptions Disp Refills     pravastatin (PRAVACHOL) 40 MG tablet [Pharmacy Med Name: PRAVASTATIN SODIUM 40 MG Tablet]  Last Written Prescription Date:  01/02/2020  Last Fill Quantity: 90,  # refills: 0   Last Office Visit: 3/10/2020   Future Office Visit:      90 tablet 0     Sig: TAKE 1 TABLET (40 MG) BY MOUTH DAILY       Statins Protocol Failed - 4/20/2020  4:18 PM        Failed - LDL on file in past 12 months     Recent Labs   Lab Test 04/03/19  0938   LDL 98             Passed - No abnormal creatine kinase in past 12 months     No lab results found.             Passed - Recent (12 mo) or future (30 days) visit within the authorizing provider's specialty     Patient has had an office visit with the authorizing provider or a provider within the authorizing providers department within the previous 12 mos or has a future within next 30 days. See \"Patient Info\" tab in inbasket, or \"Choose Columns\" in Meds & Orders section of the refill encounter.              Passed - Medication is active on med list        Passed - Patient is age 18 or older        Passed - No active pregnancy on record        Passed - No positive pregnancy test in past 12 months             "

## 2020-04-21 RX ORDER — PRAVASTATIN SODIUM 40 MG
40 TABLET ORAL DAILY
Qty: 90 TABLET | Refills: 0 | Status: SHIPPED | OUTPATIENT
Start: 2020-04-21 | End: 2020-07-13

## 2020-04-21 NOTE — TELEPHONE ENCOUNTER
Routing refill request to provider for review/approval because:  Labs not current:  Lipids

## 2020-04-30 DIAGNOSIS — N32.81 OAB (OVERACTIVE BLADDER): ICD-10-CM

## 2020-04-30 RX ORDER — OXYBUTYNIN CHLORIDE 5 MG/1
5 TABLET ORAL 2 TIMES DAILY PRN
Qty: 60 TABLET | Refills: 0 | Status: SHIPPED | OUTPATIENT
Start: 2020-04-30 | End: 2020-06-29

## 2020-04-30 NOTE — TELEPHONE ENCOUNTER
Reason for Call:  Medication or medication refill:    Do you use a [facility]?  Name of the pharmacy and phone number for the current request:   [facility]    Name of the medication requested:   oxybutynin (DITROPAN) 5 MG tablet     Other request: none      Can we leave a detailed message on this number? YES    Phone number patient can be reached at: Cell number on file:    Telephone Information:   Mobile [phone]       Best Time: any    Call taken on 4/30/2020 at 10:41 AM by Arleen Cormier

## 2020-06-24 DIAGNOSIS — K21.9 GASTROESOPHAGEAL REFLUX DISEASE WITHOUT ESOPHAGITIS: ICD-10-CM

## 2020-06-29 DIAGNOSIS — N32.81 OAB (OVERACTIVE BLADDER): ICD-10-CM

## 2020-06-29 RX ORDER — OXYBUTYNIN CHLORIDE 5 MG/1
5 TABLET ORAL 2 TIMES DAILY PRN
Qty: 180 TABLET | Refills: 0 | Status: SHIPPED | OUTPATIENT
Start: 2020-06-29 | End: 2020-09-08 | Stop reason: ALTCHOICE

## 2020-07-02 ENCOUNTER — OFFICE VISIT (OUTPATIENT)
Dept: URGENT CARE | Facility: URGENT CARE | Age: 80
End: 2020-07-02
Payer: COMMERCIAL

## 2020-07-02 VITALS
RESPIRATION RATE: 16 BRPM | HEART RATE: 65 BPM | DIASTOLIC BLOOD PRESSURE: 70 MMHG | OXYGEN SATURATION: 95 % | BODY MASS INDEX: 28.79 KG/M2 | TEMPERATURE: 98.4 F | WEIGHT: 173 LBS | SYSTOLIC BLOOD PRESSURE: 122 MMHG

## 2020-07-02 DIAGNOSIS — T63.444A BEE STING REACTION, UNDETERMINED INTENT, INITIAL ENCOUNTER: Primary | ICD-10-CM

## 2020-07-02 PROCEDURE — 99213 OFFICE O/P EST LOW 20 MIN: CPT | Performed by: FAMILY MEDICINE

## 2020-07-02 RX ORDER — PREDNISONE 20 MG/1
20 TABLET ORAL DAILY
Qty: 3 TABLET | Refills: 0 | Status: SHIPPED | OUTPATIENT
Start: 2020-07-02 | End: 2020-07-13

## 2020-07-03 NOTE — PATIENT INSTRUCTIONS
If you lose sensation of your finger, if the swelling gets worse, if you are unable to rotate the ring around your finger or if you have increasing pain -- then come in immediately to be re-evaluated      Ice the area 10 minutes at a time every 1-2 hours    Elevate the hand above the heart to help with swelling      Prednisone 20mg a day for 3 days  -- starting tomorrow take your final 2 doses both in the morning time

## 2020-07-03 NOTE — PROGRESS NOTES
Subjective:   Brielle Marvin is a 80 year old female who presents for   Chief Complaint   Patient presents with     Allergic Reaction     pt was working in the gardens at [employer] and got stung or bit by a bug on L ring finger and it is swollen   Was at the [employer] and was watering plants and a bee or wasp stung the finger. She has applied ice to the area. She did take a benadryl an hour ago. Years ago she was stung by a bee but no history of anaphylaxis or throat swelling. She has no difficulty with breathing.   There is little pain at this time. She removed the stinger immediately and was able to get al of it   Usually even without swelling the ring is on pretty tight.     At this time she has no loss of sensation, the ring is still able to move the ring and rotate it around the digit.     Patient Active Problem List    Diagnosis Date Noted     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     Priority: Medium     right total knee replacement at [facility] 07/06/2018     Priority: Medium     Major depression in complete remission (H) 07/06/2018     Priority: Medium     Overweight (BMI 25.0-29.9) 07/06/2018     Priority: Medium     Benign essential hypertension 05/29/2018     Priority: Medium     Gastroesophageal reflux disease without esophagitis 06/17/2015     Priority: Medium     Neck pain 10/03/2013     Priority: Medium     Sprain of neck 09/12/2013     Priority: Medium     Upper back strain 09/12/2013     Priority: Medium     Major depressive disorder, single episode, mild (H) 04/22/2013     Priority: Medium     ACP (advance care planning) 05/22/2012     Priority: Medium     Received outside advance directive.  Patient seen in an appointment with facilitator today to review health care directive.  HCD:Previously signed by patient and notarized by .  scanned into EMR as Advance Directive/Living Will document. View document and details in Code Status History Report.  Please see advance directive for specifics. 10/29/2012  ROLA Rodrigez RN     .Discussed advance care planning with patient; information given to patient to review. Received call from patient and patient and  have appointment with facilitator on 10/29/2012 ROLA Rodrigez RN  10/22/2012     . Received referral from PMD for [facility].   Calling patient today and left message regarding patient's interest in more information re: Advance Care Planning.  Will await call back from patient. ROLA Rodrigez RN  9/24/2012     .Discussed advance care planning with patient; information given to patient to review. 5/22/2012        Muscle weakness (generalized) 05/06/2010     Priority: Medium     Lumbago 08/07/2009     Priority: Medium     Enthesopathy of hip region 08/07/2009     Priority: Medium       Current Outpatient Medications   Medication     ASPIRIN 81 MG OR TABS     ATENOLOL 25 MG PO tablet     BIOTIN PO     CALCIUM 500 + D OR     Cholecalciferol (VITAMIN D) 2000 UNIT tablet     FISH  MG OR CAPS     omeprazole (PRILOSEC) 20 MG DR capsule     oxybutynin (DITROPAN) 5 MG tablet     pravastatin (PRAVACHOL) 40 MG tablet     predniSONE (DELTASONE) 20 MG tablet     VITAMIN B-12 1000 MCG OR TABS     VITAMIN C 500 MG OR TABS     vitamin E 400 UNIT capsule     No current facility-administered medications for this visit.      ROS:  As above per HPI    Objective:   /70   Pulse 65   Temp 98.4  F (36.9  C) (Tympanic)   Resp 16   Wt 78.5 kg (173 lb)   LMP 11/08/1991   SpO2 95%   BMI 28.79 kg/m  , Body mass index is 28.79 kg/m .  Gen:  NAD, well-nourished, sitting in chair comfortably  HEENT: EOMI, sclera anicteric, Head normocephalic, ; nares patent; moist mucous membranes  Neck: trachea midline, no thyromegaly  CV:  Hemodynamically stable  Pulm:  no increased work of breathing , CTAB, no wheezes/rales/rhonchi   Extrem: no cyanosis, edema or clubbing  Skin: 4th left ring finger there is swelling predominately at the IP  joint and below with additional swelling seen on dorsum of hand  Along the ulnar half. She has intact sensation to touch of her hand and digits. Small punctate /erythematous lesion with no obvious retained fragments or pieces.   Psych: Euthymic, linear thoughts, normal rate of speech    No results found for any visits on 07/02/20.        Assessment & Plan:   Brielle Marvin, 80 year old female who presents with:  Bee sting reaction, undetermined intent, initial encounter  As the ring is still able to move around her digit and she has no pain or loss of sensation I gave her the option to continue to observe before making the decision to cut this ring off. If symptoms worsen then she is to return and we can cut this silver band with a cutter. Elevation, intermittent icing were recommended. Discussed typically reserve for prednisone for severe cases but we will proceed with 20mg prednisone for 3 days to see if this may help with the reaction/swelling.   - predniSONE (DELTASONE) 20 MG tablet  Dispense: 3 tablet; Refill: 0      Mick Tran MD   [facility]    The use of Dragon/[employer] dictation services may have been used to construct the content in this note; any grammatical or spelling errors are non-intentional. Please contact the author of this note directly if you are in need of any clarification.

## 2020-07-10 ENCOUNTER — OFFICE VISIT (OUTPATIENT)
Dept: URGENT CARE | Facility: URGENT CARE | Age: 80
End: 2020-07-10
Payer: COMMERCIAL

## 2020-07-10 VITALS
HEART RATE: 61 BPM | DIASTOLIC BLOOD PRESSURE: 68 MMHG | TEMPERATURE: 97.4 F | OXYGEN SATURATION: 98 % | WEIGHT: 162 LBS | SYSTOLIC BLOOD PRESSURE: 116 MMHG | RESPIRATION RATE: 16 BRPM | BODY MASS INDEX: 26.96 KG/M2

## 2020-07-10 DIAGNOSIS — L03.116 CELLULITIS OF LEFT LOWER EXTREMITY: Primary | ICD-10-CM

## 2020-07-10 DIAGNOSIS — M79.89 LEFT LEG SWELLING: ICD-10-CM

## 2020-07-10 LAB
BASOPHILS # BLD AUTO: 0 10E9/L (ref 0–0.2)
BASOPHILS NFR BLD AUTO: 0.3 %
D DIMER PPP FEU-MCNC: 0.4 UG/ML FEU (ref 0–0.5)
DIFFERENTIAL METHOD BLD: NORMAL
EOSINOPHIL # BLD AUTO: 0.2 10E9/L (ref 0–0.7)
EOSINOPHIL NFR BLD AUTO: 3.1 %
ERYTHROCYTE [DISTWIDTH] IN BLOOD BY AUTOMATED COUNT: 12.2 % (ref 10–15)
HCT VFR BLD AUTO: 45.2 % (ref 35–47)
HGB BLD-MCNC: 14.5 G/DL (ref 11.7–15.7)
LYMPHOCYTES # BLD AUTO: 1.9 10E9/L (ref 0.8–5.3)
LYMPHOCYTES NFR BLD AUTO: 24.9 %
MCH RBC QN AUTO: 31.5 PG (ref 26.5–33)
MCHC RBC AUTO-ENTMCNC: 32.1 G/DL (ref 31.5–36.5)
MCV RBC AUTO: 98 FL (ref 78–100)
MONOCYTES # BLD AUTO: 1 10E9/L (ref 0–1.3)
MONOCYTES NFR BLD AUTO: 13.2 %
NEUTROPHILS # BLD AUTO: 4.5 10E9/L (ref 1.6–8.3)
NEUTROPHILS NFR BLD AUTO: 58.5 %
PLATELET # BLD AUTO: 199 10E9/L (ref 150–450)
RBC # BLD AUTO: 4.61 10E12/L (ref 3.8–5.2)
WBC # BLD AUTO: 7.7 10E9/L (ref 4–11)

## 2020-07-10 PROCEDURE — 85025 COMPLETE CBC W/AUTO DIFF WBC: CPT | Performed by: PHYSICIAN ASSISTANT

## 2020-07-10 PROCEDURE — 85379 FIBRIN DEGRADATION QUANT: CPT | Performed by: PHYSICIAN ASSISTANT

## 2020-07-10 PROCEDURE — 36415 COLL VENOUS BLD VENIPUNCTURE: CPT | Performed by: PHYSICIAN ASSISTANT

## 2020-07-10 PROCEDURE — 99213 OFFICE O/P EST LOW 20 MIN: CPT | Performed by: PHYSICIAN ASSISTANT

## 2020-07-10 RX ORDER — CEPHALEXIN 500 MG/1
500 CAPSULE ORAL 3 TIMES DAILY
Qty: 30 CAPSULE | Refills: 0 | Status: SHIPPED | OUTPATIENT
Start: 2020-07-10 | End: 2020-07-20

## 2020-07-10 ASSESSMENT — ENCOUNTER SYMPTOMS
PSYCHIATRIC NEGATIVE: 1
RESPIRATORY NEGATIVE: 1
MUSCULOSKELETAL NEGATIVE: 1
COLOR CHANGE: 1
NEUROLOGICAL NEGATIVE: 1
CARDIOVASCULAR NEGATIVE: 1
CONSTITUTIONAL NEGATIVE: 1
EYES NEGATIVE: 1
WOUND: 0
GASTROINTESTINAL NEGATIVE: 1
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
## 2020-07-10 NOTE — PATIENT INSTRUCTIONS
Patient Education     Cellulitis  Cellulitis is an infection of the deep layers of skin. A break in the skin, such as a cut or scratch, can let bacteria under the skin. If the bacteria get to deep layers of the skin, it can be serious. If not treated, cellulitis can get into the bloodstream and lymph nodes. The infection can then spread throughout the body. This causes serious illness.  Cellulitis causes the affected skin to become red, swollen, warm, and sore. The reddened areas have a visible border. An open sore may leak fluid (pus). You may have a fever, chills, and pain.  Cellulitis is treated with antibiotics taken for 7 to 10 days. An open sore may be cleaned and covered with cool wet gauze. Symptoms should get better 1 to 2 days after treatment is started. Make sure to take all the antibiotics for the full number of days until they are gone. Keep taking the medicine even if your symptoms go away.  Home care  Follow these tips:    Limit the use of the part of your body with cellulitis.     If the infection is on your leg, keep your leg raised while sitting. This will help to reduce swelling.    Take all of the antibiotic medicine exactly as directed until it is gone. Do not miss any doses, especially during the first 7 days. Don t stop taking the medicine when your symptoms get better.    Keep the affected area clean and dry.    Wash your hands with soap and warm water before and after touching your skin. Anyone else who touches your skin should also wash his or her hands. Don't share towels.  Follow-up care  Follow up with your healthcare provider, or as advised. If your infection does not go away on the first antibiotic, your healthcare provider will prescribe a different one.  When to seek medical advice  Call your healthcare provider right away if any of these occur:    Red areas that spread    Swelling or pain that gets worse    Fluid leaking from the skin (pus)    Fever higher of 100.4  F (38.0  C) or  higher after 2 days on antibiotics  Date Last Reviewed: 9/1/2016 2000-2019 [employer], Reissued. [address] [place] [address], [place] [address]. All rights reserved. This information is not intended as a substitute for professional medical care. Always follow your healthcare professional's instructions.

## 2020-07-13 ENCOUNTER — VIRTUAL VISIT (OUTPATIENT)
Dept: INTERNAL MEDICINE | Facility: CLINIC | Age: 80
End: 2020-07-13
Payer: COMMERCIAL

## 2020-07-13 DIAGNOSIS — N32.81 OAB (OVERACTIVE BLADDER): ICD-10-CM

## 2020-07-13 DIAGNOSIS — I10 BENIGN ESSENTIAL HYPERTENSION: ICD-10-CM

## 2020-07-13 DIAGNOSIS — K21.9 GASTROESOPHAGEAL REFLUX DISEASE WITHOUT ESOPHAGITIS: ICD-10-CM

## 2020-07-13 DIAGNOSIS — L03.116 CELLULITIS OF LEFT LOWER EXTREMITY: Primary | ICD-10-CM

## 2020-07-13 DIAGNOSIS — Z12.31 VISIT FOR SCREENING MAMMOGRAM: ICD-10-CM

## 2020-07-13 DIAGNOSIS — E78.5 HYPERLIPIDEMIA LDL GOAL <130: ICD-10-CM

## 2020-07-13 PROCEDURE — 99213 OFFICE O/P EST LOW 20 MIN: CPT | Mod: 95 | Performed by: INTERNAL MEDICINE

## 2020-07-13 RX ORDER — PRAVASTATIN SODIUM 40 MG
40 TABLET ORAL DAILY
Qty: 90 TABLET | Refills: 0 | Status: SHIPPED | OUTPATIENT
Start: 2020-07-13 | End: 2020-07-27
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
## 2020-07-13 NOTE — PATIENT INSTRUCTIONS
"*      *Finish the antibiotics until they are all gone     *  the infection in the lower leg appears to be improving.    *The swelling may persist for a couple weeks even after completing the antibiotics.    *Elevate the left leg for 10 to 15 minutes a few times throughout the day to help reduce any swelling.    *Let us know if the swelling and redness suddenly get much worse    *Monitor sodium intake closely because this may produce more swelling than usual.  Keep on sodium intake less than 2000 mg daily.    *  Continue all other medications at the same doses.  Contact your usual pharmacy if you need refills.     *  Return to see me in 2-3 months for your annual recent exam, return sooner if needed.  Please get fasting labs done at the [facility] or any other [facility] lab 1-2 days before this appointment (schedule a \"lab appointment\").  If you get the labs done at another clinic, make arrangements with them directly.  The orders will be in place.  Eat nothing for at least 8 hours prior to having these labs drawn.  Use F[employer] or Call [phone] to schedule the appointment with me and lab appointment.      "

## 2020-07-25 DIAGNOSIS — E78.5 HYPERLIPIDEMIA LDL GOAL <130: ICD-10-CM

## 2020-07-27 RX ORDER — PRAVASTATIN SODIUM 40 MG
TABLET ORAL
Qty: 90 TABLET | Refills: 0 | Status: SHIPPED | OUTPATIENT
Start: 2020-07-27 | End: 2020-09-08

## 2020-07-27 NOTE — TELEPHONE ENCOUNTER
Pravastatin previously approved sent to mail order.    Prescription approved per [facility] Refill Protocol.      Merlyn ZAPATA, RN, PHN

## 2020-08-03 ENCOUNTER — OFFICE VISIT (OUTPATIENT)
Dept: FAMILY MEDICINE | Facility: CLINIC | Age: 80
End: 2020-08-03
Payer: COMMERCIAL

## 2020-08-03 VITALS
DIASTOLIC BLOOD PRESSURE: 64 MMHG | BODY MASS INDEX: 27.79 KG/M2 | WEIGHT: 167 LBS | OXYGEN SATURATION: 96 % | HEART RATE: 69 BPM | TEMPERATURE: 97.9 F | SYSTOLIC BLOOD PRESSURE: 112 MMHG | RESPIRATION RATE: 16 BRPM

## 2020-08-03 DIAGNOSIS — H61.23 CERUMEN DEBRIS ON TYMPANIC MEMBRANE OF BOTH EARS: Primary | ICD-10-CM

## 2020-08-03 PROBLEM — I82.451: Status: ACTIVE | Noted: 2018-11-03

## 2020-08-03 PROBLEM — M17.11 OSTEOARTHRITIS OF RIGHT KNEE: Status: ACTIVE | Noted: 2019-03-28

## 2020-08-03 PROBLEM — Z79.01 ON CONTINUOUS ORAL ANTICOAGULATION: Status: ACTIVE | Noted: 2018-11-09

## 2020-08-03 PROCEDURE — 99213 OFFICE O/P EST LOW 20 MIN: CPT | Performed by: INTERNAL MEDICINE

## 2020-08-03 RX ORDER — ZINC GLUCONATE 50 MG
50 TABLET ORAL DAILY
COMMUNITY

## 2020-08-03 ASSESSMENT — PATIENT HEALTH QUESTIONNAIRE - PHQ9: SUM OF ALL RESPONSES TO PHQ QUESTIONS 1-9: 0

## 2020-08-03 NOTE — PATIENT INSTRUCTIONS
Removal of Ear wax done in the office today.    ===========================    Patient Education       Earwax, Home Treatment    Everyone produces earwax from the lining of the ear canal. It serves to lubricate and protect the ear. The wax that forms in the canal naturally moves toward the outside of the ear and falls out. Sometimes the ear canal may contain too much wax. This can cause a blockage and loss of hearing. Directions are given below for home treatment.  Home care  If your doctor has advised you to remove a wax blockage yourself, follow these directions:    Unless a medicine was prescribed, you may use an over-the-counter product made for clearing earwax. These contain carbamide peroxide. Lie down with the blocked ear facing upward. Apply one dropper full of medicine and wait a few minutes. Grasp the outer ear and wiggle it to help the solution enter the canal.    Lean over a sink or basin with the blocked ear facing downward. Use a bulb syringe filled with warm (not hot or cold) water to rinse the ear several times. Use gentle pressure only.    If you are having trouble draining the water out of your ear canal, put a few drops of rubbing alcohol (isopropyl alcohol) into the ear canal. This will help remove the remaining water.    Repeat this procedure once a day for up to three days, or until your hearing is back to normal. Do not use this treatment for more than three days in a row.  Don ts    Don t use cold water to rinse the ear. This will make you dizzy.    Don t perform this procedure if you have an ear infection.    Don t perform this procedure if you have a ruptured eardrum.    Don t use cotton swabs, matches, hairpins, keys, or other objects to  clean  the ear canal. This can cause infection of the ear canal or rupture the eardrum. Because of their size and shape, cotton swabs can push earwax deeper into the ear canal instead of removing it.  Follow-up care  Follow up with your health care  provider if you are not improving after three cleaning attempts, or as advised.  When to seek medical advice  Call your health care provider right away if any of these occur:    Worsening ear pain    Fever of 101 F (38.3 C) or higher, or as directed by your health care provider    Hearing does not return to normal after three days of treatment    Fluid drainage or bleeding from the ear canal    Swelling, redness, or tenderness of the outer ear    Headache, neck pain, or stiff neck    4411-8029 [employer]. [address] [place], [place] [id number]. All rights reserved. This information is not intended as a substitute for professional medical care. Always follow your healthcare professional's instructions.

## 2020-08-03 NOTE — PROGRESS NOTES
Subjective     Brielle Marvin is a 80 year old female who presents to clinic today for the following health issues:    HPI       ears      Duration: couple weeks    Description (location/character/radiation): both ears    Intensity:  moderate    Accompanying signs and symptoms: muffled sounds, ears popping    History (similar episodes/previous evaluation): went to have hearing aids checked. Discovered wax in ears    Precipitating or alleviating factors: wears hearing aids    Therapies tried and outcome: has used drops for 5 days         Patient Active Problem List   Diagnosis     Lumbago     Enthesopathy of hip region     Muscle weakness (generalized)     ACP (advance care planning)     Major depressive disorder, single episode, mild (H)     Sprain of neck     Upper back strain     Neck pain     Gastroesophageal reflux disease without esophagitis     Benign essential hypertension     Hypercholesterolemia     Major depression in complete remission (H)     Overweight (BMI 25.0-29.9)     Status post total knee replacement     Past Surgical History:   Procedure Laterality Date     C REYNOSO W/O FACETEC FORAMOT/DSKC 1/2 VRT SEG, LUMBAR  1962    Lami, Lumbar     C TOTAL KNEE ARTHROPLASTY Right 04/12/2019    right TKA at [facility]      COLONOSCOPY THRU STOMA, DIAGNOSTIC  8/31/02     HC CORRECT BUNION,SIMPLE  1981       Social History     Tobacco Use     Smoking status: Never Smoker     Smokeless tobacco: Never Used   Substance Use Topics     Alcohol use: No     Family History   Problem Relation Age of Onset     Cancer Mother         D:84 Lung CA     Cardiovascular Father         D:72 heart Attack     Cancer Sister         D:62, probably colon cancer     Family History Negative Sister         B:1947 alive     Family History Negative Brother         B:1935 Alive     Family History Negative Brother         B:1943 Alive     Family History Negative Brother         B:1939 Alive     C.A.D. Brother         B: 1937  HTN, CAD, had  PTCA           Reviewed and updated as needed this visit by Provider         Review of Systems   Constitutional, HEENT, cardiovascular, pulmonary, GI, , musculoskeletal, neuro, skin, endocrine and psych systems are negative, except as otherwise noted.      Objective    LMP 11/08/1991   There is no height or weight on file to calculate BMI.  Physical Exam   GENERAL: healthy, alert and no distress  ENT Exam : POSITIVE for bilateral Cerumen. Clear TM bilaterally after ear wash, mild erythema due to warm water used for flushing which will resolve , reassured to the patient.  NECK: no adenopathy, no asymmetry, masses, or scars and thyroid normal to palpation  RESP: lungs clear to auscultation - no rales, rhonchi or wheezes  CV: regular rate and rhythm, normal S1 S2, no S3 or S4, no murmur, click or rub, no peripheral edema and peripheral pulses strong  ABDOMEN: soft, nontender, no hepatosplenomegaly, no masses and bowel sounds normal  MS: no gross musculoskeletal defects noted, no edema    Diagnostic Test Results:  Labs reviewed in Epic          Assessment and Plan  1. Cerumen debris on tympanic membrane of both ears  Stable, no further ear drops needed at this time.  - REMOVE IMPACTED CERUMEN     Patient Instructions   Removal of Ear wax done in the office today.    ===========================    Patient Education       Earwax, Home Treatment    Everyone produces earwax from the lining of the ear canal. It serves to lubricate and protect the ear. The wax that forms in the canal naturally moves toward the outside of the ear and falls out. Sometimes the ear canal may contain too much wax. This can cause a blockage and loss of hearing. Directions are given below for home treatment.  Home care  If your doctor has advised you to remove a wax blockage yourself, follow these directions:    Unless a medicine was prescribed, you may use an over-the-counter product made for clearing earwax. These contain carbamide peroxide. Lie  down with the blocked ear facing upward. Apply one dropper full of medicine and wait a few minutes. Grasp the outer ear and wiggle it to help the solution enter the canal.    Lean over a sink or basin with the blocked ear facing downward. Use a bulb syringe filled with warm (not hot or cold) water to rinse the ear several times. Use gentle pressure only.    If you are having trouble draining the water out of your ear canal, put a few drops of rubbing alcohol (isopropyl alcohol) into the ear canal. This will help remove the remaining water.    Repeat this procedure once a day for up to three days, or until your hearing is back to normal. Do not use this treatment for more than three days in a row.  Don ts    Don t use cold water to rinse the ear. This will make you dizzy.    Don t perform this procedure if you have an ear infection.    Don t perform this procedure if you have a ruptured eardrum.    Don t use cotton swabs, matches, hairpins, keys, or other objects to  clean  the ear canal. This can cause infection of the ear canal or rupture the eardrum. Because of their size and shape, cotton swabs can push earwax deeper into the ear canal instead of removing it.  Follow-up care  Follow up with your health care provider if you are not improving after three cleaning attempts, or as advised.  When to seek medical advice  Call your health care provider right away if any of these occur:    Worsening ear pain    Fever of 101 F (38.3 C) or higher, or as directed by your health care provider    Hearing does not return to normal after three days of treatment    Fluid drainage or bleeding from the ear canal    Swelling, redness, or tenderness of the outer ear    Headache, neck pain, or stiff neck    4762-1421 [employer]. [address] [place] [id number]. All rights reserved. This information is not intended as a substitute for professional medical care. Always follow your healthcare professional's  instructions.             Return in about 1 month (around 9/3/2020), or if symptoms worsen or fail to improve, for Annual Wellness Exam with your PCP.    Sloane Ascencio MD  [facility]

## 2020-09-08 ENCOUNTER — OFFICE VISIT (OUTPATIENT)
Dept: INTERNAL MEDICINE | Facility: CLINIC | Age: 80
End: 2020-09-08
Payer: COMMERCIAL

## 2020-09-08 ENCOUNTER — ANCILLARY PROCEDURE (OUTPATIENT)
Dept: MAMMOGRAPHY | Facility: CLINIC | Age: 80
End: 2020-09-08
Attending: INTERNAL MEDICINE
Payer: COMMERCIAL

## 2020-09-08 VITALS
BODY MASS INDEX: 27.26 KG/M2 | TEMPERATURE: 96.5 F | SYSTOLIC BLOOD PRESSURE: 106 MMHG | HEART RATE: 75 BPM | DIASTOLIC BLOOD PRESSURE: 64 MMHG | OXYGEN SATURATION: 96 % | WEIGHT: 163.6 LBS | RESPIRATION RATE: 14 BRPM | HEIGHT: 65 IN

## 2020-09-08 DIAGNOSIS — K21.9 GASTROESOPHAGEAL REFLUX DISEASE WITHOUT ESOPHAGITIS: ICD-10-CM

## 2020-09-08 DIAGNOSIS — Z23 NEED FOR IMMUNIZATION AGAINST INFLUENZA: ICD-10-CM

## 2020-09-08 DIAGNOSIS — E78.5 HYPERLIPIDEMIA LDL GOAL <130: ICD-10-CM

## 2020-09-08 DIAGNOSIS — Z00.00 ENCOUNTER FOR MEDICARE ANNUAL WELLNESS EXAM: Primary | ICD-10-CM

## 2020-09-08 DIAGNOSIS — N32.81 OAB (OVERACTIVE BLADDER): ICD-10-CM

## 2020-09-08 DIAGNOSIS — I10 BENIGN ESSENTIAL HYPERTENSION: ICD-10-CM

## 2020-09-08 DIAGNOSIS — Z12.31 VISIT FOR SCREENING MAMMOGRAM: ICD-10-CM

## 2020-09-08 PROCEDURE — 77063 BREAST TOMOSYNTHESIS BI: CPT | Mod: TC

## 2020-09-08 PROCEDURE — 99213 OFFICE O/P EST LOW 20 MIN: CPT | Mod: 25 | Performed by: INTERNAL MEDICINE

## 2020-09-08 PROCEDURE — 90662 IIV NO PRSV INCREASED AG IM: CPT | Performed by: INTERNAL MEDICINE

## 2020-09-08 PROCEDURE — G0008 ADMIN INFLUENZA VIRUS VAC: HCPCS | Performed by: INTERNAL MEDICINE

## 2020-09-08 PROCEDURE — 99397 PER PM REEVAL EST PAT 65+ YR: CPT | Mod: 25 | Performed by: INTERNAL MEDICINE

## 2020-09-08 PROCEDURE — 77067 SCR MAMMO BI INCL CAD: CPT | Mod: TC

## 2020-09-08 RX ORDER — PRAVASTATIN SODIUM 40 MG
40 TABLET ORAL DAILY
Qty: 90 TABLET | Refills: 3 | Status: SHIPPED | OUTPATIENT
Start: 2020-09-08 | End: 2021-09-01

## 2020-09-08 RX ORDER — ATENOLOL 25 MG/1
25 TABLET ORAL DAILY
Qty: 90 TABLET | Refills: 3 | Status: SHIPPED | OUTPATIENT
Start: 2020-09-08 | End: 2021-06-08

## 2020-09-08 RX ORDER — OXYBUTYNIN CHLORIDE 5 MG/1
5 TABLET, EXTENDED RELEASE ORAL DAILY
Qty: 90 TABLET | Refills: 3 | Status: SHIPPED | OUTPATIENT
Start: 2020-09-08 | End: 2021-06-02 | Stop reason: DRUGHIGH

## 2020-09-08 ASSESSMENT — ACTIVITIES OF DAILY LIVING (ADL): CURRENT_FUNCTION: NO ASSISTANCE NEEDED

## 2020-09-08 ASSESSMENT — MIFFLIN-ST. JEOR: SCORE: 1217.96
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
## 2020-09-08 NOTE — PATIENT INSTRUCTIONS
"*  Change the Oxybutynin to a 5 mg once per day extended release version to give more    *  OK to stop the Omeprazole to see if you truly still need it     *  Conitnue to employ anti-reflux measures (watch meal size, watch the times of your meals) because     *  Return for a \"lab only appointment\" in the next 1-2 weeks to recheck fasting labs.  Please get these labs done in a fasting state with nothing to eat for at least 8 hours prior to getting labs drawn.  You can make this \"lab only appointment\" at any [facility] lab site, contact that clinic site directly to arrange this.  I will make contact either via phone or GelSight regarding the results and any recommendations once I have reviewed the lab results.     *  Return to see me in 1 year, sooner if needed.  Please get fasting labs done at the [facility] or any other [facility] lab 1-2 days before this appointment (schedule a \"lab appointment\" for this).  If you get the labs done at another [facility], make arrangements with them directly.  The orders will be in place.  Eat nothing for at least 8 hours prior to having these labs drawn.  Use GelSight or Call [phone] to schedule the appointment with me and lab appointment.         5 GOALS TO PREVENT VASCULAR DISEASE:     1.  Aggressive blood pressure control, under 130/80 ideally.  Using medications if needed.    Your blood pressure is under good control  BP Readings from Last 4 Encounters:   09/08/20 106/64   08/03/20 112/64   07/10/20 116/68   07/02/20 122/70       2.  Aggressive LDL cholesterol (\"bad cholesterol\") lowering as indicated.    Your goal is an LDL under 130 for sure, preferably under 100.  (If you have diabetes or previous vascular disease, the the LDL goals would be under 100 for sure, preferably under 70.)    New guidelines identify four high-risk groups who could benefit from statins:   *people with pre-existing heart disease, such as those who have had a " heart attack;   *people ages 40 to 75 who have diabetes of any type  *patients ages 40 to 75 with at least a 7.5% risk of developing cardiovascular disease over the next decade, according to a formula described in the guidelines  *patients with the sort of super-high cholesterol that sometimes runs in families, as evidenced by an LDL of 190 milligrams per deciliter or higher    Your cholesterol levels are well controlled.    Recent Labs   Lab Test 04/03/19  0938 05/16/18  1039  05/22/14  1013 06/03/13  0927   CHOL 170 157   < > 171 161   HDL 55 58   < > 54 51   LDL 98 84   < > 101 91   TRIG 84 73   < > 78 96   CHOLHDLRATIO  --   --   --  3.1 3.1    < > = values in this interval not displayed.       3.  Aggressive diabetic prevention, screening and/or management.      You do not have diabetes as of the most recent blood tests.     4.  No smoking    5.  Consider daily preventative aspirin over age 50 if you have enough cardiac risk factors to place you at higher risk for the presence of vascular disease.    If you have any reason not to take aspirin such easy bruising or bleeding, stomach problems, other anticoagulant medications, or any other side effects, then you should not take Aspirin.      --Based on your relative lack of current cardiac risk factor profile, you do NOT need to take daily aspirin.     Preventive Health Recommendations  Female Ages 75+    Yearly exam: See your health care provider every year in order to  o Review health changes.   o Discuss preventive care.    o Review your medicines if your doctor has prescribed any.      Get a Pap test every three years (unless you have an abnormal result and your provider advises testing more often).    No more PAP smear needed.      You do not need a Pap test if your uterus was removed (hysterectomy) and you have not had cancer.    You should be tested each year for STDs (sexually transmitted diseases) if you're at risk.     Routine screening mammogram no  "longer indicated.    No routine screening colonoscopy indiacted     Have a cholesterol test every 5 years, or more often if advised.    Have a diabetes test (fasting glucose) every three years. If you are at risk for diabetes, you should have this test more often.     If you are at risk for osteoporosis (brittle bone disease), think about having a bone density scan (DEXA).    Shots:   *  Get a flu shot each year.   *  Get a tetanus shot every 10 years.    *  Pneumonia vaccine up to date.      Nutrition:     Eat at least 5 servings of fruits and vegetables each day.    Eat whole-grain bread, whole-wheat pasta and brown rice instead of white grains and rice.    Talk to your provider about Calcium and Vitamin D.    --Calcium: aim for 1200 mg per day (any brand is fine)   --Vitamin D3 at least 1000 units per day (any brand is fine)       --Good Grains:  Oats, brown rice, Quinoa (these do not raise the blood sugar as much)     --Bad grains:  Anything made from wheat or white rice     (because these raise the blood sugars significantly, and the possible gluten issue from wheat for some people).      --Proteins:  Aim for \"lean proteins\" including chicken, fish, seafood, pork, turkey, and eggs (in moderation); Eat red meat only occasionally        Lifestyle    Exercise at least 150 minutes a week (30 minutes a day, 5 days a week). This will help you control your weight and prevent disease.    Limit alcohol to one drink per day.    No smoking.     Wear sunscreen to prevent skin cancer.     See your dentist every six months for an exam and cleaning.    See your eye doctor every 1 to 2 years.            Patient Education   Personalized Prevention Plan  You are due for the preventive services outlined below.  Your care team is available to assist you in scheduling these services.  If you have already completed any of these items, please share that information with your care team to update in your medical record.  Health " Maintenance Due   Topic Date Due     Liver Monitoring Lab  04/03/2020     Cholesterol Lab  04/03/2020     Annual Wellness Visit  07/10/2020     FALL RISK ASSESSMENT  07/10/2020     Flu Vaccine (1) 09/01/2020

## 2020-09-23 ENCOUNTER — DOCUMENTATION ONLY (OUTPATIENT)
Dept: LAB | Facility: CLINIC | Age: 80
End: 2020-09-23

## 2020-09-28 DIAGNOSIS — E78.5 HYPERLIPIDEMIA LDL GOAL <130: ICD-10-CM

## 2020-09-28 DIAGNOSIS — I10 BENIGN ESSENTIAL HYPERTENSION: ICD-10-CM

## 2020-09-28 LAB
ALT SERPL W P-5'-P-CCNC: 25 U/L (ref 0–50)
ANION GAP SERPL CALCULATED.3IONS-SCNC: 1 MMOL/L (ref 3–14)
AST SERPL W P-5'-P-CCNC: 19 U/L (ref 0–45)
BUN SERPL-MCNC: 20 MG/DL (ref 7–30)
CALCIUM SERPL-MCNC: 8.9 MG/DL (ref 8.5–10.1)
CHLORIDE SERPL-SCNC: 107 MMOL/L (ref 94–109)
CHOLEST SERPL-MCNC: 146 MG/DL
CO2 SERPL-SCNC: 30 MMOL/L (ref 20–32)
CREAT SERPL-MCNC: 0.73 MG/DL (ref 0.52–1.04)
ERYTHROCYTE [DISTWIDTH] IN BLOOD BY AUTOMATED COUNT: 12.4 % (ref 10–15)
GFR SERPL CREATININE-BSD FRML MDRD: 78 ML/MIN/{1.73_M2}
GLUCOSE SERPL-MCNC: 115 MG/DL (ref 70–99)
HCT VFR BLD AUTO: 41.8 % (ref 35–47)
HDLC SERPL-MCNC: 56 MG/DL
HGB BLD-MCNC: 13.4 G/DL (ref 11.7–15.7)
LDLC SERPL CALC-MCNC: 80 MG/DL
MCH RBC QN AUTO: 31.1 PG (ref 26.5–33)
MCHC RBC AUTO-ENTMCNC: 32.1 G/DL (ref 31.5–36.5)
MCV RBC AUTO: 97 FL (ref 78–100)
NONHDLC SERPL-MCNC: 90 MG/DL
PLATELET # BLD AUTO: 182 10E9/L (ref 150–450)
POTASSIUM SERPL-SCNC: 4.3 MMOL/L (ref 3.4–5.3)
RBC # BLD AUTO: 4.31 10E12/L (ref 3.8–5.2)
SODIUM SERPL-SCNC: 138 MMOL/L (ref 133–144)
TRIGL SERPL-MCNC: 49 MG/DL
WBC # BLD AUTO: 6.6 10E9/L (ref 4–11)

## 2020-09-28 PROCEDURE — 85027 COMPLETE CBC AUTOMATED: CPT | Performed by: INTERNAL MEDICINE

## 2020-09-28 PROCEDURE — 80048 BASIC METABOLIC PNL TOTAL CA: CPT | Performed by: INTERNAL MEDICINE

## 2020-09-28 PROCEDURE — 84460 ALANINE AMINO (ALT) (SGPT): CPT | Performed by: INTERNAL MEDICINE

## 2020-09-28 PROCEDURE — 36415 COLL VENOUS BLD VENIPUNCTURE: CPT | Performed by: INTERNAL MEDICINE

## 2020-09-28 PROCEDURE — 80061 LIPID PANEL: CPT | Performed by: INTERNAL MEDICINE

## 2020-09-28 PROCEDURE — 84450 TRANSFERASE (AST) (SGOT): CPT | Performed by: INTERNAL MEDICINE

## 2020-12-02 ENCOUNTER — TELEPHONE (OUTPATIENT)
Dept: INTERNAL MEDICINE | Facility: CLINIC | Age: 80
End: 2020-12-02

## 2020-12-02 DIAGNOSIS — N32.81 OAB (OVERACTIVE BLADDER): ICD-10-CM

## 2020-12-02 DIAGNOSIS — N30.00 ACUTE CYSTITIS WITHOUT HEMATURIA: ICD-10-CM

## 2020-12-02 DIAGNOSIS — R35.0 URINARY FREQUENCY: Primary | ICD-10-CM

## 2020-12-02 RX ORDER — OXYBUTYNIN CHLORIDE 5 MG/1
5 TABLET, EXTENDED RELEASE ORAL 2 TIMES DAILY
Qty: 180 TABLET | Refills: 1 | Status: CANCELLED
Start: 2020-12-02

## 2020-12-02 NOTE — TELEPHONE ENCOUNTER
Come to our lab and have a urinalysis drawn and then go home, I will contact her about the results.    She should take a higher dose of oxybutynin, either take a single 5 mg tablet twice per day, or take 2 x 5mg tablets once per day, whichever is easier for her.    Use her remaining pills to take this higher dose, I do not want to send a new prescription until we are sure that this medicine is helping.    Make a virtual follow up appointment in 3-5 weeks to follow up on this issue.      Close encounter when done.

## 2020-12-02 NOTE — TELEPHONE ENCOUNTER
Spoke with pt she will come in and do the UA tomorrow and understands medication dosage increase and follow up needed .Daphne Calderón RN

## 2020-12-02 NOTE — TELEPHONE ENCOUNTER
Attempted to call patient, voicemail is full and unable to leave a message. Will try back again later today or tomorrow morning.     HIEU Ngo

## 2020-12-02 NOTE — TELEPHONE ENCOUNTER
Patient called back, provider's message given. She has both 24 hr tablet oxybutynin and regular 5 mg at home. Should she just take 2 of the 5 mg 24 hr tablets? He symptoms have worsened in the last week or so. UA pended, provider to approve.

## 2020-12-02 NOTE — TELEPHONE ENCOUNTER
She is currently taking the lowest dose of Oxybutynin available.     Increase the Oxybutynin to 5 mg twice per day, or 10 mg (2 x 5 mg) once per day.    Use her current tablets.    If the urinary symptoms only in the last couple of weeks, then she needs a UA to make sure there is no infection present.     Her diet is not likely causing any her urinary problems.    Trial of Desitin or A+D ointment on the skin around the vaginal area as a barrier treatment.     Make a virtual follow up appointment in 3-5 weeks to follow up on this issue.

## 2020-12-02 NOTE — TELEPHONE ENCOUNTER
Reason for Call:  Other appointment and prescription    Detailed comments: patient states that the extended release Oxybutynin is not working well.  She has been urinating frequently and now her vaginal area is very sore.  She is wondering if she can get something for that.  She also is wondering if her diet is affecting this issue.  She is asking to talk to someone in your team.      Phone Number Patient can be reached at: Home number on file [phone] (home)    Best Time: any    Can we leave a detailed message on this number? YES    Call taken on 12/2/2020 at 11:24 AM by Armand Patel

## 2020-12-03 DIAGNOSIS — R35.0 URINARY FREQUENCY: ICD-10-CM

## 2020-12-03 DIAGNOSIS — R82.90 NONSPECIFIC FINDING ON EXAMINATION OF URINE: Primary | ICD-10-CM

## 2020-12-03 LAB
ALBUMIN UR-MCNC: 100 MG/DL
APPEARANCE UR: ABNORMAL
BACTERIA #/AREA URNS HPF: ABNORMAL /HPF
BILIRUB UR QL STRIP: NEGATIVE
COLOR UR AUTO: YELLOW
GLUCOSE UR STRIP-MCNC: NEGATIVE MG/DL
HGB UR QL STRIP: ABNORMAL
KETONES UR STRIP-MCNC: NEGATIVE MG/DL
LEUKOCYTE ESTERASE UR QL STRIP: ABNORMAL
NITRATE UR QL: POSITIVE
PH UR STRIP: 6 PH (ref 5–7)
RBC #/AREA URNS AUTO: ABNORMAL /HPF
SOURCE: ABNORMAL
SP GR UR STRIP: 1.02 (ref 1–1.03)
UROBILINOGEN UR STRIP-ACNC: 0.2 EU/DL (ref 0.2–1)
WBC #/AREA URNS AUTO: >100 /HPF

## 2020-12-03 PROCEDURE — 87186 SC STD MICRODIL/AGAR DIL: CPT | Performed by: INTERNAL MEDICINE

## 2020-12-03 PROCEDURE — 87086 URINE CULTURE/COLONY COUNT: CPT | Performed by: INTERNAL MEDICINE

## 2020-12-03 PROCEDURE — 81001 URINALYSIS AUTO W/SCOPE: CPT | Performed by: INTERNAL MEDICINE

## 2020-12-03 PROCEDURE — 87088 URINE BACTERIA CULTURE: CPT | Performed by: INTERNAL MEDICINE

## 2020-12-03 RX ORDER — SULFAMETHOXAZOLE/TRIMETHOPRIM 800-160 MG
1 TABLET ORAL 2 TIMES DAILY
Qty: 14 TABLET | Refills: 0 | Status: SHIPPED | OUTPATIENT
Start: 2020-12-03 | End: 2020-12-10

## 2020-12-04 NOTE — TELEPHONE ENCOUNTER
Informed patient of message below. She verbalized an understanding and agrees with plan.    Dalila Santizo RASHAD

## 2020-12-04 NOTE — TELEPHONE ENCOUNTER
Called patient, mailbox is full and unable to leave voicemail. Will try calling again later today.    HIEU Ngo

## 2020-12-04 NOTE — TELEPHONE ENCOUNTER
Please call the patient.     (I also sent a result note, but I am not sure how often she checks her [facility] messages and want to make sure that she gets this message)    Her urine test from today shows a urinary tract infection.  This would explain her recent urinary frequency.   I ordered an antibiotic (Bactrim) to [facility] that she should take twice daily for the next 7 days.  I will review the urine culture results when they are available in a couple of days and make any changes necessary depending on the culture and sensitivity report.   This antibiotic would take care of any relatively recent symptoms.   If you continue to have urinary incontinence despite treatment of this bladder infection, then schedule a virtual appointment with me.

## 2020-12-05 LAB
BACTERIA SPEC CULT: ABNORMAL
Lab: ABNORMAL
SPECIMEN SOURCE: ABNORMAL

## 2020-12-11 ENCOUNTER — NURSE TRIAGE (OUTPATIENT)
Dept: NURSING | Facility: CLINIC | Age: 80
End: 2020-12-11

## 2020-12-11 NOTE — TELEPHONE ENCOUNTER
Called to find out if she can take her regular oxybutynin while waits for her time release oxybutynin..  Monica Castillo RN    Additional Information    Negative: Drug overdose and triager unable to answer question    Negative: Caller requesting information unrelated to medicine    Negative: Caller requesting a prescription for Strep throat and has a positive culture result    Negative: Rash while taking a medication or within 3 days of stopping it    Negative: Immunization reaction suspected    Negative: Asthma and having symptoms of asthma (cough, wheezing, etc.)    Negative: Breastfeeding questions about mother's medicines and diet    Negative: MORE THAN A DOUBLE DOSE of a prescription or over-the-counter (OTC) drug    Negative: DOUBLE DOSE (an extra dose or lesser amount) of over-the-counter (OTC) drug and any symptoms (e.g., dizziness, nausea, pain, sleepiness)    Negative: DOUBLE DOSE (an extra dose or lesser amount) of prescription drug and any symptoms (e.g., dizziness, nausea, pain, sleepiness)    Negative: Took another person's prescription drug    Negative: DOUBLE DOSE (an extra dose or lesser amount) of prescription drug and NO symptoms (Exception: a double dose of antibiotics)    Negative: Diabetes drug error or overdose (e.g., took wrong type of insulin or took extra dose)    Negative: Caller has medication question about med not prescribed by PCP and triager unable to answer question (e.g., compatibility with other med, storage)    Negative: Request for URGENT new prescription or refill of 'essential' medication (i.e., likelihood of harm to patient if not taken) and triager unable to fill per department policy    Negative: Prescription not at pharmacy and was prescribed today by PCP    Negative: Pharmacy calling with prescription questions and triager unable to answer question    Negative: Caller has urgent medication question about med that PCP prescribed and triager unable to answer question    Caller  has NON-URGENT medication question about med that PCP prescribed and triager unable to answer question     oxybutrin    Protocols used: MEDICATION QUESTION CALL-A-OH

## 2020-12-11 NOTE — TELEPHONE ENCOUNTER
Yes, either take the regular oxybutynin twice per day or take the long acting extended release once per day.    Close encounter when done.         Caller was called back.  Monica Castillo RN

## 2021-01-12 ENCOUNTER — OFFICE VISIT (OUTPATIENT)
Dept: URGENT CARE | Facility: URGENT CARE | Age: 81
End: 2021-01-12
Payer: COMMERCIAL

## 2021-01-12 ENCOUNTER — NURSE TRIAGE (OUTPATIENT)
Dept: NURSING | Facility: CLINIC | Age: 81
End: 2021-01-12

## 2021-01-12 ENCOUNTER — NURSE TRIAGE (OUTPATIENT)
Dept: INTERNAL MEDICINE | Facility: CLINIC | Age: 81
End: 2021-01-12

## 2021-01-12 VITALS
OXYGEN SATURATION: 97 % | DIASTOLIC BLOOD PRESSURE: 80 MMHG | TEMPERATURE: 97.6 F | BODY MASS INDEX: 27.82 KG/M2 | HEART RATE: 66 BPM | WEIGHT: 167.2 LBS | SYSTOLIC BLOOD PRESSURE: 112 MMHG

## 2021-01-12 DIAGNOSIS — L03.116 CELLULITIS OF LEFT LOWER EXTREMITY: Primary | ICD-10-CM

## 2021-01-12 PROCEDURE — 99213 OFFICE O/P EST LOW 20 MIN: CPT | Performed by: FAMILY MEDICINE

## 2021-01-12 RX ORDER — CEPHALEXIN 500 MG/1
500 CAPSULE ORAL 3 TIMES DAILY
Qty: 30 CAPSULE | Refills: 0 | Status: SHIPPED | OUTPATIENT
Start: 2021-01-12 | End: 2021-01-22
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
## 2021-01-12 NOTE — TELEPHONE ENCOUNTER
Pt will Urgent care .Daphne Calderón RN      Additional Information    Negative: Sounds like a life-threatening emergency to the triager    Negative: Chest pain    Negative: Small area of swelling and followed an insect bite to the area    Negative: Followed a knee injury    Negative: Ankle or foot injury    Negative: Pregnant with leg swelling or edema    Negative: Difficulty breathing at rest    Negative: Entire foot is cool or blue in comparison to other side    Thigh, calf, or ankle swelling in only one leg    Negative: Thigh or calf pain and only 1 side and present > 1 hour    Negative: SEVERE swelling (e.g., swelling extends above knee, entire leg is swollen, weeping fluid)    Negative: Patient sounds very sick or weak to the triager    Negative: Fever and red area (or area very tender to touch)    Negative: Can't walk or can barely stand (new onset)    Negative: Cast on leg or ankle and has increasing pain    Negative: Thigh, calf, or ankle swelling in both legs, but one side is definitely more swollen    Negative: Swelling of face, arm or hands (Exception: slight puffiness of fingers during hot weather)    Negative: Pregnant > 20 weeks and sudden weight gain (i.e., > 2 lbs, 1 kg in one week)    Negative: MODERATE swelling of both ankles (e.g., swelling extends up to the knees) AND new onset or worsening    Negative: Difficulty breathing with exertion AND worsening or new onset    Negative: Looks like a boil, infected sore, deep ulcer, or other infected rash (spreading redness, pus)    Patient wants to be seen    Negative: MILD swelling of both ankles (i.e., pedal edema) AND new onset or worsening    Negative: MILD swelling of both ankles (i.e., pedal edema) and caused by hot weather    Negative: Mild swelling of both ankles and varicose veins    Negative: Mild swelling of both ankles and chronic (unchanged)    Negative: Small area of localized swelling and itchy    Answer Assessment - Initial  "Assessment Questions  1. ONSET: \"When did the swelling start?\" (e.g., minutes, hours, days)      today  2. LOCATION: \"What part of the leg is swollen?\"  \"Are both legs swollen or just one leg?\"      Lower and one   3. SEVERITY: \"How bad is the swelling?\" (e.g., localized; mild, moderate, severe)   - Localized - small area of swelling localized to one leg   - MILD pedal edema - swelling limited to foot and ankle, pitting edema < 1/4 inch (6 mm) deep, rest and elevation eliminate most or all swelling   - MODERATE edema - swelling of lower leg to knee, pitting edema > 1/4 inch (6 mm) deep, rest and elevation only partially reduce swelling   - SEVERE edema - swelling extends above knee, facial or hand swelling present       mild  4. REDNESS: \"Does the swelling look red or infected?\"      yes  5. PAIN: \"Is the swelling painful to touch?\" If so, ask: \"How painful is it?\"   (Scale 1-10; mild, moderate or severe)      no  6. FEVER: \"Do you have a fever?\" If so, ask: \"What is it, how was it measured, and when did it start?\"       no  7. CAUSE: \"What do you think is causing the leg swelling?\"      cellulitis  8. MEDICAL HISTORY: \"Do you have a history of heart failure, kidney disease, liver failure, or cancer?\"      no  9. RECURRENT SYMPTOM: \"Have you had leg swelling before?\" If so, ask: \"When was the last time?\" \"What happened that time?\"      july  10. OTHER SYMPTOMS: \"Do you have any other symptoms?\" (e.g., chest pain, difficulty breathing)        no  11. PREGNANCY: \"Is there any chance you are pregnant?\" \"When was your last menstrual period?\"        no    Protocols used: LEG SWELLING AND EDEMA-A-OH      "

## 2021-01-13 ENCOUNTER — NURSE TRIAGE (OUTPATIENT)
Dept: NURSING | Facility: CLINIC | Age: 81
End: 2021-01-13

## 2021-01-13 ENCOUNTER — VIRTUAL VISIT (OUTPATIENT)
Dept: URGENT CARE | Facility: CLINIC | Age: 81
End: 2021-01-13
Payer: COMMERCIAL

## 2021-01-13 DIAGNOSIS — R53.81 MALAISE: Primary | ICD-10-CM

## 2021-01-13 PROCEDURE — 99213 OFFICE O/P EST LOW 20 MIN: CPT | Mod: 95 | Performed by: EMERGENCY MEDICINE

## 2021-01-13 NOTE — TELEPHONE ENCOUNTER
In today for cellulitis of left leg.  Has prescription for antibiotics and took first dose.  She thinks the redness might be spreading.  Advised that she should give at least 24 ours for the medication to kick in, and could rayray the edges to assist with  Assessment.      Guidelines recommend that patient call her PCP within 24 hours, and patient agreed.    Yara Agee RN  [facility]        Reason for Disposition    [1] Taking antibiotic < 24 hours AND [2] cellulitis symptoms are WORSE (e.g., spreading redness, pain, swelling, drainage) AND [3] no fever    Additional Information    Negative: Shock suspected (e.g., cold/pale/clammy skin, too weak to stand, low BP, rapid pulse)    Negative: Sounds like a life-threatening emergency to the triager    Negative: SEVERE pain    Negative: [1] SEVERE pain with bending of finger (or toe) AND [2] cellulitis on hand (or foot)    Negative: Fever > 104 F (40 C)    Negative: [1] Widespread rash AND [2] bright red, sunburn-like    Negative: Black (necrotic), dark purple, or blisters develop in area of cellulitis    Negative: Patient sounds very sick or weak to the triager    Negative: [1] Taking antibiotic > 24 hours AND [2] red streak (or line) runs from area of infection    Negative: [1] Taking antibiotic > 24 hours AND [2] fever > 100.5 F (38.1 C)    Negative: [1] Fever > 100.0 F (37.8 C) AND [2] new onset    Negative: [1] Red streak runs from area of infection AND [2] new onset    Negative: [1] Caller has URGENT question AND [2] triager unable to answer question    Negative: [1] Taking antibiotic > 24 hours AND [2] cellulitis symptoms are WORSE (e.g., spreading redness, pain, swelling)    Negative: [1] Finished taking antibiotic AND [2] cellulitis symptoms are WORSE (e.g., redness, pain  drainage, swelling)    Negative: [1] Red streak (or line) runs from area of infection AND [2] longer than 4 inches (10 cm)    Protocols used: CELLULITIS ON ANTIBIOTIC FOLLOW-UP  CALL-A-AH

## 2021-01-13 NOTE — PROGRESS NOTES
HPI: Patient is a 80-year-old female who was seen yesterday for cellulitis of her left lower extremity.  She was started on cephalexin 500 3 times daily and has been taking all doses.  Last night she had chills during the night.  Did not have a thermometer to check her temperature.  Upon awakening this morning she laid back down and slept for approximately 6 hours and now has awakened feeling better.  No further chills.  She has had marked fatigue and wondering about Covid.  She has no Covid symptoms such as cough, fever, shortness of breath, or diarrhea.  She feels as if the leg infection has not spread although may look a little bit deeper in terms of its reddish color.      ROS: See HPI otherwise normal.    Allergies   Allergen Reactions     Codeine Nausea and Vomiting     vomiting     Codeine Nausea and Vomiting     Morphine Nausea and Vomiting     nausea     Vicodin [Hydrocodone-Acetaminophen] Nausea and Vomiting      Current Outpatient Medications   Medication Sig Dispense Refill     ASPIRIN 81 MG OR TABS ONE DAILY       atenolol (TENORMIN) 25 MG tablet Take 1 tablet (25 mg) by mouth daily 90 tablet 3     BIOTIN PO        CALCIUM 500 + D OR None Entered       cephALEXin (KEFLEX) 500 MG capsule Take 1 capsule (500 mg) by mouth 3 times daily for 10 days 30 capsule 0     Cholecalciferol (VITAMIN D) 2000 UNIT tablet Take 2,000 Units by mouth daily.       FISH  MG OR CAPS 1 tablet daily  0     omeprazole (PRILOSEC) 20 MG DR capsule TAKE 1 CAPSULE (20 MG) BY MOUTH DAILY (Patient not taking: Reported on 1/12/2021) 90 capsule 2     oxybutynin ER (DITROPAN-XL) 5 MG 24 hr tablet Take 1 tablet (5 mg) by mouth daily 90 tablet 3     pravastatin (PRAVACHOL) 40 MG tablet Take 1 tablet (40 mg) by mouth daily 90 tablet 3     VITAMIN B-12 1000 MCG OR TABS 1 tablet daily       VITAMIN C 500 MG OR TABS 1 tablet daily 60 0     vitamin E 400 UNIT capsule Take 1 capsule (400 Units) by mouth daily       zinc gluconate 50 MG  tablet Take 50 mg by mouth daily           PE: No acute distress and nondyspneic sounding on the phone.        TREATMENT: None.      ASSESSMENT: Chills and fatigue which may be related to her cellulitis but cannot exclude Covid infection.  Testing is indicated      DIAGNOSIS:.  Chills.  Malaise.  Left leg cellulitis.      PLAN: Covid PCR ordered.  Testing team to follow.  Patient is return to urgent care tomorrow she feels like the leg is getting worse or she feels more ill.    Time: 10 minutes

## 2021-01-13 NOTE — TELEPHONE ENCOUNTER
"Pt diagnosed with LLE cellulitis yesterdy and last night \"felt cold and shivers and today didn't have usual energy\" and slept most of the day. Pt reports she continues to feel chilled even after putting on a heavy sweatshirt. Has eaten two waffles and a glass of milk today. Pt reports she did rayray red area as instructed by triage yesterday (see triage call 20) and erythema \"not getting bigger but redder\". Pt reports she is taking Keflex three times daily and has not missed any doses. Pt denies any other acute symptoms. Pt reports she is able to walk around normally. Pt reports she has misplaced her thermometer so is not sure but thinks she might be feverish however right now just feeling chilled. Pt reports concern that she may have Covid 19 and wonders if she should be tested.    Advised pt to schedule urgent telephone visit with provider now, find thermometer and take temperature if possible so temperature is known at time of telephone visit and call back if any worsening prior to telephone visit.     Pt verbalizes understanding and agrees to plan.     Reason for Disposition    [1] Caller has URGENT question AND [2] triager unable to answer question    Additional Information    Negative: Severe difficulty breathing (e.g., struggling for each breath, speaks in single words)    Negative: Sounds like a life-threatening emergency to the triager    [1] Cellulitis AND [2] taking an antibiotic    Negative: Shock suspected (e.g., cold/pale/clammy skin, too weak to stand, low BP, rapid pulse)    Negative: Sounds like a life-threatening emergency to the triager    Negative:  surgical wound infection suspected (post-op)    Negative: Surgical wound infection suspected (post-op)    Negative: [1] Widespread rash AND [2] drug rash suspected (i.e., allergic reaction to antibiotic)    Negative: Animal bite wound infection suspected    Negative: SEVERE pain    Negative: [1] SEVERE pain with bending of finger (or toe) " AND [2] cellulitis on hand (or foot)    Negative: Fever > 104 F (40 C)    Negative: [1] Widespread rash AND [2] bright red, sunburn-like    Negative: Black (necrotic), dark purple, or blisters develop in area of cellulitis    Negative: Patient sounds very sick or weak to the triager    Protocols used: CELLULITIS ON ANTIBIOTIC FOLLOW-UP CALL-A-AH, INFECTION ON ANTIBIOTIC FOLLOW-UP CALL-A-AH

## 2021-01-14 DIAGNOSIS — R53.81 MALAISE: ICD-10-CM

## 2021-01-14 LAB
LABORATORY COMMENT REPORT: NORMAL
SARS-COV-2 RNA RESP QL NAA+PROBE: NEGATIVE
SARS-COV-2 RNA RESP QL NAA+PROBE: NORMAL
SPECIMEN SOURCE: NORMAL
SPECIMEN SOURCE: NORMAL

## 2021-01-14 PROCEDURE — U0005 INFEC AGEN DETEC AMPLI PROBE: HCPCS | Performed by: EMERGENCY MEDICINE

## 2021-01-14 PROCEDURE — U0003 INFECTIOUS AGENT DETECTION BY NUCLEIC ACID (DNA OR RNA); SEVERE ACUTE RESPIRATORY SYNDROME CORONAVIRUS 2 (SARS-COV-2) (CORONAVIRUS DISEASE [COVID-19]), AMPLIFIED PROBE TECHNIQUE, MAKING USE OF HIGH THROUGHPUT TECHNOLOGIES AS DESCRIBED BY CMS-2020-01-R: HCPCS | Performed by: EMERGENCY MEDICINE

## 2021-01-21 ENCOUNTER — TELEPHONE (OUTPATIENT)
Dept: INTERNAL MEDICINE | Facility: CLINIC | Age: 81
End: 2021-01-21

## 2021-01-21 NOTE — TELEPHONE ENCOUNTER
Friend was exposed on Sunday. She saw the friend on Monday without a mask. Patient is asking if she was exposed to Covid. Friend had no symptoms and is still feeling fine.   Advised that patient to continue wearing mask, social distance and monitor for symptoms. Advised she does not need to quarantine at this time, but her friend does.   Encouraged mask and social distancing to prevent exposure.   Patient verbalizes understanding.  Emperatriz Barkley RN

## 2021-02-15 ENCOUNTER — IMMUNIZATION (OUTPATIENT)
Dept: NURSING | Facility: CLINIC | Age: 81
End: 2021-02-15
Payer: COMMERCIAL

## 2021-02-15 PROCEDURE — 91300 PR COVID VAC PFIZER DIL RECON 30 MCG/0.3 ML IM: CPT

## 2021-02-15 PROCEDURE — 0001A PR COVID VAC PFIZER DIL RECON 30 MCG/0.3 ML IM: CPT

## 2021-02-17 ENCOUNTER — NURSE TRIAGE (OUTPATIENT)
Dept: INTERNAL MEDICINE | Facility: CLINIC | Age: 81
End: 2021-02-17

## 2021-02-17 NOTE — TELEPHONE ENCOUNTER
Pt calling with dizziness did inform if becomes severe to go to the ER .     Additional Information    Negative: Shock suspected (e.g., cold/pale/clammy skin, too weak to stand, low BP, rapid pulse)    Negative: Difficult to awaken or acting confused (e.g., disoriented, slurred speech)    Negative: Fainted, and still feels dizzy afterwards    Negative: Severe difficulty breathing (e.g., struggling for each breath, speaks in single words)    Negative: Overdose (accidental or intentional) of medications    Negative: New neurologic deficit that is present now: * Weakness of the face, arm, or leg on one side of the body * Numbness of the face, arm, or leg on one side of the body * Loss of speech or garbled speech    Negative: Heart beating < 50 beats per minute OR > 140 beats per minute    Negative: Sounds like a life-threatening emergency to the triager    Negative: Chest pain    Negative: Rectal bleeding, bloody stool, or tarry-black stool    Negative: Vomiting is the main symptom    Negative: Diarrhea is the main symptom    Negative: Headache is the main symptom    Negative: Heat exhaustion suspected (i.e., dehydration from heat exposure)    Negative: Patient states that he/she is having an anxiety/panic attack    Negative: SEVERE dizziness (e.g., unable to stand, requires support to walk, feels like passing out now)    Negative: SEVERE headache or neck pain    Negative: Spinning or tilting sensation (vertigo) present now and one or more stroke risk factors (i.e., hypertension, diabetes, prior stroke/TIA, heart attack, age over 60) (Exception: prior physician evaluation for this AND no different/worse than usual)    Negative: Loss of vision or double vision    Negative: Extra heart beats OR irregular heart beating (i.e., 'palpitations')    Negative: Difficulty breathing    Negative: Drinking very little and has signs of dehydration (e.g., no urine > 12 hours, very dry mouth, very lightheaded)    Negative: Follows  bleeding (e.g., stomach, rectum, vagina) (Exception: became dizzy from sight of small amount blood)    Negative: Patient sounds very sick or weak to the triager    Lightheadedness (dizziness) present now, after 2 hours of rest and fluids    Negative: Spinning or tilting sensation (vertigo) present now    Negative: Fever > 103 F (39.4 C)    Negative: Fever > 100.0 F (37.8 C) and has diabetes mellitus or a weak immune system (e.g., HIV positive, cancer chemotherapy, organ transplant, splenectomy, chronic steroids)    Negative: Vomiting occurs with dizziness    Negative: Patient wants to be seen    Taking a medicine that could cause dizziness (e.g., blood pressure medications, diuretics)    Negative: Diabetes    Negative: Dizziness not present now, but is a chronic symptom (recurrent or ongoing AND lasting > 4 weeks)    Negative: Poor fluid intake probably causing dizziness    Negative: Recent heat exposure probably causing the dizziness    Negative: Sudden or prolonged standing probably causing dizziness    Protocols used: DIZZINESS-A-OH

## 2021-03-08 ENCOUNTER — IMMUNIZATION (OUTPATIENT)
Dept: NURSING | Facility: CLINIC | Age: 81
End: 2021-03-08
Attending: INTERNAL MEDICINE
Payer: COMMERCIAL

## 2021-03-08 PROCEDURE — 0002A PR COVID VAC PFIZER DIL RECON 30 MCG/0.3 ML IM: CPT

## 2021-03-08 PROCEDURE — 91300 PR COVID VAC PFIZER DIL RECON 30 MCG/0.3 ML IM: CPT

## 2021-05-27 ENCOUNTER — NURSE TRIAGE (OUTPATIENT)
Dept: INTERNAL MEDICINE | Facility: CLINIC | Age: 81
End: 2021-05-27

## 2021-05-27 ENCOUNTER — ANCILLARY PROCEDURE (OUTPATIENT)
Dept: GENERAL RADIOLOGY | Facility: CLINIC | Age: 81
End: 2021-05-27
Attending: NURSE PRACTITIONER
Payer: COMMERCIAL

## 2021-05-27 ENCOUNTER — OFFICE VISIT (OUTPATIENT)
Dept: URGENT CARE | Facility: URGENT CARE | Age: 81
End: 2021-05-27
Payer: COMMERCIAL

## 2021-05-27 ENCOUNTER — APPOINTMENT (OUTPATIENT)
Dept: ULTRASOUND IMAGING | Facility: CLINIC | Age: 81
End: 2021-05-27
Attending: NURSE PRACTITIONER
Payer: COMMERCIAL

## 2021-05-27 ENCOUNTER — HOSPITAL ENCOUNTER (EMERGENCY)
Facility: CLINIC | Age: 81
Discharge: HOME | End: 2021-05-27
Attending: NURSE PRACTITIONER | Admitting: NURSE PRACTITIONER
Payer: COMMERCIAL

## 2021-05-27 VITALS
SYSTOLIC BLOOD PRESSURE: 164 MMHG | BODY MASS INDEX: 28.82 KG/M2 | HEART RATE: 63 BPM | OXYGEN SATURATION: 96 % | DIASTOLIC BLOOD PRESSURE: 94 MMHG | HEIGHT: 65 IN | RESPIRATION RATE: 16 BRPM | TEMPERATURE: 98.5 F | WEIGHT: 173 LBS

## 2021-05-27 VITALS
TEMPERATURE: 97.9 F | BODY MASS INDEX: 28.82 KG/M2 | WEIGHT: 173 LBS | HEART RATE: 77 BPM | RESPIRATION RATE: 16 BRPM | SYSTOLIC BLOOD PRESSURE: 134 MMHG | OXYGEN SATURATION: 97 % | HEIGHT: 65 IN | DIASTOLIC BLOOD PRESSURE: 77 MMHG

## 2021-05-27 DIAGNOSIS — M79.652 ACUTE PAIN OF LEFT THIGH: Primary | ICD-10-CM

## 2021-05-27 DIAGNOSIS — M25.552 HIP PAIN, LEFT: ICD-10-CM

## 2021-05-27 DIAGNOSIS — M79.605 LEFT LEG PAIN: ICD-10-CM

## 2021-05-27 DIAGNOSIS — S76.919A MUSCLE STRAIN OF THIGH: ICD-10-CM

## 2021-05-27 DIAGNOSIS — D64.9 ANEMIA, UNSPECIFIED TYPE: ICD-10-CM

## 2021-05-27 LAB
BASOPHILS # BLD AUTO: 0 10E9/L (ref 0–0.2)
BASOPHILS NFR BLD AUTO: 0.5 %
D DIMER PPP FEU-MCNC: 0.5 UG/ML FEU (ref 0–0.5)
DIFFERENTIAL METHOD BLD: ABNORMAL
EOSINOPHIL # BLD AUTO: 0.2 10E9/L (ref 0–0.7)
EOSINOPHIL NFR BLD AUTO: 2.6 %
ERYTHROCYTE [DISTWIDTH] IN BLOOD BY AUTOMATED COUNT: 13.5 % (ref 10–15)
HCT VFR BLD AUTO: 34.8 % (ref 35–47)
HGB BLD-MCNC: 10.6 G/DL (ref 11.7–15.7)
LYMPHOCYTES # BLD AUTO: 1.7 10E9/L (ref 0.8–5.3)
LYMPHOCYTES NFR BLD AUTO: 22.5 %
MCH RBC QN AUTO: 27.5 PG (ref 26.5–33)
MCHC RBC AUTO-ENTMCNC: 30.5 G/DL (ref 31.5–36.5)
MCV RBC AUTO: 90 FL (ref 78–100)
MONOCYTES # BLD AUTO: 0.9 10E9/L (ref 0–1.3)
MONOCYTES NFR BLD AUTO: 11.6 %
NEUTROPHILS # BLD AUTO: 4.9 10E9/L (ref 1.6–8.3)
NEUTROPHILS NFR BLD AUTO: 62.8 %
PLATELET # BLD AUTO: 224 10E9/L (ref 150–450)
RBC # BLD AUTO: 3.85 10E12/L (ref 3.8–5.2)
WBC # BLD AUTO: 7.7 10E9/L (ref 4–11)

## 2021-05-27 PROCEDURE — 36415 COLL VENOUS BLD VENIPUNCTURE: CPT | Performed by: NURSE PRACTITIONER

## 2021-05-27 PROCEDURE — 93971 EXTREMITY STUDY: CPT | Mod: LT

## 2021-05-27 PROCEDURE — 99284 EMERGENCY DEPT VISIT MOD MDM: CPT | Mod: 25

## 2021-05-27 PROCEDURE — 73502 X-RAY EXAM HIP UNI 2-3 VIEWS: CPT | Mod: LT | Performed by: RADIOLOGY

## 2021-05-27 PROCEDURE — 85025 COMPLETE CBC W/AUTO DIFF WBC: CPT | Performed by: NURSE PRACTITIONER

## 2021-05-27 PROCEDURE — 85379 FIBRIN DEGRADATION QUANT: CPT | Performed by: NURSE PRACTITIONER

## 2021-05-27 PROCEDURE — 99214 OFFICE O/P EST MOD 30 MIN: CPT | Performed by: NURSE PRACTITIONER

## 2021-05-27 RX ORDER — ACETAMINOPHEN 500 MG
1000 TABLET ORAL ONCE
Status: COMPLETED | OUTPATIENT
Start: 2021-05-27 | End: 2021-05-27

## 2021-05-27 RX ADMIN — Medication 1000 MG: at 12:25

## 2021-05-27 ASSESSMENT — ENCOUNTER SYMPTOMS
SHORTNESS OF BREATH: 0
BLOOD IN STOOL: 0
FEVER: 0

## 2021-05-27 ASSESSMENT — MIFFLIN-ST. JEOR
SCORE: 1255.6
SCORE: 1255.6

## 2021-05-27 NOTE — TELEPHONE ENCOUNTER
"    Reason for Disposition    History of prior 'blood clot' in leg or lungs (i.e., deep vein thrombosis, pulmonary embolism)    Thigh, calf, or ankle swelling in only one leg    Thigh or calf pain in only one leg and present > 1 hour    Additional Information    Negative: Looks like a broken bone or dislocated joint (e.g., crooked or deformed)    Negative: Sounds like a life-threatening emergency to the triager    Negative: Followed a hip injury    Negative: Followed a knee injury    Negative: Followed an ankle or foot injury    Negative: Back pain radiating (shooting) into leg(s)    Negative: Foot pain is the main symptom    Negative: Ankle pain is the main symptom    Negative: Knee pain is the main symptom    Negative: Leg swelling is the main symptom    Negative: Chest pain    Negative: Difficulty breathing    Negative: Entire foot is cool or blue in comparison to other side    Negative: Unable to walk    Negative: Thigh, calf, or ankle swelling in both legs, but one side is definitely more swollen    Negative: Fever and swollen joint    Negative: Fever and red area (or area very tender to touch)    Answer Assessment - Initial Assessment Questions  1. ONSET: \"When did the pain start?\"       Yesterday.     Was out pulling weeds yesterday. Was not strenuous was ready to get up and go in for the day upon standing stood up and got 'soreness'. Not sharp. Was bent over pulling weeds. Not long in duration  2. LOCATION: \"Where is the pain located?\"       Left  Thigh.  3. PAIN: \"How bad is the pain?\"    (Scale 1-10; or mild, moderate, severe)    -  MILD (1-3): doesn't interfere with normal activities     -  MODERATE (4-7): interferes with normal activities (e.g., work or school) or awakens from sleep, limping     -  SEVERE (8-10): excruciating pain, unable to do any normal activities, unable to walk      Moderate- soreness/ tenderness  4. WORK OR EXERCISE: \"Has there been any recent work or exercise that involved this " "part of the body?\"       Recent activity  5. CAUSE: \"What do you think is causing the leg pain?\"      Not sure. Pulled muscle?  6. OTHER SYMPTOMS: \"Do you have any other symptoms?\" (e.g., chest pain, back pain, breathing difficulty, swelling, rash, fever, numbness, weakness)      No weakness. No numbness. Little back pain. But left thigh is very sore. Left leg was swollen last night but relates this to sitting for along time at a meeting the night before. Swelling has since gone down. Did try an Excedrin as she did not have IBU/Aleve. Did also try warm pack  7. PREGNANCY: \"Is there any chance you are pregnant?\" \"When was your last menstrual period?\"      N/a    Protocols used: LEG PAIN-A-OH      "

## 2021-05-27 NOTE — PROGRESS NOTES
Clinic Administered Medication Documentation    Oral Medication Documentation    Patient was given Acetaminophen. Prior to medication administration, verified patients identity using patient s name and date of birth. Please see MAR and medication order for additional information.     Was entire amount of medication used? Yes  Expiration Date: 06/2022    DUTCH Williamson, Medical Assistant

## 2021-05-27 NOTE — ED TRIAGE NOTES
Pt was pulling weeds yesterday and went into house last night and started to get pain in left leg. Hard to walk.

## 2021-05-27 NOTE — PROGRESS NOTES
Chief Complaint   Patient presents with     Urgent Care     Pain     was bending down doing yard work yesterday and then started to have pain in the left thigh          ICD-10-CM    1. Acute pain of left thigh  M79.652 XR Pelvis and Hip Left 1 View     D dimer, quantitative     CBC with platelets and differential   2. Hip pain, left  M25.552 XR Pelvis and Hip Left 1 View     acetaminophen (TYLENOL) tablet 1,000 mg   3. Anemia, unspecified type  D64.9    Follow up with primary care provider about anemia.  Take Tylenol as needed for pain and if leg discomfort is continuing please discuss with primary care physician at your next appointment.    Medical Decision Making    Differential Diagnosis:  MS Injury Pain: sprain, fracture, tendonitis, muscle strain, contusion, dislocation, bursitis, osteoarthritis, DVT, hematoma    Xray - Reviewed and interpreted by me.  Left hip and pelvis x-ray show no acute fractures or dislocations.     Results for orders placed or performed in visit on 05/27/21 (from the past 24 hour(s))   D dimer, quantitative   Result Value Ref Range    D Dimer 0.5 0.0 - 0.50 ug/ml FEU   CBC with platelets and differential   Result Value Ref Range    WBC 7.7 4.0 - 11.0 10e9/L    RBC Count 3.85 3.8 - 5.2 10e12/L    Hemoglobin 10.6 (L) 11.7 - 15.7 g/dL    Hematocrit 34.8 (L) 35.0 - 47.0 %    MCV 90 78 - 100 fl    MCH 27.5 26.5 - 33.0 pg    MCHC 30.5 (L) 31.5 - 36.5 g/dL    RDW 13.5 10.0 - 15.0 %    Platelet Count 224 150 - 450 10e9/L    % Neutrophils 62.8 %    % Lymphocytes 22.5 %    % Monocytes 11.6 %    % Eosinophils 2.6 %    % Basophils 0.5 %    Absolute Neutrophil 4.9 1.6 - 8.3 10e9/L    Absolute Lymphocytes 1.7 0.8 - 5.3 10e9/L    Absolute Monocytes 0.9 0.0 - 1.3 10e9/L    Absolute Eosinophils 0.2 0.0 - 0.7 10e9/L    Absolute Basophils 0.0 0.0 - 0.2 10e9/L    Diff Method Automated Method    XR Pelvis and Hip Left 1 View    Narrative    XR PELVIS AND HIP LEFT 1 VIEW 5/27/2021 12:36 PM     HISTORY: Hip  "pain, left; Acute pain of left thigh      Impression    IMPRESSION: No apparent fracture. Hip joint space width appears within  normal limits.    ANDREW HOLBROOK MD       Subjective     Brielle Marvin is an 81 year old female who presents to clinic today for pain in the left hip and leg down to the knee since yesterday.  She was pulling weeds but not for an unusually long period of time.  This is not a new activity for her as she is in her garden daily.    ROS: 10 point ROS neg other than the symptoms noted above in the HPI.       Objective    /77   Pulse 77   Temp 97.9  F (36.6  C)   Resp 16   Ht 1.651 m (5' 5\")   Wt 78.5 kg (173 lb)   LMP 11/08/1991   SpO2 97%   BMI 28.79 kg/m      Physical Exam       GENERAL APPEARANCE: healthy appearing, alert     RESP: lungs clear to auscultation - no rales, rhonchi or wheezes     CV: regular rates and rhythm, no murmurs, rubs, or gallop     MS: extremities normal- no gross deformities noted; normal muscle tone, except for left thigh which is tender to palpation, there is no tenderness over the bursa of the hip, pelvis or knee joint     SKIN: no suspicious lesions or rashes     NEURO: Normal strength and tone, mentation intact and speech normal     PSYCH: normal thought process; no significant mood disturbance    Patient Instructions     Patient Education     Anemia  Anemia is a condition that occurs when your body does not have enough healthy red blood cells (RBCs). RBCs are the parts of your blood that carry oxygen all over your body. A protein called hemoglobin allows your RBCs to absorb and release oxygen. Without enough RBCs or hemoglobin, your body doesn't get enough oxygen. Symptoms of anemia may then occur.    What are the symptoms of anemia?  Some people with anemia have no symptoms. But most people have symptoms that range from mild to severe. These can include:    Tiredness (fatigue)    Weakness    Pale skin    Shortness of breath    Dizziness or " fainting    Rapid heartbeat    Trouble doing normal amounts of activity    Yellowing of your eyes, skin, or mouth and dark urine (jaundice)  What causes anemia?  Anemia can occur when your body:    Loses too much blood    Does not make enough RBCs    Destroys your RBCs at a faster rate than it can replace them    Does not make a normal amount of hemoglobin in your RBCs  These problems can occur for many reasons, including:    A condition that you are born with (congenital or inherited), such as sickle cell disease or thalassemia    Heavy bleeding for any reason, including injury, surgery, childbirth, or even heavy menstrual periods    Being low in certain nutrients, such as iron, folate, or vitamin B-12    Certain long-term (chronic) conditions such as diabetes, arthritis, or kidney disease    Certain chronic infections such as tuberculosis or HIV    Exposure to certain medicines, such as those used for chemotherapy  There are different types of anemia. Your healthcare provider can tell you more about the type of anemia you have and what may have caused it.  How is anemia diagnosed?  To diagnose anemia, your healthcare provider orders blood tests. These can include:    Complete blood cell count (CBC). This test measures the amounts of the different types of blood cells.    Blood smear. This test checks the size and shape of your blood cells. To do the test, a drop of your blood is looked at under a microscope. A stain is used to make the blood cells easier to see.    Iron studies. These tests measure the amount of iron in your blood. Your body needs iron to make hemoglobin in your RBCs.    Vitamin B-12 and folate studies. These tests check for some of the components that help give RBCs a normal size and shape.    Reticulocyte count. This test measures the amount of new RBCs that your bone marrow makes.    Hemoglobin electrophoresis. This test checks for problems with your hemoglobin in RBCs.    Bone marrow biopsy.  This test evaluates the bone marrow where RBCs are made.  How is anemia treated?  Treatment for anemia is based on the type of anemia, its cause, and the severity of your symptoms. Treatments may include:    Diet changes. This includes increasing the amount of certain nutrients in your diet, such as iron, vitamin B-12, or folate. Your healthcare provider may also prescribe nutrient supplements.    Medicines. Certain medicines treat the cause of your anemia. Others help build new RBCs or ease symptoms. If a medicine is the cause of your anemia, you may need to stop or change it.    Blood transfusions. Replacing some of your blood can increase the number of healthy RBCs in your body.    Surgery. In some cases, your healthcare provider may do surgery to treat the underlying cause of anemia. If you need surgery, your healthcare provider will explain the procedure and outline the risks and benefits for you.  What are the long-term concerns?  If you have a certain type of anemia, you can expect a full recovery after treatment. If you have other types of anemia (especially a type you're born with), you will need to manage it for life. Your healthcare provider can tell you more.  [employer] last reviewed this educational content on 4/1/2019 2000-2021 [employer]. All rights reserved. This information is not intended as a substitute for professional medical care. Always follow your healthcare professional's instructions.               ISABEL Coker, CNP  [facility]

## 2021-05-27 NOTE — ED PROVIDER NOTES
History   Chief Complaint:  Leg Pain     HPI   Brielle Marvin is a 81 year old female with history of DVT who presents with leg pain. Patient states she was pulling weeds yesterday bending over at her hips and reports pain on her left leg beginning last night. She woke up today with increased left leg soreness and visited [facility] at noon who marisa blood work and performed imaging as seen below. Her pain continued to persist after her visit and was advised to come into the emergency department by her family member. She states her hemoglobin has not been low in past visits. Here, she states her left leg is usually swollen and has no change to it recently. She denies chest pain, shortness of breath, fevers, or blood in stools. She denies use of blood thinning medication but takes a daily aspirin 81 mg.     XR PELVIS AND HIP LEFT 1 VIEW 5/27/2021 12:36 PM   No apparent fracture. Hip joint space width appears within  normal limits.    Laboratory Results: 05/27/2021  CBC: WBC 7.7, HGB 10.6 (L),   D Dimer (Collected 1223): 0.5    Review of Systems   Constitutional: Negative for fever.   Respiratory: Negative for shortness of breath.    Cardiovascular: Positive for leg swelling. Negative for chest pain.   Gastrointestinal: Negative for blood in stool.   All other systems reviewed and are negative.        Allergies:  Codeine  Morphine  Vicodin [Hydrocodone-Acetaminophen]    Medications:  Aspirin 81 mg   Atenolol  Omeprazole  Ditropan   Pravastatin     Past Medical History:    DVT   Esophageal Reflux   Osteoarthritis of Right Knee  Lumbago   Major Depression in Remission      Past Surgical History:    Total Knee Arthroplasty, Right   Colonoscopy   Correct Bunion, Simple   Laminectomy, Lumbar      Family History:    Mother - Lung Cancer  Father - Heart Attack   Sister - Cancer    Social History:  Arrives to the emergency department with daughter.     Physical Exam     Patient Vitals for the past 24 hrs:   BP Temp  "Temp src Pulse Resp SpO2 Height Weight   05/27/21 1912 -- 98.5  F (36.9  C) Oral -- -- -- -- --   05/27/21 1907 (!) 164/94 -- -- 63 16 96 % 1.651 m (5' 5\") 78.5 kg (173 lb)       Physical Exam  Physical Exam   Constitutional: Laying in bed comfortably. Non toxic appearing.   Head: Head moves freely with normal range of motion.   ENT: Oropharynx is clear and moist.   Eyes: Conjunctivae pink. EOMs intact.   Neck: Normal range of motion.    Cardiovascular: Regular rate and rhythm. Normal heart sounds. No concerning murmur. Intact distal pulses: Pedal pulses 2+ on the right, 2+ on the left.   Pulmonary/Chest: No respiratory distress.  Breath sounds normal.   Abdominal: Soft. Non-tender. No rebound, no guarding. No CVA tenderness.  Musculoskeletal: Distal capillary refill and sensation intact. Lower extremity strength 5/5. Patellar reflexes 2+. Negative straight leg raise and crossover.  No low back tenderness.   Neurological: Oriented to person, place, and time. No focal deficits. No saddle anesthesia.   Skin: Skin is warm.       Emergency Department Course     Imaging:  US Lower Extremity Venous Duplex Left  1.  No deep venous thrombosis in the left lower extremity.    Emergency Department Course:    Reviewed:  I reviewed nursing notes, vitals, past medical history and care everywhere    Assessments:  1906 I obtained history and examined the patient as noted above.   2031 I rechecked the patient and explained findings.     Disposition:  The patient was discharged to home.     Impression & Plan     Medical Decision Making:  Brielle Marvin is a 81 year old female who comes in for evaluation of left upper leg pain and history of DVT. She denies any trauma or injury, but was bent over yesterday gardening. She was evaluated at  this evening with a negative hip/pelvis Xray and a negative D Dimer. Mild anemia seen on CBC. They arrive with concerns for DVT and did not have an understanding of the D Dimer results. We " discussed U/S imaging for her concerns and this is negative. She has no exam evidence for lumbar radiculopathy. I suspect a muscle strain from her position while gardening yesterday. We discussed need for follow up and reasons to return here. She and her daughter are amenable to plan.     Diagnosis:    ICD-10-CM    1. Left leg pain  M79.605    2. Muscle strain of thigh  S76.919A        Scribe Disclosure:  I, Giovani Villalta, am serving as a scribe at 6:49 PM on 5/27/2021 to document services personally performed by Melva Murray APRN CNP based on my observations and the provider's statements to me.              Melva Murray APRN CNP  05/27/21 [id number]

## 2021-05-28 NOTE — DISCHARGE INSTRUCTIONS
If you develop change in function, sensation or strength of your leg return to the ER.     Follow up in clinic in one week for recheck.     Gentle stretches, tylenol or ibuprofen as needed for pain and cold packs to the painful area for comfort.

## 2021-06-02 ENCOUNTER — OFFICE VISIT (OUTPATIENT)
Dept: INTERNAL MEDICINE | Facility: CLINIC | Age: 81
End: 2021-06-02
Payer: COMMERCIAL

## 2021-06-02 VITALS
OXYGEN SATURATION: 97 % | DIASTOLIC BLOOD PRESSURE: 64 MMHG | TEMPERATURE: 97.4 F | SYSTOLIC BLOOD PRESSURE: 112 MMHG | HEIGHT: 65 IN | BODY MASS INDEX: 28.82 KG/M2 | WEIGHT: 173 LBS | HEART RATE: 71 BPM

## 2021-06-02 DIAGNOSIS — M70.62 TROCHANTERIC BURSITIS OF LEFT HIP: Primary | ICD-10-CM

## 2021-06-02 DIAGNOSIS — N32.81 OAB (OVERACTIVE BLADDER): ICD-10-CM

## 2021-06-02 DIAGNOSIS — R53.82 CHRONIC FATIGUE: ICD-10-CM

## 2021-06-02 DIAGNOSIS — K21.9 GASTROESOPHAGEAL REFLUX DISEASE WITHOUT ESOPHAGITIS: ICD-10-CM

## 2021-06-02 DIAGNOSIS — D64.9 ANEMIA, UNSPECIFIED TYPE: ICD-10-CM

## 2021-06-02 DIAGNOSIS — F32.5 MAJOR DEPRESSION IN COMPLETE REMISSION (H): ICD-10-CM

## 2021-06-02 DIAGNOSIS — E78.5 HYPERLIPIDEMIA LDL GOAL <130: ICD-10-CM

## 2021-06-02 DIAGNOSIS — I10 BENIGN ESSENTIAL HYPERTENSION: ICD-10-CM

## 2021-06-02 PROBLEM — I82.451: Status: RESOLVED | Noted: 2018-11-03 | Resolved: 2021-06-02

## 2021-06-02 LAB
ALBUMIN SERPL-MCNC: 3.4 G/DL (ref 3.4–5)
ALP SERPL-CCNC: 47 U/L (ref 40–150)
ALT SERPL W P-5'-P-CCNC: 23 U/L (ref 0–50)
ANION GAP SERPL CALCULATED.3IONS-SCNC: <1 MMOL/L (ref 3–14)
AST SERPL W P-5'-P-CCNC: 19 U/L (ref 0–45)
BILIRUB SERPL-MCNC: 0.3 MG/DL (ref 0.2–1.3)
BUN SERPL-MCNC: 25 MG/DL (ref 7–30)
CALCIUM SERPL-MCNC: 9.1 MG/DL (ref 8.5–10.1)
CHLORIDE SERPL-SCNC: 106 MMOL/L (ref 94–109)
CO2 SERPL-SCNC: 34 MMOL/L (ref 20–32)
CREAT SERPL-MCNC: 0.87 MG/DL (ref 0.52–1.04)
ERYTHROCYTE [DISTWIDTH] IN BLOOD BY AUTOMATED COUNT: 14.1 % (ref 10–15)
GFR SERPL CREATININE-BSD FRML MDRD: 62 ML/MIN/{1.73_M2}
GLUCOSE SERPL-MCNC: 115 MG/DL (ref 70–99)
HCT VFR BLD AUTO: 37.9 % (ref 35–47)
HGB BLD-MCNC: 11.5 G/DL (ref 11.7–15.7)
IRON SATN MFR SERPL: 14 % (ref 15–46)
IRON SERPL-MCNC: 52 UG/DL (ref 35–180)
MCH RBC QN AUTO: 27.5 PG (ref 26.5–33)
MCHC RBC AUTO-ENTMCNC: 30.3 G/DL (ref 31.5–36.5)
MCV RBC AUTO: 91 FL (ref 78–100)
PLATELET # BLD AUTO: 240 10E9/L (ref 150–450)
POTASSIUM SERPL-SCNC: 4.9 MMOL/L (ref 3.4–5.3)
PROT SERPL-MCNC: 7.5 G/DL (ref 6.8–8.8)
RBC # BLD AUTO: 4.18 10E12/L (ref 3.8–5.2)
SODIUM SERPL-SCNC: 139 MMOL/L (ref 133–144)
TIBC SERPL-MCNC: 376 UG/DL (ref 240–430)
TSH SERPL DL<=0.005 MIU/L-ACNC: 1.84 MU/L (ref 0.4–4)
WBC # BLD AUTO: 8.2 10E9/L (ref 4–11)

## 2021-06-02 PROCEDURE — 85027 COMPLETE CBC AUTOMATED: CPT | Performed by: INTERNAL MEDICINE

## 2021-06-02 PROCEDURE — 80053 COMPREHEN METABOLIC PANEL: CPT | Performed by: INTERNAL MEDICINE

## 2021-06-02 PROCEDURE — 83540 ASSAY OF IRON: CPT | Performed by: INTERNAL MEDICINE

## 2021-06-02 PROCEDURE — 84443 ASSAY THYROID STIM HORMONE: CPT | Performed by: INTERNAL MEDICINE

## 2021-06-02 PROCEDURE — 83550 IRON BINDING TEST: CPT | Performed by: INTERNAL MEDICINE

## 2021-06-02 PROCEDURE — 36415 COLL VENOUS BLD VENIPUNCTURE: CPT | Performed by: INTERNAL MEDICINE

## 2021-06-02 PROCEDURE — 99214 OFFICE O/P EST MOD 30 MIN: CPT | Performed by: INTERNAL MEDICINE

## 2021-06-02 RX ORDER — OXYBUTYNIN CHLORIDE 10 MG/1
10 TABLET, EXTENDED RELEASE ORAL DAILY
Qty: 90 TABLET | Refills: 0 | Status: SHIPPED | OUTPATIENT
Start: 2021-06-02 | End: 2021-08-13

## 2021-06-02 ASSESSMENT — MIFFLIN-ST. JEOR: SCORE: 1255.6

## 2021-06-02 NOTE — PATIENT INSTRUCTIONS
"*  Blood count slightly low, but improved.     * I will review the results of the other blood tests and give other recommendations, such as whether or not you need an iron tablet.     *  Increase dose of Oxybutynin to 10 mg in evening.     *  Return to see me in 3 months (after 9/8/21), sooner if needed.  Please get fasting labs done at the [facility] or any other [facility] lab 1-2 days before this appointment (schedule a \"lab appointment\").  If you get the labs done at another clinic, make arrangements with them directly.  The orders will be in place.  Eat nothing for at least 8 hours prior to having these labs drawn.  Use [employer] or Call [phone] to schedule the appointment with me and lab appointment.        TROCHANTERIC BURSITIS:     * No evidence for any hip joint problems.     *  Trochanteric bursitis, or greater trochanter pain syndrome (GTPS), is an inflammation of the bursa overlying the greater trochanter of femur (located on the outside part of the upper thigh). This condition is characterized by tenderness over the lateral hip, and pain that may be present both at rest and with movement.   * Trochanteric bursitis can be caused by irritation from a tight iliotibial band (IT-band) rubbing over the bursa, a direct blow to the area, or from biomechanical abnormalities causing repetitive microtrauma.   * Treatment for trochanteric bursitis typically focuses on controlling inflammation (using ice or anti-inflammatories) and alleviating causative factors.   *  Stop, change or reduce any activities that may have caused the symptoms.     * If you have no problems taking over the counter anti-inflammatory medications, take one of the following (with food):   --Aleve 1-2 tablets twice per day for 5-7 days, then twice per day as needed.    Or   --Ibuprofen/Motrin/Advil 400-600 two or three times per day for 5-7 days, then 2-3 times per day as needed.      *  If you do not improve using the " "methods below, then let us know and we may need to refer you to the sports medicine specialists for a steroid injection into the affected area.  Most often this is not required.     *  Stretches for the iliotibial band (IT-band) may be prescribed to reduce the friction over the outside part of the upper thigh.   *  Strengthening, particularly of the muscles surrounding the hips, is encouraged.   *  If you have trochanteric bursitis, you should perform a combination of flexibility and strengthening techniques to help your body heal and prevent further injury.   *  Begin by foam rolling your adductors, hip flexors, and IT-band. Foam rolling is a form of self-massage that can help relax tight muscles before you stretch them. Hold the tender spots for 30 seconds to allow your muscle time to relax and release the knots that are causing tension in the muscle.          *  After you have completed the foam rolling, statically stretch your adductors and hip flexor complex. Hold each stretch for 30 seconds to allow your muscles time to elongate.     *  Stretch the affected legs.  Repeat all stretches 3-5 times, 3 different times a day. With all these stretches you may feel it more up near the hip as opposed to down lower where you may be experiencing pain; this is normal.  (All photos below assume that it is the right leg that is the injured leg)    Strectch #1:   Pull foot up to back of buttocks. Cross the uninjured leg over the injured leg and push down, hold for 30 seconds.    Stretch #2:    Stretch #2: Cross injured leg behind and lean towards the uninjured side. This stretch is best performed with arms over the head, creating a \"bow\" from ankle to hand on the injured side (unlike how it is depicted).    Stretch #3:  Stretch # 3: Cross injured leg over the uninjured side and pull the leg as close to your chest as possible.      "

## 2021-06-02 NOTE — PROGRESS NOTES
Assessment & Plan     (M70.62) Trochanteric bursitis of left hip  (primary encounter diagnosis)  Comment: Discussed the mechanical nature of trochanteric bursitis.  Discussed treatment options including conservative measures of  stretching, changes in mechanics and any features of overuse, NSAIDs (if able to take them safely).  Also discussed the role of steroid injections.   If no better with conservative means, then will consider steroid injections and PT referral if needed.    Plan: [facility]            (D64.9) Anemia, unspecified type  Comment: Continue to monitor closely, resume iron supplementation if the iron level is low.  No obvious source of blood loss reported by the patient.  Plan: CBC with platelets, Iron and iron binding         capacity, Comprehensive metabolic panel, TSH         with free T4 reflex, Lipid panel reflex to         direct LDL Fasting, CBC with platelets            (R53.82) Chronic fatigue  Comment: Ongoing mild fatigue chronic nature.  Part of this could be relative anemia, repeat other labs to look for other reasons such as thyroid or kidney disease.  Plan: CBC with platelets, Iron and iron binding         capacity, Comprehensive metabolic panel, TSH         with free T4 reflex            (K21.9) Gastroesophageal reflux disease without esophagitis  Comment: This condition is currently controlled on the current medical regimen.  Continue current therapy.   Plan: CBC with platelets, Iron and iron binding         capacity, Comprehensive metabolic panel            (E78.5) Hyperlipidemia LDL goal <130  Comment: This condition is currently controlled on the current medical regimen.  Continue current therapy.   Plan: Comprehensive metabolic panel, Lipid panel         reflex to direct LDL Fasting, CBC with         platelets            (I10) Benign essential hypertension  Comment: This condition is currently controlled on the current medical regimen.  Continue current  "therapy.   Plan: CBC with platelets, TSH with free T4 reflex,         Lipid panel reflex to direct LDL Fasting, CBC         with platelets            (F32.5) Major depression in complete remission (H)  Comment: This condition is currently controlled on the current medical regimen.  Continue current therapy.   Plan:     (N32.81) OAB (overactive bladder)  Comment: Asking for slightly increased dose, 5 mg does not seem to work.  Denies side effects of medication.  Plan: oxybutynin ER (DITROPAN-XL) 10 MG 24 hr tablet                        BMI:   Estimated body mass index is 28.79 kg/m  as calculated from the following:    Height as of this encounter: 1.651 m (5' 5\").    Weight as of this encounter: 78.5 kg (173 lb).           Return in about 3 months (around 9/9/2021) for Medicare Annual Wellness Exam, with pre-visit fasting labs.    Jared Emmanuel MD  [facility] Hannah is a 81 year old who presents for the following health issues     HPI     1.  The blood count has been lower than expected and desired.   No evidence for obvious blood loss.  No recent surgeries, no nosebleeds, no obvious intestinal blood loss, no hematochezia.   Does not take NSAIDs regularly, does not drink alcohol regularly, does not donate blood products regularly.   Reports nutrition as \"good\"  Has had issues or workup for previous anemia before.   Has had previous colonoscopy.     Reviewed labs in [facility]:    Lab Results   Component Value Date    HGB 11.5 06/02/2021    HGB 10.6 05/27/2021    HGB 13.4 09/28/2020    HGB 14.5 07/10/2020    HGB 13.7 07/10/2019    HGB 14.4 04/03/2019    HGB 13.3 05/16/2018    HGB 14.0 02/16/2016    HGB 13.5 11/06/2015    HGB 13.8 06/03/2013    HGB 13.8 12/20/2011    HGB 14.0 07/29/2009    HGB 13.9 08/25/2008    HGB 13.4 11/01/2002          2.  Lots of pain in lateral left hip area.    Started after spent a week in gardening and working the yard in a squatting " "position frequently.    3.    Hypertension:  History of hypertension, on medication.  No reported side effects from medications.    Reviewed last 6 BP readings in chart:  BP Readings from Last 6 Encounters:   06/02/21 112/64   05/27/21 (!) 164/94   05/27/21 134/77   01/12/21 112/80   09/08/20 106/64   08/03/20 112/64     No active cardiac complaints or symptoms with exercise.     4.  GERD stable.  Taking meds as ordered, no reported side effects from medicines.  Eating properly to avoid sx.   No melena, no hematochezia, no diarrhea.  No unintended weigth loss.       **I reviewed the information recorded in the patient's EPIC chart (including but not limited to medical history, surgical history, family history, problem list, medication list, and allergy list) and updated the information as indicated based on the patients reported information.       Review of Systems   Constitutional, HEENT, cardiovascular, pulmonary, gi and gu systems are negative, except as otherwise noted.      Objective    /64 (BP Location: Left arm, Patient Position: Sitting, Cuff Size: Adult Regular)   Pulse 71   Temp 97.4  F (36.3  C) (Oral)   Ht 1.651 m (5' 5\")   Wt 78.5 kg (173 lb)   LMP 11/08/1991   SpO2 97%   Breastfeeding No   BMI 28.79 kg/m    Body mass index is 28.79 kg/m .  Physical Exam   GENERAL alert and no distress  EYES:  Normal sclera,conjunctiva, EOMI  HENT: oral and posterior pharynx without lesions or erythema, facies symmetric  NECK: Neck supple. No LAD, without thyroidmegaly.  RESP: Clear to ausculation bilaterally without wheezes or crackles. Normal BS in all fields.  CV: RRR normal S1S2 without murmurs, rubs or gallops.  LYMPH: no cervical lymph adenopathy appreciated  MS: extremities- no gross deformities of the visible extremities noted,   EXT:  no lower extremity edema  PSYCH: Alert and oriented times 3; speech- coherent  SKIN:  No obvious significant skin lesions on visible portions of face   HIP:  Full " ROM in both hips.  No obvious deformities.  Tender around greater trochanter on left side and at muscular insertions palpation here reproduces the pain exactly.

## 2021-06-08 ENCOUNTER — TELEPHONE (OUTPATIENT)
Dept: INTERNAL MEDICINE | Facility: CLINIC | Age: 81
End: 2021-06-08

## 2021-06-08 DIAGNOSIS — I10 BENIGN ESSENTIAL HYPERTENSION: ICD-10-CM

## 2021-06-08 RX ORDER — ATENOLOL 25 MG/1
25 TABLET ORAL DAILY
Qty: 7 TABLET | Refills: 0 | Status: SHIPPED | OUTPATIENT
Start: 2021-06-08 | End: 2021-09-01

## 2021-06-15 ENCOUNTER — THERAPY VISIT (OUTPATIENT)
Dept: PHYSICAL THERAPY | Facility: CLINIC | Age: 81
End: 2021-06-15
Attending: INTERNAL MEDICINE
Payer: COMMERCIAL

## 2021-06-15 DIAGNOSIS — M25.552 HIP PAIN, LEFT: ICD-10-CM

## 2021-06-15 PROCEDURE — 97161 PT EVAL LOW COMPLEX 20 MIN: CPT | Mod: GP | Performed by: PHYSICAL THERAPIST

## 2021-06-15 PROCEDURE — 97110 THERAPEUTIC EXERCISES: CPT | Mod: GP | Performed by: PHYSICAL THERAPIST

## 2021-06-15 ASSESSMENT — ACTIVITIES OF DAILY LIVING (ADL)
GOING_UP_1_FLIGHT_OF_STAIRS: NO DIFFICULTY AT ALL
WALKING_APPROXIMATELY_10_MINUTES: NO DIFFICULTY AT ALL
HOS_ADL_ITEM_SCORE_TOTAL: 66
GETTING_INTO_AND_OUT_OF_AN_AVERAGE_CAR: NO DIFFICULTY AT ALL
HOS_ADL_HIGHEST_POTENTIAL_SCORE: 68
STANDING_FOR_15_MINUTES: NO DIFFICULTY AT ALL
HEAVY_WORK: SLIGHT DIFFICULTY
HOS_ADL_COUNT: 17
HOW_WOULD_YOU_RATE_YOUR_CURRENT_LEVEL_OF_FUNCTION_DURING_YOUR_USUAL_ACTIVITIES_OF_DAILY_LIVING_FROM_0_TO_100_WITH_100_BEING_YOUR_LEVEL_OF_FUNCTION_PRIOR_TO_YOUR_HIP_PROBLEM_AND_0_BEING_THE_INABILITY_TO_PERFORM_ANY_OF_YOUR_USUAL_DAILY_ACTIVITIES?: 95
WALKING_INITIALLY: NO DIFFICULTY AT ALL
SITTING_FOR_15_MINUTES: NO DIFFICULTY AT ALL
WALKING_DOWN_STEEP_HILLS: NO DIFFICULTY AT ALL
TWISTING/PIVOTING_ON_INVOLVED_LEG: NO DIFFICULTY AT ALL
GOING_DOWN_1_FLIGHT_OF_STAIRS: NO DIFFICULTY AT ALL
PUTTING_ON_SOCKS_AND_SHOES: NO DIFFICULTY AT ALL
RECREATIONAL_ACTIVITIES: NO DIFFICULTY AT ALL
ROLLING_OVER_IN_BED: NO DIFFICULTY AT ALL
WALKING_15_MINUTES_OR_GREATER: NO DIFFICULTY AT ALL
STEPPING_UP_AND_DOWN_CURBS: NO DIFFICULTY AT ALL
DEEP_SQUATTING: NO DIFFICULTY AT ALL
WALKING_UP_STEEP_HILLS: SLIGHT DIFFICULTY
LIGHT_TO_MODERATE_WORK: NO DIFFICULTY AT ALL
HOS_ADL_SCORE(%): 97.06
GETTING_INTO_AND_OUT_OF_A_BATHTUB: NO DIFFICULTY AT ALL

## 2021-06-15 NOTE — PROGRESS NOTES
Physical Therapy Initial Evaluation  Subjective:  The history is provided by the patient. No  was used.   Therapist Generated HPI Evaluation  Problem details: Pt presents with complaints of L hip pain. Pt attributes current sx's to gardening/weeding back on May 26th. Pt reported no issues at the time but noted onset of sx's later in the day. Pt reported extreme difficulty with walking secondary to pain. Pt reported hip pain over the past week has significantly reduced; pt reported using a topical cream recommended by a friend.         Type of problem:  Left hip.    This is a new condition.  Condition occurred with:  Insidious onset.  Where condition occurred: at home.  Patient reports pain:  Lateral.  Pain is described as aching and is intermittent.  Pain radiates to:  Knee (reported pain initially radiated from the lateral hip to the knee; now no longer feels sx's to the knee). Pain timing: initially reported pain during the day and night; currently noting very minimal sx's day or night.  Since onset symptoms are rapidly improving.  Symptoms are exacerbated by walking, ascending stairs and descending stairs (sx's almost resolved at this time)  and relieved by other (topical cream).                              Objective:    Gait:  Slight gait deviation secondary to hip abduction weakness  Assistive Devices:  None                 Lumbar/SI Evaluation  ROM:    AROM Lumbar:   Flexion:        Fingertips to ankles, no provocation of sx's  Ext:                    WFL's in standing, no provocation of sx's   Side Bend:        Left:     Right:   Rotation:           Left:     Right:   Side Glide:        Left:     Right:                                                               Hip Evaluation  Hip PROM:  Hip PROM:  Left Hip:    Normal (flexion/ER/IR-90/90)  Right Hip:  Normal (flexion/ER/IR-90/90)                          Hip Strength:        Abduction:  Left: 3-/5     Pain:Right: 3-/5     Pain:      External Rotation:  Left: 4+/5   Pain:  Right: 4+/5   Pain:                           General     ROS    Assessment/Plan:    Patient is a 81 year old female with left side hip complaints.    Patient has the following significant findings with corresponding treatment plan.                Diagnosis 1:  L hip pain  Pain -  self management, education and home program  Decreased strength - therapeutic exercise and therapeutic activities  Decreased function - therapeutic activities    Therapy Evaluation Codes:   1) History comprised of:   Personal factors that impact the plan of care:      None.    Comorbidity factors that impact the plan of care are:      None.     Medications impacting care: None.  2) Examination of Body Systems comprised of:   Body structures and functions that impact the plan of care:      Hip.   Activity limitations that impact the plan of care are:      Walking.  3) Clinical presentation characteristics are:   Stable/Uncomplicated.  Cumulative Therapy Evaluation is: Low complexity.    Previous and current functional limitations:  (See Goal Flow Sheet for this information)    Short term and Long term goals: (See Goal Flow Sheet for this information)     Communication ability:  Patient appears to be able to clearly communicate and understand verbal and written communication and follow directions correctly.  Treatment Explanation - The following has been discussed with the patient:   RX ordered/plan of care  Anticipated outcomes  Possible risks and side effects  This patient would benefit from PT intervention to resume normal activities.   Rehab potential is excellent.    Frequency:  1 X week, once daily  Duration:  for 4  visits  Discharge Plan:  Independent in home treatment program.  Reach maximal therapeutic benefit.    Please refer to the daily flowsheet for treatment today, total treatment time and time spent performing 1:1 timed codes.

## 2021-06-16 NOTE — PROGRESS NOTES
Physical Therapy Initial Evaluation  Subjective:    Patient Health History         Pain is reported as 0/10 on pain scale.  General health as reported by patient is excellent.  Pertinent medical history includes: high blood pressure and incontinence. Other medical history details: Codeine, vicodin.        Surgeries include:  Orthopedic surgery.        Current occupation is Retired.                                       Objective:  System    Physical Exam    General     ROS    Assessment/Plan:

## 2021-08-10 DIAGNOSIS — Z79.2 PROPHYLACTIC ANTIBIOTIC: Primary | ICD-10-CM

## 2021-08-10 RX ORDER — AMOXICILLIN 500 MG/1
TABLET, FILM COATED ORAL
Qty: 12 TABLET | Refills: 1 | Status: SHIPPED | OUTPATIENT
Start: 2021-08-10 | End: 2023-01-23

## 2021-08-10 NOTE — TELEPHONE ENCOUNTER
Pt requesting refill of amoxicillin to go see the dentist. Please refill advise. Pt is wondering if she needs this everytime she goes to the dentist. She was told she would need an antibiotic for this visit. Thank you. Also let her know via mychart if she needs an antibiotic every time she goes to the dentist.

## 2021-08-10 NOTE — TELEPHONE ENCOUNTER
Called patient- she has had this previously by her knee surgeon- she is unable to reach them- they are not taking calls- she just gets a recording      Please advise    Thank You,  Dinorah MILLAN RN  [facility]  [phone]

## 2021-08-18 ENCOUNTER — NURSE TRIAGE (OUTPATIENT)
Dept: INTERNAL MEDICINE | Facility: CLINIC | Age: 81
End: 2021-08-18

## 2021-08-18 NOTE — TELEPHONE ENCOUNTER
Patient called, reported not feeling well- started yesterday. Reported it felt like cellulitis. Reported feeling tired, and feverish, left lower leg, warm to the touch and a bit swollen. Patient is going to urgent care, she's going to call her friend to get a ride.     Kenyatta Manuel RN      Reason for Disposition    Leg swelling is the main symptom    Patient wants to be seen    Additional Information    Negative: Looks like a broken bone or dislocated joint (e.g., crooked or deformed)    Negative: Sounds like a life-threatening emergency to the triager    Negative: Sounds like a life-threatening emergency to the triager    Negative: Chest pain    Negative: Small area of swelling and followed an insect bite to the area    Negative: Followed a knee injury    Negative: Ankle or foot injury    Negative: Pregnant with leg swelling or edema    Negative: Difficulty breathing at rest    Negative: Entire foot is cool or blue in comparison to other side    Negative: SEVERE swelling (e.g., swelling extends above knee, entire leg is swollen, weeping fluid)    Negative: Thigh or calf pain and only 1 side and present > 1 hour    Negative: Thigh, calf, or ankle swelling in only one leg    Negative: Thigh, calf, or ankle swelling in both legs, but one side is definitely more swollen (Exception: longstanding difference between legs)    Negative: Cast on leg or ankle and has increasing pain    Negative: Can't walk or can barely stand (new onset)    Negative: Fever and red area (or area very tender to touch)    Negative: Patient sounds very sick or weak to the triager    Negative: Swelling of face, arm or hands (Exception: slight puffiness of fingers during hot weather)    Negative: Pregnant > 20 weeks and sudden weight gain (i.e., > 2 lbs, 1 kg in one week)    Negative: MODERATE swelling of both ankles (e.g., swelling extends up to the knees) AND new onset or worsening    Negative: Difficulty breathing with exertion AND worsening  "or new onset    Negative: Looks like a boil, infected sore, deep ulcer, or other infected rash (spreading redness, pus)    Answer Assessment - Initial Assessment Questions  1. ONSET: \"When did the pain start?\"       Left lower leg- warm to touch- feels feverish   2. LOCATION: \"Where is the pain located?\"       Left lower left   3. PAIN: \"How bad is the pain? Moderate pain-  (Scale 1-10; or mild, moderate, severe)    -  MILD (1-3): doesn't interfere with normal activities     -  MODERATE (4-7): interferes with normal activities (e.g., work or school) or awakens from sleep, limping     -  SEVERE (8-10): excruciating pain, unable to do any normal activities, unable to walk      **4. WORK OR EXERCISE: \"Has there been any recent work or exercise that involved this part of the body?\"       N/a   5. CAUSE: \"What do you think is causing the leg pain?\"      Thinks cellulitis   6. OTHER SYMPTOMS: \"Do you have any other symptoms?\" (e.g., chest pain, back pain, breathing difficulty, swelling, rash, fever, numbness, weakness)      Maybe a fever, doesn't feel well.   7. PREGNANCY: \"Is there any chance you are pregnant?\" \"When was your last menstrual period?\"      None    Protocols used: LEG PAIN-A-OH, LEG SWELLING AND EDEMA-A-OH      "

## 2021-08-19 ENCOUNTER — TELEPHONE (OUTPATIENT)
Dept: INTERNAL MEDICINE | Facility: CLINIC | Age: 81
End: 2021-08-19

## 2021-08-19 NOTE — TELEPHONE ENCOUNTER
Patient called clinic.  Was seen at [facility] yesterday.   ASSESSMENT  ICD-10-CM   1. Cellulitis and abscess of left lower extremity L03.116 cefadroxil (DURICEF) 500 mg capsule   L02.416   2. 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 Patient asking when to schedule follow up with PCP?    2. Patient asking for referral or advise for vein specialist.         [phone]  May leave detailed msg/orders on voice mail.       Yara ZAPATA, RN

## 2021-09-01 ENCOUNTER — OFFICE VISIT (OUTPATIENT)
Dept: INTERNAL MEDICINE | Facility: CLINIC | Age: 81
End: 2021-09-01
Payer: COMMERCIAL

## 2021-09-01 ENCOUNTER — TELEPHONE (OUTPATIENT)
Dept: VASCULAR SURGERY | Facility: CLINIC | Age: 81
End: 2021-09-01

## 2021-09-01 VITALS
HEART RATE: 81 BPM | OXYGEN SATURATION: 96 % | BODY MASS INDEX: 28.36 KG/M2 | RESPIRATION RATE: 18 BRPM | SYSTOLIC BLOOD PRESSURE: 106 MMHG | WEIGHT: 170.4 LBS | DIASTOLIC BLOOD PRESSURE: 60 MMHG

## 2021-09-01 DIAGNOSIS — K21.9 GASTROESOPHAGEAL REFLUX DISEASE WITHOUT ESOPHAGITIS: ICD-10-CM

## 2021-09-01 DIAGNOSIS — E78.5 HYPERLIPIDEMIA LDL GOAL <130: ICD-10-CM

## 2021-09-01 DIAGNOSIS — M85.859 OSTEOPENIA OF HIP, UNSPECIFIED LATERALITY: ICD-10-CM

## 2021-09-01 DIAGNOSIS — I10 BENIGN ESSENTIAL HYPERTENSION: ICD-10-CM

## 2021-09-01 DIAGNOSIS — I87.2 VENOUS INSUFFICIENCY OF LEFT LEG: Primary | ICD-10-CM

## 2021-09-01 DIAGNOSIS — L03.116 CELLULITIS OF LEFT ANTERIOR LOWER LEG: ICD-10-CM

## 2021-09-01 PROCEDURE — 99214 OFFICE O/P EST MOD 30 MIN: CPT | Performed by: INTERNAL MEDICINE

## 2021-09-01 RX ORDER — IBUPROFEN 200 MG
1 CAPSULE ORAL DAILY
COMMUNITY
Start: 2021-09-01

## 2021-09-01 RX ORDER — CEFADROXIL 500 MG/1
CAPSULE ORAL
COMMUNITY
Start: 2021-08-18 | End: 2021-09-01

## 2021-09-01 RX ORDER — ATENOLOL 25 MG/1
25 TABLET ORAL DAILY
Qty: 90 TABLET | Refills: 3 | Status: SHIPPED | OUTPATIENT
Start: 2021-09-01 | End: 2022-11-30

## 2021-09-01 RX ORDER — MAGNESIUM 200 MG
1000 TABLET ORAL DAILY
COMMUNITY
Start: 2021-09-01

## 2021-09-01 RX ORDER — PRAVASTATIN SODIUM 40 MG
40 TABLET ORAL DAILY
Qty: 90 TABLET | Refills: 3 | Status: SHIPPED | OUTPATIENT
Start: 2021-09-01 | End: 2022-11-10

## 2021-09-01 NOTE — TELEPHONE ENCOUNTER
Patient called to schedule an appt. I set it up in , but Melva is going to check it out. Referred by her family M.D. She has cellulitis and probably should be seen elsewhere?

## 2021-09-01 NOTE — PATIENT INSTRUCTIONS
*  Infection (celluitis) has resolved, no further antibiotics needed.     *  The best way to prevent these episodes is to control swelling in the lower legs.      --Wear compression stockings.      --Stay active, avoid sitting for long periods of time     --keep sodium intake below 2000 mg per day        *  Referral to a vein specialist    [facility]   [address], [address]   [facility] [address]   Phone: [phone]   Fax: [phone]       *  Covid booster vaccine 8 months after the last one (due early November for you)    *  Continue all medications at the same doses.  Contact your usual pharmacy if you need refills.       
Yes

## 2021-09-01 NOTE — PROGRESS NOTES
"    Assessment & Plan     (I87.2) Venous insufficiency of left leg  (primary encounter diagnosis)  Comment:   *  Infection (celluitis) has resolved, no further antibiotics needed.     *  The best way to prevent these episodes is to control swelling in the lower legs.      --Wear compression stockings.      --Stay active, avoid sitting for long periods of time     --keep sodium intake below 2000 mg per day        *  Referral to a vein specialist    [facility]   [address], [address]   [facility] [address]   Phone: [phone]   Fax: [phone]       *  Covid booster vaccine 8 months after the last one (due early November for you)    *  Continue all medications at the same doses.  Contact your usual pharmacy if you need refills.         Plan: Vascular Surgery Referral            (L03.116) Cellulitis of left anterior lower leg  Comment: resolved   Plan: Vascular Surgery Referral            (I10) Benign essential hypertension  Comment: This condition is currently controlled on the current medical regimen.  Continue current therapy.   Plan: atenolol (TENORMIN) 25 MG tablet,         Cyanocobalamin (B-12) 1000 MCG SUBL            (E78.5) Hyperlipidemia LDL goal <130  Comment: Discussed guidelines recommending a statin cholesterol lowering medication for any patient with either diabetes and/or vascular disease, aiming for a LDL goal under 100 for sure, ideally under 70, using whatever dose of statin tolerated.    Plan: pravastatin (PRAVACHOL) 40 MG tablet            (K21.9) Esophageal reflux  Comment: This condition is currently controlled on the current medical regimen.  Continue current therapy.   Plan:     (M85.859) Osteopenia of hip, unspecified laterality  Comment:   Plan: calcium 600 MG tablet                        BMI:   Estimated body mass index is 28.36 kg/m  as calculated from the following:    Height as of 6/2/21: 1.651 m (5' 5\").    Weight as of this encounter: 77.3 kg (170 lb 6.4 oz). "           Return in about 6 months (around 3/1/2022) for Medicare Annual Wellness Exam, Blood pressure.    Jared Emmanuel MD  [facility] Hannah is a 81 year old who presents for the following health issues     HPI     ED/UC Followup:    Facility:  [facility]  Date of visit: 08/18/2021  Reason for visit: Leg pain, redness  Current Status: Improved       2.    Hypertension:  History of hypertension, on medication.  No reported side effects from medications.    Reviewed last 6 BP readings in chart:  BP Readings from Last 6 Encounters:   09/01/21 106/60   06/02/21 112/64   05/27/21 (!) 164/94   05/27/21 134/77   01/12/21 112/80   09/08/20 106/64     No active cardiac complaints or symptoms with exercise.     3.  Hyperlipidemia:  Has history of hyperlipidemia.    The patient is taking a medication for this.  Denies any significant side effects from his medication.      Latest labs reviewed:    Recent Labs   Lab Test 09/28/20  0823 04/03/19  0938 05/22/14  1013   CHOL 146 170 171   HDL 56 55 54   LDL 80 98 101   TRIG 49 84 78   CHOLHDLRATIO  --   --  3.1        Lab Results   Component Value Date    AST 19 06/02/2021         4.      **I reviewed the information recorded in the patient's EPIC chart (including but not limited to medical history, surgical history, family history, problem list, medication list, and allergy list) and updated the information as indicated based on the patients reported information.         Review of Systems   Constitutional, HEENT, cardiovascular, pulmonary, gi and gu systems are negative, except as otherwise noted.      Objective    /60   Pulse 81   Resp 18   Wt 77.3 kg (170 lb 6.4 oz)   LMP 11/08/1991   SpO2 96%   BMI 28.36 kg/m    Body mass index is 28.36 kg/m .  Physical Exam   GENERAL alert and no distress  EYES:  Normal sclera,conjunctiva, EOMI  HENT: oral and posterior pharynx without lesions or erythema,  facies symmetric  NECK: Neck supple. No LAD, without thyroidmegaly.  RESP: Clear to ausculation bilaterally without wheezes or crackles. Normal BS in all fields.  CV: RRR normal S1S2 without murmurs, rubs or gallops.  LYMPH: no cervical lymph adenopathy appreciated  MS: extremities- no gross deformities of the visible extremities noted,   EXT: prior lower extremity edema cellulitis has resolved.   Chronic stasis dermatitis.   PSYCH: Alert and oriented times 3; speech- coherent  SKIN:  No obvious significant skin lesions on visible portions of face

## 2021-09-01 NOTE — TELEPHONE ENCOUNTER
Pt is okay to be seen by our vein clinic for her venous insufficiency and cellulitis.    Melva Wayne, FLAQUITAN, RN  [facility]  Vein Clinic

## 2021-09-04 ENCOUNTER — HEALTH MAINTENANCE LETTER (OUTPATIENT)
Age: 81
End: 2021-09-04

## 2021-09-09 ENCOUNTER — OFFICE VISIT (OUTPATIENT)
Dept: VASCULAR SURGERY | Facility: CLINIC | Age: 81
End: 2021-09-09
Attending: INTERNAL MEDICINE
Payer: COMMERCIAL

## 2021-09-09 DIAGNOSIS — L03.116 CELLULITIS OF LEFT ANTERIOR LOWER LEG: ICD-10-CM

## 2021-09-09 DIAGNOSIS — I87.2 VENOUS INSUFFICIENCY OF LEFT LEG: ICD-10-CM

## 2021-09-09 DIAGNOSIS — I83.12 VARICOSE VEINS OF LEFT LOWER EXTREMITY WITH INFLAMMATION: Primary | ICD-10-CM

## 2021-09-09 DIAGNOSIS — I87.2 VENOUS (PERIPHERAL) INSUFFICIENCY: ICD-10-CM

## 2021-09-09 PROBLEM — M25.552 HIP PAIN, LEFT: Status: RESOLVED | Noted: 2021-06-15 | Resolved: 2021-09-09

## 2021-09-09 PROCEDURE — 99203 OFFICE O/P NEW LOW 30 MIN: CPT | Performed by: SURGERY
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
## 2021-09-14 ENCOUNTER — ANCILLARY PROCEDURE (OUTPATIENT)
Dept: ULTRASOUND IMAGING | Facility: CLINIC | Age: 81
End: 2021-09-14
Attending: SURGERY
Payer: COMMERCIAL

## 2021-09-14 DIAGNOSIS — I83.12 VARICOSE VEINS OF LEFT LOWER EXTREMITY WITH INFLAMMATION: ICD-10-CM

## 2021-09-14 PROCEDURE — 93971 EXTREMITY STUDY: CPT | Mod: LT | Performed by: SURGERY

## 2021-09-27 ENCOUNTER — VIRTUAL VISIT (OUTPATIENT)
Dept: VASCULAR SURGERY | Facility: CLINIC | Age: 81
End: 2021-09-27
Attending: SURGERY
Payer: COMMERCIAL

## 2021-09-27 DIAGNOSIS — L03.116 CELLULITIS OF LEFT ANTERIOR LOWER LEG: ICD-10-CM

## 2021-09-27 DIAGNOSIS — I87.2 VENOUS INSUFFICIENCY OF LEFT LEG: Primary | ICD-10-CM

## 2021-09-27 PROCEDURE — 99213 OFFICE O/P EST LOW 20 MIN: CPT | Mod: 95 | Performed by: SURGERY

## 2021-09-27 NOTE — LETTER
9/27/2021         RE: Brielle Marvin  [address] [facility] [address]        Dear Colleague,    Thank you for referring your patient, Brielle Marvin, to the [facility]. Please see a copy of my visit note below.    Brielle is a 81 year old who is being evaluated via a billable video visit.      How would you like to obtain your AVS? MyChart  If the video visit is dropped, the invitation should be resent by: Text to cell phone: [phone]      Video Start Time: 8:15 AM    Video-Visit Details    Type of service:  Video Visit    Video End Time:8:25 AM    Originating Location (pt. Location): Home    Distant Location (provider location):  [facility]     Platform used for Video Visit: [employer]     [facility] Progress Note    Brielle Marvin presents in follow-up of left lower extremity swelling with cellulitis.  Since I have seen her, she has been wearing compression and states that the swelling is much improved and that the cellulitis has resolved.  She is very pleased with the improvement of her leg and says she has no symptoms at this time.  She returned on 9/14/2021 for bilateral extremity venous competency studies, the results of which we will discuss on today's video visit.  Her daughter is also present on this video visit.      Physical Exam  General: Pleasant female in no acute distress.  Blood pressure 149/72, pulse 67  Extremities: Trace edema bilaterally.  No erythema of the left lower extremity appreciated.    Ultrasound:  Left lower extremity: No evidence of deep vein thrombosis.  Left common femoral and proximal femoral vein incompetence.  Limited incompetence of the great saphenous vein at the level of the knee and proximal calf but otherwise the vein is competent.  Small saphenous vein, Vein of Giacomini are competent.  The anterior accessory saphenous vein is not visualized.  There are no incompetent  perforators appreciated.    Right lower extremity: Normal phasicity, compression and augmentation of the right common femoral vein with no evidence of deep vein thrombosis.  The right common femoral vein is incompetent.    Assessment:  No significant deep or superficial left lower extremity venous insufficiency.  I reassured the patient that the swelling is not on the basis of the limited insufficiency and that no treatment is indicated.    I encouraged her to continue to wear compression as she is doing and continue to be as active as possible.  She states that since she has been walking, the swelling is improved and the cellulitis has resolved.    Plan:  Continue conservative measures.  Call us if other concerns or questions arise.    Bolivar Sr MD    Dictated using Dragon voice recognition software which may result in transcription errors              Again, thank you for allowing me to participate in the care of your patient.        Sincerely,        Bolivar Sr MD

## 2021-09-27 NOTE — PROGRESS NOTES
Brielle is a 81 year old who is being evaluated via a billable video visit.      How would you like to obtain your AVS? MyChart  If the video visit is dropped, the invitation should be resent by: Text to cell phone: [phone]      Video Start Time: 8:15 AM    Video-Visit Details    Type of service:  Video Visit    Video End Time:8:25 AM    Originating Location (pt. Location): Home    Distant Location (provider location):  [facility]     Platform used for Video Visit: [employer]     [facility] [place] Progress Note    Brielle Marvin presents in follow-up of left lower extremity swelling with cellulitis.  Since I have seen her, she has been wearing compression and states that the swelling is much improved and that the cellulitis has resolved.  She is very pleased with the improvement of her leg and says she has no symptoms at this time.  She returned on 9/14/2021 for bilateral extremity venous competency studies, the results of which we will discuss on today's video visit.  Her daughter is also present on this video visit.      Physical Exam  General: Pleasant female in no acute distress.  Blood pressure 149/72, pulse 67  Extremities: Trace edema bilaterally.  No erythema of the left lower extremity appreciated.    Ultrasound:  Left lower extremity: No evidence of deep vein thrombosis.  Left common femoral and proximal femoral vein incompetence.  Limited incompetence of the great saphenous vein at the level of the knee and proximal calf but otherwise the vein is competent.  Small saphenous vein, Vein of Giacomini are competent.  The anterior accessory saphenous vein is not visualized.  There are no incompetent perforators appreciated.    Right lower extremity: Normal phasicity, compression and augmentation of the right common femoral vein with no evidence of deep vein thrombosis.  The right common femoral vein is incompetent.    Assessment:  No significant deep or  superficial left lower extremity venous insufficiency.  I reassured the patient that the swelling is not on the basis of the limited insufficiency and that no treatment is indicated.    I encouraged her to continue to wear compression as she is doing and continue to be as active as possible.  She states that since she has been walking, the swelling is improved and the cellulitis has resolved.    Plan:  Continue conservative measures.  Call us if other concerns or questions arise.    Bolivar Sr MD    Dictated using Dragon voice recognition software which may result in transcription errors

## 2021-10-14 ENCOUNTER — TELEPHONE (OUTPATIENT)
Dept: INTERNAL MEDICINE | Facility: CLINIC | Age: 81
End: 2021-10-14

## 2021-10-14 ENCOUNTER — NURSE TRIAGE (OUTPATIENT)
Dept: NURSING | Facility: CLINIC | Age: 81
End: 2021-10-14

## 2021-10-14 NOTE — TELEPHONE ENCOUNTER
Received red flag call from [facility].    Patient states she has cellulitis left lower leg about 4 inches above her ankle.    First Noted last evening.  Patient states the area is red, hot, slight edema, skin intact.  Woke at 0700 felt chills a fatigue, went back to sleep until 1330.  Patient did not check her temp.  Patient has taken ibuprofen today.  Denies feeling light headed or dizzy.  Patient is able to ambulate without pain, is painful to touch.  Patient states she has had cellulitis in this leg before and was given amoxicillin(8/10/21).    Patient states she sent Dr Emmanuel photos via my chart, this writer does not see any  My chart activity today.    Advised patient to check her temp, drink water.  Will forward to Dr Emmanuel.  Advised if she does not hear back by the end of the day and is febrile, worsening/extending redness-then will need UC assessment.  Patient verbalized understanding and agrees.  Paige Moreno RN

## 2021-10-15 ENCOUNTER — OFFICE VISIT (OUTPATIENT)
Dept: URGENT CARE | Facility: URGENT CARE | Age: 81
End: 2021-10-15
Payer: COMMERCIAL

## 2021-10-15 ENCOUNTER — NURSE TRIAGE (OUTPATIENT)
Dept: INTERNAL MEDICINE | Facility: CLINIC | Age: 81
End: 2021-10-15

## 2021-10-15 VITALS
RESPIRATION RATE: 16 BRPM | TEMPERATURE: 98.9 F | DIASTOLIC BLOOD PRESSURE: 62 MMHG | SYSTOLIC BLOOD PRESSURE: 105 MMHG | BODY MASS INDEX: 28.29 KG/M2 | WEIGHT: 170 LBS | HEART RATE: 69 BPM

## 2021-10-15 DIAGNOSIS — L03.116 CELLULITIS OF LEFT LOWER EXTREMITY: Primary | ICD-10-CM

## 2021-10-15 PROCEDURE — 99214 OFFICE O/P EST MOD 30 MIN: CPT | Mod: 25 | Performed by: FAMILY MEDICINE

## 2021-10-15 PROCEDURE — 96372 THER/PROPH/DIAG INJ SC/IM: CPT | Performed by: FAMILY MEDICINE

## 2021-10-15 RX ORDER — CEPHALEXIN 500 MG/1
500 CAPSULE ORAL 4 TIMES DAILY
Qty: 40 CAPSULE | Refills: 0 | Status: SHIPPED | OUTPATIENT
Start: 2021-10-15 | End: 2021-10-25

## 2021-10-15 RX ORDER — CEFTRIAXONE SODIUM 1 G
1 VIAL (EA) INJECTION ONCE
Status: COMPLETED | OUTPATIENT
Start: 2021-10-15 | End: 2021-10-15

## 2021-10-15 RX ADMIN — Medication 1 G: at 10:38

## 2021-10-15 NOTE — TELEPHONE ENCOUNTER
Pt called     Darker red cellulitis - on left ankle and 3-4 inches up yesterday     Tried to send [employer] message     Thought she had sent photos but didn't go through     In past was on antibiotics for it - last time was in August     Was looking really good - and all of sudden it return    Skin gets tight/hot     Yesterday was running a fever and had chills, states she should have gone to ED but her temp broke and feels much better today     Going to purchase a good thermometer today, hers is not working     No pain at all    Per protocol, advised visit TODAY      Unable to come at any time triage was able to offer appt, will plan to go to [facility] today     Melva JUDD RN    Reason for Disposition    [1] Finished taking antibiotic AND [2] cellulitis symptoms are WORSE (e.g., redness, pain  drainage, swelling)    Additional Information    Negative: Shock suspected (e.g., cold/pale/clammy skin, too weak to stand, low BP, rapid pulse)    Negative: Sounds like a life-threatening emergency to the triager    Negative:  surgical wound infection suspected (post-op)    Negative: Surgical wound infection suspected (post-op)    Negative: [1] Widespread rash AND [2] drug rash suspected (i.e., allergic reaction to antibiotic)    Negative: Animal bite wound infection suspected    Negative: SEVERE pain    Negative: [1] SEVERE pain with bending of finger (or toe) AND [2] cellulitis on hand (or foot)    Negative: Fever > 104 F (40 C)    Negative: [1] Widespread rash AND [2] bright red, sunburn-like    Negative: Black (necrotic), dark purple, or blisters develop in area of cellulitis    Negative: Patient sounds very sick or weak to the triager    Negative: [1] Fever > 100.0 F (37.8 C) AND [2] new onset    Negative: [1] Red streak runs from area of infection AND [2] new onset    Negative: [1] Caller has URGENT question AND [2] triager unable to answer question    Negative: [1] Taking antibiotic > 24 hours AND [2] fever > 100.4  F (38.0 C)    Negative: [1] Taking antibiotic > 24 hours AND [2] red streak (or line) runs from area of infection    Negative: [1] Taking antibiotic > 24 hours AND [2] cellulitis symptoms are WORSE (e.g., spreading redness, pain, swelling)    Protocols used: CELLULITIS ON ANTIBIOTIC FOLLOW-UP CALL-A-

## 2021-10-15 NOTE — TELEPHONE ENCOUNTER
"Pt calling back, see previous RN message from today. Pt states \"I had a fever earlier think it's gone now\". Pt reports erythema \"at least 5-6 inches of redness, ankle up front and R side of L leg\". \"Not feeling good, weak earlier, stronger now\". Pt states she has been wearing her compression stockings lately but \"not today\". History of cellulitis 8/18/21    Writer paged on call provider Dr. Robb who advises pt to elevate leg, f/u with PCP tomorrow. If fever, chills or worsening prior to being seen tomorrow go to ER. Writer informed pt of Dr. Robb advice.     Attempted to call pt back and no answer. Unable to LVM. Will try later. Routed to clinic to f/u with pt in the morning.         Reason for Disposition    [1] Looks infected (spreading redness, pus) AND [2] large red area (> 2 in. or 5 cm)    Additional Information    Negative: [1] Sudden onset of rash (within last 2 hours) AND [2] difficulty with breathing or swallowing    Negative: Sounds like a life-threatening emergency to the triager    Negative: Poison ivy, oak, or sumac contact suspected    Negative: Insect bite(s) suspected    Negative: Ringworm suspected (i.e., round pink patch, sometimes looks like ring, usually 1/2 to 1 inch [12-25 mm],  in size, slowly increasing in size)    Negative: Athlete's Foot suspected (i.e., itchy rash between the toes)    Negative: Jock Itch suspected (i.e., itchy rash on inner thighs near genital area)    Negative: Wound infection suspected (i.e., pain, spreading redness, or pus; in a cut, puncture, scrape or sutured wound)    Negative: Impetigo suspected  (i.e., painless infected superficial small sores, less than 1 inch or 2.5 cm, often covered by a soft, yellow-brown scab or crust; sometimes occurring near nasal openings)    Negative: Shingles suspected (i.e., painful rash, multiple small blisters grouped together in one area of body; dermatomal distribution)    Negative: Rash of external female genital area " (vulva)    Negative: Rash of penis or scrotum    Negative: Small spot, skin growth, or mole    Negative: Sores or skin ulcer, not a rash    Negative: Localized lump (or swelling) without redness or rash    Negative: [1] Localized purple or blood-colored spots or dots AND [2] not from injury or friction AND [3] fever    Negative: Patient sounds very sick or weak to the triager    Protocols used: RASH OR REDNESS - [id number]
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
## 2021-10-15 NOTE — TELEPHONE ENCOUNTER
"Patient calling back regarding a voicemail that was left for her. Patient states she was waiting for a nurse to call her back regarding redness on her leg and was requesting medication. Reviewed the following message from FNA    \"Writer paged on call provider Dr. Robb who advises pt to elevate leg, f/u with PCP tomorrow. If fever, chills or worsening prior to being seen tomorrow go to ER'    Caller verbalized understanding. Denies further questions.      Marcus Robin RN  [facility]     "

## 2021-10-15 NOTE — PROGRESS NOTES
Clinic Administered Medication Documentation    Administrations This Visit     cefTRIAXone (ROCEPHIN) injection 1 g     Admin Date  10/15/2021 Action  Given Dose  1 g Route  Intramuscular Site   Administered By  Larisa Hirsch CMA    Ordering Provider: Robinson North DO    NDC: 64068-634-41    Lot#: [id number]    : [employer]    Patient Supplied?: No

## 2021-10-19 PROBLEM — F32.9 MAJOR DEPRESSION: Status: ACTIVE | Noted: 2018-07-06

## 2021-10-30 ENCOUNTER — HEALTH MAINTENANCE LETTER (OUTPATIENT)
Age: 81
End: 2021-10-30

## 2021-11-19 ENCOUNTER — HOSPITAL ENCOUNTER (INPATIENT)
Facility: CLINIC | Age: 81
LOS: 2 days | Discharge: HOME | DRG: 872 | End: 2021-11-22
Attending: EMERGENCY MEDICINE | Admitting: STUDENT IN AN ORGANIZED HEALTH CARE EDUCATION/TRAINING PROGRAM
Payer: COMMERCIAL

## 2021-11-19 ENCOUNTER — NURSE TRIAGE (OUTPATIENT)
Dept: NURSING | Facility: CLINIC | Age: 81
End: 2021-11-19
Payer: COMMERCIAL

## 2021-11-19 DIAGNOSIS — L03.116 LEFT LEG CELLULITIS: ICD-10-CM

## 2021-11-19 LAB
BASOPHILS # BLD AUTO: 0 10E3/UL (ref 0–0.2)
BASOPHILS NFR BLD AUTO: 0 %
EOSINOPHIL # BLD AUTO: 0.1 10E3/UL (ref 0–0.7)
EOSINOPHIL NFR BLD AUTO: 1 %
ERYTHROCYTE [DISTWIDTH] IN BLOOD BY AUTOMATED COUNT: 15.9 % (ref 10–15)
HCO3 BLDV-SCNC: 29 MMOL/L (ref 21–28)
HCT VFR BLD AUTO: 36.4 % (ref 35–47)
HGB BLD-MCNC: 11.4 G/DL (ref 11.7–15.7)
IMM GRANULOCYTES # BLD: 0 10E3/UL
IMM GRANULOCYTES NFR BLD: 0 %
LACTATE BLD-SCNC: 1.4 MMOL/L
LYMPHOCYTES # BLD AUTO: 0.6 10E3/UL (ref 0.8–5.3)
LYMPHOCYTES NFR BLD AUTO: 6 %
MCH RBC QN AUTO: 26.7 PG (ref 26.5–33)
MCHC RBC AUTO-ENTMCNC: 31.3 G/DL (ref 31.5–36.5)
MCV RBC AUTO: 85 FL (ref 78–100)
MONOCYTES # BLD AUTO: 0.7 10E3/UL (ref 0–1.3)
MONOCYTES NFR BLD AUTO: 7 %
NEUTROPHILS # BLD AUTO: 8.7 10E3/UL (ref 1.6–8.3)
NEUTROPHILS NFR BLD AUTO: 86 %
NRBC # BLD AUTO: 0 10E3/UL
NRBC BLD AUTO-RTO: 0 /100
PCO2 BLDV: 47 MM HG (ref 40–50)
PH BLDV: 7.4 [PH] (ref 7.32–7.43)
PLATELET # BLD AUTO: 208 10E3/UL (ref 150–450)
PO2 BLDV: 27 MM HG (ref 25–47)
RBC # BLD AUTO: 4.27 10E6/UL (ref 3.8–5.2)
SAO2 % BLDV: 49 % (ref 94–100)
WBC # BLD AUTO: 10.1 10E3/UL (ref 4–11)

## 2021-11-19 PROCEDURE — C9803 HOPD COVID-19 SPEC COLLECT: HCPCS

## 2021-11-19 PROCEDURE — 84484 ASSAY OF TROPONIN QUANT: CPT | Performed by: EMERGENCY MEDICINE

## 2021-11-19 PROCEDURE — 85025 COMPLETE CBC W/AUTO DIFF WBC: CPT | Performed by: EMERGENCY MEDICINE

## 2021-11-19 PROCEDURE — 99285 EMERGENCY DEPT VISIT HI MDM: CPT

## 2021-11-19 PROCEDURE — 87040 BLOOD CULTURE FOR BACTERIA: CPT | Performed by: EMERGENCY MEDICINE

## 2021-11-19 PROCEDURE — 82040 ASSAY OF SERUM ALBUMIN: CPT | Performed by: EMERGENCY MEDICINE

## 2021-11-19 PROCEDURE — 82803 BLOOD GASES ANY COMBINATION: CPT

## 2021-11-19 PROCEDURE — 87636 SARSCOV2 & INF A&B AMP PRB: CPT | Performed by: EMERGENCY MEDICINE

## 2021-11-19 PROCEDURE — 36415 COLL VENOUS BLD VENIPUNCTURE: CPT | Performed by: EMERGENCY MEDICINE

## 2021-11-20 PROBLEM — L03.116 LEFT LEG CELLULITIS: Status: ACTIVE | Noted: 2021-11-20

## 2021-11-20 LAB
ALBUMIN SERPL-MCNC: 3.3 G/DL (ref 3.4–5)
ALP SERPL-CCNC: 44 U/L (ref 40–150)
ALT SERPL W P-5'-P-CCNC: 25 U/L (ref 0–50)
ANION GAP SERPL CALCULATED.3IONS-SCNC: 3 MMOL/L (ref 3–14)
AST SERPL W P-5'-P-CCNC: 20 U/L (ref 0–45)
BILIRUB SERPL-MCNC: 0.4 MG/DL (ref 0.2–1.3)
BUN SERPL-MCNC: 17 MG/DL (ref 7–30)
CALCIUM SERPL-MCNC: 8.4 MG/DL (ref 8.5–10.1)
CHLORIDE BLD-SCNC: 105 MMOL/L (ref 94–109)
CO2 SERPL-SCNC: 27 MMOL/L (ref 20–32)
CREAT SERPL-MCNC: 0.74 MG/DL (ref 0.52–1.04)
FLUAV RNA SPEC QL NAA+PROBE: NEGATIVE
FLUBV RNA RESP QL NAA+PROBE: NEGATIVE
GFR SERPL CREATININE-BSD FRML MDRD: 77 ML/MIN/1.73M2
GLUCOSE BLD-MCNC: 114 MG/DL (ref 70–99)
HOLD SPECIMEN: NORMAL
LACTATE SERPL-SCNC: 1.2 MMOL/L (ref 0.7–2)
POTASSIUM BLD-SCNC: 3.7 MMOL/L (ref 3.4–5.3)
PROT SERPL-MCNC: 7.7 G/DL (ref 6.8–8.8)
SARS-COV-2 RNA RESP QL NAA+PROBE: NEGATIVE
SODIUM SERPL-SCNC: 135 MMOL/L (ref 133–144)
TROPONIN I SERPL-MCNC: <0.015 UG/L (ref 0–0.04)

## 2021-11-20 PROCEDURE — 96365 THER/PROPH/DIAG IV INF INIT: CPT

## 2021-11-20 PROCEDURE — 99207 PR NO CHARGE LOS: CPT | Performed by: INTERNAL MEDICINE

## 2021-11-20 PROCEDURE — 250N000013 HC RX MED GY IP 250 OP 250 PS 637: Performed by: EMERGENCY MEDICINE

## 2021-11-20 PROCEDURE — 258N000003 HC RX IP 258 OP 636: Performed by: STUDENT IN AN ORGANIZED HEALTH CARE EDUCATION/TRAINING PROGRAM

## 2021-11-20 PROCEDURE — 250N000013 HC RX MED GY IP 250 OP 250 PS 637: Performed by: STUDENT IN AN ORGANIZED HEALTH CARE EDUCATION/TRAINING PROGRAM

## 2021-11-20 PROCEDURE — 250N000011 HC RX IP 250 OP 636: Performed by: STUDENT IN AN ORGANIZED HEALTH CARE EDUCATION/TRAINING PROGRAM

## 2021-11-20 PROCEDURE — 250N000011 HC RX IP 250 OP 636: Performed by: INTERNAL MEDICINE

## 2021-11-20 PROCEDURE — 120N000001 HC R&B MED SURG/OB

## 2021-11-20 PROCEDURE — 83605 ASSAY OF LACTIC ACID: CPT | Performed by: INTERNAL MEDICINE

## 2021-11-20 PROCEDURE — 99223 1ST HOSP IP/OBS HIGH 75: CPT | Mod: AI | Performed by: STUDENT IN AN ORGANIZED HEALTH CARE EDUCATION/TRAINING PROGRAM

## 2021-11-20 PROCEDURE — 36415 COLL VENOUS BLD VENIPUNCTURE: CPT | Performed by: INTERNAL MEDICINE

## 2021-11-20 PROCEDURE — 250N000011 HC RX IP 250 OP 636: Performed by: EMERGENCY MEDICINE

## 2021-11-20 RX ORDER — ONDANSETRON 2 MG/ML
4 INJECTION INTRAMUSCULAR; INTRAVENOUS EVERY 6 HOURS PRN
Status: DISCONTINUED | OUTPATIENT
Start: 2021-11-20 | End: 2021-11-22 | Stop reason: HOSPADM

## 2021-11-20 RX ORDER — PRAVASTATIN SODIUM 40 MG
40 TABLET ORAL DAILY
Status: DISCONTINUED | OUTPATIENT
Start: 2021-11-20 | End: 2021-11-22 | Stop reason: HOSPADM

## 2021-11-20 RX ORDER — LIDOCAINE 40 MG/G
CREAM TOPICAL
Status: DISCONTINUED | OUTPATIENT
Start: 2021-11-20 | End: 2021-11-22 | Stop reason: HOSPADM

## 2021-11-20 RX ORDER — PIPERACILLIN SODIUM, TAZOBACTAM SODIUM 3; .375 G/15ML; G/15ML
3.38 INJECTION, POWDER, LYOPHILIZED, FOR SOLUTION INTRAVENOUS EVERY 6 HOURS
Status: DISCONTINUED | OUTPATIENT
Start: 2021-11-20 | End: 2021-11-20

## 2021-11-20 RX ORDER — ACETAMINOPHEN 500 MG
1000 TABLET ORAL ONCE
Status: COMPLETED | OUTPATIENT
Start: 2021-11-20 | End: 2021-11-20

## 2021-11-20 RX ORDER — ONDANSETRON 4 MG/1
4 TABLET, ORALLY DISINTEGRATING ORAL EVERY 6 HOURS PRN
Status: DISCONTINUED | OUTPATIENT
Start: 2021-11-20 | End: 2021-11-22 | Stop reason: HOSPADM

## 2021-11-20 RX ORDER — CEFAZOLIN SODIUM 1 G/3ML
1 INJECTION, POWDER, FOR SOLUTION INTRAMUSCULAR; INTRAVENOUS EVERY 8 HOURS
Status: DISCONTINUED | OUTPATIENT
Start: 2021-11-20 | End: 2021-11-22 | Stop reason: HOSPADM

## 2021-11-20 RX ORDER — ACETAMINOPHEN 325 MG/1
650 TABLET ORAL EVERY 6 HOURS PRN
Status: DISCONTINUED | OUTPATIENT
Start: 2021-11-20 | End: 2021-11-22 | Stop reason: HOSPADM

## 2021-11-20 RX ORDER — FAMOTIDINE 10 MG
10 TABLET ORAL 2 TIMES DAILY PRN
Status: DISCONTINUED | OUTPATIENT
Start: 2021-11-20 | End: 2021-11-22 | Stop reason: HOSPADM

## 2021-11-20 RX ORDER — PIPERACILLIN SODIUM, TAZOBACTAM SODIUM 4; .5 G/20ML; G/20ML
4.5 INJECTION, POWDER, LYOPHILIZED, FOR SOLUTION INTRAVENOUS ONCE
Status: COMPLETED | OUTPATIENT
Start: 2021-11-20 | End: 2021-11-20

## 2021-11-20 RX ORDER — CEFAZOLIN SODIUM 1 G/3ML
1 INJECTION, POWDER, FOR SOLUTION INTRAMUSCULAR; INTRAVENOUS ONCE
Status: DISCONTINUED | OUTPATIENT
Start: 2021-11-20 | End: 2021-11-20

## 2021-11-20 RX ORDER — ATENOLOL 25 MG/1
25 TABLET ORAL DAILY
Status: DISCONTINUED | OUTPATIENT
Start: 2021-11-20 | End: 2021-11-22 | Stop reason: HOSPADM

## 2021-11-20 RX ORDER — HEPARIN SODIUM 5000 [USP'U]/.5ML
5000 INJECTION, SOLUTION INTRAVENOUS; SUBCUTANEOUS EVERY 12 HOURS
Status: DISCONTINUED | OUTPATIENT
Start: 2021-11-20 | End: 2021-11-22 | Stop reason: HOSPADM

## 2021-11-20 RX ORDER — SODIUM CHLORIDE, SODIUM LACTATE, POTASSIUM CHLORIDE, CALCIUM CHLORIDE 600; 310; 30; 20 MG/100ML; MG/100ML; MG/100ML; MG/100ML
INJECTION, SOLUTION INTRAVENOUS CONTINUOUS
Status: DISCONTINUED | OUTPATIENT
Start: 2021-11-20 | End: 2021-11-21

## 2021-11-20 RX ADMIN — PIPERACILLIN SODIUM AND TAZOBACTAM SODIUM 3.38 G: 3; .375 INJECTION, POWDER, LYOPHILIZED, FOR SOLUTION INTRAVENOUS at 07:46

## 2021-11-20 RX ADMIN — HEPARIN SODIUM 5000 UNITS: 5000 INJECTION, SOLUTION INTRAVENOUS; SUBCUTANEOUS at 16:08

## 2021-11-20 RX ADMIN — ACETAMINOPHEN 650 MG: 325 TABLET, FILM COATED ORAL at 20:40

## 2021-11-20 RX ADMIN — CEFAZOLIN 1 G: 1 INJECTION, POWDER, FOR SOLUTION INTRAMUSCULAR; INTRAVENOUS at 20:40

## 2021-11-20 RX ADMIN — PRAVASTATIN SODIUM 40 MG: 40 TABLET ORAL at 08:23

## 2021-11-20 RX ADMIN — ACETAMINOPHEN 1000 MG: 500 TABLET, FILM COATED ORAL at 00:29

## 2021-11-20 RX ADMIN — ACETAMINOPHEN 1000 MG: 500 TABLET, FILM COATED ORAL at 03:08

## 2021-11-20 RX ADMIN — HEPARIN SODIUM 5000 UNITS: 5000 INJECTION, SOLUTION INTRAVENOUS; SUBCUTANEOUS at 03:08

## 2021-11-20 RX ADMIN — ATENOLOL 25 MG: 25 TABLET ORAL at 08:23

## 2021-11-20 RX ADMIN — CEFAZOLIN 1 G: 1 INJECTION, POWDER, FOR SOLUTION INTRAMUSCULAR; INTRAVENOUS at 13:38

## 2021-11-20 RX ADMIN — ACETAMINOPHEN 650 MG: 325 TABLET, FILM COATED ORAL at 12:37

## 2021-11-20 RX ADMIN — PIPERACILLIN SODIUM AND TAZOBACTAM SODIUM 4.5 G: 4; .5 INJECTION, POWDER, LYOPHILIZED, FOR SOLUTION INTRAVENOUS at 01:53

## 2021-11-20 RX ADMIN — SODIUM CHLORIDE, POTASSIUM CHLORIDE, SODIUM LACTATE AND CALCIUM CHLORIDE: 600; 310; 30; 20 INJECTION, SOLUTION INTRAVENOUS at 03:09

## 2021-11-20 RX ADMIN — SODIUM CHLORIDE, POTASSIUM CHLORIDE, SODIUM LACTATE AND CALCIUM CHLORIDE: 600; 310; 30; 20 INJECTION, SOLUTION INTRAVENOUS at 13:38

## 2021-11-20 ASSESSMENT — ACTIVITIES OF DAILY LIVING (ADL)
ADLS_ACUITY_SCORE: 6
ADLS_ACUITY_SCORE: 8
ADLS_ACUITY_SCORE: 6
ADLS_ACUITY_SCORE: 10
ADLS_ACUITY_SCORE: 6
ADLS_ACUITY_SCORE: 12
ADLS_ACUITY_SCORE: 8
ADLS_ACUITY_SCORE: 6
ADLS_ACUITY_SCORE: 6
ADLS_ACUITY_SCORE: 12
ADLS_ACUITY_SCORE: 6
ADLS_ACUITY_SCORE: 8

## 2021-11-20 NOTE — ED NOTES
[facility]  ED Nurse Handoff Report    ED Chief complaint: Allergic Reaction and Leg Pain      ED Diagnosis:   Final diagnoses:   Left leg cellulitis       Code Status: Full Code    Allergies:   Allergies   Allergen Reactions     Codeine Nausea and Vomiting     vomiting     Codeine Nausea and Vomiting     Morphine Nausea and Vomiting     nausea     Vicodin [Hydrocodone-Acetaminophen] Nausea and Vomiting       Patient Story: pt presents to ED with fever, tachycardia and cellulitis of left lower leg  Focused Assessment:  Pt A&o x3, drowsy - redness and warmth noted to left lower leg  Treatments and/or interventions provided: antibiotics  Patient's response to treatments and/or interventions: fair    To be done/followed up on inpatient unit:  monitor for worsening cellulitis    Does this patient have any cognitive concerns?: none    Activity level - Baseline/Home:  Independent  Activity Level - Current:   Independent    Patient's Preferred language: English   Needed?: No    Isolation: None  Infection: Not Applicable  Patient tested for COVID 19 prior to admission: YES  Bariatric?: No    Vital Signs:   Vitals:    11/20/21 0045 11/20/21 0100 11/20/21 0115 11/20/21 0130   BP: 128/68 125/63 133/72 121/66   Pulse: 102 101 103 102   Resp: 22 21 22 22   Temp:       TempSrc:       SpO2: 91% 94% 93% 92%       Cardiac Rhythm:     Was the PSS-3 completed:   Yes  What interventions are required if any?               Family Comments: neighbor at bedside  OBS brochure/video discussed/provided to patient/family: N/A              Name of person given brochure if not patient: n/a              Relationship to patient: n/a    For the majority of the shift this patient's behavior was Green.   Behavioral interventions performed were reassurance and information.    ED NURSE PHONE NUMBER: *[phone]

## 2021-11-20 NOTE — PHARMACY-ADMISSION MEDICATION HISTORY
Pharmacy Medication History  Admission medication history interview status for the 11/19/2021  admission is complete. See EPIC admission navigator for prior to admission medications     Location of Interview: Patient room  Medication history sources: Patient, Surescripts and Care Everywhere    Significant changes made to the medication list:  Removed Fish oil    In the past week, patient estimated taking medication this percent of the time: Unclear    Additional medication history information:   Patient wasn't fully able to do med interview but did confirm all medications eventually (with emphasis on prescription medications). Fill dates reflect adherence to 3 Rx medications on file.     Patient hasn't started taking sublingual B12 because she couldn't find it at the store    Medication reconciliation completed by provider prior to medication history? Yes    Time spent in this activity: 20 minutes    Prior to Admission medications    Medication Sig Last Dose Taking? Auth Provider   ASPIRIN 81 MG OR TABS Take 81 mg by mouth daily  11/18/2021 at PM Yes Jared Emmanuel MD   atenolol (TENORMIN) 25 MG tablet Take 1 tablet (25 mg) by mouth daily 11/19/2021 at AM Yes Jared Emmanuel MD   BIOTIN PO  Past Week at Unknown time Yes Reported, Patient   calcium 600 MG tablet Take 1 tablet (600 mg) by mouth 2 times daily Past Week at Unknown time Yes Jared Emmanuel MD   Cholecalciferol (VITAMIN D) 2000 UNIT tablet Take 2,000 Units by mouth daily. Past Week at Unknown time Yes Jared Emmanuel MD   oxybutynin ER (DITROPAN-XL) 10 MG 24 hr tablet TAKE 1 TABLET DAILY (DOSE CHANGE) 11/19/2021 at AM Yes Jared Emmanuel MD   pravastatin (PRAVACHOL) 40 MG tablet Take 1 tablet (40 mg) by mouth daily 11/18/2021 at PM Yes Jared Emmanuel MD   VITAMIN B-12 1000 MCG OR TABS 1 tablet daily 11/19/2021 at Unknown time Yes Reported, Patient   VITAMIN C 500 MG OR TABS 1 tablet daily 11/19/2021  at Unknown time Yes Darek English MD   vitamin E 400 UNIT capsule Take 1 capsule (400 Units) by mouth daily 11/19/2021 at Unknown time Yes rFeida Pandya PA-C   zinc gluconate 50 MG tablet Take 50 mg by mouth daily 11/19/2021 at Unknown time Yes Reported, Patient   amoxicillin (AMOXIL) 500 MG tablet Take 4 tablets by mouth one hour prior to dental procedure  Patient not taking: Reported on 10/15/2021  at Jared Clarke MD   Cyanocobalamin (B-12) 1000 MCG SUBL Place 1,000 mcg under the tongue daily  at Hasnt taken  Jared Emmanuel MD       The information provided in this note is only as accurate as the sources available at the time of update(s)
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
## 2021-11-20 NOTE — ED PROVIDER NOTES
History     Chief Complaint:  Allergic Reaction and Leg Pain       HPI   Brielle Marvin is a 81 year old female who presents with concerns for left leg swelling and pain along with generalized malaise.  The patient notes that she has a history of recurrent cellulitis of this left leg, most recently treated with antibiotics 1 month ago and then more extended course in August.  She saw her dentist today and took 2 tabs of amoxicillin as directed for prophylaxis.  After the procedure, she started to feel fatigued and weak and then noticed the redness to the left leg.  With these symptoms, she became concerned that she might be dealing with an allergic reaction to the amoxicillin or an infection in her leg.  She otherwise denies sore throat, runny nose, cough, abdominal pain, nausea, vomiting, diarrhea, or other skin lesions.  She denies wheezing, lip or tongue swelling, or other allergic phenomenon.  She denies other complaints at this juncture.    Allergies:  Codeine  Codeine  Morphine  Vicodin [Hydrocodone-Acetaminophen]     Medications:    No current outpatient medications on file.      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     Unspecified menopausal and postmenopausal disorder        Patient Active Problem List    Diagnosis Date Noted     Left leg cellulitis 11/20/2021     Priority: Medium     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     Priority: Medium     right total knee replacement at [facility]       Osteoarthritis of right knee 03/28/2019     Priority: Medium     Overview:   Added automatically from  request for surgery [id number]       On continuous oral anticoagulation 11/09/2018     Priority: Medium     Hypercholesterolemia 07/06/2018     Priority: Medium     Major depression in complete remission (H) 07/06/2018     Priority: Medium     Overweight (BMI 25.0-29.9) 07/06/2018     Priority: Medium     Benign essential hypertension 05/29/2018     Priority: Medium     Gastroesophageal reflux disease without esophagitis 06/17/2015     Priority: Medium     Neck pain 10/03/2013     Priority: Medium     Sprain of neck 09/12/2013     Priority: Medium     Upper back strain 09/12/2013     Priority: Medium     Major depressive disorder, single episode, mild (H) 04/22/2013     Priority: Medium     ACP (advance care planning) 05/22/2012     Priority: Medium     Received outside advance directive.  Patient seen in an appointment with facilitator today to review health care directive.  HCD:Previously signed by patient and notarized by .  scanned into EMR as Advance Directive/Living Will document. View document and details in Code Status History Report. Please see advance directive for specifics. 10/29/2012  ROLA Rodrigez RN     .Discussed advance care planning with patient; information given to patient to review. Received call from patient and patient and  have appointment with facilitator on 10/29/2012 ROLA Rodrigez RN  10/22/2012     . Received referral from PMD for Honoring choices.   Calling patient today and left message regarding patient's interest in more information re: Advance Care Planning.  Will await call back from patient. ROLA Rodrigez RN  9/24/2012     .Discussed advance care planning with patient; information given to patient to review. 5/22/2012        Muscle weakness (generalized) 05/06/2010     Priority: Medium     Lumbago 08/07/2009     Priority: Medium     Enthesopathy of hip region 08/07/2009     Priority: Medium        Past Surgical History:    Past Surgical History:   Procedure Laterality Date      C REYNOSO W/O FACETEC FORAMOT/[facility] 1/2 VRT SEG, LUMBAR  1962    [facility]ami, Lumbar     C TOTAL KNEE ARTHROPLASTY Right 04/12/2019    right TKA at [facility]      CORRECT BUNION,SIMPLE  1981     [facility] COLONOSCOPY THRU STOMA, DIAGNOSTIC  8/31/02        Family History:    family history includes C.A.D. in her brother; Cancer in her mother and sister; Cardiovascular in her father; Family History Negative in her brother, brother, brother, and sister.    Social History:   reports that she has never smoked. She has never used smokeless tobacco. She reports that she does not drink alcohol and does not use drugs.    PCP: Jared Emmanuel     Review of Systems  Pertinent positives and negatives as above.  A 14-point review of systems was performed with all other systems reviewed as negative.      Physical Exam     Patient Vitals for the past 24 hrs:   BP Temp Temp src Pulse Resp SpO2   11/20/21 0300 102/56 97.7  F (36.5  C) Oral 96 18 93 %   11/20/21 0200 113/60 -- -- 98 18 93 %   11/20/21 0145 120/63 -- -- 98 21 92 %   11/20/21 0130 121/66 -- -- 102 22 92 %   11/20/21 0115 133/72 -- -- 103 22 93 %   11/20/21 0100 125/63 -- -- 101 21 94 %   11/20/21 0045 128/68 -- -- 102 22 91 %   11/20/21 0030 (!) 140/76 -- -- 98 23 94 %   11/20/21 0015 (!) 140/69 -- -- 108 24 92 %   11/20/21 0000 (!) 147/73 -- -- 111 25 93 %   11/19/21 2345 (!) 148/88 -- -- 112 26 96 %   11/19/21 2330 (!) 152/76 -- -- 106 24 94 %   11/19/21 2309 (!) 157/88 (!) 102.1  F (38.9  C) Oral 109 20 95 %        Physical Exam  Eye:  Pupils are equal, round, and reactive.  Extraocular movements intact.    ENT:  No rhinorrhea.  Moist mucus membranes.  Normal tongue and tonsil.    Cardiac:  Regular rate and rhythm.  No murmurs, gallops, or rubs.    Pulmonary:  Clear to auscultation bilaterally.  No wheezes, rales, or rhonchi.    Abdomen:  Positive bowel sounds.  Abdomen is soft and non-distended, without focal tenderness.    Musculoskeletal:  Normal movement of all  extremities without evidence for deficit.    Skin:  Warm and dry.  There is a brawny red warm tender circumferential redness to the left lower leg from just above the malleoli into the foot.  There is no evidence of an abscess, drainage, or other breakdown of the skin.    Neurologic:  Non-focal exam without asymmetric weakness or numbness.     Psychiatric:  Normal affect with appropriate interaction with examiner.    Emergency Department Course     Laboratory:  Labs Ordered and Resulted from Time of ED Arrival to Time of ED Departure   COMPREHENSIVE METABOLIC PANEL - Abnormal       Result Value    Sodium 135      Potassium 3.7      Chloride 105      Carbon Dioxide (CO2) 27      Anion Gap 3      Urea Nitrogen 17      Creatinine 0.74      Calcium 8.4 (*)     Glucose 114 (*)     Alkaline Phosphatase 44      AST 20      ALT 25      Protein Total 7.7      Albumin 3.3 (*)     Bilirubin Total 0.4      GFR Estimate 77     CBC WITH PLATELETS AND DIFFERENTIAL - Abnormal    WBC Count 10.1      RBC Count 4.27      Hemoglobin 11.4 (*)     Hematocrit 36.4      MCV 85      MCH 26.7      MCHC 31.3 (*)     RDW 15.9 (*)     Platelet Count 208      % Neutrophils 86      % Lymphocytes 6      % Monocytes 7      % Eosinophils 1      % Basophils 0      % Immature Granulocytes 0      NRBCs per 100 WBC 0      Absolute Neutrophils 8.7 (*)     Absolute Lymphocytes 0.6 (*)     Absolute Monocytes 0.7      Absolute Eosinophils 0.1      Absolute Basophils 0.0      Absolute Immature Granulocytes 0.0      Absolute NRBCs 0.0     ISTAT GASES LACTATE VENOUS POCT - Abnormal    Lactic Acid POCT 1.4      Bicarbonate Venous POCT 29 (*)     O2 Sat, Venous POCT 49 (*)     pCO2V Venous POCT 47      pH Venous POCT 7.40      pO2 Venous POCT 27     TROPONIN I - Normal    Troponin I <0.015     INFLUENZA A/B & SARS-COV2 PCR MULTIPLEX - Normal    Influenza A target Negative      Influenza B target Negative      SARS CoV2 PCR Negative     ROUTINE UA WITH  MICROSCOPIC REFLEX TO CULTURE   BLOOD CULTURE   BLOOD CULTURE        Interventions:    Medications   sodium chloride (PF) 0.9% PF flush 3 mL (3 mLs Intracatheter Given 11/20/21 0153)   sodium chloride (PF) 0.9% PF flush 3 mL (has no administration in time range)   piperacillin-tazobactam (ZOSYN) 4.5 g vial to attach to  mL bag (4.5 g Intravenous New Bag 11/20/21 0153)   acetaminophen (TYLENOL) tablet 1,000 mg (1,000 mg Oral Given 11/20/21 0308)        Emergency Department Course:  Past medical records, nursing notes, and vitals reviewed.  I performed an exam of the patient and obtained history, as documented above.      Impression & Plan      Medical Decision Making:  This delightful 81-year-old presents to us with a left lower extremity cellulitis along with a fever.  Fortunately, her labs are reassuring, showing no evidence of severe sepsis or other metabolic derangement.  However, with her advanced age, high fever, and recurrent cellulitis, I do feel admission to the hospital is prudent.  Blood cultures were obtained.  She was treated very broadly with a dose of Zosyn.  I went back through her prior visits and see recurrent treatments with Keflex, sometimes with IV doses of Ancef or Rocephin.  Nonetheless, this recurrent nature should be further investigated.  We will broaden her antibiotics for now.  I will hold off on vancomycin as this does not seem to be consistent with MRSA.  Otherwise, the patient is in support of being admitted.  Tylenol was given for fever.  Fluid was given.  Questions were answered and she is comfort with the plan for admission.  I reassured her this does not seem to be related to an allergic reaction in any way and that she should continue with antibiotics before dental procedures per her physician's recommendation.    Diagnosis:    ICD-10-CM    1. Left leg cellulitis  L03.116         11/20/2021   Chad Trierweiler, MD Trierweiler, Chad A, MD  11/20/21 0598

## 2021-11-20 NOTE — TELEPHONE ENCOUNTER
Pt called in states she has chills now.  Pt states she want to dentist today.  The Pt has dental work today.  The dentist gave her amoxicillin.  The Pt states she took amoxicillin 4:30 PM today.  Pt states she has chills now.  The symptom started 9 PM today   Pt temp is 100.5 oral  The Pt has leg infection in the past   It is her left leg.  The Pt has itching and swelling on her leg.  The swelling is little.  There is redness.  It is above the ankle.  Has rash on her leg.  There is redness on her leg.  No cough, no sore throat, no cold symptom.  No headache.  Feels cold.  Dr. Raymond Moon states Pt has to be evaluate at the ED.  Called for the Pt, relayed  provider message.  The Pt verbalized understand, no other concern at this time         Gui Comer Nurse Advisor 11/19/2021 10:21 PM        Reason for Disposition    [1] Red area or streak AND [2] fever    Additional Information    Negative: Shock suspected (e.g., cold/pale/clammy skin, too weak to stand, low BP, rapid pulse)    Negative: Difficult to awaken or acting confused (e.g., disoriented, slurred speech)    Negative: [1] Difficulty breathing AND [2] bluish lips, tongue or face    Negative: New onset rash with multiple purple (or blood-colored) spots or dots    Negative: Sounds like a life-threatening emergency to the triager    Negative: Fever in a cancer patient who is currently (or recently) receiving chemotherapy or radiation therapy, or cancer patient who has metastatic or end-stage cancer and is receiving palliative care    Negative: Pregnant    Negative: Postpartum (from 0 to 6 weeks after delivery)    Negative: Fever onset within 24 hours of receiving vaccine    Negative: [1] Fever AND [2] within 14 days of COVID-19 Exposure    Negative: Other symptom is present, see that guideline  (e.g., symptoms of cough, runny nose, sore throat, earache, abdominal pain, diarrhea, vomiting)    Negative: [1] Headache AND [2] stiff neck (can't touch chin to  chest)    Negative: Difficulty breathing    Negative: IV drug abuse    Negative: [1] Drinking very little AND [2] dehydration suspected (e.g., no urine > 12 hours, very dry mouth, very lightheaded)    Negative: Patient sounds very sick or weak to the triager  (Exception: mild weakness and hasn't taken fever medicine)    Negative: Fever > 104 F (40 C)    Negative: [1] Fever > 101 F (38.3 C) AND [2] age > 60    Negative: [1] Fever > 100.0 F (37.8 C) AND [2] diabetes mellitus or weak immune system (e.g., HIV positive, cancer chemo, splenectomy, organ transplant, chronic steroids)    Negative: [1] Fever > 100.0 F (37.8 C) AND [2] has port (portacath), central line, or PICC line    Negative: [1] Fever > 100.0 F (37.8 C) AND [2] indwelling urinary catheter (e.g., Andrews, Coude)    Negative: [1] Fever > 100.0 F (37.8 C) AND [2] bedridden (e.g., nursing home patient, CVA, chronic illness, recovering from surgery)    Negative: [1] Fever > 100.0 F (37.8 C) AND [2] surgery in the last month    Negative: Transplant patient (e.g., kidney, liver, lung, heart)    Negative: Fever present > 3 days (72 hours)    Negative: [1] Fever > 100.0 F (37.8 C) AND [2] foreign travel to a developing country in the last month    Negative: [1] Intermittent fever > 100.0 F (37.8 C) AND [2] lasts > 3 weeks    Negative: Severe difficulty breathing (e.g., struggling for each breath, speaks in single words)    Negative: Looks like a broken bone or dislocated joint (e.g., crooked or deformed)    Negative: Sounds like a life-threatening emergency to the triager    Negative: Chest pain    Negative: Followed a leg injury    Negative: [1] Small area of swelling AND [2] followed an insect bite to the area    Negative: Swelling of one ankle joint    Negative: Swelling of knee is main symptom    Negative: Pregnant    Negative: Postpartum (from 0 to 6 weeks after delivery)    Negative: Difficulty breathing at rest    Negative: Entire foot is cool or blue in  comparison to other side    Negative: [1] Can't walk or can barely walk AND [2] new onset    Negative: [1] Difficulty breathing with exertion (e.g., walking) AND [2] new onset or worsening    Protocols used: LEG SWELLING AND EDEMA-A-AH, FEVER-A-AH

## 2021-11-20 NOTE — PROGRESS NOTES
RECEIVING UNIT ED HANDOFF REVIEW    ED Nurse Handoff Report was reviewed by: Mona Ramirez RN on November 20, 2021 at 2:31 AM

## 2021-11-20 NOTE — PLAN OF CARE
Patient arrived from the ED via stretcher @ approximately 0250 this AM.  A&Ox4.  VSS, RA.  Denies pain; scheduled Tylenol given as ordered.  SBA.  LLE redness noted; marked.  PIV infusing LR @ 100 mL/hr.  Discharge pending progress.

## 2021-11-20 NOTE — PROGRESS NOTES
[facility]    Medicine Progress Note - Hospitalist Service       Date of Admission:  11/19/2021  Assessment & Plan   Brielle Marvin is an 81 year-old female with history of recurrent cellulitis, depression, hypertension, hyperlipidemia who presents with malaise, generalized weakness, left lower extremity discomfort and redness. Admitted on 11/19/2021.     Sepsis secondary to left lower extremity cellulitis  *Presents with malaise, weakness, left lower extremity discomfort and erythema   *Temperature 102.1F,  on admission   - Change piperacillin-tazobactam IV to cefazolin IV  - Acetaminophen PRN  - Add oxycodone PRN if needed, judicious use  - Continue IVF with LR     Benign essential hypertension  - Continue prior to admission atenolol     Hyperlipidemia  - Continue prior to admission pravastatin    Major depressive disorder, single episode, mild   Not currently on medications.     Gastroesophageal reflux disease without esophagitis  Not currently on medications.    - Famotidine PRN ordered      Clinically Significant Risk Factors Present on Admission          # Hypocalcemia: Ca = 8.4 mg/dL (Ref range: 8.5 - 10.1 mg/dL) and/or iCa = N/A on admission, will replace as needed     # Platelet Defect: home medication list includes an antiplatelet medication         Diet: Combination Diet Regular Diet Adult    DVT Prophylaxis: Heparin SQ  Andrews Catheter: Not present  Code Status: Full Code      Disposition Plan   Expected discharge: Anticipated discharge in 1-2 days once transitioned to oral antibiotics   Entered: Gustabo Carrasco MD 11/20/2021, 12:43 PM       The patient's care was discussed with the patient and patient's daughter    Gustabo Carrasco MD  Hospitalist Service  [facility]    ______________________________________________________________________    Interval History   No acute events overnight.     Data reviewed today: I reviewed all medications, new  labs and imaging results over the last 24 hours. I personally reviewed no images or EKG's today.    Physical Exam   Vital Signs: Temp: (!) (P) 100.5  F (38.1  C) Temp src: (P) Oral BP: (!) (P) 165/76 Pulse: (P) 91   Resp: (P) 18 SpO2: (P) 93 % O2 Device: (P) None (Room air)    Weight: 165 lbs 0 oz    Constitutional: NAD  Respiratory: Normal respiratory effort at rest, non-labored breathing  Cardiovascular: RRR   GI:    Skin/Integumen: Warm, dry. Erythema, warmth left lower extremity within previously drawn borders  Other:      Data   Recent Labs   Lab 11/19/21  2333   WBC 10.1   HGB 11.4*   MCV 85         POTASSIUM 3.7   CHLORIDE 105   CO2 27   BUN 17   CR 0.74   ANIONGAP 3   NYDIA 8.4*   *   ALBUMIN 3.3*   PROTTOTAL 7.7   BILITOTAL 0.4   ALKPHOS 44   ALT 25   AST 20   TROPONIN <0.015       No results found for this or any previous visit (from the past 24 hour(s)).  Medications     lactated ringers 100 mL/hr at 11/20/21 0309       atenolol  25 mg Oral Daily     ceFAZolin  1 g Intravenous Q8H     heparin ANTICOAGULANT  5,000 Units Subcutaneous Q12H     pravastatin  40 mg Oral Daily     sodium chloride (PF)  3 mL Intracatheter Q8H     sodium chloride (PF)  3 mL Intracatheter Q8H

## 2021-11-21 LAB
ANION GAP SERPL CALCULATED.3IONS-SCNC: 5 MMOL/L (ref 3–14)
BUN SERPL-MCNC: 6 MG/DL (ref 7–30)
CALCIUM SERPL-MCNC: 8.1 MG/DL (ref 8.5–10.1)
CHLORIDE BLD-SCNC: 108 MMOL/L (ref 94–109)
CO2 SERPL-SCNC: 25 MMOL/L (ref 20–32)
CREAT SERPL-MCNC: 0.56 MG/DL (ref 0.52–1.04)
ERYTHROCYTE [DISTWIDTH] IN BLOOD BY AUTOMATED COUNT: 16.2 % (ref 10–15)
GFR SERPL CREATININE-BSD FRML MDRD: 88 ML/MIN/1.73M2
GLUCOSE BLD-MCNC: 102 MG/DL (ref 70–99)
HCT VFR BLD AUTO: 34.4 % (ref 35–47)
HGB BLD-MCNC: 10.3 G/DL (ref 11.7–15.7)
MCH RBC QN AUTO: 25.8 PG (ref 26.5–33)
MCHC RBC AUTO-ENTMCNC: 29.9 G/DL (ref 31.5–36.5)
MCV RBC AUTO: 86 FL (ref 78–100)
PLATELET # BLD AUTO: 185 10E3/UL (ref 150–450)
POTASSIUM BLD-SCNC: 3.6 MMOL/L (ref 3.4–5.3)
RBC # BLD AUTO: 4 10E6/UL (ref 3.8–5.2)
SODIUM SERPL-SCNC: 138 MMOL/L (ref 133–144)
WBC # BLD AUTO: 5.4 10E3/UL (ref 4–11)

## 2021-11-21 PROCEDURE — 80048 BASIC METABOLIC PNL TOTAL CA: CPT | Performed by: STUDENT IN AN ORGANIZED HEALTH CARE EDUCATION/TRAINING PROGRAM

## 2021-11-21 PROCEDURE — 250N000011 HC RX IP 250 OP 636: Performed by: INTERNAL MEDICINE

## 2021-11-21 PROCEDURE — 120N000001 HC R&B MED SURG/OB

## 2021-11-21 PROCEDURE — 99232 SBSQ HOSP IP/OBS MODERATE 35: CPT | Performed by: INTERNAL MEDICINE

## 2021-11-21 PROCEDURE — 36415 COLL VENOUS BLD VENIPUNCTURE: CPT | Performed by: STUDENT IN AN ORGANIZED HEALTH CARE EDUCATION/TRAINING PROGRAM

## 2021-11-21 PROCEDURE — 250N000013 HC RX MED GY IP 250 OP 250 PS 637: Performed by: STUDENT IN AN ORGANIZED HEALTH CARE EDUCATION/TRAINING PROGRAM

## 2021-11-21 PROCEDURE — 85027 COMPLETE CBC AUTOMATED: CPT | Performed by: STUDENT IN AN ORGANIZED HEALTH CARE EDUCATION/TRAINING PROGRAM

## 2021-11-21 PROCEDURE — 250N000011 HC RX IP 250 OP 636: Performed by: STUDENT IN AN ORGANIZED HEALTH CARE EDUCATION/TRAINING PROGRAM

## 2021-11-21 PROCEDURE — 258N000003 HC RX IP 258 OP 636: Performed by: STUDENT IN AN ORGANIZED HEALTH CARE EDUCATION/TRAINING PROGRAM

## 2021-11-21 RX ADMIN — ACETAMINOPHEN 650 MG: 325 TABLET, FILM COATED ORAL at 01:03

## 2021-11-21 RX ADMIN — CEFAZOLIN 1 G: 1 INJECTION, POWDER, FOR SOLUTION INTRAMUSCULAR; INTRAVENOUS at 21:33

## 2021-11-21 RX ADMIN — ATENOLOL 25 MG: 25 TABLET ORAL at 08:33

## 2021-11-21 RX ADMIN — ACETAMINOPHEN 650 MG: 325 TABLET, FILM COATED ORAL at 21:48

## 2021-11-21 RX ADMIN — HEPARIN SODIUM 5000 UNITS: 5000 INJECTION, SOLUTION INTRAVENOUS; SUBCUTANEOUS at 02:25

## 2021-11-21 RX ADMIN — CEFAZOLIN 1 G: 1 INJECTION, POWDER, FOR SOLUTION INTRAMUSCULAR; INTRAVENOUS at 04:56

## 2021-11-21 RX ADMIN — SODIUM CHLORIDE, POTASSIUM CHLORIDE, SODIUM LACTATE AND CALCIUM CHLORIDE: 600; 310; 30; 20 INJECTION, SOLUTION INTRAVENOUS at 01:02

## 2021-11-21 RX ADMIN — PRAVASTATIN SODIUM 40 MG: 40 TABLET ORAL at 08:33

## 2021-11-21 RX ADMIN — HEPARIN SODIUM 5000 UNITS: 5000 INJECTION, SOLUTION INTRAVENOUS; SUBCUTANEOUS at 16:09

## 2021-11-21 RX ADMIN — CEFAZOLIN 1 G: 1 INJECTION, POWDER, FOR SOLUTION INTRAMUSCULAR; INTRAVENOUS at 14:08

## 2021-11-21 ASSESSMENT — ACTIVITIES OF DAILY LIVING (ADL)
ADLS_ACUITY_SCORE: 10
ADLS_ACUITY_SCORE: 6
ADLS_ACUITY_SCORE: 6
ADLS_ACUITY_SCORE: 10
ADLS_ACUITY_SCORE: 6
ADLS_ACUITY_SCORE: 6
ADLS_ACUITY_SCORE: 10
ADLS_ACUITY_SCORE: 10
ADLS_ACUITY_SCORE: 6
ADLS_ACUITY_SCORE: 10
ADLS_ACUITY_SCORE: 10
ADLS_ACUITY_SCORE: 6
ADLS_ACUITY_SCORE: 10
ADLS_ACUITY_SCORE: 6
ADLS_ACUITY_SCORE: 10
ADLS_ACUITY_SCORE: 6
ADLS_ACUITY_SCORE: 6

## 2021-11-21 ASSESSMENT — MIFFLIN-ST. JEOR: SCORE: 1226.12

## 2021-11-21 NOTE — PROGRESS NOTES
[facility]    Medicine Progress Note - Hospitalist Service       Date of Admission:  11/19/2021  Assessment & Plan   Brielle Marvin is an 81 year-old female with history of recurrent cellulitis, depression, hypertension, hyperlipidemia who presents with malaise, generalized weakness, left lower extremity discomfort and redness. Admitted on 11/19/2021.     Sepsis secondary to left lower extremity cellulitis  *Presents with malaise, weakness, left lower extremity discomfort and erythema   *Temperature 102.1F,  on admission   - Continue cefazolin IV  - Erythema appears to be improving, small amount of spread out from borders is not unexpected   - Acetaminophen, oxycodone PRN   - Discontinue IVF     Benign essential hypertension  - Continue prior to admission atenolol     Hyperlipidemia  - Continue prior to admission pravastatin    Major depressive disorder, single episode, mild   Not currently on medications.     Gastroesophageal reflux disease without esophagitis  Not currently on medications.    - Famotidine PRN ordered      Clinically Significant Risk Factors Present on Admission                   Diet: Combination Diet Regular Diet Adult    DVT Prophylaxis: Heparin SQ  Andrews Catheter: Not present  Code Status: Full Code      Disposition Plan   Expected discharge: Anticipated discharge tomorrow on oral cephalexin if continues to improve  Entered: Gustabo Carrasco MD 11/21/2021, 8:53 AM       The patient's care was discussed with the patient and patient's daughter    Gustabo Carrasco MD  Hospitalist Service  [facility]    ______________________________________________________________________    Interval History   No acute events overnight.  Reports this afternoon she is feeling better overall.  Continues to have some discomfort in her left leg.  Denies any chest pain or shortness of breath.  Tolerating diet.  Had some chills yesterday associated with low-grade  fever, currently improving.    Data reviewed today: I reviewed all medications, new labs and imaging results over the last 24 hours. I personally reviewed no images or EKG's today.    Physical Exam   Vital Signs: Temp: 98.5  F (36.9  C) Temp src: Oral BP: (!) 174/86 Pulse: 88   Resp: 16 SpO2: 94 % O2 Device: None (Room air)    Weight: 166 lbs 8 oz    Constitutional: NAD  Respiratory: Normal respiratory effort at rest, non-labored breathing  Cardiovascular: RRR   GI:    Skin/Integumen: Warm, dry. Erythema, warmth left lower extremity with slight amount outside of previously drawn borders though erythema overall improving   Other:      Data   Recent Labs   Lab 11/19/21  2333   WBC 10.1   HGB 11.4*   MCV 85         POTASSIUM 3.7   CHLORIDE 105   CO2 27   BUN 17   CR 0.74   ANIONGAP 3   NYDIA 8.4*   *   ALBUMIN 3.3*   PROTTOTAL 7.7   BILITOTAL 0.4   ALKPHOS 44   ALT 25   AST 20   TROPONIN <0.015       No results found for this or any previous visit (from the past 24 hour(s)).  Medications     lactated ringers 100 mL/hr at 11/21/21 0102       atenolol  25 mg Oral Daily     ceFAZolin  1 g Intravenous Q8H     heparin ANTICOAGULANT  5,000 Units Subcutaneous Q12H     pravastatin  40 mg Oral Daily     sodium chloride (PF)  3 mL Intracatheter Q8H     sodium chloride (PF)  3 mL Intracatheter Q8H

## 2021-11-21 NOTE — PLAN OF CARE
A&Ox4, forgetful. Lethargic this shift, did not get much sleep last night. Hypertensive & symptomatic fever of 100.5, improved w/ tylenol. Lactic fired: 1.2. Denies pain/nausea. SBA to BR, steady. Tolerating regular diet. LLE redness marked, unchanged. Voiding adequately. PIV infusing LR @100ml/hr, int ancef. Discharge pending improvement.

## 2021-11-21 NOTE — PLAN OF CARE
A&Ox4.  VSS except tmax 100.2; Tylenol given with good relief.  Complained of mild headache which was relieved with the Tylenol.  SBA.  PIV infusing LR @ 100 mL/hr; intermittent antibiotics.  Tolerating regular diet.  LLE redness marked, slight extension from marked area.  Discharge pending improvement/progress.

## 2021-11-22 ENCOUNTER — PATIENT OUTREACH (OUTPATIENT)
Dept: INTERNAL MEDICINE | Facility: CLINIC | Age: 81
End: 2021-11-22
Payer: COMMERCIAL

## 2021-11-22 VITALS
BODY MASS INDEX: 27.74 KG/M2 | HEIGHT: 65 IN | SYSTOLIC BLOOD PRESSURE: 159 MMHG | RESPIRATION RATE: 18 BRPM | WEIGHT: 166.5 LBS | HEART RATE: 80 BPM | TEMPERATURE: 97.6 F | OXYGEN SATURATION: 91 % | DIASTOLIC BLOOD PRESSURE: 84 MMHG

## 2021-11-22 PROCEDURE — 250N000011 HC RX IP 250 OP 636: Performed by: INTERNAL MEDICINE

## 2021-11-22 PROCEDURE — 99239 HOSP IP/OBS DSCHRG MGMT >30: CPT | Performed by: INTERNAL MEDICINE

## 2021-11-22 PROCEDURE — 250N000011 HC RX IP 250 OP 636: Performed by: STUDENT IN AN ORGANIZED HEALTH CARE EDUCATION/TRAINING PROGRAM

## 2021-11-22 PROCEDURE — 250N000013 HC RX MED GY IP 250 OP 250 PS 637: Performed by: STUDENT IN AN ORGANIZED HEALTH CARE EDUCATION/TRAINING PROGRAM

## 2021-11-22 RX ORDER — CEPHALEXIN 500 MG/1
500 CAPSULE ORAL 4 TIMES DAILY
Qty: 28 CAPSULE | Refills: 0 | Status: SHIPPED | OUTPATIENT
Start: 2021-11-22 | End: 2021-11-29

## 2021-11-22 RX ADMIN — HEPARIN SODIUM 5000 UNITS: 5000 INJECTION, SOLUTION INTRAVENOUS; SUBCUTANEOUS at 02:00

## 2021-11-22 RX ADMIN — ATENOLOL 25 MG: 25 TABLET ORAL at 08:09

## 2021-11-22 RX ADMIN — CEFAZOLIN 1 G: 1 INJECTION, POWDER, FOR SOLUTION INTRAMUSCULAR; INTRAVENOUS at 05:19

## 2021-11-22 RX ADMIN — PRAVASTATIN SODIUM 40 MG: 40 TABLET ORAL at 08:09

## 2021-11-22 RX ADMIN — ONDANSETRON 4 MG: 4 TABLET, ORALLY DISINTEGRATING ORAL at 02:39

## 2021-11-22 ASSESSMENT — ACTIVITIES OF DAILY LIVING (ADL)
ADLS_ACUITY_SCORE: 8
ADLS_ACUITY_SCORE: 10
ADLS_ACUITY_SCORE: 8
ADLS_ACUITY_SCORE: 8
ADLS_ACUITY_SCORE: 10

## 2021-11-22 NOTE — TELEPHONE ENCOUNTER
What type of discharge? Inpatient  Risk of Hospital admission or ED visit: 63%  Is a TCM episode required? Yes  When should the patient follow up with PCP? 7 days of discharge.    Pedro Fowler RN  [facility]

## 2021-11-22 NOTE — PLAN OF CARE
A&Ox4. VSS on RA. Complained of mild headache, has declined tylenol so far. SBA, moves well. IVF discontinued; intermittent antibiotics. Tolerating regular diet. LLE redness marked, slight extension from marked area. Discharge pending improvement/progress. Possibly home tomorrow 11/22.

## 2021-11-22 NOTE — DISCHARGE SUMMARY
Discharge Note    Patient discharged to home via private vehicle  accompanied by daughter.  IV: Discontinued  Prescriptions filled and given to patient/family.   Belongings reviewed and sent with patient.   Home medications returned to patient: NA  Equipment sent with: patient, N/A.   patient verbalizes understanding of discharge instructions. AVS given to patient.

## 2021-11-22 NOTE — DISCHARGE SUMMARY
[facility]  Hospitalist Discharge Summary       Date of Admission:  11/19/2021  Date of Discharge:  11/22/2021  Discharging Provider: Gustabo Carrasco MD      Discharge Diagnoses   Sepsis secondary to left lower extremity cellulitis  Chronic venous insufficiency with chronic venous stasis changes  Benign essential hypertension  Hyperlipidemia  Major depressive disorder, single episode, mild   Gastroesophageal reflux disease without esophagitis    Follow-ups Needed After Discharge   Follow-up Appointments     Follow-up and recommended labs and tests       Follow up with primary care provider, Jared Emmanuel, within 7   days for hospital follow- up.  No follow up labs or test are needed.             Unresulted Labs Ordered in the Past 30 Days of this Admission     Date and Time Order Name Status Description    11/19/2021 11:26 PM Blood Culture Peripheral Blood Preliminary     11/19/2021 11:26 PM Blood Culture Peripheral Blood Preliminary       These results will be followed up by hospitalist    Hospital Course   Brielle Marvin is an 81 year-old female with history of recurrent cellulitis, depression, hypertension, hyperlipidemia who presents with malaise, generalized weakness, left lower extremity discomfort and redness. Admitted on 11/19/2021.     Sepsis secondary to left lower extremity cellulitis  Chronic venous insufficiency with chronic venous stasis changes  *Presents with malaise, weakness, left lower extremity discomfort and erythema   *Temperature 102.1F,  on admission   - Managed with cefazolin IV while hospitalized, transitioned to cephalexin for an additional week on discharge  - Follow-up with primary care provider to ensure cellulitis is resolved and no additional antibiotics needed  - Has previously been seen by a vein specialist with mild venous insufficiency noted on US, though advanced venous stasis changes noted per prior notes. She reports persistent  erythema, even when acute cellulitis episodes have resolved, and suspect these chronic changes predispose her to her recurrent episodes     Benign essential hypertension  Continue prior to admission atenolol     Hyperlipidemia  Continue prior to admission pravastatin    Major depressive disorder, single episode, mild   Not currently on medications.     Gastroesophageal reflux disease without esophagitis  Not currently on medications.    Consultations This Hospital Stay   None    Code Status   Full Code    Time Spent on this Encounter   I, Gustabo Carrasco MD, personally saw the patient today and spent greater than 30 minutes discharging this patient.       Gustabo Carrasco MD  [facility]  ______________________________________________________________________    Physical Exam   Vital Signs: Temp: 97.6  F (36.4  C) Temp src: Axillary BP: (!) 159/84 Pulse: 80   Resp: 18 SpO2: 91 % O2 Device: None (Room air)    Weight: 166 lbs 8 oz    Constitutional: NAD  Respiratory:     Normal respiratory effort at rest, non-labored breathing  Cardiovascular: RRR   GI:    Skin/Integumen: Warm, dry. Improving erythema left lower extremity.    Other:         Primary Care Physician   Jared Emmanuel    Discharge Disposition   Discharged to home  Condition at discharge: Stable      Discharge Orders      Reason for your hospital stay    You were hospitalized for recurrent cellulitis (skin infection) of your left leg     Follow-up and recommended labs and tests     Follow up with primary care provider, Jared Emmanuel, within 7 days for hospital follow- up.  No follow up labs or test are needed.     Activity    Your activity upon discharge: activity as tolerated     Discharge Instructions    Elevate your leg as much as possible, follow a low salt diet to help with underlying swelling.     Diet    Follow this diet upon discharge: Low salt diet     Discharge Medications   Current Discharge Medication  List      START taking these medications    Details   cephALEXin (KEFLEX) 500 MG capsule Take 1 capsule (500 mg) by mouth 4 times daily for 7 days  Qty: 28 capsule, Refills: 0    Associated Diagnoses: Left leg cellulitis         CONTINUE these medications which have NOT CHANGED    Details   ASPIRIN 81 MG OR TABS Take 81 mg by mouth daily     Associated Diagnoses: Other and unspecified hyperlipidemia      atenolol (TENORMIN) 25 MG tablet Take 1 tablet (25 mg) by mouth daily  Qty: 90 tablet, Refills: 3    Comments: PROFILE FOR FUTURE ONGOING REFILLS  Associated Diagnoses: Benign essential hypertension      BIOTIN PO       calcium 600 MG tablet Take 1 tablet (600 mg) by mouth 2 times daily    Associated Diagnoses: Osteopenia of hip, unspecified laterality      Cholecalciferol (VITAMIN D) 2000 UNIT tablet Take 2,000 Units by mouth daily.    Associated Diagnoses: Major depressive disorder, single episode, mild (H)      oxybutynin ER (DITROPAN-XL) 10 MG 24 hr tablet TAKE 1 TABLET DAILY (DOSE CHANGE)  Qty: 90 tablet, Refills: 2    Associated Diagnoses: OAB (overactive bladder)      pravastatin (PRAVACHOL) 40 MG tablet Take 1 tablet (40 mg) by mouth daily  Qty: 90 tablet, Refills: 3    Comments: PROFILE FOR FUTURE ONGOING REFILLS  Associated Diagnoses: Hyperlipidemia LDL goal <130      VITAMIN B-12 1000 MCG OR TABS 1 tablet daily    Associated Diagnoses: Esophageal reflux      VITAMIN C 500 MG OR TABS 1 tablet daily  Qty: 60, Refills: 0    Associated Diagnoses: Routine gynecological examination      vitamin E 400 UNIT capsule Take 1 capsule (400 Units) by mouth daily      zinc gluconate 50 MG tablet Take 50 mg by mouth daily      amoxicillin (AMOXIL) 500 MG tablet Take 4 tablets by mouth one hour prior to dental procedure  Qty: 12 tablet, Refills: 1    Associated Diagnoses: Prophylactic antibiotic      Cyanocobalamin (B-12) 1000 MCG SUBL Place 1,000 mcg under the tongue daily    Associated Diagnoses: Benign essential  hypertension             Significant Results and Procedures   Most Recent 3 CBC's:  Recent Labs   Lab Test 21  0835 21  2333 21  1344   WBC 5.4 10.1 8.2   HGB 10.3* 11.4* 11.5*   MCV 86 85 91    208 240     Most Recent 3 BMP's:  Recent Labs   Lab Test 21  0835 21  2333 21  1344    135 139   POTASSIUM 3.6 3.7 4.9   CHLORIDE 108 105 106   CO2 25 27 34*   BUN 6* 17 25   CR 0.56 0.74 0.87   ANIONGAP 5 3 <1*   NYDIA 8.1* 8.4* 9.1   * 114* 115*     Most Recent 2 LFT's:  Recent Labs   Lab Test 21  2333 21  1344   AST 20 19   ALT 25 23   ALKPHOS 44 47   BILITOTAL 0.4 0.3     Most Recent 3 INR's:No lab results found.  Most Recent 3 Troponin's:  Recent Labs   Lab Test 21  2333   TROPONIN <0.015     Most Recent 3 BNP's:No lab results found.  Most Recent Cholesterol Panel:  Recent Labs   Lab Test 20  0823   CHOL 146   LDL 80   HDL 56   TRIG 49     Most Recent 6 Bacteria Isolates From Any Culture (See EPIC Reports for Culture Details):  Recent Labs   Lab Test 20  1601   CULT >100,000 colonies/mL  Escherichia coli  *     Most Recent TSH and T4:  Recent Labs   Lab Test 21  1344   TSH 1.84     Most Recent Hemoglobin A1c:No lab results found.  Most Recent Urinalysis:  Recent Labs   Lab Test 20  1601   COLOR Yellow   APPEARANCE Cloudy   URINEGLC Negative   URINEBILI Negative   URINEKETONE Negative   SG 1.025   UBLD Moderate*   URINEPH 6.0   PROTEIN 100*   UROBILINOGEN 0.2   NITRITE Positive*   LEUKEST Large*   RBCU 2-5*   WBCU >100*     Most Recent ABG:  Recent Labs   Lab Test 21  2344   PH 7.40     Most Recent ESR & CRP:No lab results found.,   Results for orders placed or performed in visit on 21   US Venous Competency Left    Narrative    Name:  Brielle Marvin                                                             Patient ID: [id number]  Date: 2021                                                       :  1940  Sex: female    Impression                    Examined by: TOSHIA Hathaway RVT  Age:  81 year old                                                                       Reading MD: AMY     INDICATION:  Varicose veins of left lower extremity with inflammation.     EXAM TYPE  LEFT LOWER EXTREMITY VENOUS DUPLEX FOR VENOUS INSUFFICIENCY TECHNICAL   SUMMARY     A duplex ultrasound study using color flow was performed to evaluate the   left lower extremity veins for valvular incompetence with the patient in a   steep reversed trendelenberg.      LEFT:     The deep veins demonstrate phasic flow, compress, and respond to   augmentations.  There is no evidence of DVT.  The common femoral and   proximal femoral veins are incompetent and free of thrombus.      The GSV demonstrates phasic flow, compresses, and responds to   augmentations from the saphenofemoral junction to the ankle with no   evidence of thrombus. The greater saphenous vein measures 6.2 mm at the   saphenofemoral junction, 4.6 mm in the proximal thigh, and 3.5 mm at the   knee. The GSV is incompetent from knee to the proximal thigh with the   greatest reflux time of 5230 milliseconds.      The AASV is not visualized.      The Giacomini vein is competent( 1.4 mm) communicating with the small   saphenous vein at the knee level.      The SSV demonstrates phasic flow, compresses, and responds to   augmentations from the popliteal space to the ankle.  No reflux or   thrombus is seen.  The saphenopopliteal junction is absent.      Perforators: There is no evidence of incompetent  veins at any   level.     RIGHT:     The CFV demonstrates phasic flow, compresses, responds to augmentations,   and is incompetent.  There is no DVT.       FINAL SUMMARY:  1.         No evidence of left lower extremity deep vein thrombus.  2.         Right common femoral vein incompetence.  Left common femoral   and proximal femoral vein incompetence.  3.         Left greater  saphenous vein incompetence.  4.         Superficial and perforating veins time of incompetence is >500   ms and >1000 ms for deep vein incompetence.       Allergies   Allergies   Allergen Reactions     Codeine Nausea and Vomiting     vomiting     Codeine Nausea and Vomiting     Morphine Nausea and Vomiting     nausea     Vicodin [Hydrocodone-Acetaminophen] Nausea and Vomiting

## 2021-11-22 NOTE — PLAN OF CARE
A&Ox4. VSS on RA. Complained of mild headache, administered tylenol. SBA, moves well. IVF discontinued; intermittent antibiotics. Had a brief sudden episode of nausea. No emesis. Zofran given. Tolerating regular diet. LLE redness marked, slight extension from marked area. Discharge pending improvement/progress. Possibly home today.

## 2021-11-24 NOTE — TELEPHONE ENCOUNTER
"Hospital/TCU/ED for chronic condition Discharge Protocol    \"Hi, my name is Kerline Slaughter RN, a registered nurse, and I am calling from [facility].  I am calling to follow up and see how things are going for you after your recent emergency visit/hospital/TCU stay.\"    Tell me how you are doing now that you are home?\"     \"I am doing better\"       Discharge Instructions    \"Let's review your discharge instructions.  What is/are the follow-up recommendations?  Pt. Response: F/U with Dr. Emmanuel    \"Has an appointment with your primary care provider been scheduled?\"   Yes. (confirm)    \"When you see the provider, I would recommend that you bring your medications with you.\"    Medications    \"Tell me what changed about your medicines when you discharged?\"    Changes to chronic meds?    0-1    \"What questions do you have about your medications?\"    None     New diagnoses of heart failure, COPD, diabetes, or MI?    No              Post Discharge Medication Reconciliation Status: discharge medications reconciled, continue medications without change.    Was MTM referral placed (*Make sure to put transitions as reason for referral)?   No    Call Summary    \"What questions or concerns do you have about your recent visit and your follow-up care?\"     none    \"If you have questions or things don't continue to improve, we encourage you contact us through the main clinic number (give number).  Even if the clinic is not open, triage nurses are available 24/7 to help you.     We would like you to know that our clinic has extended hours (provide information).  We also have urgent care (provide details on closest location and hours/contact info)\"      \"Thank you for your time and take care!\"      Kerline Slaughter RN             "

## 2021-11-25 LAB
BACTERIA BLD CULT: NO GROWTH
BACTERIA BLD CULT: NO GROWTH

## 2021-12-01 ENCOUNTER — OFFICE VISIT (OUTPATIENT)
Dept: INTERNAL MEDICINE | Facility: CLINIC | Age: 81
End: 2021-12-01
Payer: COMMERCIAL

## 2021-12-01 VITALS
BODY MASS INDEX: 27.12 KG/M2 | RESPIRATION RATE: 16 BRPM | HEART RATE: 54 BPM | TEMPERATURE: 97.5 F | WEIGHT: 163 LBS | OXYGEN SATURATION: 97 % | DIASTOLIC BLOOD PRESSURE: 68 MMHG | SYSTOLIC BLOOD PRESSURE: 120 MMHG

## 2021-12-01 DIAGNOSIS — I87.2 CHRONIC VENOUS STASIS DERMATITIS OF LOWER EXTREMITY: ICD-10-CM

## 2021-12-01 DIAGNOSIS — I87.2 VENOUS INSUFFICIENCY OF LEFT LOWER EXTREMITY: ICD-10-CM

## 2021-12-01 DIAGNOSIS — L03.116 LEFT LEG CELLULITIS: Primary | ICD-10-CM

## 2021-12-01 DIAGNOSIS — Z23 NEED FOR PROPHYLACTIC VACCINATION AND INOCULATION AGAINST INFLUENZA: ICD-10-CM

## 2021-12-01 DIAGNOSIS — Z23 HIGH PRIORITY FOR 2019-NCOV VACCINE: ICD-10-CM

## 2021-12-01 DIAGNOSIS — R42 ACUTE SEVERE VERTIGO: ICD-10-CM

## 2021-12-01 DIAGNOSIS — I10 BENIGN ESSENTIAL HYPERTENSION: ICD-10-CM

## 2021-12-01 PROCEDURE — 91300 COVID-19,PF,PFIZER (12+ YRS): CPT | Performed by: INTERNAL MEDICINE

## 2021-12-01 PROCEDURE — 0004A COVID-19,PF,PFIZER (12+ YRS): CPT | Performed by: INTERNAL MEDICINE

## 2021-12-01 PROCEDURE — G0008 ADMIN INFLUENZA VIRUS VAC: HCPCS | Performed by: INTERNAL MEDICINE

## 2021-12-01 PROCEDURE — 90662 IIV NO PRSV INCREASED AG IM: CPT | Performed by: INTERNAL MEDICINE

## 2021-12-01 PROCEDURE — 99495 TRANSJ CARE MGMT MOD F2F 14D: CPT | Mod: 25 | Performed by: INTERNAL MEDICINE

## 2021-12-01 NOTE — PATIENT INSTRUCTIONS
*  I suspect that the acute blood pressure elevation was secondary to the acute vertigo.     *  Cellulitis has completed resolved.     *  OK to reduce the Atenolol down to 37.5 mg (1 and 1/2 tablets) or 25 mg (1 tablet) once per day.      *  Goal for the blood pressure is between 100-140 more often than not with few if any low readings.      *  Continue all other medications at the same doses.  Contact your usual pharmacy if you need refills.

## 2021-12-01 NOTE — PROGRESS NOTES
"  Assessment & Plan     (L03.116) Left leg cellulitis  (primary encounter diagnosis)  Comment: Initial cellulitis has resolved with antibiotic therapy.  Discussed that the main reason for recurrent cellulitis is her chronic venous stasis/venous insufficiency.  The key to prevention of future lower extremity cellulitis is to manage swelling and edema aggressively.  Plan:     (R42) Acute severe vertigo  Comment: Acute episode of vertigo shortly after discharge.  Appears to be unrelated.  Work-up in the local emergency room showed no abnormalities.  I suspect benign positional vertigo that promptly resolved.  No evidence for vascular disease.  Plan:     (I10) Benign essential hypertension  Comment:   Blood pressure elevated while acutely ill with her dizziness acute infection.  I expect the elevation she saw in the emergency room on the day after Thanksgiving was due to the acute dizziness and stress.  Suspect her baseline blood pressure is well controlled with the current 25 mg dose atenolol.  On this 50 mg dose, she does report increased fatigue in her heart rate is somewhat slow.  Plan: Reduce atenolol back to 25 mg once daily.    (Z23) Need for prophylactic vaccination and inoculation against influenza  Comment:   Plan: INFLUENZA, QUAD, HIGH DOSE, PF, 65YR + (FLUZONE        HD), ADMIN INFLUENZA (For MEDICARE Patients         ONLY) []            (Z23) High priority for 2019-nCoV vaccine  Comment:   Plan: COVID-19,PF,PFIZER (12+ Yrs PURPLE LABEL)            (I87.2) Venous insufficiency of left lower extremity  Comment: Continue to wear compression stockings, control sodium in the diet, ambulate.  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    Weight as of this encounter: 73.9 kg (163 lb).           Return in about 8 months (around " 8/1/2022) for Blood pressure.    Jared Emmanuel MD  [facility] VESNA Hannah is a 81 year old who presents for the following health issues  accompanied by her daughter.    HPI     Patient also recently visited an ED in [facility] recently since leaving the hospital.  Hospital Follow-up Visit:    Hospital/Nursing Home/IP Rehab Facility: [facility]  Date of Admission: 11/19/21  Date of Discharge: 11/21/21  Reason(s) for Admission: Left lower leg cellulitis      Was your hospitalization related to COVID-19? No   Problems taking medications regularly:  None  Medication changes since discharge: None  Problems adhering to non-medication therapy:  None    Summary of hospitalization:  [facility] discharge summary reviewed  Diagnostic Tests/Treatments reviewed.  Follow up needed: none  Other Healthcare Providers Involved in Patient s Care:         None  Update since discharge: improved. Post Discharge Medication Reconciliation: discharge medications reconciled, continue medications without change.  Plan of care communicated with patient      Since home from hospital, they report that they are feeling better.   The left lower leg erythema and swelling is completely resolved  Energy level and stamina are slowly improving.    Appetite and intake are improving.   No fevers, no chills  No shortness of breath.   No abdominal pain.         Travel to [facility] for [place] 3 days after discharge.  In normal Thanksgiving meal without difficulty.  She woke the following morning extremely dizzy and unsteady, acutely worsening every time she moved her body or try to get out of bed.  No focal neurological weakness or significant vision changes.  Blood pressure noted to be elevated at 190/100, taken by her grandson who is a EMT.  The symptoms persisted and she was seen at the local emergency room.  I was unable to locate the medical  records from her emergency room visit within care everywhere  Laboratory studies were reportedly normal.  Head CT was negative for acute infarction.  Given IV fluids and prescription for meclizine.  Atenolol dose was increased from 25 to 50 mg once daily at discharge from the emergency room.    Since that time she has been feeling well.  Feels slightly more fatigue since the increase of atenolol.    **I reviewed the information recorded in the patient's EPIC chart (including but not limited to medical history, surgical history, family history, problem list, medication list, and allergy list) and updated the information as indicated based on the patients reported information.         Review of Systems   Constitutional, HEENT, cardiovascular, pulmonary, gi and gu systems are negative, except as otherwise noted.      Objective    /68 (BP Location: Left arm, Patient Position: Chair, Cuff Size: Adult Regular)   Pulse 54   Temp 97.5  F (36.4  C) (Temporal)   Resp 16   Wt 73.9 kg (163 lb)   LMP 11/08/1991   SpO2 97%   BMI 27.12 kg/m    Body mass index is 27.12 kg/m .  Physical Exam   GENERAL alert and no distress  EYES:  Normal sclera,conjunctiva, EOMI  HENT: facies symmetric  MS: extremities- no gross deformities of the visible extremities noted,   PSYCH: Alert and oriented times 3; speech- coherent  SKIN:  No obvious significant skin lesions on visible portions of face   NEURO:  Normal facial movements.  Appears to move normally .  Moves around the exam room easily, normal gait.  EXT: No lower extremity edema.  The previous area of erythema and cellulitis has completely resolved.  She has scattered varicosities on both lower legs.

## 2021-12-09 ENCOUNTER — ANCILLARY PROCEDURE (OUTPATIENT)
Dept: MAMMOGRAPHY | Facility: CLINIC | Age: 81
End: 2021-12-09
Payer: COMMERCIAL

## 2021-12-09 DIAGNOSIS — Z12.31 VISIT FOR SCREENING MAMMOGRAM: ICD-10-CM

## 2021-12-09 PROCEDURE — 77063 BREAST TOMOSYNTHESIS BI: CPT | Mod: TC | Performed by: RADIOLOGY

## 2021-12-09 PROCEDURE — 77067 SCR MAMMO BI INCL CAD: CPT | Mod: TC | Performed by: RADIOLOGY

## 2022-01-03 ENCOUNTER — OFFICE VISIT (OUTPATIENT)
Dept: URGENT CARE | Facility: URGENT CARE | Age: 82
End: 2022-01-03
Payer: COMMERCIAL

## 2022-01-03 VITALS
OXYGEN SATURATION: 96 % | HEART RATE: 85 BPM | BODY MASS INDEX: 27.96 KG/M2 | DIASTOLIC BLOOD PRESSURE: 73 MMHG | SYSTOLIC BLOOD PRESSURE: 129 MMHG | WEIGHT: 168 LBS | TEMPERATURE: 98.3 F

## 2022-01-03 DIAGNOSIS — N39.0 URINARY TRACT INFECTION WITH HEMATURIA, SITE UNSPECIFIED: Primary | ICD-10-CM

## 2022-01-03 DIAGNOSIS — R30.9 PAINFUL URINATION: ICD-10-CM

## 2022-01-03 DIAGNOSIS — R31.9 URINARY TRACT INFECTION WITH HEMATURIA, SITE UNSPECIFIED: Primary | ICD-10-CM

## 2022-01-03 DIAGNOSIS — R05.9 COUGH: ICD-10-CM

## 2022-01-03 LAB
ALBUMIN UR-MCNC: 100 MG/DL
APPEARANCE UR: ABNORMAL
BACTERIA #/AREA URNS HPF: ABNORMAL /HPF
BILIRUB UR QL STRIP: NEGATIVE
COLOR UR AUTO: YELLOW
GLUCOSE UR STRIP-MCNC: NEGATIVE MG/DL
HGB UR QL STRIP: ABNORMAL
KETONES UR STRIP-MCNC: NEGATIVE MG/DL
LEUKOCYTE ESTERASE UR QL STRIP: ABNORMAL
MUCOUS THREADS #/AREA URNS LPF: PRESENT /LPF
NITRATE UR QL: POSITIVE
PH UR STRIP: 6 [PH] (ref 5–7)
RBC #/AREA URNS AUTO: ABNORMAL /HPF
SP GR UR STRIP: 1.02 (ref 1–1.03)
SQUAMOUS #/AREA URNS AUTO: ABNORMAL /LPF
UROBILINOGEN UR STRIP-ACNC: 0.2 E.U./DL
WBC #/AREA URNS AUTO: >100 /HPF

## 2022-01-03 PROCEDURE — 87086 URINE CULTURE/COLONY COUNT: CPT | Performed by: FAMILY MEDICINE

## 2022-01-03 PROCEDURE — 87186 SC STD MICRODIL/AGAR DIL: CPT | Performed by: FAMILY MEDICINE

## 2022-01-03 PROCEDURE — 81001 URINALYSIS AUTO W/SCOPE: CPT | Performed by: FAMILY MEDICINE

## 2022-01-03 PROCEDURE — U0003 INFECTIOUS AGENT DETECTION BY NUCLEIC ACID (DNA OR RNA); SEVERE ACUTE RESPIRATORY SYNDROME CORONAVIRUS 2 (SARS-COV-2) (CORONAVIRUS DISEASE [COVID-19]), AMPLIFIED PROBE TECHNIQUE, MAKING USE OF HIGH THROUGHPUT TECHNOLOGIES AS DESCRIBED BY CMS-2020-01-R: HCPCS | Performed by: FAMILY MEDICINE

## 2022-01-03 PROCEDURE — 99213 OFFICE O/P EST LOW 20 MIN: CPT | Performed by: FAMILY MEDICINE

## 2022-01-03 PROCEDURE — U0005 INFEC AGEN DETEC AMPLI PROBE: HCPCS | Performed by: FAMILY MEDICINE

## 2022-01-03 RX ORDER — CEFDINIR 300 MG/1
300 CAPSULE ORAL 2 TIMES DAILY
Qty: 10 CAPSULE | Refills: 0 | Status: SHIPPED | OUTPATIENT
Start: 2022-01-03 | End: 2022-01-08
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
## 2022-01-03 NOTE — PATIENT INSTRUCTIONS
"Discharge Instructions for COVID-19 Patients  You have--or may have--COVID-19. Please follow the instructions listed below.   If you have a weakened immune system, discuss with your doctor any other actions you need to take.  How can I protect others?  If you have symptoms (fever, cough, body aches or trouble breathing):    Stay home and away from others (self-isolate) until:  ? Your other symptoms have resolved (gotten better). And   ? You've had no fever--and no medicine that reduces fever--for 1 full day (24 hours). And   ? At least 10 days have passed since your symptoms started. (You may need to wait 20 days. Follow the advice of your care team.)  If you don't show symptoms, but testing showed that you have COVID-19:    Stay home and away from others (self-isolate) until at least 10 days have passed since the date of your first positive COVID-19 test.  During this time    Stay in your own room, even for meals. Use your own bathroom if you can.    Stay away from others in your home. No hugging, kissing or shaking hands. No visitors.    Don't go to work, school or anywhere else.    Clean \"high touch\" surfaces often (doorknobs, counters, handles). Use household cleaning spray or wipes.    You'll find a full list of  on the EPA website: www.epa.gov/pesticide-registration/list-n-disinfectants-use-against-sars-cov-2.    Cover your mouth and nose with a mask or other face covering to avoid spreading germs.    Wash your hands and face often. Use soap and water.    Caregivers in these groups are at risk for severe illness due to COVID-19:  ? People 65 years and older  ? People who live in a nursing home or long-term care facility  ? People with chronic disease (lung, heart, cancer, diabetes, kidney, liver, immunologic)  ? People who have a weakened immune system, including those who:    Are in cancer treatment    Take medicine that weakens the immune system, such as corticosteroids    Had a bone marrow or organ " transplant    Have an immune deficiency    Have poorly controlled HIV or AIDS    Are obese (body mass index of 40 or higher)    Smoke regularly    Caregivers should wear gloves while washing dishes, handling laundry and cleaning bedrooms and bathrooms.    Use caution when washing and drying laundry: Don't shake dirty laundry and use the warmest water setting that you can.    For more tips on managing your health at home, go to www.cdc.gov/coronavirus/2019-ncov/downloads/10Things.pdf.  How can I take care of myself at home?  1. Get lots of rest. Drink extra fluids (unless a doctor has told you not to).  2. Take Tylenol (acetaminophen) for fever or pain. If you have liver or kidney problems, ask your family doctor if it's okay to take Tylenol.   Adults can take either:   ? 650 mg (two 325 mg pills) every 4 to 6 hours, or   ? 1,000 mg (two 500 mg pills) every 8 hours as needed.  ? Note: Don't take more than 3,000 mg in one day. Acetaminophen is found in many medicines (both prescribed and over-the-counter medicines). Read all labels to be sure you don't take too much.   For children, check the Tylenol bottle for the right dose. The dose is based on the child's age or weight.  3. If you have other health problems (like cancer, heart failure, an organ transplant or severe kidney disease): Call your specialty clinic if you don't feel better in the next 2 days.  4. Know when to call 911. Emergency warning signs include:  ? Trouble breathing or shortness of breath  ? Pain or pressure in the chest that doesn't go away  ? Feeling confused like you haven't felt before, or not being able to wake up  ? Bluish-colored lips or face  5. Your doctor may have prescribed a blood thinner medicine. Follow their instructions.  Where can I get more information?     [employer] - About COVID-19:   [email or web address]    CDC - What to Do If You're Sick:  www.cdc.gov/coronavirus/2019-ncov/about/steps-when-sick.html    CDC - Ending Home Isolation: www.cdc.gov/coronavirus/2019-ncov/hcp/disposition-in-home-patients.html    CDC - Caring for Someone: www.cdc.gov/coronavirus/2019-ncov/if-you-are-sick/care-for-someone.html    [facility] - Interim Guidance for Hospital Discharge to Home: www.health.[email or web address]./diseases/coronavirus/hcp/hospdischarge.pdf    Below are the COVID-19 hotlines at the [place] Department of Health ([facility]). Interpreters are available.  ? For health questions: Call 801-340-1765 or 1-310.374.1900 (7 a.m. to 7 p.m.)  ? For questions about schools and childcare: Call 382-579-3371 or 1-345.889.3284 (7 a.m. to 7 p.m.)    For informational purposes only. Not to replace the advice of your health care provider. Clinically reviewed by Dr. Grzegorz Blake.   Copyright   2020 [facility]. All rights reserved. [employer] 308371 - REV 01/05/21.

## 2022-01-04 LAB
BACTERIA UR CULT: ABNORMAL
SARS-COV-2 RNA RESP QL NAA+PROBE: NEGATIVE

## 2022-03-29 ENCOUNTER — NURSE TRIAGE (OUTPATIENT)
Dept: INTERNAL MEDICINE | Facility: CLINIC | Age: 82
End: 2022-03-29
Payer: COMMERCIAL

## 2022-03-29 ENCOUNTER — OFFICE VISIT (OUTPATIENT)
Dept: URGENT CARE | Facility: URGENT CARE | Age: 82
End: 2022-03-29
Payer: COMMERCIAL

## 2022-03-29 VITALS
HEART RATE: 84 BPM | DIASTOLIC BLOOD PRESSURE: 79 MMHG | RESPIRATION RATE: 16 BRPM | OXYGEN SATURATION: 98 % | BODY MASS INDEX: 26.63 KG/M2 | TEMPERATURE: 98.7 F | WEIGHT: 160 LBS | SYSTOLIC BLOOD PRESSURE: 125 MMHG

## 2022-03-29 DIAGNOSIS — R30.0 DYSURIA: ICD-10-CM

## 2022-03-29 DIAGNOSIS — N30.01 ACUTE CYSTITIS WITH HEMATURIA: Primary | ICD-10-CM

## 2022-03-29 LAB
ALBUMIN UR-MCNC: 100 MG/DL
APPEARANCE UR: ABNORMAL
BACTERIA #/AREA URNS HPF: ABNORMAL /HPF
BILIRUB UR QL STRIP: NEGATIVE
COLOR UR AUTO: YELLOW
GLUCOSE UR STRIP-MCNC: NEGATIVE MG/DL
HGB UR QL STRIP: ABNORMAL
KETONES UR STRIP-MCNC: NEGATIVE MG/DL
LEUKOCYTE ESTERASE UR QL STRIP: ABNORMAL
NITRATE UR QL: POSITIVE
PH UR STRIP: 6 [PH] (ref 5–7)
RBC #/AREA URNS AUTO: ABNORMAL /HPF
SP GR UR STRIP: 1.02 (ref 1–1.03)
UROBILINOGEN UR STRIP-ACNC: 0.2 E.U./DL
WBC #/AREA URNS AUTO: >100 /HPF

## 2022-03-29 PROCEDURE — 99213 OFFICE O/P EST LOW 20 MIN: CPT | Performed by: NURSE PRACTITIONER

## 2022-03-29 PROCEDURE — 81001 URINALYSIS AUTO W/SCOPE: CPT

## 2022-03-29 PROCEDURE — 87186 SC STD MICRODIL/AGAR DIL: CPT | Performed by: NURSE PRACTITIONER

## 2022-03-29 PROCEDURE — 87086 URINE CULTURE/COLONY COUNT: CPT | Performed by: NURSE PRACTITIONER

## 2022-03-29 RX ORDER — CEFDINIR 300 MG/1
300 CAPSULE ORAL 2 TIMES DAILY
Qty: 10 CAPSULE | Refills: 0 | Status: SHIPPED | OUTPATIENT
Start: 2022-03-29 | End: 2022-04-03

## 2022-03-29 NOTE — TELEPHONE ENCOUNTER
"1. SYMPTOM: \"What's the main symptom you're concerned about?\" (e.g., frequency, incontinence)      Frequency every 30 mins and burning   2. ONSET: \"When did the UTI start?\"      Yesterday   3. PAIN: \"Is there any pain?\" If so, ask: \"How bad is it?\" (Scale: 1-10; mild, moderate, severe)      Discomfort   4. CAUSE: \"What do you think is causing the symptoms?\"      UTI   5. OTHER SYMPTOMS: \"Do you have any other symptoms?\" (e.g., fever, flank pain, blood in urine, pain with urination)      No.     Pt agreed to do an E-visit now. Will call clinic if unable to navigate.     Reason for Disposition    Urinating more frequently than usual (i.e., frequency)    Additional Information    Negative: Shock suspected (e.g., cold/pale/clammy skin, too weak to stand, low BP, rapid pulse)    Negative: Sounds like a life-threatening emergency to the triager    Negative: Followed a genital area injury    Negative: Followed a genital area injury (penis, scrotum)    Negative: Vaginal discharge    Negative: Pus (white, yellow) or bloody discharge from end of penis    Negative: Discomfort (pain, burning or stinging) when passing urine and pregnant    Negative: Discomfort (pain, burning or stinging) when passing urine and female    Negative: Discomfort (pain, burning or stinging) when passing urine and male    Negative: Pain or itching in the vulvar area    Negative: Pain in scrotum is main symptom    Negative: Blood in the urine is main symptom    Negative: Symptoms arising from use of a urinary catheter (Andrews or Coude)    Negative: Unable to urinate (or only a few drops) > 4 hours and bladder feels very full (e.g., palpable bladder or strong urge to urinate)    Negative: Decreased urination and drinking very little and dehydration suspected (e.g., dark urine, no urine > 12 hours, very dry mouth, very lightheaded)    Negative: Patient sounds very sick or weak to the triager    Negative: Fever > 100.4 F (38.0 C)    Negative: Side (flank) " or lower back pain present    Negative: Can't control passage of urine (i.e., urinary incontinence) and new onset (< 2 weeks) or worsening    Protocols used: URINARY SYMPTOMS-A-OH

## 2022-03-30 NOTE — PROGRESS NOTES
Chief Complaint   Patient presents with     Urgent Care     possible UTI          ICD-10-CM    1. Acute cystitis with hematuria  N30.01 cefdinir (OMNICEF) 300 MG capsule   2. Dysuria  R30.0 UA macro with reflex to Microscopic and Culture - Clinc Collect     Urine Microscopic Exam     Urine Culture   Patient is instructed to take all antibiotics until gone.  Drink increased amounts of water and go to the emergency room if she develops a fever, confusion, nausea or vomiting or back pain.      Results for orders placed or performed in visit on 03/29/22 (from the past 24 hour(s))   UA macro with reflex to Microscopic and Culture - Clinc Collect    Specimen: Urine, Midstream   Result Value Ref Range    Color Urine Yellow Colorless, Straw, Light Yellow, Yellow    Appearance Urine Cloudy (A) Clear    Glucose Urine Negative Negative mg/dL    Bilirubin Urine Negative Negative    Ketones Urine Negative Negative mg/dL    Specific Gravity Urine 1.025 1.003 - 1.035    Blood Urine Moderate (A) Negative    pH Urine 6.0 5.0 - 7.0    Protein Albumin Urine 100  (A) Negative mg/dL    Urobilinogen Urine 0.2 0.2, 1.0 E.U./dL    Nitrite Urine Positive (A) Negative    Leukocyte Esterase Urine Large (A) Negative   Urine Microscopic Exam   Result Value Ref Range    Bacteria Urine Many (A) None Seen /HPF    RBC Urine 2-5 (A) 0-2 /HPF /HPF    WBC Urine >100 (A) 0-5 /HPF /HPF    Narrative    Microscopic exam performed on un-spun specimen due to low volume.       Subjective     Brielle Marvin is an 82 year old female who presents to clinic today for couple day history of frequent urination and urgency.  She denies any dysuria, fever, chills, nausea, vomiting, back pain or diarrhea.  Her last urinary tract infection was around January 4 and prior to that she had not had one in years.  She does take oxybutynin for urinary frequency.      Objective    /79   Pulse 84   Temp 98.7  F (37.1  C)   Resp 16   Wt 72.6 kg (160 lb)   LMP  11/08/1991   SpO2 98%   BMI 26.63 kg/m    Nurses notes and VS have been reviewed.    Physical Exam       GENERAL APPEARANCE: healthy appearing, alert     ABDOMEN:  soft, nontender, no HSM or masses and bowel sounds normal, no CVA tenderness     MS: extremities normal- no gross deformities noted; normal muscle tone.     SKIN: no suspicious lesions or rashes     NEURO: Normal strength and tone, mentation intact and speech normal     PSYCH: normal thought process; no significant mood disturbance    Patient Instructions     Patient Education     Understanding Urinary Tract Infections (UTIs)   Most UTIs are caused by bacteria, but they may also be caused by viruses or fungi. Bacteria from the bowel are the most common source of infection. The infection may start because of any of the following:     Sexual activity.  During sex, bacteria can travel from the penis, vagina, or rectum into the urethra.     Bacteria outside the rectum getting into the urethra.  Bacteria on the skin outside the rectum may travel into the urethra. This is more common in women since the rectum and urethra are closer to each other than in men. Wiping from front to back after using the toilet and keeping the area clean can help prevent germs from getting to the urethra.    Blocked urine flow through the urinary tract. If urine sits too long, germs may start to grow out of control.  Parts of the urinary tract  The infection can occur in any part of the urinary tract.       The kidneys. These organs collect and store urine.    The ureters. These tubes carry urine from the kidneys to the bladder.    The bladder. This holds urine until you are ready to let it out.    The urethra. This tube carries urine from the bladder out of the body. It is shorter in women, so bacteria can move through it more easily. The urethra is longer in men, so a UTI is less likely to reach the bladder or kidneys in men.  [employer] last reviewed this educational content on  1/1/2020 2000-2021 [employer]. All rights reserved. This information is not intended as a substitute for professional medical care. Always follow your healthcare professional's instructions.               ISABEL Coker, [facility] Urgent Care Provider    The use of Dragon/[employer]etov[employer] dictation services may have been used to construct the content in this note; any grammatical or spelling errors are non-intentional. Please contact the author of this note directly if you are in need of any clarification.

## 2022-03-30 NOTE — PATIENT INSTRUCTIONS
Patient Education     Understanding Urinary Tract Infections (UTIs)   Most UTIs are caused by bacteria, but they may also be caused by viruses or fungi. Bacteria from the bowel are the most common source of infection. The infection may start because of any of the following:     Sexual activity.  During sex, bacteria can travel from the penis, vagina, or rectum into the urethra.     Bacteria outside the rectum getting into the urethra.  Bacteria on the skin outside the rectum may travel into the urethra. This is more common in women since the rectum and urethra are closer to each other than in men. Wiping from front to back after using the toilet and keeping the area clean can help prevent germs from getting to the urethra.    Blocked urine flow through the urinary tract. If urine sits too long, germs may start to grow out of control.  Parts of the urinary tract  The infection can occur in any part of the urinary tract.       The kidneys. These organs collect and store urine.    The ureters. These tubes carry urine from the kidneys to the bladder.    The bladder. This holds urine until you are ready to let it out.    The urethra. This tube carries urine from the bladder out of the body. It is shorter in women, so bacteria can move through it more easily. The urethra is longer in men, so a UTI is less likely to reach the bladder or kidneys in men.  [employer] last reviewed this educational content on 1/1/2020 2000-2021 [employer]. All rights reserved. This information is not intended as a substitute for professional medical care. Always follow your healthcare professional's instructions.

## 2022-04-01 LAB — BACTERIA UR CULT: ABNORMAL

## 2022-05-16 ENCOUNTER — NURSE TRIAGE (OUTPATIENT)
Dept: INTERNAL MEDICINE | Facility: CLINIC | Age: 82
End: 2022-05-16
Payer: COMMERCIAL

## 2022-05-16 ENCOUNTER — OFFICE VISIT (OUTPATIENT)
Dept: INTERNAL MEDICINE | Facility: CLINIC | Age: 82
End: 2022-05-16
Payer: COMMERCIAL

## 2022-05-16 VITALS
HEART RATE: 71 BPM | WEIGHT: 160 LBS | DIASTOLIC BLOOD PRESSURE: 70 MMHG | HEIGHT: 65 IN | OXYGEN SATURATION: 97 % | BODY MASS INDEX: 26.66 KG/M2 | SYSTOLIC BLOOD PRESSURE: 122 MMHG | TEMPERATURE: 98.1 F

## 2022-05-16 DIAGNOSIS — R30.0 DYSURIA: ICD-10-CM

## 2022-05-16 DIAGNOSIS — N30.00 ACUTE CYSTITIS WITHOUT HEMATURIA: Primary | ICD-10-CM

## 2022-05-16 LAB
ALBUMIN UR-MCNC: 100 MG/DL
APPEARANCE UR: CLEAR
BACTERIA #/AREA URNS HPF: ABNORMAL /HPF
BILIRUB UR QL STRIP: NEGATIVE
COLOR UR AUTO: YELLOW
GLUCOSE UR STRIP-MCNC: NEGATIVE MG/DL
HGB UR QL STRIP: ABNORMAL
KETONES UR STRIP-MCNC: NEGATIVE MG/DL
LEUKOCYTE ESTERASE UR QL STRIP: ABNORMAL
NITRATE UR QL: NEGATIVE
PH UR STRIP: 6 [PH] (ref 5–7)
RBC #/AREA URNS AUTO: >100 /HPF
SP GR UR STRIP: >=1.03 (ref 1–1.03)
SQUAMOUS #/AREA URNS AUTO: ABNORMAL /LPF
UROBILINOGEN UR STRIP-ACNC: 0.2 E.U./DL
WBC #/AREA URNS AUTO: ABNORMAL /HPF

## 2022-05-16 PROCEDURE — 81001 URINALYSIS AUTO W/SCOPE: CPT | Performed by: INTERNAL MEDICINE

## 2022-05-16 PROCEDURE — 99213 OFFICE O/P EST LOW 20 MIN: CPT | Performed by: INTERNAL MEDICINE

## 2022-05-16 PROCEDURE — 87086 URINE CULTURE/COLONY COUNT: CPT | Performed by: INTERNAL MEDICINE

## 2022-05-16 PROCEDURE — 87186 SC STD MICRODIL/AGAR DIL: CPT | Performed by: INTERNAL MEDICINE

## 2022-05-16 RX ORDER — NITROFURANTOIN 25; 75 MG/1; MG/1
100 CAPSULE ORAL 2 TIMES DAILY
Qty: 10 CAPSULE | Refills: 0 | Status: SHIPPED | OUTPATIENT
Start: 2022-05-16 | End: 2022-05-16 | Stop reason: ALTCHOICE

## 2022-05-16 RX ORDER — NITROFURANTOIN 25; 75 MG/1; MG/1
100 CAPSULE ORAL 2 TIMES DAILY
Qty: 10 CAPSULE | Refills: 0 | Status: SHIPPED | OUTPATIENT
Start: 2022-05-16 | End: 2023-07-21

## 2022-05-16 NOTE — TELEPHONE ENCOUNTER
"Pt having mild lower back pain rating 3/10, frequent urination and NOC incontinence starting 5/15.       Pt Unable to navigate [employer] for recommended e-visit for UTI symptoms. Per disposition, patient needs to be seen in office today. Patient agrees and is scheduled with available provider today.     1. SYMPTOM: \"What's the main symptom you're concerned about?\" (e.g., frequency, incontinence)      Frequency and Incontinence  2. ONSET: \"When did the  pain  start?\"      Yesterday  3. PAIN: \"Is there any pain?\" If so, ask: \"How bad is it?\" (Scale: 1-10; mild, moderate, severe)      3/10 intermittent soreness  4. CAUSE: \"What do you think is causing the symptoms?\"      UTI  5. OTHER SYMPTOMS: \"Do you have any other symptoms?\" (e.g., fever, flank pain, blood in urine, pain with urination)      Mild back pain    Reason for Disposition    Side (flank) or lower back pain present    Urinating more frequently than usual (i.e., frequency)    Additional Information    Negative: Shock suspected (e.g., cold/pale/clammy skin, too weak to stand, low BP, rapid pulse)    Negative: Sounds like a life-threatening emergency to the triager    Negative: Followed a genital area injury    Negative: Followed a genital area injury (penis, scrotum)    Negative: Vaginal discharge    Negative: Pus (white, yellow) or bloody discharge from end of penis    Negative: Discomfort (pain, burning or stinging) when passing urine and pregnant    Negative: Discomfort (pain, burning or stinging) when passing urine and female    Negative: Discomfort (pain, burning or stinging) when passing urine and male    Negative: Pain or itching in the vulvar area    Negative: Pain in scrotum is main symptom    Negative: Blood in the urine is main symptom    Negative: Symptoms arising from use of a urinary catheter (Andrews or Coude)    Negative: Unable to urinate (or only a few drops) > 4 hours and bladder feels very full (e.g., palpable bladder or strong urge to " urinate)    Negative: Decreased urination and drinking very little and dehydration suspected (e.g., dark urine, no urine > 12 hours, very dry mouth, very lightheaded)    Negative: Patient sounds very sick or weak to the triager    Negative: Fever > 100.4 F (38.0 C)    Negative: Can't control passage of urine (i.e., urinary incontinence) and new onset (< 2 weeks) or worsening    Negative: Bad or foul-smelling urine    Protocols used: URINARY SYMPTOMS-A-OH

## 2022-05-16 NOTE — PROGRESS NOTES
"  Assessment & Plan     Acute cystitis without hematuria  3rd episode this year. Use different antibx today.  Discussed prev strategies.  If further recurrence consider urologic eval.   - nitroFURantoin macrocrystal-monohydrate (MACROBID) 100 MG capsule; Take 1 capsule (100 mg) by mouth 2 times daily    Dysuria  - UA macro with reflex to Microscopic and Culture - Clinc Collect  - Urine Microscopic Exam  - Urine Culture    Prescription drug management         BMI:   Estimated body mass index is 26.63 kg/m  as calculated from the following:    Height as of this encounter: 1.651 m (5' 5\").    Weight as of this encounter: 72.6 kg (160 lb).           No follow-ups on file.    Yeyo Germain MD  [facility]    Nancy Hannah is a 82 year old who presents for the following health issues     History of Present Illness       Reason for visit:  UTI  Symptom onset:  1-3 days ago  Symptom intensity:  Moderate  Symptom progression:  Staying the same  Had these symptoms before:  Yes  Has tried/received treatment for these symptoms:  Yes  Previous treatment was successful:  Yes  Prior treatment description:  Unitary trac infection  What makes it better:  Antibotics    She eats 0-1 servings of fruits and vegetables daily.She consumes 0 sweetened beverage(s) daily.She exercises with enough effort to increase her heart rate 9 or less minutes per day.  She exercises with enough effort to increase her heart rate 3 or less days per week.   She is taking medications regularly.             Review of Systems         Objective    /70   Pulse 71   Temp 98.1  F (36.7  C) (Temporal)   Ht 1.651 m (5' 5\")   Wt 72.6 kg (160 lb)   LMP 11/08/1991   SpO2 97%   BMI 26.63 kg/m    Body mass index is 26.63 kg/m .  Physical Exam   GENERAL: healthy, alert and no distress                "

## 2022-05-18 LAB — BACTERIA UR CULT: ABNORMAL

## 2022-07-25 DIAGNOSIS — N32.81 OAB (OVERACTIVE BLADDER): ICD-10-CM

## 2022-07-25 RX ORDER — OXYBUTYNIN CHLORIDE 10 MG/1
TABLET, EXTENDED RELEASE ORAL
Qty: 90 TABLET | Refills: 3 | Status: SHIPPED | OUTPATIENT
Start: 2022-07-25 | End: 2023-01-05

## 2022-08-15 NOTE — PATIENT INSTRUCTIONS
*  Visit [facility] for Bladder Control - [place] [phone]   [email or web address] for further evaluation of the issue. Based on their evaluation, they can determien what the next best course of treatment might be.           [Father] : father

## 2022-08-16 ENCOUNTER — LAB (OUTPATIENT)
Dept: URGENT CARE | Facility: URGENT CARE | Age: 82
End: 2022-08-16
Payer: COMMERCIAL

## 2022-08-16 ENCOUNTER — NURSE TRIAGE (OUTPATIENT)
Dept: NURSING | Facility: CLINIC | Age: 82
End: 2022-08-16

## 2022-08-16 DIAGNOSIS — Z20.822 SUSPECTED COVID-19 VIRUS INFECTION: ICD-10-CM

## 2022-08-16 LAB — SARS-COV-2 RNA RESP QL NAA+PROBE: NEGATIVE

## 2022-08-16 PROCEDURE — U0005 INFEC AGEN DETEC AMPLI PROBE: HCPCS

## 2022-08-16 PROCEDURE — U0003 INFECTIOUS AGENT DETECTION BY NUCLEIC ACID (DNA OR RNA); SEVERE ACUTE RESPIRATORY SYNDROME CORONAVIRUS 2 (SARS-COV-2) (CORONAVIRUS DISEASE [COVID-19]), AMPLIFIED PROBE TECHNIQUE, MAKING USE OF HIGH THROUGHPUT TECHNOLOGIES AS DESCRIBED BY CMS-2020-01-R: HCPCS

## 2022-08-16 NOTE — TELEPHONE ENCOUNTER
Patient calling, and states she just traveled back from [facility], and she has a low grade sore throat. She is asking to get a PCR Covid test.    Call was transferred to make PCR test appointment.    Edie Barbosa RN   [facility] Nurse Advisor      Reason for Disposition    Patient wants to be seen    Additional Information    Negative: SEVERE difficulty breathing (e.g., struggling for each breath, speaks in single words)    Negative: Sounds like a life-threatening emergency to the triager    Negative: Throat culture results, call about    Negative: Productive cough is main symptom    Negative: Runny nose is main symptom    Negative: History of rheumatic fever    Negative: Diabetes mellitus or weak immune system (e.g., HIV positive, cancer chemo, splenectomy, organ transplant, chronic steroids)    Negative: Widespread rash (especially chest and abdomen)    Negative: Earache also present    Negative: Pus on tonsils (back of throat) and swollen neck lymph nodes ('glands')    Negative: SEVERE sore throat pain    Negative: Difficulty breathing (per caller) but not severe    Negative: Fever > 103 F (39.4 C)    Negative: Refuses to drink anything for > 12 hours    Negative: Drooling or spitting out saliva (because can't swallow)    Negative: Unable to open mouth completely    Negative: Drinking very little and has signs of dehydration (e.g., no urine > 12 hours, very dry mouth, very lightheaded)    Negative: Patient sounds very sick or weak to the triager    Protocols used: SORE THROAT-A-OH    COVID 19 Nurse Triage Plan/Patient Instructions    Please be aware that novel coronavirus (COVID-19) may be circulating in the community. If you develop symptoms such as fever, cough, or SOB or if you have concerns about the presence of another infection including coronavirus (COVID-19), please contact your health care provider or visit [email or web address].     Disposition/Instructions    Home care  recommended. Follow home care protocol based instructions.  Virtual Visit with provider recommended. Reference Visit Selection Guide.    Thank you for taking steps to prevent the spread of this virus.  o Limit your contact with others.  o Wear a simple mask to cover your cough.  o Wash your hands well and often.    Resources    [facility]: About COVID-19: [email or web address]    CDC: What to Do If You're Sick: www.cdc.gov/coronavirus/2019-ncov/about/steps-when-sick.html    CDC: Ending Home Isolation: www.cdc.gov/coronavirus/2019-ncov/hcp/disposition-in-home-patients.html     CDC: Caring for Someone: www.cdc.gov/coronavirus/2019-ncov/if-you-are-sick/care-for-someone.html     [facility]: Interim Guidance for Hospital Discharge to Home: [email or web address].[email or web address]    [facility] clinical trials (COVID-19 research studies): [email or web address].[email or web address]-clinical-trials     Below are the COVID-19 hotlines at the [place] Department of Health ([facility]). Interpreters are available.   o For health questions: Call 632-741-9848 or 1-232.764.6314 (7 a.m. to 7 p.m.)  o For questions about schools and childcare: Call 644-464-9782 or 1-325.616.4726 (7 a.m. to 7 p.m.)

## 2022-10-22 ENCOUNTER — HEALTH MAINTENANCE LETTER (OUTPATIENT)
Age: 82
End: 2022-10-22

## 2022-10-31 ENCOUNTER — NURSE TRIAGE (OUTPATIENT)
Dept: INTERNAL MEDICINE | Facility: CLINIC | Age: 82
End: 2022-10-31

## 2022-10-31 NOTE — TELEPHONE ENCOUNTER
Based on history, most likely transient process.     If she is completely asymptomatic, then no need to be seen at this [facility].     Continue to monitor symptoms and if these are regularly occurring then we can see her.     Go to the ER if she develops very serious or unrelenting severe abdominal pain.      Nothing further at this time.     Close encounter when done.

## 2022-10-31 NOTE — TELEPHONE ENCOUNTER
Called and spoke to patient. Relayed messaged from Dr. Emmanuel, patient verbalized understanding. Patient reports lou asymptomatic currently.     Tiny Collins RN

## 2022-10-31 NOTE — TELEPHONE ENCOUNTER
"Nurse Triage SBAR    Is this a 2nd Level Triage? YES, LICENSED PRACTITIONER REVIEW IS REQUIRED    Situation:     Patient calling reporting dizziness and abdominal pain     Background:    Patient experienced symptoms briefly, is now asymptomatic     Assessment:     Dizziness:    DESCRIPTION: \"I felt kind of woozy\"  LIGHTHEADED: felt \"woozy\"  VERTIGO: denies   SEVERITY: mild, was able to stand and walk when happening, no dizziness now   ONSET: yesterday had episode of dizziness, this morning had one episode, currently asymptomatic   AGGRAVATING FACTORS: denies   HEART RATE: denies palpitations   CAUSE: unsure   RECURRENT SYMPTOM: denies   OTHER SYMPTOMS: DENIES fever, chest pain, vomiting, bleeding, diarrhea     Abdominal Pain:    LOCATION: center of abdomen \"soreness, uneasiness\"  RADIATION: denies  ONSET: 9am this morning   SUDDEN: denies sudden onset  PATTERN: comes and goes, currently no pain   SEVERITY: 2/10 when occurring   RECURRENT SYMPTOMS: denies   CAUSE: unsure   RELIEVING/AGGRAVATING: better after having bowel movement   OTHER SYMPTOMS: DENIES back pain, diarrhea, fever, urination pain, vomiting    Protocol Recommended Disposition:   See in Office Today, Discuss With PCP And Callback By Nurse Today    Recommendation:     Protocol to be seen in office today. No appointments available with PCP or within clinic today. Please advise if patient needs to be seen urgently today as she is currently asymptomatic or if patient can schedule at next available appointment?    Routed to provider    Does the patient meet one of the following criteria for ADS visit consideration? 16+ years old, with an [facility] PCP     Writer advised patient to go to UC/ER in the interim with new or worsening symptoms. Reviewed red flag symptoms. Patient agreeable and expressed verbal understanding.     Callback [phone] - ok to leave detailed VM     Dimas Morris RN  [facility]      Reason for Disposition    Taking " a medicine that could cause dizziness (e.g., blood pressure medications, diuretics)    Age > 60 years    Additional Information    Negative: SEVERE difficulty breathing (e.g., struggling for each breath, speaks in single words)    Negative: Shock suspected (e.g., cold/pale/clammy skin, too weak to stand, low BP, rapid pulse)    Negative: Difficult to awaken or acting confused (e.g., disoriented, slurred speech)    Negative: Fainted, and still feels dizzy afterwards    Negative: Overdose (accidental or intentional) of medications    Negative: New neurologic deficit that is present now: * Weakness of the face, arm, or leg on one side of the body * Numbness of the face, arm, or leg on one side of the body * Loss of speech or garbled speech    Negative: Heart beating < 50 beats per minute OR > 140 beats per minute    Negative: Sounds like a life-threatening emergency to the triager    Negative: Chest pain    Negative: Rectal bleeding, bloody stool, or tarry-black stool    Negative: Vomiting is main symptom    Negative: Diarrhea is main symptom    Negative: Headache is main symptom    Negative: Heat exhaustion suspected (i.e., dehydration from heat exposure)    Negative: Patient states that they are having an anxiety or panic attack    Negative: Dizziness from low blood sugar (i.e., < 60 mg/dl or 3.5 mmol/l)    Negative: SEVERE dizziness (e.g., unable to stand, requires support to walk, feels like passing out now)    Negative: SEVERE headache or neck pain    Negative: Spinning or tilting sensation (vertigo) present now and one or more stroke risk factors (i.e., hypertension, diabetes mellitus, prior stroke/TIA, heart attack, age over 60) (Exception: prior physician evaluation for this AND no different/worse than usual)    Negative: Neurologic deficit that was brief (now gone), ANY of the following:* Weakness of the face, arm, or leg on one side of the body* Numbness of the face, arm, or leg on one side of the body* Loss  of speech or garbled speech    Negative: Loss of vision or double vision  (Exception: Similar to previous migraines.)    Negative: Extra heart beats OR irregular heart beating (i.e., 'palpitations')    Negative: Difficulty breathing    Negative: Drinking very little and has signs of dehydration (e.g., no urine > 12 hours, very dry mouth, very lightheaded)    Negative: Follows bleeding (e.g., stomach, rectum, vagina)  (Exception: Became dizzy from sight of small amount blood.)    Negative: Patient sounds very sick or weak to the triager    Negative: Lightheadedness (dizziness) present now, after 2 hours of rest and fluids    Negative: Spinning or tilting sensation (vertigo) present now    Negative: Fever > 103 F (39.4 C)    Negative: Fever > 100.0 F (37.8 C) and has diabetes mellitus or a weak immune system (e.g., HIV positive, cancer chemotherapy, organ transplant, splenectomy, chronic steroids)    Negative: MODERATE dizziness (e.g., interferes with normal activities) (Exception: dizziness caused by heat exposure, sudden standing, or poor fluid intake)    Negative: Vomiting occurs with dizziness    Negative: Patient wants to be seen    Negative: Passed out (i.e., fainted, collapsed and was not responding)    Negative: Shock suspected (e.g., cold/pale/clammy skin, too weak to stand, low BP, rapid pulse)    Negative: Sounds like a life-threatening emergency to the triager    Negative: Chest pain    Negative: Pain is mainly in upper abdomen (if needed ask: 'is it mainly above the belly button?')    Negative: Abdominal pain and pregnant < 20 weeks    Negative: Abdominal pain and pregnant 20 or more weeks    Negative: SEVERE abdominal pain (e.g., excruciating)    Negative: Vomiting red blood or black (coffee ground) material    Negative: Bloody, black, or tarry bowel movements  (Exception: Chronic-unchanged black-grey bowel movements and is taking iron pills or Pepto-Bismol.)    Negative: Constant abdominal pain lasting  > 2 hours    Negative: Vomiting bile (green color)    Negative: Patient sounds very sick or weak to the triager    Negative: Vomiting and abdomen looks much more swollen than usual    Negative: White of the eyes have turned yellow (i.e., jaundice)    Negative: Blood in urine (red, pink, or tea-colored)    Negative: Fever > 103 F (39.4 C)    Negative: Fever > 101 F (38.3 C) and over 60 years of age    Negative: Fever > 100.0 F (37.8 C) and has diabetes mellitus or a weak immune system (e.g., HIV positive, cancer chemotherapy, organ transplant, splenectomy, chronic steroids)    Negative: Fever > 100.0 F (37.8 C) and bedridden (e.g., nursing home patient, stroke, chronic illness, recovering from surgery)    Negative: Pregnant or could be pregnant (i.e., missed last menstrual period)    Negative: MODERATE pain (e.g., interferes with normal activities that comes and goes (cramps) lasts > 24 hours  (Exception: Pain with Vomiting or Diarrhea - see that Protocol.)    Negative: Unusual vaginal discharge    Protocols used: DIZZINESS-A-OH, ABDOMINAL PAIN - FEMALE-A-OH

## 2022-11-09 DIAGNOSIS — E78.5 HYPERLIPIDEMIA LDL GOAL <130: ICD-10-CM

## 2022-11-10 RX ORDER — PRAVASTATIN SODIUM 40 MG
TABLET ORAL
Qty: 90 TABLET | Refills: 3 | Status: SHIPPED | OUTPATIENT
Start: 2022-11-10 | End: 2023-01-05

## 2022-11-10 NOTE — TELEPHONE ENCOUNTER
Routing refill request to provider for review/approval because:  LDL Cholesterol Calculated   Date Value Ref Range Status   09/28/2020 80 <100 mg/dL Final     Comment:     Desirable:       <100 mg/dl     please route to team to contact patient for appointment       Martinez Rajan RN  [facility] Triage Nurse

## 2022-11-29 DIAGNOSIS — I10 BENIGN ESSENTIAL HYPERTENSION: ICD-10-CM

## 2022-11-30 RX ORDER — ATENOLOL 25 MG/1
TABLET ORAL
Qty: 90 TABLET | Refills: 1 | Status: SHIPPED | OUTPATIENT
Start: 2022-11-30 | End: 2023-01-05

## 2022-12-04 ENCOUNTER — HEALTH MAINTENANCE LETTER (OUTPATIENT)
Age: 82
End: 2022-12-04

## 2022-12-13 ENCOUNTER — ANCILLARY PROCEDURE (OUTPATIENT)
Dept: MAMMOGRAPHY | Facility: CLINIC | Age: 82
End: 2022-12-13
Attending: INTERNAL MEDICINE
Payer: COMMERCIAL

## 2022-12-13 DIAGNOSIS — Z12.31 VISIT FOR SCREENING MAMMOGRAM: ICD-10-CM

## 2022-12-13 PROCEDURE — 77067 SCR MAMMO BI INCL CAD: CPT | Mod: TC | Performed by: RADIOLOGY

## 2022-12-13 PROCEDURE — 77063 BREAST TOMOSYNTHESIS BI: CPT | Mod: TC | Performed by: RADIOLOGY

## 2023-01-05 ENCOUNTER — OFFICE VISIT (OUTPATIENT)
Dept: INTERNAL MEDICINE | Facility: CLINIC | Age: 83
End: 2023-01-05
Payer: COMMERCIAL

## 2023-01-05 VITALS
SYSTOLIC BLOOD PRESSURE: 128 MMHG | BODY MASS INDEX: 27.54 KG/M2 | TEMPERATURE: 97.7 F | HEIGHT: 64 IN | HEART RATE: 72 BPM | OXYGEN SATURATION: 98 % | DIASTOLIC BLOOD PRESSURE: 78 MMHG | WEIGHT: 161.3 LBS

## 2023-01-05 DIAGNOSIS — E78.00 HYPERCHOLESTEROLEMIA: ICD-10-CM

## 2023-01-05 DIAGNOSIS — Z13.220 SCREENING FOR HYPERLIPIDEMIA: ICD-10-CM

## 2023-01-05 DIAGNOSIS — Z23 NEED FOR PROPHYLACTIC VACCINATION AND INOCULATION AGAINST INFLUENZA: ICD-10-CM

## 2023-01-05 DIAGNOSIS — N32.81 OAB (OVERACTIVE BLADDER): ICD-10-CM

## 2023-01-05 DIAGNOSIS — Z00.00 ENCOUNTER FOR MEDICARE ANNUAL WELLNESS EXAM: Primary | ICD-10-CM

## 2023-01-05 DIAGNOSIS — I10 BENIGN ESSENTIAL HYPERTENSION: ICD-10-CM

## 2023-01-05 DIAGNOSIS — E78.5 HYPERLIPIDEMIA LDL GOAL <130: ICD-10-CM

## 2023-01-05 DIAGNOSIS — F32.5 MAJOR DEPRESSIVE DISORDER IN FULL REMISSION, UNSPECIFIED WHETHER RECURRENT (H): ICD-10-CM

## 2023-01-05 LAB
ALT SERPL W P-5'-P-CCNC: 15 U/L (ref 10–35)
ANION GAP SERPL CALCULATED.3IONS-SCNC: 9 MMOL/L (ref 7–15)
AST SERPL W P-5'-P-CCNC: 26 U/L (ref 10–35)
BUN SERPL-MCNC: 15.3 MG/DL (ref 8–23)
CALCIUM SERPL-MCNC: 9.3 MG/DL (ref 8.8–10.2)
CHLORIDE SERPL-SCNC: 103 MMOL/L (ref 98–107)
CHOLEST SERPL-MCNC: 164 MG/DL
CREAT SERPL-MCNC: 0.66 MG/DL (ref 0.51–0.95)
DEPRECATED HCO3 PLAS-SCNC: 28 MMOL/L (ref 22–29)
ERYTHROCYTE [DISTWIDTH] IN BLOOD BY AUTOMATED COUNT: 14.3 % (ref 10–15)
GFR SERPL CREATININE-BSD FRML MDRD: 88 ML/MIN/1.73M2
GLUCOSE SERPL-MCNC: 103 MG/DL (ref 70–99)
HCT VFR BLD AUTO: 41 % (ref 35–47)
HDLC SERPL-MCNC: 51 MG/DL
HGB BLD-MCNC: 12.7 G/DL (ref 11.7–15.7)
LDLC SERPL CALC-MCNC: 95 MG/DL
MCH RBC QN AUTO: 29.7 PG (ref 26.5–33)
MCHC RBC AUTO-ENTMCNC: 31 G/DL (ref 31.5–36.5)
MCV RBC AUTO: 96 FL (ref 78–100)
NONHDLC SERPL-MCNC: 113 MG/DL
PLATELET # BLD AUTO: 192 10E3/UL (ref 150–450)
POTASSIUM SERPL-SCNC: 4.4 MMOL/L (ref 3.4–5.3)
RBC # BLD AUTO: 4.28 10E6/UL (ref 3.8–5.2)
SODIUM SERPL-SCNC: 140 MMOL/L (ref 136–145)
TRIGL SERPL-MCNC: 90 MG/DL
WBC # BLD AUTO: 7 10E3/UL (ref 4–11)

## 2023-01-05 PROCEDURE — 85027 COMPLETE CBC AUTOMATED: CPT | Performed by: INTERNAL MEDICINE

## 2023-01-05 PROCEDURE — G0439 PPPS, SUBSEQ VISIT: HCPCS | Performed by: INTERNAL MEDICINE

## 2023-01-05 PROCEDURE — 84460 ALANINE AMINO (ALT) (SGPT): CPT | Performed by: INTERNAL MEDICINE

## 2023-01-05 PROCEDURE — 99214 OFFICE O/P EST MOD 30 MIN: CPT | Mod: 25 | Performed by: INTERNAL MEDICINE

## 2023-01-05 PROCEDURE — 80048 BASIC METABOLIC PNL TOTAL CA: CPT | Performed by: INTERNAL MEDICINE

## 2023-01-05 PROCEDURE — 36415 COLL VENOUS BLD VENIPUNCTURE: CPT | Performed by: INTERNAL MEDICINE

## 2023-01-05 PROCEDURE — 80061 LIPID PANEL: CPT | Performed by: INTERNAL MEDICINE

## 2023-01-05 PROCEDURE — 84450 TRANSFERASE (AST) (SGOT): CPT | Performed by: INTERNAL MEDICINE

## 2023-01-05 RX ORDER — PRAVASTATIN SODIUM 40 MG
40 TABLET ORAL DAILY
Qty: 90 TABLET | Refills: 3 | Status: SHIPPED | OUTPATIENT
Start: 2023-01-05 | End: 2023-06-01

## 2023-01-05 RX ORDER — ATENOLOL 25 MG/1
25 TABLET ORAL DAILY
Qty: 90 TABLET | Refills: 3 | Status: ON HOLD | OUTPATIENT
Start: 2023-01-05 | End: 2023-09-11

## 2023-01-05 RX ORDER — OXYBUTYNIN CHLORIDE 10 MG/1
10 TABLET, EXTENDED RELEASE ORAL DAILY
Qty: 90 TABLET | Refills: 3 | Status: SHIPPED | OUTPATIENT
Start: 2023-01-05 | End: 2024-01-04

## 2023-01-05 ASSESSMENT — ENCOUNTER SYMPTOMS
HEADACHES: 0
CONSTIPATION: 0
EYE PAIN: 0
HEMATOCHEZIA: 0
HEMATURIA: 0
PALPITATIONS: 0
DYSURIA: 0
ARTHRALGIAS: 0
HEARTBURN: 0
DIARRHEA: 0
MYALGIAS: 0
NERVOUS/ANXIOUS: 0
FEVER: 0
JOINT SWELLING: 0
WEAKNESS: 0
BREAST MASS: 0
SORE THROAT: 0
DIZZINESS: 0
CHILLS: 0
NAUSEA: 0
SHORTNESS OF BREATH: 0
FREQUENCY: 1
COUGH: 0
ABDOMINAL PAIN: 0

## 2023-01-05 ASSESSMENT — PATIENT HEALTH QUESTIONNAIRE - PHQ9
SUM OF ALL RESPONSES TO PHQ QUESTIONS 1-9: 0
SUM OF ALL RESPONSES TO PHQ QUESTIONS 1-9: 0

## 2023-01-05 ASSESSMENT — ACTIVITIES OF DAILY LIVING (ADL): CURRENT_FUNCTION: NO ASSISTANCE NEEDED

## 2023-01-05 NOTE — PATIENT INSTRUCTIONS
" Consider the updated covid booster vaccine 3 months after your covid infection( about March 2023)  [facility] Covid Scheduling number: [phone]   (to arrange Covid testing and/or Covid vaccine appointments)     We are rechecking your labs.  I will let you know if the results indicate any changes in your therapy.  Unless you hear otherwise, continue all medications at the same doses.  Contact your usual pharmacy if you need refills.     Assuming your labs look good, then continue all medications at the same doses.  Contact your usual pharmacy if you need refills.        Return to see me in 1 year, sooner if needed.  Use [employer] or Call [phone] to schedule the appointment with me.       CERUMEN IMPACTION (EXCESSIVE EAR WAX BUILD UP):       *  You had a fair amount of wax in the ear canal.     *  Wax softening ear drops (e.g. Debrox or mineral oil) into the affected ear canal, let it sit for 15 minutes, then irrigate with the syringe as below.     *  In the future, never use Q-tips or swabs to try and clean out your ear canal as you will risk injury to the ear canal or ear drum and you will do nothing more than just push the wax in deeper into the erar canal.     *  If you have future problems with ear wax build up, perform regular ear canal irrigation using either a \"bulb syringe\" from any drug store or the \"elephant ear wash kit\" (available on [employer]) and perform your own ear canal washes every 2-3 months if needed.  Use luke warm water ( not too hot, not too cold)                    5 GOALS TO PREVENT VASCULAR DISEASE:     1.  Aggressive blood pressure control, under 130/80 ideally.  Using medications if needed.    Your blood pressure is under good control    BP Readings from Last 4 Encounters:   01/05/23 128/78   05/16/22 122/70   03/29/22 125/79   01/03/22 129/73       2.  Aggressive LDL cholesterol (\"bad cholesterol\") lowering as indicated.    Your goal is an LDL under 130 for sure, preferably " under 100.  (If you have diabetes or previous vascular disease, the the LDL goals would be under 100 for sure, preferably under 70.)    New guidelines identify four high-risk groups who could benefit from statins:   *people with pre-existing heart disease, such as those who have had a heart attack;   *people ages 40 to 75 who have diabetes of any type  *patients ages 40 to 75 with at least a 7.5% risk of developing cardiovascular disease over the next decade, according to a formula described in the guidelines  *patients with the sort of super-high cholesterol that sometimes runs in families, as evidenced by an LDL of 190 milligrams per deciliter or higher    Your cholesterol levels are well controlled.    Recent Labs   Lab Test 09/28/20  0823 04/03/19  0938   CHOL 146 170   HDL 56 55   LDL 80 98   TRIG 49 84       3.  Aggressive diabetic prevention, screening and/or management.      You do not have diabetes as of the most recent blood tests.     4.  No smoking    5.  Consider daily preventative aspirin over age 50 if you have enough cardiac risk factors to place you at higher risk for the presence of vascular disease.    If you have any reason not to take aspirin such easy bruising or bleeding, stomach problems, other anticoagulant medications, or any other side effects, then you should not take Aspirin.     --Based on your current cardiac disease risk profile and/or age over 75, you do NOT need to take daily preventative aspirin.      Preventive Health Recommendations  Female Ages 75+    Yearly exam: See your health care provider every year in order to  Review health changes.   Discuss preventive care.    Review your medicines if your doctor has prescribed any.    Get a Pap test every three years (unless you have an abnormal result and your provider advises testing more often).  No more PAP smear needed.    You do not need a Pap test if your uterus was removed (hysterectomy) and you have not had cancer.  You should  "be tested each year for STDs (sexually transmitted diseases) if you're at risk.   Routine screening mammogram no longer indicated.  No routine screening colonoscopy indiacted   Have a cholesterol test every 5 years, or more often if advised.  Have a diabetes test (fasting glucose) every three years. If you are at risk for diabetes, you should have this test more often.   If you are at risk for osteoporosis (brittle bone disease), think about having a bone density scan (DEXA).    Shots:   *  Get a flu shot each year.   *  Get a tetanus shot every 10 years.    *  Pneumonia vaccine up to date.      Nutrition:   Eat at least 5 servings of fruits and vegetables each day.  Eat whole-grain bread, whole-wheat pasta and brown rice instead of white grains and rice.  Talk to your provider about Calcium and Vitamin D.    --Calcium: aim for 1200 mg per day (any brand is fine)   --Vitamin D3 at least 1000 units per day (any brand is fine)       --Good Grains:  Oats, brown rice, Quinoa (these do not raise the blood sugar as much)     --Bad grains:  Anything made from wheat or white rice     (because these raise the blood sugars significantly, and the possible gluten issue from wheat for some people).      --Proteins:  Aim for \"lean proteins\" including chicken, fish, seafood, pork, turkey, and eggs (in moderation); Eat red meat only occasionally        Lifestyle  Exercise at least 150 minutes a week (30 minutes a day, 5 days a week). This will help you control your weight and prevent disease.  Limit alcohol to one drink per day.  No smoking.   Wear sunscreen to prevent skin cancer.   See your dentist every six months for an exam and cleaning.  See your eye doctor every 1 to 2 years.          Patient Education   Personalized Prevention Plan  You are due for the preventive services outlined below.  Your care team is available to assist you in scheduling these services.  If you have already completed any of these items, please share " that information with your care team to update in your medical record.  Health Maintenance Due   Topic Date Due    ANNUAL REVIEW OF HM ORDERS  Never done    Annual Wellness Visit  09/08/2021    Cholesterol Lab  09/28/2021    COVID-19 Vaccine (4 - Booster for Pfizer series) 01/26/2022    Flu Vaccine (1) 09/01/2022    Liver Monitoring Lab  11/19/2022       Exercise for a Healthier Heart  You may wonder how you can improve the health of your heart. If you re thinking about exercise, you re on the right track. You don t need to become an athlete. But you do need a certain amount of brisk exercise to help strengthen your heart. If you have been diagnosed with a heart condition, your healthcare provider may advise exercise to help stabilize your condition. To help make exercise a habit, choose safe, fun activities.      Exercise with a friend. When activity is fun, you're more likely to stick with it.   Before you start  Check with your healthcare provider before starting an exercise program. This is especially important if you have not been active for a while. It's also important if you have a long-term (chronic) health problem such as heart disease, diabetes, or obesity. Or if you are at high risk for having these problems.   Why exercise?  Exercising regularly offers many healthy rewards. It can help you do all of the following:   Improve your blood cholesterol level to help prevent further heart trouble  Lower your blood pressure to help prevent a stroke or heart attack  Control diabetes, or reduce your risk of getting this disease  Improve your heart and lung function  Reach and stay at a healthy weight  Make your muscles stronger so you can stay active  Prevent falls and fractures by slowing the loss of bone mass (osteoporosis)  Manage stress better  Reduce your blood pressure  Improve your sense of self and your body image  Exercise tips    Ease into your routine. Set small goals. Then build on them. If you are not  sure what your activity level should be, talk with your healthcare provider first before starting an exercise routine.  Exercise on most days. Aim for a total of 150 minutes (2 hours and 30 minutes) or more of moderate-intensity aerobic activity each week. Or 75 minutes (1 hour and 15 minutes) or more of vigorous-intensity aerobic activity each week. Or try for a combination of both. Moderate activity means that you breathe heavier and your heart rate increases but you can still talk. Think about doing 40 minutes of moderate exercise, 3 to 4 times a week. For best results, activity should last for about 40 minutes to lower blood pressure and cholesterol. It's OK to work up to the 40-minute period over time. Examples of moderate-intensity activity are walking 1 mile in 15 minutes. Or doing 30 to 45 minutes of yard work.  Step up your daily activity level.  Along with your exercise program, try being more active the whole day. Walk instead of drive. Or park further away so that you take more steps each day. Do more household tasks or yard work. You may not be able to meet the advised mount of physical activity. But doing some moderate- or vigorous-intensity aerobic activity can help reduce your risk for heart disease. Your healthcare provider can help you figure out what is best for you.  Choose 1 or more activities you enjoy.  Walking is one of the easiest things you can do. You can also try swimming, riding a bike, dancing, or taking an exercise class.    When to call your healthcare provider  Call your healthcare provider if you have any of these:   Chest pain or feel dizzy or lightheaded  Burning, tightness, pressure, or heaviness in your chest, neck, shoulders, back, or arms  Abnormal shortness of breath  More joint or muscle pain  A very fast or irregular heartbeat (palpitations)  [employer] last reviewed this educational content on 7/1/2019 2000-2021 [employer]. All rights reserved. This  information is not intended as a substitute for professional medical care. Always follow your healthcare professional's instructions.          Urinary Incontinence, Female (Adult)   Urinary incontinence means loss of bladder control. This problem affects many women, especially as they get older. If you have incontinence, you may be embarrassed to ask for help. But know that this problem can be treated.   Types of Incontinence  There are different types of incontinence. Two of the main types are described here. You can have more than one type.   Stress incontinence. With this type, urine leaks when pressure (stress) is put on the bladder. This may happen when you cough, sneeze, or laugh. Stress incontinence most often occurs because the pelvic floor muscles that support the bladder and urethra are weak. This can happen after pregnancy and vaginal childbirth or a hysterectomy. It can also be due to excess body weight or hormone changes.  Urge incontinence (also called overactive bladder). With this type, a sudden urge to urinate is felt often. This may happen even though there may not be much urine in the bladder. The need to urinate often during the night is common. Urge incontinence most often occurs because of bladder spasms. This may be due to bladder irritation or infection. Damage to bladder nerves or pelvic muscles, constipation, and certain medicines can also lead to urge incontinence.  Treatment depends on the cause. Further evaluation is needed to find the type you have. This will likely include an exam and certain tests. Based on the results, you and your healthcare provider can then plan treatment. Until a diagnosis is made, the home care tips below can help ease symptoms.   Home care  Do pelvic floor muscle exercises, if they are prescribed. The pelvic floor muscles help support the bladder and urethra. Many women find that their symptoms improve when doing special exercises that strengthen these muscles.  To do the exercises, contract the muscles you would use to stop your stream of urine. But do this when you re not urinating. Hold for 10 seconds, then relax. Repeat 10 to 20 times in a row, at least 3 times a day. Your healthcare provider may give you other instructions for how to do the exercises and how often.  Keep a bladder diary. This helps track how often and how much you urinate over a set period of time. Bring this diary with you to your next visit with the provider. The information can help your provider learn more about your bladder problem.  Lose weight, if advised to by your provider. Extra weight puts pressure on the bladder. Your provider can help you create a weight-loss plan that s right for you. This may include exercising more and making certain diet changes.  Don't have foods and drinks that may irritate the bladder. These can include alcohol and caffeinated drinks.  Quit smoking. Smoking and other tobacco use can lead to a long-term (chronic) cough that strains the pelvic floor muscles. Smoking may also damage the bladder and urethra. Talk with your provider about treatments or methods you can use to quit smoking.  If drinking large amounts of fluid makes you have symptoms, you may be advised to limit your fluid intake. You may also be advised to drink most of your fluids during the day and to limit fluids at night.  If you re worried about urine leakage or accidents, you may wear absorbent pads to catch urine. Change the pads often. This helps reduce discomfort. It may also reduce the risk of skin or bladder infections.    Follow-up care  Follow up with your healthcare provider, or as directed. It may take some to find the right treatment for your problem. But healthy lifestyle changes can be made right away. These include such things as exercising on a regular basis, eating a healthy diet, losing weight (if needed), and quitting smoking. Your treatment plan may include special therapies or  medicines. Certain procedures or surgery may also be options. Talk about any questions you have with your provider.   When to seek medical advice  Call the healthcare provider right away if any of these occur:  Fever of 100.4 F (38 C) or higher, or as directed by your provider  Bladder pain or fullness  Belly swelling  Nausea or vomiting  Back pain  Weakness, dizziness, or fainting  [employer] last reviewed this educational content on 1/1/2020 2000-2021 [employer]. All rights reserved. This information is not intended as a substitute for professional medical care. Always follow your healthcare professional's instructions.

## 2023-01-05 NOTE — PROGRESS NOTES
"    She is at risk for lack of exercise and has been provided with information to increase physical activity for the benefit of her well-being.  Information on urinary incontinence and treatment options given to patient.        Answers for HPI/ROS submitted by the patient on 1/5/2023  PHQ9 TOTAL SCORE: 0  In general, how would you rate your overall physical health?: excellent  Frequency of exercise:: None  Do you usually eat at least 4 servings of fruit and vegetables a day, include whole grains & fiber, and avoid regularly eating high fat or \"junk\" foods? : Yes  Taking medications regularly:: Yes  Medication side effects:: None  Activities of Daily Living: no assistance needed  Home safety: no safety concerns identified  Hearing Impairment:: no hearing concerns  In the past 6 months, have you been bothered by leaking of urine?: Yes  abdominal pain: No  Blood in stool: No  Blood in urine: No  chest pain: No  chills: No  congestion: No  constipation: No  cough: No  diarrhea: No  dizziness: No  ear pain: No  eye pain: No  nervous/anxious: No  fever: No  frequency: Yes  genital sores: No  headaches: No  hearing loss: Yes  heartburn: No  arthralgias: No  joint swelling: No  peripheral edema: No  mood changes: No  myalgias: No  nausea: No  dysuria: No  palpitations: No  sore throat: No  urgency: Yes  rash: No  shortness of breath: No  visual disturbance: No  weakness: No  pelvic pain: No  vaginal bleeding: No  vaginal discharge: No  tenderness: No  breast mass: No  breast discharge: No  Additional concerns today:: No    Conflicting answers have been found for some questions. Please document the patient's answers manually.       "

## 2023-01-05 NOTE — PROGRESS NOTES
"SUBJECTIVE:   Brielle is a 82 year old who presents for Preventive Visit.  Patient has been advised of split billing requirements and indicates understanding: Yes  Are you in the first 12 months of your Medicare coverage?  No    Healthy Habits:     In general, how would you rate your overall health?  Excellent    Frequency of exercise:  None    Do you usually eat at least 4 servings of fruit and vegetables a day, include whole grains    & fiber and avoid regularly eating high fat or \"junk\" foods?  Yes    Taking medications regularly:  Yes    Medication side effects:  None    Ability to successfully perform activities of daily living:  No assistance needed    Home Safety:  No safety concerns identified    Hearing Impairment:  No hearing concerns    In the past 6 months, have you been bothered by leaking of urine? Yes    PHQ-2 Total Score: 0    Additional concerns today:  No      Have you ever done Advance Care Planning? (For example, a Health Directive, POLST, or a discussion with a medical provider or your loved ones about your wishes): Yes, advance care planning is on file.     Fall risk  Fallen 2 or more times in the past year?: No  Any fall with injury in the past year?: No    Cognitive Screening   1) Repeat 3 items (Leader, Season, Table)    2) Clock draw: NORMAL  3) 3 item recall: Recalls 3 objects  Results: 3 items recalled: COGNITIVE IMPAIRMENT LESS LIKELY    Mini-CogTM Copyright MAXWELL Prado. Licensed by the author for use in [facility]; reprinted with permission (pricila@.[facility]). All rights reserved.      Do you have sleep apnea, excessive snoring or daytime drowsiness?: no    Reviewed and updated as needed this visit by clinical staff    Allergies  Meds  Problems             Reviewed and updated as needed this visit by Provider    Allergies  Meds  Problems            Social History     Tobacco Use     Smoking status: Never     Smokeless tobacco: Never   Substance Use Topics     Alcohol use: No "     If you drink alcohol do you typically have >3 drinks per day or >7 drinks per week? No    Alcohol Use 1/5/2023   Prescreen: >3 drinks/day or >7 drinks/week? Not Applicable   Prescreen: >3 drinks/day or >7 drinks/week? -       1.    Hypertension:  History of hypertension, on medication.  No reported side effects from medications.    Reviewed last 6 BP readings in chart:  BP Readings from Last 6 Encounters:   01/05/23 128/78   05/16/22 122/70   03/29/22 125/79   01/03/22 129/73   12/01/21 120/68   11/22/21 (!) 159/84     No active cardiac complaints or symptoms with exercise.       2.  Hyperlipidemia:  Has history of hyperlipidemia.    The patient is taking a medication for this.  Denies any significant side effects from his medication.      Latest labs reviewed:    Recent Labs   Lab Test 01/05/23  1117 09/28/20  0823   CHOL 164 146   HDL 51 56   LDL 95 80   TRIG 90 49        Lab Results   Component Value Date    AST 26 01/05/2023    AST 19 06/02/2021           3.  History of overactive bladder, taking oxybutynin.      4.  Prior history of depression, more reactive.  Has not had any depression symptoms, she is resolved this issue.        Current providers sharing in care for this patient include:   Patient Care Team:  Jared Emmanuel MD as PCP - General  Jared Emmanuel MD as Assigned PCP  Bolivar Sr MD as Assigned Heart and Vascular Provider    The following health maintenance items are reviewed in Epic and correct as of today:  Health Maintenance   Topic Date Due     COVID-19 Vaccine (4 - Booster for Pfizer series) 01/26/2022     PHQ-9  07/05/2023     MEDICARE ANNUAL WELLNESS VISIT  01/05/2024     ALT  01/05/2024     LIPID  01/05/2024     ANNUAL REVIEW OF HM ORDERS  01/05/2024     FALL RISK ASSESSMENT  01/05/2024     ADVANCE CARE PLANNING  01/06/2028     DTAP/TDAP/TD IMMUNIZATION (3 - Td or Tdap) 02/22/2028     DEXA  07/31/2034     DEPRESSION ACTION PLAN  Completed     INFLUENZA  "VACCINE  Completed     Pneumococcal Vaccine: 65+ Years  Completed     ZOSTER IMMUNIZATION  Completed     IPV IMMUNIZATION  Aged Out     MENINGITIS IMMUNIZATION  Aged Out     MAMMO SCREENING  Discontinued       **I reviewed the information recorded in the patient's EPIC chart (including but not limited to medical history, surgical history, family history, problem list, medication list, and allergy list) and updated the information as indicated based on the patients reported information.         Mammogram Screening - Patient over age 75, has elected to discontinue screenings.  Pertinent mammograms are reviewed under the imaging tab.    Review of Systems   Constitutional: Negative for chills and fever.   HENT: Positive for hearing loss. Negative for congestion, ear pain and sore throat.    Eyes: Negative for pain and visual disturbance.   Respiratory: Negative for cough and shortness of breath.    Cardiovascular: Negative for chest pain, palpitations and peripheral edema.   Gastrointestinal: Negative for abdominal pain, constipation, diarrhea, heartburn, hematochezia and nausea.   Breasts:  Negative for tenderness, breast mass and discharge.   Genitourinary: Positive for frequency and urgency. Negative for dysuria, genital sores, hematuria, pelvic pain, vaginal bleeding and vaginal discharge.   Musculoskeletal: Negative for arthralgias, joint swelling and myalgias.   Skin: Negative for rash.   Neurological: Negative for dizziness, weakness and headaches.   Psychiatric/Behavioral: Negative for mood changes. The patient is not nervous/anxious.      Constitutional, HEENT, cardiovascular, pulmonary, gi and gu systems are negative, except as otherwise noted.    OBJECTIVE:   /78   Pulse 72   Temp 97.7  F (36.5  C) (Tympanic)   Ht 1.626 m (5' 4\")   Wt 73.2 kg (161 lb 4.8 oz)   LMP 11/08/1991   SpO2 98%   BMI 27.69 kg/m   Estimated body mass index is 27.69 kg/m  as calculated from the following:    Height as of " "this encounter: 1.626 m (5' 4\").    Weight as of this encounter: 73.2 kg (161 lb 4.8 oz).  Physical Exam  GENERAL alert and no distress  EYES:  Normal sclera,conjunctiva, EOMI  HENT: oral and posterior pharynx without lesions or erythema, facies symmetric  NECK: Neck supple. No LAD, without thyroidmegaly.  RESP: Clear to ausculation bilaterally without wheezes or crackles. Normal BS in all fields.  CV: RRR normal S1S2 without murmurs, rubs or gallops.  LYMPH: no cervical lymph adenopathy appreciated  MS: extremities- no gross deformities of the visible extremities noted,   EXT:  no lower extremity edema  PSYCH: Alert and oriented times 3; speech- coherent  SKIN:  No obvious significant skin lesions on visible portions of face     Diagnostic Test Results:  Labs reviewed in Epic    ASSESSMENT / PLAN:     (Z00.00) Encounter for Medicare annual wellness exam  (primary encounter diagnosis)  Comment: Discussed cardiac disease risk factors and cardiac disease risk factor modification, including diabetes screening, blood pressure screening (and management if indicated), and cholesterol screening.   Reviewed immunzation guidelines, including pneumococcal vaccines, annual influenza, and shingles vaccines.   Discussed routine cancer screenings, including skin cancer, colon cancer screening for everyone until age 80, prostate cancer screening in men until age 75, mammogram and PAP/pelvic for women until age 75.   Recommended regular dentist visits to care for remaining teeth.   Recommended regular screening for vision and glaucoma.   Recommended safe driving and accident avoidance.   Plan: REVIEW OF HEALTH MAINTENANCE PROTOCOL ORDERS            (I10) Benign essential hypertension  Comment: This condition is currently controlled on the current medical regimen.  Continue current therapy.   Plan: REVIEW OF HEALTH MAINTENANCE PROTOCOL ORDERS,         atenolol (TENORMIN) 25 MG tablet, CBC with         platelets, Basic metabolic " "panel            (N32.81) OAB (overactive bladder)  Comment: This condition is currently controlled on the current medical regimen.  Continue current therapy.   Plan: REVIEW OF HEALTH MAINTENANCE PROTOCOL ORDERS,         oxybutynin ER (DITROPAN XL) 10 MG 24 hr tablet            (E78.5) Hyperlipidemia LDL goal <130  Comment: This condition is currently controlled on the current medical regimen.  Continue current therapy.   Plan: REVIEW OF HEALTH MAINTENANCE PROTOCOL ORDERS,         pravastatin (PRAVACHOL) 40 MG tablet, AST, ALT,        Lipid panel reflex to direct LDL Fasting            (Z13.220) Screening for hyperlipidemia  Comment:   Plan: REVIEW OF HEALTH MAINTENANCE PROTOCOL ORDERS            (E78.00) Hypercholesterolemia  Comment:   Plan: REVIEW OF HEALTH MAINTENANCE PROTOCOL ORDERS            (Z23) Need for prophylactic vaccination and inoculation against influenza  Comment:   Plan: INFLUENZA, QUAD, HIGH DOSE, PF, 65YR + (FLUZONE        HD), ADMIN INFLUENZA (For MEDICARE Patients         ONLY) []            (F32.5) 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, unspecified whether recurrent (H)  Comment: resovled, no longer asn issue  Plan:        Patient has been advised of split billing requirements and indicates understanding: Yes      COUNSELING:  Reviewed preventive health counseling, as reflected in patient instructions       Regular exercise       Healthy diet/nutrition       Vision screening       Hearing screening       Dental care       Bladder control       Fall risk prevention       Immunizations    Vaccinated for: Influenza            BMI:   Estimated body mass index is 27.69 kg/m  as calculated from the following:    Height as of this encounter: 1.626 m (5' 4\").    Weight as of this encounter: 73.2 kg (161 lb 4.8 oz).         She reports that she has never smoked. She has never used smokeless tobacco.      Appropriate preventive services were discussed with this patient, including applicable " screening as appropriate for cardiovascular disease, diabetes, osteopenia/osteoporosis, and glaucoma.  As appropriate for age/gender, discussed screening for colorectal cancer, prostate cancer, breast cancer, and cervical cancer. Checklist reviewing preventive services available has been given to the patient.    Reviewed patients plan of care and provided an AVS. The  clive Hannah meets the Care Plan requirement. This Care Plan has been established and reviewed with the .          Jared Emmanuel MD  [facility]    Identified Health Risks:  Answers for HPI/ROS submitted by the patient on 1/5/2023  PHQ9 TOTAL SCORE: 0    Conflicting answers have been found for some questions. Please document the patient's answers manually.

## 2023-01-06 PROBLEM — F32.9 MAJOR DEPRESSION: Status: RESOLVED | Noted: 2018-07-06 | Resolved: 2023-01-06

## 2023-01-06 PROCEDURE — G0008 ADMIN INFLUENZA VIRUS VAC: HCPCS | Performed by: INTERNAL MEDICINE

## 2023-01-06 PROCEDURE — 90662 IIV NO PRSV INCREASED AG IM: CPT | Performed by: INTERNAL MEDICINE

## 2023-01-22 ENCOUNTER — NURSE TRIAGE (OUTPATIENT)
Dept: NURSING | Facility: CLINIC | Age: 83
End: 2023-01-22
Payer: COMMERCIAL

## 2023-01-23 ENCOUNTER — ANCILLARY PROCEDURE (OUTPATIENT)
Dept: GENERAL RADIOLOGY | Facility: CLINIC | Age: 83
End: 2023-01-23
Attending: FAMILY MEDICINE
Payer: COMMERCIAL

## 2023-01-23 ENCOUNTER — OFFICE VISIT (OUTPATIENT)
Dept: URGENT CARE | Facility: URGENT CARE | Age: 83
End: 2023-01-23
Payer: COMMERCIAL

## 2023-01-23 VITALS
HEART RATE: 71 BPM | SYSTOLIC BLOOD PRESSURE: 127 MMHG | DIASTOLIC BLOOD PRESSURE: 75 MMHG | OXYGEN SATURATION: 96 % | TEMPERATURE: 97.4 F

## 2023-01-23 DIAGNOSIS — N10 PYELONEPHRITIS, ACUTE: ICD-10-CM

## 2023-01-23 DIAGNOSIS — M54.50 ACUTE LEFT-SIDED LOW BACK PAIN WITHOUT SCIATICA: Primary | ICD-10-CM

## 2023-01-23 LAB
ALBUMIN UR-MCNC: NEGATIVE MG/DL
APPEARANCE UR: CLEAR
BACTERIA #/AREA URNS HPF: ABNORMAL /HPF
BILIRUB UR QL STRIP: NEGATIVE
COLOR UR AUTO: YELLOW
GLUCOSE UR STRIP-MCNC: NEGATIVE MG/DL
HGB UR QL STRIP: NEGATIVE
KETONES UR STRIP-MCNC: NEGATIVE MG/DL
LEUKOCYTE ESTERASE UR QL STRIP: ABNORMAL
MUCOUS THREADS #/AREA URNS LPF: PRESENT /LPF
NITRATE UR QL: NEGATIVE
PH UR STRIP: 5.5 [PH] (ref 5–7)
RBC #/AREA URNS AUTO: ABNORMAL /HPF
SP GR UR STRIP: >=1.03 (ref 1–1.03)
SQUAMOUS #/AREA URNS AUTO: ABNORMAL /LPF
UROBILINOGEN UR STRIP-ACNC: 1 E.U./DL
WBC #/AREA URNS AUTO: ABNORMAL /HPF

## 2023-01-23 PROCEDURE — 73502 X-RAY EXAM HIP UNI 2-3 VIEWS: CPT | Mod: TC | Performed by: RADIOLOGY

## 2023-01-23 PROCEDURE — 99214 OFFICE O/P EST MOD 30 MIN: CPT | Performed by: FAMILY MEDICINE

## 2023-01-23 PROCEDURE — 81001 URINALYSIS AUTO W/SCOPE: CPT | Performed by: FAMILY MEDICINE

## 2023-01-23 PROCEDURE — 87086 URINE CULTURE/COLONY COUNT: CPT | Performed by: FAMILY MEDICINE

## 2023-01-23 RX ORDER — SULFAMETHOXAZOLE/TRIMETHOPRIM 800-160 MG
1 TABLET ORAL 2 TIMES DAILY
Qty: 20 TABLET | Refills: 0 | Status: SHIPPED | OUTPATIENT
Start: 2023-01-23 | End: 2023-02-02

## 2023-01-23 NOTE — TELEPHONE ENCOUNTER
"Reports itchy area on side of L calf in area where she had cellulitis in the past (months ago) Started 2 days ago. No pain, redness, warmth, rash/bumps or other change in appearance of leg. Benadryl helpful for itch. Reports her cellulitis began w/ pain and deep redness. Advised see provider w/i 3 days.   Also c/o a \"catch\" in L hip for past 2-3 days. Denies injury. L hip feels sore/bruised though no visible redness or discoloration, no swelling. Still able to walk. Ibuprofen \"does nothing\" for the soreness or catching of hip. Advised see provider w/i 3 days.    Reason for Disposition    [1] MODERATE-SEVERE local itching (i.e., interferes with work, school, activities) AND [2] not improved after 24 hours of hydrocortisone cream    [1] MODERATE pain (e.g., interferes with normal activities, limping) AND [2] present > 3 days    Additional Information    Negative: Athlete's foot suspected (e.g., itchy rash of feet, especially 3rd-4th web spaces)    Negative: Head lice suspected (e.g., head itching and lice or nits are seen)    Negative: Insect bites suspected    Negative: Jock Itch suspected (e.g., itchy rash on upper inner thigh)    Negative: Poison ivy, oak, or sumac suspected (e.g., itchy rash after contact with poison ivy)    Negative: Pubic lice suspected (e.g., genital itching and lice or nits are seen)    Negative: Ringworm suspected (e.g., round ring-shaped pink patch on skin, scaly border, clearing of the center as the patch grows)    Negative: [1] Eye itching AND [2] contact with allergic substance    Negative: [1] Eye itching AND [2] cause is unclear    Negative: Genital itching - female    Negative: Genital itching - male    Negative: Lip itching    Negative: Rectal (anus) itching    Negative: Localized rash also present    Negative: Patient sounds very sick or weak to the triager    Negative: Looks infected (spreading redness, red streak, pus)    Negative: Looks like a broken bone or dislocated joint " "(e.g., crooked or deformed)    Negative: Sounds like a life-threatening emergency to the triager    Negative: Followed a hip injury    Negative: Leg pain is main symptom    Negative: Back pain radiating (shooting) into hip    Negative: [1] SEVERE pain (e.g., excruciating, unable to do any normal activities) AND [2] fever    Negative: Can't stand (bear weight) or walk    Negative: [1] Red area or streak AND [2] fever    Negative: Patient sounds very sick or weak to the triager    Negative: [1] SEVERE pain (e.g., excruciating, unable to do any normal activities) AND [2] not improved after 2 hours of pain medicine    Negative: [1] Looks infected (spreading redness, pus) AND [2] large red area (> 2 in. or 5 cm)    Negative: [1] Painful rash AND [2] multiple small blisters grouped together (i.e., dermatomal distribution or \"band\" or \"stripe\")    Negative: Looks like a boil, infected sore, or deep ulcer    Negative: [1] Localized rash is very painful AND [2] no fever    Negative: [1] Redness of the skin AND [2] no fever    Negative: Numbness in a leg or foot (i.e., loss of sensation)    Protocols used: ITCHING - [id number], HIP PAIN-A-AH      "
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
## 2023-01-25 LAB — BACTERIA UR CULT: NO GROWTH

## 2023-06-01 DIAGNOSIS — E78.5 HYPERLIPIDEMIA LDL GOAL <130: ICD-10-CM

## 2023-06-01 NOTE — TELEPHONE ENCOUNTER
Patient is requesting a small amount of medication from local pharmacy while she waits for her mail order to arrive.

## 2023-06-02 DIAGNOSIS — E78.5 HYPERLIPIDEMIA LDL GOAL <130: ICD-10-CM

## 2023-06-02 RX ORDER — PRAVASTATIN SODIUM 40 MG
TABLET ORAL
Qty: 90 TABLET | OUTPATIENT
Start: 2023-06-02

## 2023-06-02 RX ORDER — PRAVASTATIN SODIUM 40 MG
40 TABLET ORAL DAILY
Qty: 30 TABLET | Refills: 0 | Status: SHIPPED | OUTPATIENT
Start: 2023-06-02 | End: 2023-09-20

## 2023-07-21 ENCOUNTER — NURSE TRIAGE (OUTPATIENT)
Dept: INTERNAL MEDICINE | Facility: CLINIC | Age: 83
End: 2023-07-21

## 2023-07-21 ENCOUNTER — OFFICE VISIT (OUTPATIENT)
Dept: INTERNAL MEDICINE | Facility: CLINIC | Age: 83
End: 2023-07-21
Payer: COMMERCIAL

## 2023-07-21 VITALS
WEIGHT: 157.9 LBS | DIASTOLIC BLOOD PRESSURE: 74 MMHG | HEIGHT: 64 IN | OXYGEN SATURATION: 94 % | SYSTOLIC BLOOD PRESSURE: 122 MMHG | BODY MASS INDEX: 26.96 KG/M2 | HEART RATE: 60 BPM | RESPIRATION RATE: 16 BRPM | TEMPERATURE: 98 F

## 2023-07-21 DIAGNOSIS — R53.83 FATIGUE, UNSPECIFIED TYPE: Primary | ICD-10-CM

## 2023-07-21 LAB
ALBUMIN SERPL BCG-MCNC: 4.3 G/DL (ref 3.5–5.2)
ALP SERPL-CCNC: 55 U/L (ref 35–104)
ALT SERPL W P-5'-P-CCNC: 16 U/L (ref 0–50)
ANION GAP SERPL CALCULATED.3IONS-SCNC: 10 MMOL/L (ref 7–15)
AST SERPL W P-5'-P-CCNC: 32 U/L (ref 0–45)
BASOPHILS # BLD AUTO: 0 10E3/UL (ref 0–0.2)
BASOPHILS NFR BLD AUTO: 1 %
BILIRUB SERPL-MCNC: 0.4 MG/DL
BUN SERPL-MCNC: 17.8 MG/DL (ref 8–23)
CALCIUM SERPL-MCNC: 9.5 MG/DL (ref 8.8–10.2)
CHLORIDE SERPL-SCNC: 99 MMOL/L (ref 98–107)
CREAT SERPL-MCNC: 0.66 MG/DL (ref 0.51–0.95)
DEPRECATED HCO3 PLAS-SCNC: 28 MMOL/L (ref 22–29)
EOSINOPHIL # BLD AUTO: 0.1 10E3/UL (ref 0–0.7)
EOSINOPHIL NFR BLD AUTO: 2 %
ERYTHROCYTE [DISTWIDTH] IN BLOOD BY AUTOMATED COUNT: 11.9 % (ref 10–15)
GFR SERPL CREATININE-BSD FRML MDRD: 87 ML/MIN/1.73M2
GLUCOSE SERPL-MCNC: 93 MG/DL (ref 70–99)
HCT VFR BLD AUTO: 42.1 % (ref 35–47)
HGB BLD-MCNC: 13.8 G/DL (ref 11.7–15.7)
IMM GRANULOCYTES # BLD: 0 10E3/UL
IMM GRANULOCYTES NFR BLD: 0 %
LYMPHOCYTES # BLD AUTO: 2 10E3/UL (ref 0.8–5.3)
LYMPHOCYTES NFR BLD AUTO: 31 %
MCH RBC QN AUTO: 31.2 PG (ref 26.5–33)
MCHC RBC AUTO-ENTMCNC: 32.8 G/DL (ref 31.5–36.5)
MCV RBC AUTO: 95 FL (ref 78–100)
MONOCYTES # BLD AUTO: 0.7 10E3/UL (ref 0–1.3)
MONOCYTES NFR BLD AUTO: 10 %
NEUTROPHILS # BLD AUTO: 3.6 10E3/UL (ref 1.6–8.3)
NEUTROPHILS NFR BLD AUTO: 56 %
PLATELET # BLD AUTO: 199 10E3/UL (ref 150–450)
POTASSIUM SERPL-SCNC: 4.3 MMOL/L (ref 3.4–5.3)
PROT SERPL-MCNC: 7.4 G/DL (ref 6.4–8.3)
RBC # BLD AUTO: 4.43 10E6/UL (ref 3.8–5.2)
SODIUM SERPL-SCNC: 137 MMOL/L (ref 136–145)
TSH SERPL DL<=0.005 MIU/L-ACNC: 2.25 UIU/ML (ref 0.3–4.2)
WBC # BLD AUTO: 6.5 10E3/UL (ref 4–11)

## 2023-07-21 PROCEDURE — 99213 OFFICE O/P EST LOW 20 MIN: CPT | Performed by: PHYSICIAN ASSISTANT

## 2023-07-21 PROCEDURE — 80053 COMPREHEN METABOLIC PANEL: CPT | Performed by: PHYSICIAN ASSISTANT

## 2023-07-21 PROCEDURE — 85025 COMPLETE CBC W/AUTO DIFF WBC: CPT | Performed by: PHYSICIAN ASSISTANT

## 2023-07-21 PROCEDURE — 84443 ASSAY THYROID STIM HORMONE: CPT | Performed by: PHYSICIAN ASSISTANT

## 2023-07-21 PROCEDURE — 36415 COLL VENOUS BLD VENIPUNCTURE: CPT | Performed by: PHYSICIAN ASSISTANT

## 2023-07-21 ASSESSMENT — PATIENT HEALTH QUESTIONNAIRE - PHQ9
10. IF YOU CHECKED OFF ANY PROBLEMS, HOW DIFFICULT HAVE THESE PROBLEMS MADE IT FOR YOU TO DO YOUR WORK, TAKE CARE OF THINGS AT HOME, OR GET ALONG WITH OTHER PEOPLE: SOMEWHAT DIFFICULT
SUM OF ALL RESPONSES TO PHQ QUESTIONS 1-9: 5
SUM OF ALL RESPONSES TO PHQ QUESTIONS 1-9: 5

## 2023-07-21 NOTE — PROGRESS NOTES
"  Assessment & Plan     Fatigue, unspecified type      - CBC with platelets and differential  - Comprehensive metabolic panel (BMP + Alb, Alk Phos, ALT, AST, Total. Bili, TP)  - TSH with free T4 reflex             BMI:   Estimated body mass index is 27.1 kg/m  as calculated from the following:    Height as of this encounter: 1.626 m (5' 4\").    Weight as of this encounter: 71.6 kg (157 lb 14.4 oz).       Labs as noted.   Will f/u based on results   See PCP     DENNY Camargo [facility] CARMELOLittle [facility] Center[facility]SO[facility] Hannah is a 83 year old, presenting for the following health issues:  Fatigue      History of Present Illness       Reason for visit:  Feeling more tired than normal  Symptom onset:  1-2 weeks ago    She eats 0-1 servings of fruits and vegetables daily.She consumes 0 sweetened beverage(s) daily.She exercises with enough effort to increase her heart rate 10 to 19 minutes per day.  She exercises with enough effort to increase her heart rate 3 or less days per week.   She is taking medications regularly.    Today's PHQ-9         PHQ-9 Total Score: 5    PHQ-9 Q9 Thoughts of better off dead/self-harm past 2 weeks :   Not at all    How difficult have these problems made it for you to do your work, take care of things at home, or get along with other people: Somewhat difficult     Fatigue       Duration: 2 weeks worsening     Description (location/character/radiation):     Intensity:  moderate    Accompanying signs and symptoms: No abdominal pain no urination issues    No dizziness or weakness     History (similar episodes/previous evaluation): None    Precipitating or alleviating factors: None    Therapies tried and outcome: None                  Review of Systems         Objective    /74   Pulse 60   Temp 98  F (36.7  C) (Tympanic)   Resp 16   Ht 1.626 m (5' 4\")   Wt 71.6 kg (157 lb 14.4 oz)   LMP 11/08/1991   SpO2 94%   BMI 27.10 kg/m    Body mass index is 27.1 " kg/m .  Physical Exam   GENERAL: healthy, alert and no distress  RESP: lungs clear to auscultation - no rales, rhonchi or wheezes  CV: regular rate and rhythm, normal S1 S2, no S3 or S4, no murmur, click or rub, no peripheral edema and peripheral pulses strong  MS: no gross musculoskeletal defects noted, no edema  SKIN: no suspicious lesions or rashes  NEURO: Normal strength and tone, mentation intact and speech normal

## 2023-07-21 NOTE — TELEPHONE ENCOUNTER
Nurse Triage SBAR    Is this a 2nd Level Triage? NO    Situation: Patient calling clinic reporting increased fatigue. Patient stated she first noticed increased fatigue after she broke her wrist a few weeks ago and would like labs done to make sure her hemoglobin and thyroid levels are unchanged.     Background: Patient stated she fell and broke her wrist a few weeks ago. Patient stated she has been doing PT consistently and has increased fluid intake.    Assessment: Patient did not report any red flag symptoms. Patient denied severe dizziness or lightheadedness. Patient stated she has felt the need to sleep more during the day and does not feel well rested when she wakes up.     Protocol Recommended Disposition:   See in Office Within 3 Days    Recommendation: Per disposition, RN advised patient to be seen within 3 days for evaluation. RN assisted patient to be scheduled with Same Day provider 7/21/2023 and advised patient to be seen sooner if symptoms worsen. Patient agreed with plan of care and did not have any additional questions or concerns.      Does the patient meet one of the following criteria for ADS visit consideration? 16+ years old, with an [facility] PCP     TIP  Providers, please consider if this condition is appropriate for management at one of our Acute and Diagnostic Services sites.     If patient is a good candidate, please use dotphrase <dot>triageresponse and select Refer to ADS to document.      Reason for Disposition    Fatigue (i.e., tires easily, decreased energy) and persists > 1 week    Additional Information    Negative: SEVERE difficulty breathing (e.g., struggling for each breath, speaks in single words)    Negative: Shock suspected (e.g., cold/pale/clammy skin, too weak to stand, low BP, rapid pulse)    Negative: Difficult to awaken or acting confused (e.g., disoriented, slurred speech)    Negative: Fainted > 15 minutes ago and still feels too weak or dizzy to stand    Negative: SEVERE  weakness (i.e., unable to walk or barely able to walk, requires support) and new-onset or worsening    Negative: Sounds like a life-threatening emergency to the triager    Negative: Weakness of the face, arm or leg on one side of the body    Negative: Has diabetes mellitus and weakness from low blood sugar (i.e., < 60 mg/dL or 3.5 mmol/L)    Negative: Recent heat exposure, suspected cause of weakness    Negative: Vomiting is main symptom    Negative: Diarrhea is main symptom    Negative: Difficulty breathing    Negative: Heart beating < 50 beats per minute OR > 140 beats per minute    Negative: Extra heartbeats OR irregular heart beating (i.e., 'palpitations')    Negative: Follows bleeding (e.g., from vomiting, rectum, vagina)  (Exception: Small transient weakness from sight of a small amount blood.)    Negative: Bloody, black, or tarry bowel movements  (Exception: Chronic-unchanged black-grey bowel movements and is taking iron pills or Pepto-Bismol.)    Negative: MODERATE weakness from poor fluid intake with no improvement after 2 hours of rest and fluids    Negative: Drinking very little and dehydration suspected (e.g., no urine > 12 hours, very dry mouth, very lightheaded)    Negative: Patient sounds very sick or weak to the triager    Negative: MODERATE weakness (i.e., interferes with work, school, normal activities) and cause unknown  (Exceptions: Weakness with acute minor illness, or weakness from poor fluid intake.)    Negative: Fever > 103 F  (39.4 C) and not able to get the Fever down using CARE ADVICE    Negative: Fever > 100.0 F (37.8 C) and bedridden (e.g., nursing home patient, stroke, chronic illness, recovering from surgery)    Negative: Fever > 101 F (38.3 C) and over 60 years of age    Negative: Fever > 100.0 F (37.8 C) and diabetes mellitus or weak immune system (e.g., HIV positive, cancer chemo, splenectomy, organ transplant, chronic steroids)    Negative: Pale skin (pallor)    Negative: MODERATE  weakness (i.e., interferes with work, school, normal activities) and persists > 3 days    Negative: Taking a medicine that could cause weakness (e.g., blood pressure medications, diuretics)    Negative: Patient wants to be seen    Negative: MILD weakness (i.e., does not interfere with ability to work, go to school, normal activities) and persists > 1 week    Protocols used: WEAKNESS (GENERALIZED) AND FATIGUE-A-OH

## 2023-08-08 ENCOUNTER — NURSE TRIAGE (OUTPATIENT)
Dept: INTERNAL MEDICINE | Facility: CLINIC | Age: 83
End: 2023-08-08
Payer: COMMERCIAL

## 2023-08-08 NOTE — TELEPHONE ENCOUNTER
"  Nurse Triage SBAR    Is this a 2nd Level Triage? NO    Situation:  Patient has been feeling \"off\" currently. Feels weak and tired, does not feel rested when she gets up.     Background:  Patient broke wrist on may 19th and has been feeling off ever since. She attributes it to the fact that she is extremely active. Adequately hydrated.     Assessment:  patient generally not feeling well.     Protocol Recommended Disposition:   See in Office Within 3 Days    Recommendation:  Patient needs to be seen 3 within three days      Routed to provider    Does the patient meet one of the following criteria for ADS visit consideration? No     Reason for Disposition   MILD weakness (i.e., does not interfere with ability to work, go to school, normal activities) and persists > 1 week    Additional Information   Negative: SEVERE difficulty breathing (e.g., struggling for each breath, speaks in single words)   Negative: Shock suspected (e.g., cold/pale/clammy skin, too weak to stand, low BP, rapid pulse)   Negative: Difficult to awaken or acting confused (e.g., disoriented, slurred speech)   Negative: Fainted > 15 minutes ago and still feels too weak or dizzy to stand   Negative: SEVERE weakness (i.e., unable to walk or barely able to walk, requires support) and new-onset or worsening   Negative: Sounds like a life-threatening emergency to the triager   Negative: Weakness of the face, arm or leg on one side of the body   Negative: Has diabetes mellitus and weakness from low blood sugar (i.e., < 60 mg/dL or 3.5 mmol/L)   Negative: Recent heat exposure, suspected cause of weakness   Negative: Vomiting is main symptom   Negative: Diarrhea is main symptom   Negative: Difficulty breathing   Negative: Heart beating < 50 beats per minute OR > 140 beats per minute   Negative: Extra heartbeats OR irregular heart beating (i.e., 'palpitations')   Negative: Follows bleeding (e.g., from vomiting, rectum, vagina)  (Exception: Small transient " "weakness from sight of a small amount blood.)   Negative: Bloody, black, or tarry bowel movements  (Exception: Chronic-unchanged black-grey bowel movements and is taking iron pills or Pepto-Bismol.)   Negative: MODERATE weakness from poor fluid intake with no improvement after 2 hours of rest and fluids   Negative: Drinking very little and dehydration suspected (e.g., no urine > 12 hours, very dry mouth, very lightheaded)   Negative: Patient sounds very sick or weak to the triager   Negative: MODERATE weakness (i.e., interferes with work, school, normal activities) and cause unknown  (Exceptions: Weakness with acute minor illness, or weakness from poor fluid intake.)   Negative: Fever > 103 F  (39.4 C) and not able to get the Fever down using CARE ADVICE   Negative: Fever > 100.0 F (37.8 C) and bedridden (e.g., nursing home patient, stroke, chronic illness, recovering from surgery)   Negative: Fever > 101 F (38.3 C) and over 60 years of age   Negative: Fever > 100.0 F (37.8 C) and diabetes mellitus or weak immune system (e.g., HIV positive, cancer chemo, splenectomy, organ transplant, chronic steroids)   Negative: Pale skin (pallor)   Negative: MODERATE weakness (i.e., interferes with work, school, normal activities) and persists > 3 days   Negative: Taking a medicine that could cause weakness (e.g., blood pressure medications, diuretics)   Negative: Patient wants to be seen    Answer Assessment - Initial Assessment Questions  1. DESCRIPTION: \"Describe how you are feeling.\"      - patient feeling sluggish, patient normally active, 135/74 bp  2. SEVERITY: \"How bad is it?\"  \"Can you stand and walk?\"    - MILD - Feels weak or tired, but does not interfere with work, school or normal activities    - MODERATE - Able to stand and walk; weakness interferes with work, school, or normal activities    - SEVERE - Unable to stand or walk      Mild   3. ONSET:  \"When did the weakness begin?\"      No weakness, balance is off   4. " "CAUSE: \"What do you think is causing the weakness?\"      No weakness   5. MEDICINES: \"Have you recently started a new medicine or had a change in the amount of a medicine?\"      No   6. OTHER SYMPTOMS: \"Do you have any other symptoms?\" (e.g., chest pain, fever, cough, SOB, vomiting, diarrhea, bleeding, other areas of pain)      No   7. PREGNANCY: \"Is there any chance you are pregnant?\" \"When was your last menstrual period?\"      No    Protocols used: Weakness (Generalized) and Fatigue-A-OH    "

## 2023-08-08 NOTE — TELEPHONE ENCOUNTER
Symptoms have not improved since LOV about two weeks ago. Ok to schedule in same day tomorrow or the next day?

## 2023-08-10 ENCOUNTER — TELEPHONE (OUTPATIENT)
Dept: INTERNAL MEDICINE | Facility: CLINIC | Age: 83
End: 2023-08-10
Payer: COMMERCIAL

## 2023-08-11 NOTE — TELEPHONE ENCOUNTER
Her recent extensive labs were all within normal limits.    OK to schedule her next week in the office in an open same day or next day appointment.      Close encounter when done.

## 2023-08-11 NOTE — TELEPHONE ENCOUNTER
Triage RN attempted to contact patient to assist in getting her scheduled for an appointment for her fatigue.     Patient Contact    Attempt # 1    Was call answered?  No.  Left message on voicemail with information to call me back.    Upon call back: please relay message regarding lab results and assist in getting patient scheduled for an appointment.    Nevaeh Moralez RN

## 2023-08-11 NOTE — TELEPHONE ENCOUNTER
Pt returned call and message from Dr. Emmanuel below was relayed. She stated understanding and has been scheduled next week per note from provider. No further questions at this time.    Ros THOMPSON RN  [facility]

## 2023-08-11 NOTE — TELEPHONE ENCOUNTER
General Call    Contacts         Type Contact Phone/Fax    08/10/2023 06:58 PM CDT Phone (Incoming) Brielle Marvin (Self) [phone] (M)          Reason for Call:  Requesting to be seen asap    What are your questions or concerns:  None    Date of last appointment with provider: NA    Could we send this information to you in [employer] or would you prefer to receive a phone call?:   Patient would prefer a phone call   Okay to leave a detailed message?: Yes at Cell number on file:    Telephone Information:   Mobile [phone]

## 2023-08-17 ENCOUNTER — OFFICE VISIT (OUTPATIENT)
Dept: INTERNAL MEDICINE | Facility: CLINIC | Age: 83
End: 2023-08-17
Payer: COMMERCIAL

## 2023-08-17 VITALS
OXYGEN SATURATION: 100 % | BODY MASS INDEX: 27.52 KG/M2 | WEIGHT: 160.3 LBS | DIASTOLIC BLOOD PRESSURE: 72 MMHG | TEMPERATURE: 97.1 F | HEART RATE: 62 BPM | SYSTOLIC BLOOD PRESSURE: 134 MMHG

## 2023-08-17 DIAGNOSIS — I10 BENIGN ESSENTIAL HYPERTENSION: ICD-10-CM

## 2023-08-17 DIAGNOSIS — E78.5 HYPERLIPIDEMIA LDL GOAL <130: ICD-10-CM

## 2023-08-17 DIAGNOSIS — R53.82 CHRONIC FATIGUE: Primary | ICD-10-CM

## 2023-08-17 DIAGNOSIS — N32.81 OAB (OVERACTIVE BLADDER): ICD-10-CM

## 2023-08-17 DIAGNOSIS — S52.571D OTHER INTRAARTICULAR FRACTURE OF LOWER END OF RIGHT RADIUS, SUBSEQUENT ENCOUNTER FOR CLOSED FRACTURE WITH ROUTINE HEALING: ICD-10-CM

## 2023-08-17 DIAGNOSIS — Z13.6 CARDIOVASCULAR SCREENING; LDL GOAL LESS THAN 130: ICD-10-CM

## 2023-08-17 PROCEDURE — 99214 OFFICE O/P EST MOD 30 MIN: CPT | Performed by: INTERNAL MEDICINE

## 2023-08-17 NOTE — PROGRESS NOTES
"  Assessment & Plan   Problem List Items Addressed This Visit       Benign essential hypertension    Relevant Orders    Lipid panel reflex to direct LDL Fasting    Basic metabolic panel    CBC with platelets    AST    ALT    PRIMARY CARE FOLLOW-UP SCHEDULING     Other Visit Diagnoses       Chronic fatigue    -  Primary    Relevant Orders    Lipid panel reflex to direct LDL Fasting    Basic metabolic panel    CBC with platelets    AST    ALT    PRIMARY CARE FOLLOW-UP SCHEDULING    OAB (overactive bladder)        Relevant Orders    PRIMARY CARE FOLLOW-UP SCHEDULING    Hyperlipidemia LDL goal <130        Relevant Orders    Lipid panel reflex to direct LDL Fasting    Basic metabolic panel    CBC with platelets    AST    ALT    PRIMARY CARE FOLLOW-UP SCHEDULING    Other intraarticular fracture of lower end of right radius, subsequent encounter for closed fracture with routine healing        Relevant Orders    PRIMARY CARE FOLLOW-UP SCHEDULING    CARDIOVASCULAR SCREENING; LDL GOAL LESS THAN 130        Relevant Orders    Lipid panel reflex to direct LDL Fasting    Basic metabolic panel    CBC with platelets    AST    ALT    PRIMARY CARE FOLLOW-UP SCHEDULING               Labs were all normal.      Nothing on your labs, history, or exam today to suggest a serious problem.      I recommend concentrating on good fluid intake improved nutrition.      Advance activity as tolerated per the direction from the therapists and orthopedic staff.      Continue all medications at the same doses.  Contact your usual pharmacy if you need refills.      Covid vaccines are now recommended annually.  Get the most updated Covid vaccine when it becomes available, consider getting this at the same time as the annual influenza vaccine.      Return to see me in Jan 2024, sooner if needed.  Please get fasting labs done at the [facility] or any other [facility] Lab lab 1-2 days before this appointment (schedule a \"lab " "appointment\").  If you get the labs done at another clinic, make arrangements with them directly.  The orders will be in place.  Eat nothing for at least 8 hours prior to having these labs drawn.  Use [employer] or Call [phone] to schedule the appointment with me and lab appointment.                Jared Emmanuel MD  [employer] VESNA Hannah is a 83 year old, presenting for the following health issues:  Fatigue and Musculoskeletal Problem (R arm )    History of Present Illness       Reason for visit:  Experiencing unusally tiredness  Symptom onset:  1-2 weeks ago    She eats 0-1 servings of fruits and vegetables daily.She consumes 0 sweetened beverage(s) daily.She exercises with enough effort to increase her heart rate 20 to 29 minutes per day.  She exercises with enough effort to increase her heart rate 3 or less days per week.   She is taking medications regularly.       Right wrist fracture May 2023.  Healing as expected.   Reviewed notes from [facility].    Overall doing much better.       Per follow up xray at [facility]:    07/11/2023 8:31 AM CDT   Three views of the wrist show comminuted intra-articular distal radius  fracture. There is dorsal angulation present, but fracture does appear to  have healed well.    Reports nonspecific fatigue of a generalized nature for the last few months.  Denies intestinal symptoms (i.e. No diarrhea, no constipation, no irregular BMs), Denies chest pain, shortness of breath, or dyspnea on exertion.  Denies fevers, unintended weight loss, unexplained abdominal pain, unexplained joint or muscle pain,  recent travels, or  history of autoimmune disease.   The patient does not feel that they are aware of any specific cause for this fatigue.      These fatigue symptoms have mostly been present since the wrist fracture.     She states that she \"just does not feel well\".    Seen last month in clinic for evaluation, all  labs were within normal " limits including thyroid.     Since she called in to make this appointment, she does feel much better, has been eating and drinking better.       **I reviewed the information recorded in the patient's EPIC chart (including but not limited to medical history, surgical history, family history, problem list, medication list, and allergy list) and updated the information as indicated based on the patients reported information.         Review of Systems   Constitutional, HEENT, cardiovascular, pulmonary, gi and gu systems are negative, except as otherwise noted.      Objective    /72   Pulse 62   Temp 97.1  F (36.2  C)   Wt 72.7 kg (160 lb 4.8 oz)   LMP 11/08/1991   SpO2 100%   BMI 27.52 kg/m    Body mass index is 27.52 kg/m .  Physical Exam     GENERAL alert and no distress  EYES:  Normal sclera,conjunctiva, EOMI  HENT: oral and posterior pharynx without lesions or erythema, facies symmetric  NECK: Neck supple. No LAD, without thyroidmegaly.  RESP: Clear to ausculation bilaterally without wheezes or crackles. Normal BS in all fields.  CV: RRR normal S1S2 without murmurs, rubs or gallops.  LYMPH: no cervical lymph adenopathy appreciated  MS: extremities- no gross deformities of the visible extremities noted,   EXT:  no lower extremity edema  PSYCH: Alert and oriented times 3; speech- coherent  SKIN:  No obvious significant skin lesions on visible portions of face

## 2023-08-17 NOTE — PATIENT INSTRUCTIONS
"  Labs were all normal.      Nothing on your labs, history, or exam today to suggest a serious problem.      I recommend concentrating on good fluid intake improved nutrition.      Advance activity as tolerated per the direction from the therapists and orthopedic staff.      Continue all medications at the same doses.  Contact your usual pharmacy if you need refills.      Covid vaccines are now recommended annually.  Get the most updated Covid vaccine when it becomes available, consider getting this at the same time as the annual influenza vaccine.      Return to see me in Jan 2024, sooner if needed.  Please get fasting labs done at the [facility] or any other [facility] Lab lab 1-2 days before this appointment (schedule a \"lab appointment\").  If you get the labs done at another clinic, make arrangements with them directly.  The orders will be in place.  Eat nothing for at least 8 hours prior to having these labs drawn.  Use [employer] or Call [phone] to schedule the appointment with me and lab appointment.     "

## 2023-09-09 ENCOUNTER — APPOINTMENT (OUTPATIENT)
Dept: MRI IMAGING | Facility: CLINIC | Age: 83
End: 2023-09-09
Attending: EMERGENCY MEDICINE
Payer: COMMERCIAL

## 2023-09-09 ENCOUNTER — HOSPITAL ENCOUNTER (OUTPATIENT)
Facility: CLINIC | Age: 83
Setting detail: OBSERVATION
Discharge: HOME | End: 2023-09-11
Attending: EMERGENCY MEDICINE | Admitting: EMERGENCY MEDICINE
Payer: COMMERCIAL

## 2023-09-09 DIAGNOSIS — R47.9 TRANSIENT SPEECH DISTURBANCE: ICD-10-CM

## 2023-09-09 LAB
ALBUMIN SERPL BCG-MCNC: 4.1 G/DL (ref 3.5–5.2)
ALP SERPL-CCNC: 49 U/L (ref 35–104)
ALT SERPL W P-5'-P-CCNC: 15 U/L (ref 0–50)
ANION GAP SERPL CALCULATED.3IONS-SCNC: 11 MMOL/L (ref 7–15)
AST SERPL W P-5'-P-CCNC: 18 U/L (ref 0–45)
ATRIAL RATE - MUSE: 70 BPM
BASOPHILS # BLD AUTO: 0 10E3/UL (ref 0–0.2)
BASOPHILS NFR BLD AUTO: 1 %
BILIRUB DIRECT SERPL-MCNC: <0.2 MG/DL (ref 0–0.3)
BILIRUB SERPL-MCNC: 0.4 MG/DL
BUN SERPL-MCNC: 17.4 MG/DL (ref 8–23)
CALCIUM SERPL-MCNC: 9.6 MG/DL (ref 8.8–10.2)
CHLORIDE SERPL-SCNC: 99 MMOL/L (ref 98–107)
CREAT SERPL-MCNC: 0.66 MG/DL (ref 0.51–0.95)
DEPRECATED HCO3 PLAS-SCNC: 27 MMOL/L (ref 22–29)
DIASTOLIC BLOOD PRESSURE - MUSE: NORMAL MMHG
EGFRCR SERPLBLD CKD-EPI 2021: 87 ML/MIN/1.73M2
EOSINOPHIL # BLD AUTO: 0.1 10E3/UL (ref 0–0.7)
EOSINOPHIL NFR BLD AUTO: 2 %
ERYTHROCYTE [DISTWIDTH] IN BLOOD BY AUTOMATED COUNT: 12.1 % (ref 10–15)
GLUCOSE SERPL-MCNC: 86 MG/DL (ref 70–99)
HCT VFR BLD AUTO: 41.2 % (ref 35–47)
HGB BLD-MCNC: 13.4 G/DL (ref 11.7–15.7)
IMM GRANULOCYTES # BLD: 0 10E3/UL
IMM GRANULOCYTES NFR BLD: 0 %
INTERPRETATION ECG - MUSE: NORMAL
LYMPHOCYTES # BLD AUTO: 2.5 10E3/UL (ref 0.8–5.3)
LYMPHOCYTES NFR BLD AUTO: 32 %
MCH RBC QN AUTO: 31 PG (ref 26.5–33)
MCHC RBC AUTO-ENTMCNC: 32.5 G/DL (ref 31.5–36.5)
MCV RBC AUTO: 95 FL (ref 78–100)
MONOCYTES # BLD AUTO: 0.9 10E3/UL (ref 0–1.3)
MONOCYTES NFR BLD AUTO: 11 %
NEUTROPHILS # BLD AUTO: 4.4 10E3/UL (ref 1.6–8.3)
NEUTROPHILS NFR BLD AUTO: 54 %
NRBC # BLD AUTO: 0 10E3/UL
NRBC BLD AUTO-RTO: 0 /100
P AXIS - MUSE: 75 DEGREES
PLATELET # BLD AUTO: 177 10E3/UL (ref 150–450)
POTASSIUM SERPL-SCNC: 4.1 MMOL/L (ref 3.4–5.3)
PR INTERVAL - MUSE: 182 MS
PROT SERPL-MCNC: 7.3 G/DL (ref 6.4–8.3)
QRS DURATION - MUSE: 76 MS
QT - MUSE: 406 MS
QTC - MUSE: 438 MS
R AXIS - MUSE: 44 DEGREES
RBC # BLD AUTO: 4.32 10E6/UL (ref 3.8–5.2)
SODIUM SERPL-SCNC: 137 MMOL/L (ref 136–145)
SYSTOLIC BLOOD PRESSURE - MUSE: NORMAL MMHG
T AXIS - MUSE: 65 DEGREES
TROPONIN T SERPL HS-MCNC: 9 NG/L
VENTRICULAR RATE- MUSE: 70 BPM
WBC # BLD AUTO: 8 10E3/UL (ref 4–11)

## 2023-09-09 PROCEDURE — 82248 BILIRUBIN DIRECT: CPT | Performed by: EMERGENCY MEDICINE

## 2023-09-09 PROCEDURE — 80053 COMPREHEN METABOLIC PANEL: CPT | Performed by: EMERGENCY MEDICINE

## 2023-09-09 PROCEDURE — 70553 MRI BRAIN STEM W/O & W/DYE: CPT

## 2023-09-09 PROCEDURE — 84443 ASSAY THYROID STIM HORMONE: CPT | Performed by: HOSPITALIST

## 2023-09-09 PROCEDURE — 84484 ASSAY OF TROPONIN QUANT: CPT | Performed by: EMERGENCY MEDICINE

## 2023-09-09 PROCEDURE — 82310 ASSAY OF CALCIUM: CPT | Performed by: EMERGENCY MEDICINE

## 2023-09-09 PROCEDURE — 70549 MR ANGIOGRAPH NECK W/O&W/DYE: CPT

## 2023-09-09 PROCEDURE — 70544 MR ANGIOGRAPHY HEAD W/O DYE: CPT

## 2023-09-09 PROCEDURE — 99285 EMERGENCY DEPT VISIT HI MDM: CPT | Mod: 25

## 2023-09-09 PROCEDURE — 83036 HEMOGLOBIN GLYCOSYLATED A1C: CPT | Performed by: HOSPITALIST

## 2023-09-09 PROCEDURE — 93005 ELECTROCARDIOGRAM TRACING: CPT

## 2023-09-09 PROCEDURE — 85025 COMPLETE CBC W/AUTO DIFF WBC: CPT | Performed by: EMERGENCY MEDICINE

## 2023-09-09 PROCEDURE — 36415 COLL VENOUS BLD VENIPUNCTURE: CPT | Performed by: EMERGENCY MEDICINE

## 2023-09-09 RX ORDER — GADOBUTROL 604.72 MG/ML
10 INJECTION INTRAVENOUS ONCE
Status: COMPLETED | OUTPATIENT
Start: 2023-09-09 | End: 2023-09-10

## 2023-09-10 ENCOUNTER — APPOINTMENT (OUTPATIENT)
Dept: SPEECH THERAPY | Facility: CLINIC | Age: 83
End: 2023-09-10
Attending: HOSPITALIST
Payer: COMMERCIAL

## 2023-09-10 ENCOUNTER — APPOINTMENT (OUTPATIENT)
Dept: CT IMAGING | Facility: CLINIC | Age: 83
End: 2023-09-10
Attending: HOSPITALIST
Payer: COMMERCIAL

## 2023-09-10 PROBLEM — R47.9 TRANSIENT SPEECH DISTURBANCE: Status: ACTIVE | Noted: 2023-09-10

## 2023-09-10 LAB
ALBUMIN UR-MCNC: NEGATIVE MG/DL
APPEARANCE UR: CLEAR
ATRIAL RATE - MUSE: 76 BPM
BILIRUB UR QL STRIP: NEGATIVE
CHOLEST SERPL-MCNC: 140 MG/DL
COLOR UR AUTO: ABNORMAL
DIASTOLIC BLOOD PRESSURE - MUSE: NORMAL MMHG
GLUCOSE BLDC GLUCOMTR-MCNC: 113 MG/DL (ref 70–99)
GLUCOSE UR STRIP-MCNC: NEGATIVE MG/DL
HBA1C MFR BLD: 5.7 %
HDLC SERPL-MCNC: 53 MG/DL
HGB UR QL STRIP: NEGATIVE
HOLD SPECIMEN: NORMAL
INTERPRETATION ECG - MUSE: NORMAL
KETONES UR STRIP-MCNC: NEGATIVE MG/DL
LDLC SERPL CALC-MCNC: 72 MG/DL
LEUKOCYTE ESTERASE UR QL STRIP: ABNORMAL
NITRATE UR QL: NEGATIVE
NONHDLC SERPL-MCNC: 87 MG/DL
P AXIS - MUSE: 66 DEGREES
PH UR STRIP: 6.5 [PH] (ref 5–7)
PR INTERVAL - MUSE: 168 MS
QRS DURATION - MUSE: 76 MS
QT - MUSE: 394 MS
QTC - MUSE: 443 MS
R AXIS - MUSE: 9 DEGREES
RBC URINE: <1 /HPF
SP GR UR STRIP: 1 (ref 1–1.03)
SYSTOLIC BLOOD PRESSURE - MUSE: NORMAL MMHG
T AXIS - MUSE: 51 DEGREES
TRIGL SERPL-MCNC: 75 MG/DL
TROPONIN T SERPL HS-MCNC: 10 NG/L
TSH SERPL DL<=0.005 MIU/L-ACNC: 4.2 UIU/ML (ref 0.3–4.2)
UROBILINOGEN UR STRIP-MCNC: NORMAL MG/DL
VENTRICULAR RATE- MUSE: 76 BPM
WBC URINE: 11 /HPF

## 2023-09-10 PROCEDURE — 92526 ORAL FUNCTION THERAPY: CPT | Mod: GN | Performed by: SPEECH-LANGUAGE PATHOLOGIST

## 2023-09-10 PROCEDURE — G0378 HOSPITAL OBSERVATION PER HR: HCPCS

## 2023-09-10 PROCEDURE — 250N000013 HC RX MED GY IP 250 OP 250 PS 637: Performed by: EMERGENCY MEDICINE

## 2023-09-10 PROCEDURE — 87086 URINE CULTURE/COLONY COUNT: CPT | Performed by: EMERGENCY MEDICINE

## 2023-09-10 PROCEDURE — 80061 LIPID PANEL: CPT | Performed by: HOSPITALIST

## 2023-09-10 PROCEDURE — 250N000011 HC RX IP 250 OP 636: Performed by: HOSPITALIST

## 2023-09-10 PROCEDURE — 250N000013 HC RX MED GY IP 250 OP 250 PS 637: Performed by: HOSPITALIST

## 2023-09-10 PROCEDURE — 36415 COLL VENOUS BLD VENIPUNCTURE: CPT | Performed by: EMERGENCY MEDICINE

## 2023-09-10 PROCEDURE — 255N000002 HC RX 255 OP 636: Performed by: EMERGENCY MEDICINE

## 2023-09-10 PROCEDURE — 250N000009 HC RX 250: Performed by: HOSPITALIST

## 2023-09-10 PROCEDURE — 84484 ASSAY OF TROPONIN QUANT: CPT | Performed by: EMERGENCY MEDICINE

## 2023-09-10 PROCEDURE — 82962 GLUCOSE BLOOD TEST: CPT

## 2023-09-10 PROCEDURE — 99418 PROLNG IP/OBS E/M EA 15 MIN: CPT | Mod: FS | Performed by: PSYCHIATRY & NEUROLOGY

## 2023-09-10 PROCEDURE — A9585 GADOBUTROL INJECTION: HCPCS | Performed by: EMERGENCY MEDICINE

## 2023-09-10 PROCEDURE — 99233 SBSQ HOSP IP/OBS HIGH 50: CPT | Performed by: HOSPITALIST

## 2023-09-10 PROCEDURE — 74177 CT ABD & PELVIS W/CONTRAST: CPT

## 2023-09-10 PROCEDURE — 99223 1ST HOSP IP/OBS HIGH 75: CPT | Performed by: HOSPITALIST

## 2023-09-10 PROCEDURE — 92610 EVALUATE SWALLOWING FUNCTION: CPT | Mod: GN | Performed by: SPEECH-LANGUAGE PATHOLOGIST

## 2023-09-10 PROCEDURE — 99223 1ST HOSP IP/OBS HIGH 75: CPT | Mod: FS | Performed by: PSYCHIATRY & NEUROLOGY

## 2023-09-10 PROCEDURE — 81001 URINALYSIS AUTO W/SCOPE: CPT | Performed by: EMERGENCY MEDICINE

## 2023-09-10 RX ORDER — PRAVASTATIN SODIUM 20 MG
40 TABLET ORAL DAILY
Status: DISCONTINUED | OUTPATIENT
Start: 2023-09-10 | End: 2023-09-11 | Stop reason: HOSPADM

## 2023-09-10 RX ORDER — CLOPIDOGREL 300 MG/1
300 TABLET, FILM COATED ORAL ONCE
Status: COMPLETED | OUTPATIENT
Start: 2023-09-10 | End: 2023-09-10

## 2023-09-10 RX ORDER — IOPAMIDOL 755 MG/ML
81 INJECTION, SOLUTION INTRAVASCULAR ONCE
Status: COMPLETED | OUTPATIENT
Start: 2023-09-10 | End: 2023-09-10

## 2023-09-10 RX ORDER — CLOPIDOGREL BISULFATE 75 MG/1
75 TABLET ORAL DAILY
Status: DISCONTINUED | OUTPATIENT
Start: 2023-09-11 | End: 2023-09-11 | Stop reason: HOSPADM

## 2023-09-10 RX ORDER — AMOXICILLIN 250 MG
1 CAPSULE ORAL
Status: DISCONTINUED | OUTPATIENT
Start: 2023-09-10 | End: 2023-09-11 | Stop reason: HOSPADM

## 2023-09-10 RX ORDER — OXYBUTYNIN CHLORIDE 10 MG/1
10 TABLET, EXTENDED RELEASE ORAL DAILY
Status: DISCONTINUED | OUTPATIENT
Start: 2023-09-10 | End: 2023-09-11 | Stop reason: HOSPADM

## 2023-09-10 RX ORDER — ACETAMINOPHEN 325 MG/1
650 TABLET ORAL EVERY 6 HOURS PRN
Status: DISCONTINUED | OUTPATIENT
Start: 2023-09-10 | End: 2023-09-11 | Stop reason: HOSPADM

## 2023-09-10 RX ORDER — ACETAMINOPHEN 650 MG/1
650 SUPPOSITORY RECTAL EVERY 4 HOURS PRN
Status: DISCONTINUED | OUTPATIENT
Start: 2023-09-10 | End: 2023-09-11 | Stop reason: HOSPADM

## 2023-09-10 RX ORDER — ASPIRIN 81 MG/1
81 TABLET, CHEWABLE ORAL DAILY
Status: DISCONTINUED | OUTPATIENT
Start: 2023-09-10 | End: 2023-09-11 | Stop reason: HOSPADM

## 2023-09-10 RX ORDER — ONDANSETRON 4 MG/1
4 TABLET, FILM COATED ORAL EVERY 6 HOURS PRN
Status: DISCONTINUED | OUTPATIENT
Start: 2023-09-10 | End: 2023-09-11 | Stop reason: HOSPADM

## 2023-09-10 RX ADMIN — GADOBUTROL 10 ML: 604.72 INJECTION INTRAVENOUS at 00:01

## 2023-09-10 RX ADMIN — ASPIRIN 81 MG 81 MG: 81 TABLET ORAL at 09:27

## 2023-09-10 RX ADMIN — PRAVASTATIN SODIUM 40 MG: 20 TABLET ORAL at 09:28

## 2023-09-10 RX ADMIN — SODIUM CHLORIDE 65 ML: 9 INJECTION, SOLUTION INTRAVENOUS at 19:59

## 2023-09-10 RX ADMIN — IOPAMIDOL 81 ML: 755 INJECTION, SOLUTION INTRAVENOUS at 19:59

## 2023-09-10 RX ADMIN — OXYBUTYNIN CHLORIDE 10 MG: 10 TABLET, EXTENDED RELEASE ORAL at 12:28

## 2023-09-10 RX ADMIN — CLOPIDOGREL BISULFATE 300 MG: 300 TABLET, FILM COATED ORAL at 03:50

## 2023-09-10 ASSESSMENT — ACTIVITIES OF DAILY LIVING (ADL)
ADLS_ACUITY_SCORE: 35
ADLS_ACUITY_SCORE: 22
ADLS_ACUITY_SCORE: 35
ADLS_ACUITY_SCORE: 22
ADLS_ACUITY_SCORE: 22
ADLS_ACUITY_SCORE: 35
ADLS_ACUITY_SCORE: 35
ADLS_ACUITY_SCORE: 39

## 2023-09-10 NOTE — ED NOTES
[facility]  ED Nurse Handoff Report    ED Chief complaint: Stroke Symptoms      ED Diagnosis:   Final diagnoses:   Transient speech disturbance       Code Status: to be addresssed    Allergies:   Allergies   Allergen Reactions    Codeine Nausea and Vomiting    Morphine Nausea and Vomiting     nausea    Morphine And Related Nausea and Vomiting     vomiting    Vicodin [Hydrocodone-Acetaminophen] Nausea and Vomiting       Patient Story: presented to ED due to slurred speech and disorganized thinking,stroke symptoms like.BP was elevated as reported by EMS.  Focused Assessment:  not distress,vitally stable,alert,no neurological deficit at present.    Treatments and/or interventions provided: iv access,labs,MRI,loading dose of Plavix,EKG.  Patient's response to treatments and/or interventions: tolerated    To be done/followed up on inpatient unit:  as per admission order/needs neurological assessment    Does this patient have any cognitive concerns?:  none    Activity level - Baseline/Home:  Independent  Activity Level - Current:   Independent    Patient's Preferred language: English   Needed?: No    Isolation: None  Infection: Not Applicable  Patient tested for COVID 19 prior to admission: NO  Bariatric?: No    Vital Signs:   Vitals:    09/09/23 2312 09/10/23 0100 09/10/23 0200 09/10/23 0302   BP: (!) 204/114 (!) 164/112 119/70 136/73   Pulse: 78 78 74 68   Resp: 16 16 16    Temp: 98.2  F (36.8  C)      TempSrc: Oral      SpO2: 91% 94% 92% 95%   Weight: 72.6 kg (160 lb)          Cardiac Rhythm:     Was the PSS-3 completed:   Yes  What interventions are required if any?               Family Comments: informed for admission  OBS brochure/video discussed/provided to patient/family: Yes              Name of person given brochure if not patient: none              Relationship to patient: none    For the majority of the shift this patient's behavior was Green.   Behavioral interventions performed  were none.    ED NURSE PHONE NUMBER: [phone]

## 2023-09-10 NOTE — CONSULTS
Care Management Initial Consult    General Information  Assessment completed with: Patient, patient  Type of CM/SW Visit: CM Role Introduction    Primary Care Provider verified and updated as needed: Yes   Readmission within the last 30 days: no previous admission in last 30 days      Reason for Consult: discharge planning  Advance Care Planning:            Communication Assessment  Patient's communication style: spoken language (English or Bilingual)    Hearing Difficulty or Deaf: no        Cognitive  Cognitive/Neuro/Behavioral: WDL  Level of Consciousness: alert  Arousal Level: opens eyes spontaneously  Orientation: oriented x 4     Best Language: 0 - No aphasia  Speech: spontaneous, clear    Living Environment:   People in home: alone     Current living Arrangements: house      Able to return to prior arrangements: other (see comments) (pending therapy and final recommendations)       Family/Social Support:  Care provided by: self  Provides care for: no one  Marital Status:   Children, Neighbor, Other (specify) (several friends and supportive neighbors)          Description of Support System: Supportive, Involved    Support Assessment: Adequate family and caregiver support, Adequate social supports    Current Resources:   Patient receiving home care services: No     Community Resources: None  Equipment currently used at home:    Supplies currently used at home: None    Employment/Financial:  Employment Status: retired        Financial Concerns: No concerns identified   Referral to Financial Worker: No       Does the patient's insurance plan have a 3 day qualifying hospital stay waiver?  No    Lifestyle & Psychosocial Needs:  Social Determinants of Health     Tobacco Use: Low Risk  (8/17/2023)    Patient History     Smoking Tobacco Use: Never     Smokeless Tobacco Use: Never     Passive Exposure: Not on file   Alcohol Use: Not on file   Financial Resource Strain: Not on file   Food Insecurity: Not on file  "  Transportation Needs: Not on file   Physical Activity: Not on file   Stress: Not on file   Social Connections: Not on file   Intimate Partner Violence: Not on file   Depression: At risk (7/21/2023)    PHQ-2     PHQ-2 Score: 3   Housing Stability: Not on file       Functional Status:  Prior to admission patient needed assistance: independent with all ADL's without use of assistive equipment             Mental Health Status:          Chemical Dependency Status:                Values/Beliefs:  Spiritual, Cultural Beliefs, Yarsanism Practices, Values that affect care: Scientologist noted supportive Spiritism community                Additional Information:  Writer met with the patient at the bedside. Patient is A+Ox4 up with SBA.  Writer explained role and scope of safe discharge planning.  Writer reviewed the Medicare ANTOINE for outpatient observation and answered all questions.  Prior to this admission patient independent with all ADL's. Patient noted that she has a Daughter and Grand Daughter in [place] (they are going to visit today) and mentions many neighbors, friends and community support.  She resides in a split level home 6 stairs up to room 7 stairs down to the basement. She has lawn services and help from Neighbors and Friends if needed.   Patient informs this writer she is very active she attends a card club, anti[employer] car club and was supposed to be coordinating a Spiritism picnic in [place] today.  She feels a little \"wobbly\" but improved overall.  Patient is aware that she has pending consults and that discharge could potentially be today or soon based on ongoing consults and final recommendations.  Patient is aware that our discharge planning team will continue to follow her for further discharge planning needs.  Writer briefly discussed possible need for homecare (patient will most likely not qualify as she is not homebound), PCP and other specialty follow up. Patient at this time prefers to schedule her own " follow up but is aware we can assist if needed.     Jeanna Eid RN, BSN, ACM   Care Transitions Specialist  [facility]  Care Transitions Specialist  [phone] [id number]  [email or web address]  Office: [phone] Fax: [phone]  [facility]

## 2023-09-10 NOTE — PROGRESS NOTES
"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     TIA  Presents to the hospital for feeling \"off\" and felt like her tongue was thick. Lipid profile WNL. A1C is 5.7--technically prediabetic   *MRI, MRA negative for any acute pathology or stroke.   She has a history of two syncopal episodes since 2018  -Monitor on telemetry  -Neurochecks every 4 hours  -echocardiogram not yet completed  -permissive HTN, hold PTA atenolol  -stroke neuro appreciated, recommends plavix 75mg daily, ASA 81mg daily for 21 days then ASA 81mg daily thereafter  -continue PTA pravastatin  -30 day event monitor on discharge  -PT/OT/SLP --has no needs  -follow up in 6-8 weeks with stroke MARLEN ([phone])    Acute on chronic fatigue  Weight loss  Decreased appetite  TSH WNL July 2023. 20lb weight loss in past year with decreased appetite. Other labs WNL. Family hx breast, lung, colon and pancreatic cancer. Personal hx basal skin cancer on nose. Mammogram dec 2022 WNL. Last colonoscopy many years ago-denies change in stools, but has some diarrhea intermittently. UA trace leuk esterase, no urinary symptoms  - recheck TSH  - check CT CAP     Chronic medical conditions:  Hx of dvt  Htn- PTA atenolol on hold  Hld-pravastatin resumed  Overactive bladder-ditropan resumed       Diet: Combination Diet Regular Diet; Thin Liquids (level 0)  DVT Prophylaxis: Pneumatic Compression Devices  Andrews Catheter: Not present  Lines: None     Cardiac Monitoring: ACTIVE order. Indication: Stroke, acute (48 hours)  Code Status: Full Code    Clinically Significant Risk Factors Present on Admission                  # " Drug Induced Platelet Defect: home medication list includes an antiplatelet medication     # Hypertension: Noted on problem list                 Disposition Plan      Expected Discharge Date: 09/11/2023      Destination: home            Kary DO Jono  Hospitalist Service  [facility]  Securely message with [employer] (more info)  Text page via [facility] Paging/Directory   ______________________________________________________________________    Interval History   Patient seen and examined. Initially she is sleeping pretty heavily, but daughter and granddaughter from [place] present in room. We discussed patient who is back to baseline. They raised concerns about ~20lb weight loss in the past 1 year, increased fatigue and worsening appetite. There is a family history of cancer. Later awoke patient, she is feeling back to herself. Does report she has been more tired lately, but keeps a busy social calendar. She denies change in her stools, specifically change in caliber, color or consistency. She does report occasional diarrhea. Denies bleeding in urine, vaginal bleeding. She feels back to herself from a speech standpoint. Willing to stay for echocardiogram on 9/11.   Additional time spent trying to coordinate care for echocardiogram today. Discussed with neurology, RN.    Physical Exam   Vital Signs: Temp: 97.8  F (36.6  C) Temp src: Oral BP: (!) 148/81 Pulse: 66   Resp: 18 SpO2: 96 % O2 Device: None (Room air)    Weight: 160 lbs .02 oz    Constitutional: Awake, alert, cooperative, no apparent distress  Respiratory: Clear to auscultation bilaterally, no crackles or wheezing  Cardiovascular: Regular rate and rhythm, normal S1 and S2, and no murmur noted  GI: Normal bowel sounds, soft, non-distended, non-tender  Skin/Integumen: No rashes, no cyanosis, no edema  Other:  Right lower extremity 5/5 strength, left lower extremity just slightly weaker than right which is chronic and attributed to  hx of right knee replacement. Face symmetric, tongue midline. Upper extremities 5/5 strength. Sensation intact.    Medical Decision Making       50 MINUTES SPENT BY ME on the date of service doing chart review, history, exam, documentation & further activities per the note.      Data     I have personally reviewed the following data over the past 24 hrs:    8.0  \   13.4   / 177     137 99 17.4 /  86   4.1 27 0.66 \     ALT: 15 AST: 18 AP: 49 TBILI: 0.4   ALB: 4.1 TOT PROTEIN: 7.3 LIPASE: N/A     Trop: 10 BNP: N/A     TSH: N/A T4: N/A A1C: 5.7 (H)       Imaging results reviewed over the past 24 hrs:   Recent Results (from the past 24 hour(s))   MR Brain w/o & w Contrast    Narrative    EXAM: MR BRAIN W/O and W CONTRAST, MRA NECK (CAROTIDS) W/O and W CONTRAST, MRA BRAIN ([place] OF [place]) W/O CONTRAST  LOCATION: [facility]  DATE: 9/10/2023    INDICATION: Word finding difficulty  COMPARISON: None.  CONTRAST: 10mL Gadavist  TECHNIQUE:   1) Routine multiplanar multisequence head MRI without and with intravenous contrast.  2) 3D time-of-flight head MRA without intravenous contrast.  3) Neck MRA without and with IV contrast. Stenosis measurements made according to NASCET criteria unless otherwise specified.    FINDINGS:  HEAD MRI:  INTRACRANIAL CONTENTS: No acute or subacute infarct. No mass, acute hemorrhage, or extra-axial fluid collections. Scattered nonspecific T2/FLAIR hyperintensities within the cerebral white matter most consistent with mild chronic microvascular ischemic   change. Moderate generalized cerebral atrophy. No hydrocephalus. Normal position of the cerebellar tonsils. No pathologic contrast enhancement. Small chronic infarction right basal ganglia.    SELLA: No abnormality accounting for technique.    OSSEOUS STRUCTURES/SOFT TISSUES: Normal marrow signal. The major intracranial vascular flow voids are maintained.     ORBITS: No abnormality accounting for technique.      SINUSES/MASTOIDS: No paranasal sinus mucosal disease. No middle ear or mastoid effusion.     HEAD MRA:   ANTERIOR CIRCULATION: Mild to moderate atherosclerotic changes scattered throughout M2 and M3 branches bilaterally. Standard [place] of [place] anatomy.    POSTERIOR CIRCULATION: Mild to moderate atherosclerotic changes scattered throughout smaller branches of posterior circulation bilaterally. Dominant left and smaller right vertebral artery contribute to a normal basilar artery.     NECK MRA:   RIGHT CAROTID: No measurable stenosis or dissection.    LEFT CAROTID: No measurable stenosis or dissection.    VERTEBRAL ARTERIES: No focal stenosis or dissection. Dominant left and smaller right vertebral arteries.    AORTIC ARCH: Classic aortic arch anatomy with no significant stenosis at the origin of the great vessels.      Impression    IMPRESSION:  HEAD MRI:  1.  No acute infarct, mass, mass effect, or hemorrhage.  2.  Mild chronic small vessel ischemia.  3.  Small chronic infarction right basal ganglia.  4.  Moderate atrophy.    HEAD MRA:  1.  Mild to moderate atherosclerotic changes.  2.  Intracranial circulation appears otherwise normal.    NECK MRA:  Normal neck MRA.     MRA Brain ([place] of [place]ud[place]) wo Contrast    Narrative    EXAM: MR BRAIN W/O and W CONTRAST, MRA NECK (CAROTIDS) W/O and W CONTRAST, MRA BRAIN ([place] OF ZAMUDIO) W/O CONTRAST  LOCATION: [facility]  DATE: 9/10/2023    INDICATION: Word finding difficulty  COMPARISON: None.  CONTRAST: 10mL Gadavist  TECHNIQUE:   1) Routine multiplanar multisequence head MRI without and with intravenous contrast.  2) 3D time-of-flight head MRA without intravenous contrast.  3) Neck MRA without and with IV contrast. Stenosis measurements made according to NASCET criteria unless otherwise specified.    FINDINGS:  HEAD MRI:  INTRACRANIAL CONTENTS: No acute or subacute infarct. No mass, acute hemorrhage, or extra-axial fluid collections.  Scattered nonspecific T2/FLAIR hyperintensities within the cerebral white matter most consistent with mild chronic microvascular ischemic   change. Moderate generalized cerebral atrophy. No hydrocephalus. Normal position of the cerebellar tonsils. No pathologic contrast enhancement. Small chronic infarction right basal ganglia.    SELLA: No abnormality accounting for technique.    OSSEOUS STRUCTURES/SOFT TISSUES: Normal marrow signal. The major intracranial vascular flow voids are maintained.     ORBITS: No abnormality accounting for technique.     SINUSES/MASTOIDS: No paranasal sinus mucosal disease. No middle ear or mastoid effusion.     HEAD MRA:   ANTERIOR CIRCULATION: Mild to moderate atherosclerotic changes scattered throughout M2 and M3 branches bilaterally. Standard [place] of [place] anatomy.    POSTERIOR CIRCULATION: Mild to moderate atherosclerotic changes scattered throughout smaller branches of posterior circulation bilaterally. Dominant left and smaller right vertebral artery contribute to a normal basilar artery.     NECK MRA:   RIGHT CAROTID: No measurable stenosis or dissection.    LEFT CAROTID: No measurable stenosis or dissection.    VERTEBRAL ARTERIES: No focal stenosis or dissection. Dominant left and smaller right vertebral arteries.    AORTIC ARCH: Classic aortic arch anatomy with no significant stenosis at the origin of the great vessels.      Impression    IMPRESSION:  HEAD MRI:  1.  No acute infarct, mass, mass effect, or hemorrhage.  2.  Mild chronic small vessel ischemia.  3.  Small chronic infarction right basal ganglia.  4.  Moderate atrophy.    HEAD MRA:  1.  Mild to moderate atherosclerotic changes.  2.  Intracranial circulation appears otherwise normal.    NECK MRA:  Normal neck MRA.     MRA Neck (Carotids) wo & w Contrast    Narrative    EXAM: MR BRAIN W/O and W CONTRAST, MRA NECK (CAROTIDS) W/O and W CONTRAST, MRA BRAIN ([place] OF ZAMUD[place]) W/O CONTRAST  LOCATION: [facility]  [facility]  DATE: 9/10/2023    INDICATION: Word finding difficulty  COMPARISON: None.  CONTRAST: 10mL Gadavist  TECHNIQUE:   1) Routine multiplanar multisequence head MRI without and with intravenous contrast.  2) 3D time-of-flight head MRA without intravenous contrast.  3) Neck MRA without and with IV contrast. Stenosis measurements made according to NASCET criteria unless otherwise specified.    FINDINGS:  HEAD MRI:  INTRACRANIAL CONTENTS: No acute or subacute infarct. No mass, acute hemorrhage, or extra-axial fluid collections. Scattered nonspecific T2/FLAIR hyperintensities within the cerebral white matter most consistent with mild chronic microvascular ischemic   change. Moderate generalized cerebral atrophy. No hydrocephalus. Normal position of the cerebellar tonsils. No pathologic contrast enhancement. Small chronic infarction right basal ganglia.    SELLA: No abnormality accounting for technique.    OSSEOUS STRUCTURES/SOFT TISSUES: Normal marrow signal. The major intracranial vascular flow voids are maintained.     ORBITS: No abnormality accounting for technique.     SINUSES/MASTOIDS: No paranasal sinus mucosal disease. No middle ear or mastoid effusion.     HEAD MRA:   ANTERIOR CIRCULATION: Mild to moderate atherosclerotic changes scattered throughout M2 and M3 branches bilaterally. Standard [place] of [place] anatomy.    POSTERIOR CIRCULATION: Mild to moderate atherosclerotic changes scattered throughout smaller branches of posterior circulation bilaterally. Dominant left and smaller right vertebral artery contribute to a normal basilar artery.     NECK MRA:   RIGHT CAROTID: No measurable stenosis or dissection.    LEFT CAROTID: No measurable stenosis or dissection.    VERTEBRAL ARTERIES: No focal stenosis or dissection. Dominant left and smaller right vertebral arteries.    AORTIC ARCH: Classic aortic arch anatomy with no significant stenosis at the origin of the great vessels.      Impression     IMPRESSION:  HEAD MRI:  1.  No acute infarct, mass, mass effect, or hemorrhage.  2.  Mild chronic small vessel ischemia.  3.  Small chronic infarction right basal ganglia.  4.  Moderate atrophy.    HEAD MRA:  1.  Mild to moderate atherosclerotic changes.  2.  Intracranial circulation appears otherwise normal.    NECK MRA:  Normal neck MRA.

## 2023-09-10 NOTE — ED TRIAGE NOTES
Pt C/O of AMS starting at 1700 during [facility]. Pt developed slurred speech and disorganized thinking. Pt states she was not feeling well. EMS was called but pt refused to go in. Pt Denies any blood thinners. Pt is AxO 4 and neuro's intact. Pt family at bedside.      Triage Assessment       Row Name 09/09/23 6692       Triage Assessment (Adult)    Airway WDL WDL       Respiratory WDL    Respiratory WDL WDL       Skin Circulation/Temperature WDL    Skin Circulation/Temperature WDL WDL       Cardiac WDL    Cardiac WDL WDL       Peripheral/Neurovascular WDL    Peripheral Neurovascular WDL WDL       Cognitive/Neuro/Behavioral WDL    Cognitive/Neuro/Behavioral WDL WDL

## 2023-09-10 NOTE — PROGRESS NOTES
[facility]      OUTPATIENT SPEECH LANGUAGE PATHOLOGY EVALUATION  PLAN OF TREATMENT FOR OUTPATIENT REHABILITATION  (COMPLETE FOR INITIAL CLAIMS ONLY)  Patient's Last Name, First Name, M.I.  YOB: 1940  Brielle Marvin                        Provider's Name  [facility] Medical Record No.  [id number]                               Onset Date:  09/09/23  Start of Care Date:   9/10/23 to   End of Care Date: 9/16/23   Type:     ___PT   ___OT   _X_SLP Medical Diagnosis:     TIA       SLP Diagnosis:  Mild oral-pharyngeal dysphagia  Visits from SOC:  1   ________________________________________________________________  Plan of Treatment/Functional Goals    Planned Interventions:                    Goals: See Speech Language Pathology Goals on Care Plan in [facility] electronic health record.    Therapy Frequency:4 times/wk   Predicted Duration of Therapy Intervention: 09/16/23  ________________________________________________________________________________    I CERTIFY THE NEED FOR THESE SERVICES FURNISHED UNDER        THIS PLAN OF TREATMENT AND WHILE UNDER MY CARE     (Physician attestation of this document indicates review and certification of the therapy plan).                     Referring Physician: Dr. Solano           Initial Assessment        See Speech Language Pathology documentation in Epic electronic health record, evaluation dated

## 2023-09-10 NOTE — PHARMACY-ADMISSION MEDICATION HISTORY
Pharmacy Intern Admission Medication History    Admission medication history is complete. The information provided in this note is only as accurate as the sources available at the time of the update.    Medication reconciliation/reorder completed by provider prior to medication history? No    Information Source(s): Patient and CareEverywhere/SureScripts via in-person    Pertinent Information: None    Changes made to PTA medication list:  Added: None  Deleted: Vitamin E  Changed: None    Medication Affordability: Not including over the counter (OTC) medications, was there a time in the past 3 months when you did not take your medications as prescribed for cost? No      Allergies reviewed with patient and updates made in EHR: yes- updates made.     Medication History Completed By: Denise Dawson 9/10/2023 8:04 AM    Prior to Admission medications    Medication Sig Last Dose Taking? Auth Provider Long Term End Date   ASPIRIN 81 MG OR TABS Take 81 mg by mouth daily  9/8/2023 at PM Yes Jared Emmanuel MD     atenolol (TENORMIN) 25 MG tablet Take 1 tablet (25 mg) by mouth daily 9/9/2023 at AM Yes Jared Emmanuel MD Yes    calcium 600 MG tablet Take 1 tablet by mouth daily 9/8/2023 at PM Yes Jared Emmanuel MD     Cholecalciferol (VITAMIN D) 2000 UNIT tablet Take 2,000 Units by mouth daily. 9/9/2023 at AM Yes Jared Emmanuel MD     Cyanocobalamin (B-12) 1000 MCG SUBL Place 1,000 mcg under the tongue daily 9/9/2023 at AM Yes Jared Emmanuel MD     oxybutynin ER (DITROPAN XL) 10 MG 24 hr tablet Take 1 tablet (10 mg) by mouth daily 9/9/2023 at AM Yes Jared Emmanuel MD     pravastatin (PRAVACHOL) 40 MG tablet Take 1 tablet (40 mg) by mouth daily 9/8/2023 at PM Yes Jared Emmanuel MD Yes    VITAMIN C 500 MG OR TABS 1 tablet daily 9/9/2023 at AM Yes Darek English MD     zinc gluconate 50 MG tablet Take 50 mg by mouth daily 9/9/2023 at AM Yes Reported, Patient

## 2023-09-10 NOTE — PROGRESS NOTES
SLP Bedside Swallow Evaluation  09/10/23 0924   Appointment Info   Signing Clinician's Name / Credentials (SLP) Bibi Vann MA [facility] SLP   General Information   Onset of Illness/Injury or Date of Surgery 09/09/23   Referring Physician Dr. Solano   Patient/Family Therapy Goal Statement (SLP) To eat asap   Pertinent History of Current Problem Dx: TIA suspected; Hx of GERD   General Observations Pt was alert and cooperative.  Pt feels her speech is back to baseline.  Min decreased coordination/slight ataxic quality of speech may be present.   Type of Evaluation   Type of Evaluation Swallow Evaluation   Oral Motor   Oral Musculature generally intact   Mucosal Quality dry   Dentition (Oral Motor)   Dentition (Oral Motor) some missing teeth   Cough/Swallow/Gag Reflex (Oral Motor)   Comment, Cough/Swallow/Gag Reflex (Oral Motor) Decreased elevation on dry swallow   Vocal Quality/Secretion Management (Oral Motor)   Comment, Vocal Quality/Secretion Management (Oral Motor) Hoarse voicing, decreased intensity   General Swallowing Observations   Past History of Dysphagia Pt reports hx of GERD, but no recent GERD issues.  Pt states she occasionally feels a globus sensation with large pills.  Pt has baseline dry mouth that affects swallowing.  Pt reports she has a baseline throat clear in the morning due to phlegm.   Respiratory Support (General Swallowing Observations) none   Current Diet/Method of Nutritional Intake (General Swallowing Observations, NIS) NPO   Swallowing Evaluation Clinical swallow evaluation   Clinical Swallow Evaluation   Clinical Swallow Evaluation Textures Trialed thin liquids;solid foods   Clinical Swallow Eval: Thin Liquid Texture Trial   Mode of Presentation, Thin Liquids cup;straw   Volume of Liquid or Food Presented sips by cup x 5, sips by straw x 5   Oral Phase of Swallow WFL   Pharyngeal Phase of Swallow reduction in laryngeal movement;repeated swallows   Diagnostic Statement throat clear x 1    Clinical Swallow Evaluation: Solid Food Texture Trial   Mode of Presentation self-fed   Volume Presented bites x 5   Oral Phase WFL   Pharyngeal Phase reduction in laryngeal movement;repeated swallows   Diagnostic Statement throat clear x 1   Esophageal Phase of Swallow   Patient reports or presents with symptoms of esophageal dysphagia Yes   Esophageal comments Hx of GERD and globus sensation.   Swallowing Recommendations   Diet Consistency Recommendations regular diet;thin liquids (level 0)   Mode of Delivery Recommendations bolus size, small;slow rate of intake   Swallowing Maneuver Recommendations alternate food and liquid intake;extra swallow   Monitoring/Assistance Required (Eating/Swallowing) monitor for cough or change in vocal quality with intake   Recommended Feeding/Eating Techniques (Swallow Eval) maintain upright sitting position for eating;maintain upright posture during/after eating for 30 minutes   Medication Administration Recommendations, Swallowing (SLP) Meds with thin liquids   Comment, Swallowing Recommendations Hold po if increased coughing observed   Clinical Impression   Criteria for Skilled Therapeutic Interventions Met (SLP Eval) Yes, treatment indicated   SLP Diagnosis Mild oral-pharyngeal dysphagia   Risks & Benefits of therapy have been explained evaluation/treatment results reviewed;care plan/treatment goals reviewed;risks/benefits reviewed;current/potential barriers reviewed;participants voiced agreement with care plan;participants included;patient   Clinical Impression Comments Mild oral-pharyngeal dysphagia was observed during today's SLP swallow evaluation.  Deficits/risk factors include admit with slurred speech, min-no ataxic quality of speech during eval, baseline dry mouth, mild decreased laryngeal elevation, need for extra swallows, hoarse weak voicing, and throat clearing x 2 during po trials.  Pt reports she has a baseline throat clear in the morning and feels her swallow  function is at baseline.  Recommend initiation of a regular diet and thin liquids with safe swallow strategies.  Hold po if increased coughing observed.  Plan to provide 1-2 additional swallow Tx sessions to assess diet tolerance and train strategies as indicated.   SLP Total Evaluation Time   Eval: oral/pharyngeal swallow function, clinical swallow Minutes (62850) 15   SLP Goals   Therapy Frequency (SLP Eval) 4 times/wk   SLP Predicted Duration/Target Date for Goal Attainment 09/16/23   SLP Goals Swallow   SLP: Safely tolerate diet without signs/symptoms of aspiration Regular diet;Thin liquids;With use of swallow precautions;With use of compensatory swallow strategies;Independently   Interventions   Interventions Quick Adds Swallowing Dysfunction   Swallowing Dysfunction &/or Oral Function for Feeding   Treatment of Swallowing Dysfunction &/or Oral Function for Feeding Minutes (49984) 10   Symptoms Noted During/After Treatment None   Treatment Detail/Skilled Intervention Swallow: Education provided to pt and RN regarding current findings, recommended regular diet and thin liquids, use of strateiges, and monitor for aspiration signs.  Both verbalized understanding.  During Tx, pt took an additional medium size sip by straw despite min cues for small sip.  No immediate aspiration signs noted.   SLP Discharge Planning   SLP Plan SLP - assess diet tolerance, train strategies   SLP Discharge Recommendation home   SLP Rationale for DC Rec Anticipate swallow Tx goal will be met as an IP   SLP Brief overview of current status  Mild dysphagia; Rec: regular diet and thin liquids with use of safe swallow strategies; hold po if increased coughing noted   Total Session Time   Total Session Time (sum of timed and untimed services) 25

## 2023-09-10 NOTE — PLAN OF CARE
RECEIVING UNIT ED HANDOFF REVIEW    ED Nurse Handoff Report was reviewed by: Guillermo Ruiz RN on September 10, 2023 at 8:22 AM
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
## 2023-09-10 NOTE — ED NOTES
Ambulated to bathroom and patient  a bit wobbly ,stated that is the way she walks when she get up from the bed especially at night.

## 2023-09-10 NOTE — CONSULTS
"[facility]    Stroke Telephone Note    I was called by Sylvester Silva on 09/10/23 regarding patient Brielle Marvin. The patient is a 83 year old female who at 5 pm last evening while at [employer] felt \"off\" and had a thick tongue. Friends took her home and called ambulance. By the time EMS arrived she was feeling better. EMS found her SBP in the 180s. Patient refused to be brought to the ED. Then family arrived and patient agreed to come to the ED. In the ED, her neurological exam is normal.    /70   Pulse 74   Temp 98.2  F (36.8  C) (Oral)   Resp 16   Wt 72.6 kg (160 lb)   LMP 11/08/1991   SpO2 92%   BMI 27.46 kg/m       Imaging Findings  CT head: no acute pathology  CTA head/neck: no stenosis or occlusion     Impression  Transient ischemic attack vs global cerebral hemodynamic change (hypotension, hypertension, arrhythmia) vs acute coronary syndrome.      Recommendations  - Neurochecks and Vital Signs every 4 hours   - Daily aspirin 81 mg for secondary stroke prevention  - Plavix (clopidogrel) 300 mg PO loading dose x 1  - Plavix (clopidogrel) 75 mg PO Daily  - BP goal: normotensive   - Statin: pravastatin 40  - EKG   - TTE without Bubble Study   - 24-hour Telemetry  - Bedside Glucose Monitoring  - A1c, Lipid Panel  - PT/OT/SLP  - Stroke Education  - Euthermia, Euglycemia      My recommendations are based on the information provided over the phone by Brielle Marvin's in-person providers. They are not intended to replace the clinical judgment of her in-person providers. I was not requested to personally see or examine the patient at this time.      Tash Valentine MD, Msc, FAHA, FAAN   of Neurology  [facility]     09/10/2023 3:40 AM  To page me or covering stroke neurology team member, click here: AMCOM  Choose \"On Call\" tab at top, then search dropdown box for \"Neurology Adult\" & press Enter, look for Neuro ICU/Stroke    "

## 2023-09-10 NOTE — PLAN OF CARE
Goal Outcome Evaluation:         Shift: Oneil 9/10/2023 - 08:30-19:30    Orientation: A&Ox4 -- calm, cooperative, pleasant    Vitals/Tele: VSS on RA; can be hypertensive at times, especially when ambulating to and from bathroom    IV Access/drains: PIV left antecubital SL    Diet: Regular w/ thin liquids    Mobility: SBA    GI/: incontinent of bladder at times, but has been using call light appropriately to ambulate to bathroom; continent of bowel, but no BM this shift    Wound/Skin: pale, warm, dry -- no blanchable redness, scabs, bruises, or wounds    Consults: Stroke Neurology saw patient today--signed off; per Stroke Neurology, able to discharge upon completion of echocardiogram; hospitalist aware and ordered STAT echo    Discharge Plan: home, pending completion of echocardiogram      See Flow sheets for assessment
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
## 2023-09-10 NOTE — ED PROVIDER NOTES
"  History     Chief Complaint:  Feeling off       HPI   Brielle Marvin is a 83 year old female who presents with an episode of feeling off.  She reports that she was feeling well throughout the day while she was at home.  She had been on the phone and talking to people and doing things about the house.  She then drove herself to [employer] and reports that at approximately 1700 she felt \"off.\"  She states she cannot describe it any further than feeling off.  She had walked out of [employer] and some friends attended to her.  Apparently it sounded like her tongue was somewhat thick at that time.  A friend had taken the patient home and EMS was called.  Apparently by that point the patient was feeling better and did not have any symptoms.  EMS had offered to transport the patient, but the patient had declined.  Patient's family had arrived tonight.  At one point the patient again said that she felt off, although had no other symptoms and the family brought her to the hospital.  Patient currently states that she feels fatigued, but this has been a chronic feeling for her, but has no other complaints.  She denies any pain.  She has not had any fever or cough.  No nausea or, vomiting, diarrhea, or change in urination.  She reports that she has been taking her medications.      Independent Historian:   Daughter and granddaughter assist with history    Review of External Notes:   IM note 8/17/23 for chronic fatigue    Allergies:  Codeine  Morphine  Morphine And Related  Vicodin [Hydrocodone-Acetaminophen]     Medications:    ASPIRIN 81 MG OR TABS  atenolol (TENORMIN) 25 MG tablet  calcium 600 MG tablet  Cholecalciferol (VITAMIN D) 2000 UNIT tablet  Cyanocobalamin (B-12) 1000 MCG SUBL  oxybutynin ER (DITROPAN XL) 10 MG 24 hr tablet  pravastatin (PRAVACHOL) 40 MG tablet  VITAMIN C 500 MG OR TABS  vitamin E 400 UNIT capsule  zinc gluconate 50 MG tablet        Past Medical History:    Past Medical History:   Diagnosis Date    Acute " deep vein thrombosis (DVT) of right peroneal vein (H) 11/09/2018    Esophageal reflux     Family history of malignant neoplasm of gastrointestinal tract     Muscle weakness (generalized) 5/6/2010    NONSPECIFIC MEDICAL HISTORY     Outcome of delivery, single liveborn 1972    Outcome of delivery, single liveborn 1977    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 in her brother; Cancer in her mother and sister; Cardiovascular in her father; Family History Negative in her brother, brother, brother, and sister.    Social History:   reports that she has never smoked. She has never used smokeless tobacco. She reports that she does not drink alcohol and does not use drugs.  PCP: Jared Emmanuel     Physical Exam   Patient Vitals for the past 24 hrs:   BP Temp Temp src Pulse Resp SpO2 Weight   09/10/23 0200 119/70 -- -- 74 16 92 % --   09/10/23 0100 (!) 164/112 -- -- 78 16 94 % --   09/09/23 2312 (!) 204/114 98.2  F (36.8  C) Oral 78 16 91 % 72.6 kg (160 lb)        Physical Exam  General: Appears well-developed and well-nourished.   Head: No signs of trauma.   Mouth/Throat: Oropharynx is clear and moist.   Eyes: Conjunctivae are normal. Pupils are equal, round, and reactive to light.   Neck: Normal range of motion. No nuchal rigidity. No cervical adenopathy  CV: Normal rate and regular rhythm.    Resp: Effort normal and breath sounds normal. No respiratory distress.   GI: Soft. There is no tenderness.  No rebound or guarding.  Normal bowel sounds.  No CVA tenderness.  MSK: Normal range of  motion. no edema. No Calf tenderness.  Neuro: The patient is alert and oriented to person, place, and time.  PERRLA, EOMI, strength in upper/lower extremities normal and symmetrical. Sensation normal. Speech normal.  Skin: Skin is warm and dry. No rash noted.   Psych: normal mood and affect. behavior is normal.     [employer] Stroke Scale  Exam Interval: Baseline   Score    Level of consciousness: (0)   Alert, keenly responsive    LOC questions: (0)   Answers both questions correctly    LOC commands: (0)   Performs both tasks correctly    Best gaze: (0)   Normal    Visual: (0)   No visual loss    Facial palsy: (0)   Normal symmetrical movements    Motor arm (left): (0)   No drift    Motor arm (right): (0)   No drift    Motor leg (left): (0)   No drift    Motor leg (right): (0)   No drift    Limb ataxia: (0)   Absent    Sensory: (0)   Normal- no sensory loss    Best language: (0)   Normal- no aphasia    Dysarthria: (0)   Normal    Extinction and inattention: (0)   No abnormality        Total Score:  0           Emergency Department Course     ECG results from 09/09/23   EKG 12-lead, tracing only     Value    Systolic Blood Pressure     Diastolic Blood Pressure     Ventricular Rate 70    Atrial Rate 70    RI Interval 182    QRS Duration 76        QTc 438    P Axis 75    R AXIS 44    T Axis 65    Interpretation ECG      Sinus rhythm  Possible Anterior infarct (cited on or before 06-NOV-2015)  Abnormal ECG  When compared with ECG of 06-NOV-2015 15:34,  No significant change was found  Confirmed by GENERATED REPORT, COMPUTER ([id number]),  Tip Paula ([id number]) on 9/9/2023 11:52:17 PM         Imaging:  MR Brain w/o & w Contrast   Final Result   IMPRESSION:   HEAD MRI:   1.  No acute infarct, mass, mass effect, or hemorrhage.   2.  Mild chronic small vessel ischemia.   3.  Small chronic infarction right basal ganglia.   4.  Moderate atrophy.      HEAD MRA:   1.  Mild to moderate  atherosclerotic changes.   2.  Intracranial circulation appears otherwise normal.      NECK MRA:   Normal neck MRA.         MRA Brain ([place]) wo Contrast   Final Result   IMPRESSION:   HEAD MRI:   1.  No acute infarct, mass, mass effect, or hemorrhage.   2.  Mild chronic small vessel ischemia.   3.  Small chronic infarction right basal ganglia.   4.  Moderate atrophy.      HEAD MRA:   1.  Mild to moderate atherosclerotic changes.   2.  Intracranial circulation appears otherwise normal.      NECK MRA:   Normal neck MRA.         MRA Neck (Carotids) wo & w Contrast   Final Result   IMPRESSION:   HEAD MRI:   1.  No acute infarct, mass, mass effect, or hemorrhage.   2.  Mild chronic small vessel ischemia.   3.  Small chronic infarction right basal ganglia.   4.  Moderate atrophy.      HEAD MRA:   1.  Mild to moderate atherosclerotic changes.   2.  Intracranial circulation appears otherwise normal.      NECK MRA:   Normal neck MRA.            Report per radiology    Laboratory:  Labs Ordered and Resulted from Time of ED Arrival to Time of ED Departure   ROUTINE UA WITH MICROSCOPIC REFLEX TO CULTURE - Abnormal       Result Value    Color Urine Straw      Appearance Urine Clear      Glucose Urine Negative      Bilirubin Urine Negative      Ketones Urine Negative      Specific Gravity Urine 1.005      Blood Urine Negative      pH Urine 6.5      Protein Albumin Urine Negative      Urobilinogen Urine Normal      Nitrite Urine Negative      Leukocyte Esterase Urine Trace (*)     RBC Urine <1      WBC Urine 11 (*)    BASIC METABOLIC PANEL - Normal    Sodium 137      Potassium 4.1      Chloride 99      Carbon Dioxide (CO2) 27      Anion Gap 11      Urea Nitrogen 17.4      Creatinine 0.66      Calcium 9.6      Glucose 86      GFR Estimate 87     TROPONIN T, HIGH SENSITIVITY - Normal    Troponin T, High Sensitivity 9     HEPATIC FUNCTION PANEL - Normal    Protein Total 7.3      Albumin 4.1      Bilirubin Total 0.4       "Alkaline Phosphatase 49      AST 18      ALT 15      Bilirubin Direct <0.20     TROPONIN T, HIGH SENSITIVITY - Normal    Troponin T, High Sensitivity 10     CBC WITH PLATELETS AND DIFFERENTIAL    WBC Count 8.0      RBC Count 4.32      Hemoglobin 13.4      Hematocrit 41.2      MCV 95      MCH 31.0      MCHC 32.5      RDW 12.1      Platelet Count 177      % Neutrophils 54      % Lymphocytes 32      % Monocytes 11      % Eosinophils 2      % Basophils 1      % Immature Granulocytes 0      NRBCs per 100 WBC 0      Absolute Neutrophils 4.4      Absolute Lymphocytes 2.5      Absolute Monocytes 0.9      Absolute Eosinophils 0.1      Absolute Basophils 0.0      Absolute Immature Granulocytes 0.0      Absolute NRBCs 0.0     HEMOGLOBIN A1C   LIPID PROFILE   URINE CULTURE            Emergency Department Course & Assessments:    Interventions:  Medications   clopidogrel (PLAVIX) tablet 300 mg (has no administration in time range)   gadobutrol (GADAVIST) injection 10 mL (10 mLs Intravenous $Given 9/10/23 0001)   sodium chloride (PF) 0.9% PF flush 100 mL (100 mLs Intravenous $Given 9/10/23 0006)            Consultations/Discussion of Management or Tests:  Spoke with Dr. Valentine with stroke neurology  Spoke with Dr. Solano with Hospitalist service    Social Determinants of Health affecting care:   None    Disposition:  The patient was admitted to the hospital under the care of Dr. Solano.     Impression & Plan    Medical Decision Making:  Brielle Marvin is a 83 year old female with an episode of feeling \"off.\"  She states that when she went to [employer] at approximately 1700 today she began to feel off.  She could not describe the symptoms any further than that.  Apparently while at [employer] she had some difficulty with her speech, sounding as if her tongue were thick.  EMS had been called, but apparently the patient's symptoms had resolved.  They had offered transport, but the patient had declined.  Patient reports that her blood " pressure with EMS was initially 180 systolic, but then had normalized when they rechecked it a short time later.  Patient's daughter and granddaughter went to be with the patient.  Initially the patient was not having any symptoms, but then once again stated that she felt off, although there is no speech symptoms or any other complaints.  The family then brought the patient to the hospital.  On my evaluation the patient was fully neurologically intact.  She had clear, normal speech.  She was hypertensive initially on arrival, but this again normalized without further intervention.  I did not call a code stroke given the patient was symptom-free with an NIH score of 0.  I did obtain MRIs of the brain along with EKG and blood work.  These were all unremarkable.  Patient continued to be symptom-free throughout her time in the ER.  I did speak with stroke neurology and given the possibility this was a TIA, it was decided to admit the patient with a Plavix load.      Diagnosis:    ICD-10-CM    1. Transient speech disturbance  R47.9               Sylvester Silva MD  09/10/23 0535

## 2023-09-11 ENCOUNTER — APPOINTMENT (OUTPATIENT)
Dept: SPEECH THERAPY | Facility: CLINIC | Age: 83
End: 2023-09-11
Payer: COMMERCIAL

## 2023-09-11 ENCOUNTER — APPOINTMENT (OUTPATIENT)
Dept: CARDIOLOGY | Facility: CLINIC | Age: 83
End: 2023-09-11
Attending: PHYSICIAN ASSISTANT
Payer: COMMERCIAL

## 2023-09-11 ENCOUNTER — APPOINTMENT (OUTPATIENT)
Dept: CARDIOLOGY | Facility: CLINIC | Age: 83
End: 2023-09-11
Attending: HOSPITALIST
Payer: COMMERCIAL

## 2023-09-11 VITALS
DIASTOLIC BLOOD PRESSURE: 73 MMHG | OXYGEN SATURATION: 97 % | WEIGHT: 156.75 LBS | TEMPERATURE: 97.9 F | BODY MASS INDEX: 26.91 KG/M2 | HEART RATE: 73 BPM | RESPIRATION RATE: 16 BRPM | SYSTOLIC BLOOD PRESSURE: 108 MMHG

## 2023-09-11 LAB
BACTERIA UR CULT: NO GROWTH
GLUCOSE BLDC GLUCOMTR-MCNC: 105 MG/DL (ref 70–99)
LVEF ECHO: NORMAL

## 2023-09-11 PROCEDURE — 93270 REMOTE 30 DAY ECG REV/REPORT: CPT

## 2023-09-11 PROCEDURE — 92526 ORAL FUNCTION THERAPY: CPT | Mod: GN | Performed by: SPEECH-LANGUAGE PATHOLOGIST

## 2023-09-11 PROCEDURE — 99239 HOSP IP/OBS DSCHRG MGMT >30: CPT | Performed by: HOSPITALIST

## 2023-09-11 PROCEDURE — 82962 GLUCOSE BLOOD TEST: CPT

## 2023-09-11 PROCEDURE — 93272 ECG/REVIEW INTERPRET ONLY: CPT | Performed by: INTERNAL MEDICINE

## 2023-09-11 PROCEDURE — 255N000002 HC RX 255 OP 636: Performed by: HOSPITALIST

## 2023-09-11 PROCEDURE — G0378 HOSPITAL OBSERVATION PER HR: HCPCS

## 2023-09-11 PROCEDURE — 93306 TTE W/DOPPLER COMPLETE: CPT | Mod: 26 | Performed by: INTERNAL MEDICINE

## 2023-09-11 PROCEDURE — 250N000013 HC RX MED GY IP 250 OP 250 PS 637: Performed by: HOSPITALIST

## 2023-09-11 PROCEDURE — 999N000208 ECHOCARDIOGRAM COMPLETE

## 2023-09-11 RX ORDER — METOPROLOL SUCCINATE 50 MG/1
50 TABLET, EXTENDED RELEASE ORAL DAILY
Qty: 30 TABLET | Refills: 0 | Status: SHIPPED | OUTPATIENT
Start: 2023-09-11 | End: 2023-09-20

## 2023-09-11 RX ORDER — ATENOLOL 25 MG/1
25 TABLET ORAL DAILY
Status: DISCONTINUED | OUTPATIENT
Start: 2023-09-11 | End: 2023-09-11

## 2023-09-11 RX ORDER — METOPROLOL TARTRATE 25 MG/1
25 TABLET, FILM COATED ORAL 2 TIMES DAILY
Status: DISCONTINUED | OUTPATIENT
Start: 2023-09-11 | End: 2023-09-11 | Stop reason: HOSPADM

## 2023-09-11 RX ORDER — CLOPIDOGREL BISULFATE 75 MG/1
75 TABLET ORAL DAILY
Qty: 20 TABLET | Refills: 0 | Status: SHIPPED | OUTPATIENT
Start: 2023-09-12 | End: 2023-10-11 | Stop reason: ALTCHOICE

## 2023-09-11 RX ADMIN — OXYBUTYNIN CHLORIDE 10 MG: 10 TABLET, EXTENDED RELEASE ORAL at 08:02

## 2023-09-11 RX ADMIN — HUMAN ALBUMIN MICROSPHERES AND PERFLUTREN 9 ML: 10; .22 INJECTION, SOLUTION INTRAVENOUS at 10:53

## 2023-09-11 RX ADMIN — ASPIRIN 81 MG 81 MG: 81 TABLET ORAL at 08:03

## 2023-09-11 RX ADMIN — ATENOLOL 25 MG: 25 TABLET ORAL at 11:21

## 2023-09-11 RX ADMIN — PRAVASTATIN SODIUM 40 MG: 20 TABLET ORAL at 08:02

## 2023-09-11 RX ADMIN — CLOPIDOGREL BISULFATE 75 MG: 75 TABLET ORAL at 08:02

## 2023-09-11 ASSESSMENT — ACTIVITIES OF DAILY LIVING (ADL)
ADLS_ACUITY_SCORE: 22

## 2023-09-11 NOTE — PLAN OF CARE
Goal Outcome Evaluation:  Summary:  ad 9/9 for stroke like symptoms, TIA and reported tongue was thick.  history of chronic fatigue, hypertension, GERD, dyslipidemia, overactive bladder, history of DVT no longer on anticoagulation  Shift: 9/10/2023 - night shift [id number]    Orientation: A&Ox4 -- calm, cooperative, pleasant    Vitals/Tele: VSS on RA; can be hypertensive at times. Tele: NSR  IV Access/drains: PIV left antecubital SL    Diet: Regular w/ thin liquids    Mobility: SBA, AX1 at night    GI/: incontinent of bladder at times, but has been using call light appropriately to ambulate to bathroom; continent of bowel, but no BM this shift    Wound/Skin: pale, warm, dry -- no blanchable redness, scabs, bruises, or wounds    Consults: Stroke Neurology consulted--signed off; per Stroke Neurology, able to discharge upon completion of echocardiogram;  Discharge Plan: home, pending completion of echocardiogram  Neuro's intact    See Flow sheets for assessment         Observation :  List all goals to be met before discharge home      Free from ACUTE neuro deficits : met  Testing complete : not met  Other:  plan to have echo today before discharge . Possible go home today  Nurse to notify provider when observation goals have been met and patient is ready for discharge.

## 2023-09-11 NOTE — PLAN OF CARE
Pt discharged home with daughter. AVS printed, education received. Medication given, all questions answered. IV discontinued. All belongings went home with pt.

## 2023-09-11 NOTE — PLAN OF CARE
Observation :  List all goals to be met before discharge home      Free from ACUTE neuro deficits : met  Testing complete : not met  Other:  plan to have echo today before discharge . Possible go home today  Nurse to notify provider when observation goals have been met and patient is ready for discharge.

## 2023-09-11 NOTE — DISCHARGE SUMMARY
"[facility]  Hospitalist Discharge Summary      Date of Admission:  9/9/2023  Date of Discharge:  9/11/2023  Discharging Provider: Kary De La Cruz, DO  Discharge Service: Hospitalist Service    Discharge Diagnoses   TIA  Mild-mod aortic stenosis  ? LVOT obstruction vs intracavitary  Acute on chronic fatigue  Weight loss  Decreased appetite  Large hiatal hernia  Hx of dvt  Htn  Hld  Overactive bladder    Clinically Significant Risk Factors          Follow-ups Needed After Discharge   Follow-up Appointments     Follow-up and recommended labs and tests       Follow up with primary care provider, Jared Emmanuel, within 7   days for hospital follow- up.  The following labs/tests are recommended:   Cardiac MRI, esophagram.    Follow up with stroke practitioner in 6-8 weeks. Call [phone] to make   an appointment            Discharge Disposition   Discharged to home  Condition at discharge: Stable    Hospital Course   Brielle Marvin is a 83 year old female with a past medical history of chronic fatigue, hypertension, GERD, dyslipidemia, overactive bladder, history of DVT no longer on anticoagulation presents to hospital due to feeling \"off\" and feeling like her tongue was thick.     TIA  Presents to the hospital for feeling \"off\" and felt like her tongue was thick. Lipid profile WNL. A1C is 5.7--technically prediabetic   *MRI, MRA negative for any acute pathology or stroke.   She has a history of two syncopal episodes since 2018  *Echo: EF 65-70%. Grade 1 early diastolic dysfunction. Hyperdynamic LV. Reduced stroke volume could be due to prox intracavitary obstruction vs LVOT. Likely mild-mod aortic stenosis.   -resume beta blocker, change to metoprolol succinate instead of atenolol on discharge give necho findings  -stroke neuro appreciated, recommends plavix 75mg daily, ASA 81mg daily for 21 days then ASA 81mg daily thereafter  -continue PTA pravastatin  -30 day event monitor on " discharge  -PT/OT- no needs  - mediterranean diet  -follow up in 6-8 weeks with stroke MARLEN ([phone])  - encouraged patient to reduce her busy social schedule in next week or so.    Mild-mod aortic stenosis  ? LVOT obstruction vs intracavitary  Abnormal findings on echo as above  - discussed with cardiology, recommended outpatient cardiac MRI (ordered)   - change atenolol to metoprolol succinate 50mg once daily    Acute on chronic fatigue  Weight loss  Decreased appetite  Large hiatal hernia  TSH WNL July 2023 and 9/2023. 20lb weight loss in past year with decreased appetite. Other labs WNL. Family hx breast, lung, colon and pancreatic cancer. Personal hx basal skin cancer on nose. Mammogram dec 2022 WNL. Last colonoscopy many years ago-denies change in stools, but has some diarrhea intermittently. UA trace leuk esterase, no urinary symptoms  *CT CAP: large hiatal hernia. No evidence of malignancy  - reported globus sensation to SLP. Recommended for OP esophagram (ordered)   -if esophagram is normal, recommended for video swallow (not yet ordered)   -outpatient SLP ordered  - can also consider general surgery consult regarding hiatal hernia if desired    Prediabetes  HgbA1C is 5.7  - repeat A1C with PCP in 3mo     Chronic medical conditions:  Hx of dvt  HTN- atenolol changed to metoprolol succinate. Check BPs daily  Hld-pravastatin resumed  Overactive bladder-ditropan resumed    Consultations This Hospital Stay   NEUROLOGY IP STROKE CONSULT  SPEECH LANGUAGE PATH ADULT IP CONSULT  SMOKING CESSATION PROGRAM IP CONSULT  CARE MANAGEMENT / SOCIAL WORK IP CONSULT    Code Status   Full Code    Time Spent on this Encounter   IKary DO, personally saw the patient today and spent greater than 30 minutes discharging this patient.       Kary De La Cruz DO  [facility]  [address] [place] [address]  Phone:  [phone]  ______________________________________________________________________    Physical Exam   Vital Signs: Temp: 97.6  F (36.4  C) Temp src: Oral BP: (!) 145/75 Pulse: 83   Resp: 16 SpO2: 96 % O2 Device: None (Room air)    Weight: 156 lbs 11.95 oz        Constitutional: Awake, alert, cooperative, no apparent distress  Respiratory: no increased work of breathing or wheeze  Cardiovascular: Regular rate and rhythm on telemetry  GI: nontender  Skin/Integumen: No rashes, no cyanosis, no edema on visualized skin  Other:   moving all extremities. Face symmetric. Speech clear.       Primary Care Physician   Jared Emmanuel    Discharge Orders      XR Esophagram     Speech Therapy Referral      Reason for your hospital stay    TIA (transient ischemic attack)     Follow-up and recommended labs and tests     Follow up with primary care provider, Jared Emmanuel, within 7 days for hospital follow- up.  The following labs/tests are recommended: Cardiac MRI, esophagram.    Follow up with stroke practitioner in 6-8 weeks. Call [phone] to make an appointment     Activity    Your activity upon discharge: activity as tolerated, be prepared to reduce your social schedule due to fatigue     Monitor and record    blood pressure daily. Check blood pressure about 1-2 hours after you take your morning medications. Keep a log of your blood pressures and bring to your PCP appointment     Discharge Instructions    1. Stop taking atenolol and start taking metoprolol succinate 50mg once daily.    2. Keep taking 81mg aspirin daily for the rest of your life. You will also take plavix for 20 more days.    3. Follow up with swallow therapist for esophagram     Cardiac Event Monitor Adult/Pediatric     Diet    Follow this diet upon discharge: Regular Diet; Thin Liquids (level 0)     Stroke Hospital Follow Up    Please be aware that coverage of these services is subject to the terms and limitations of your health  insurance plan.  Call member services at your health plan with any benefit or coverage questions.  [facility] will call you to coordinate care as prescribed by your provider. If you don t hear from a representative within 2 business days, please call [phone].       MRI Cardiac w/contrast       Significant Results and Procedures   Most Recent 3 CBC's:  Recent Labs   Lab Test 09/09/23 2324 07/21/23 1436 01/05/23  1117   WBC 8.0 6.5 7.0   HGB 13.4 13.8 12.7   MCV 95 95 96    199 192     Most Recent 3 BMP's:  Recent Labs   Lab Test 09/11/23  0625 09/10/23  2345 09/09/23 2324 07/21/23 1436 01/05/23  1117   NA  --   --  137 137 140   POTASSIUM  --   --  4.1 4.3 4.4   CHLORIDE  --   --  99 99 103   CO2  --   --  27 28 28   BUN  --   --  17.4 17.8 15.3   CR  --   --  0.66 0.66 0.66   ANIONGAP  --   --  11 10 9   NYDIA  --   --  9.6 9.5 9.3   * 113* 86 93 103*     Most Recent Cholesterol Panel:  Recent Labs   Lab Test 09/10/23  0122   CHOL 140   LDL 72   HDL 53   TRIG 75     Most Recent TSH and T4:  Recent Labs   Lab Test 09/09/23  2324   TSH 4.20     Most Recent Hemoglobin A1c:  Recent Labs   Lab Test 09/09/23 2324   A1C 5.7*   ,   Results for orders placed or performed during the hospital encounter of 09/09/23   MR Brain w/o & w Contrast    Narrative    EXAM: MR BRAIN W/O and W CONTRAST, MRA NECK (CAROTIDS) W/O and W CONTRAST, MRA BRAIN ([place] OF [place]) W/O CONTRAST  LOCATION: [facility]  DATE: 9/10/2023    INDICATION: Word finding difficulty  COMPARISON: None.  CONTRAST: 10mL Gadavist  TECHNIQUE:   1) Routine multiplanar multisequence head MRI without and with intravenous contrast.  2) 3D time-of-flight head MRA without intravenous contrast.  3) Neck MRA without and with IV contrast. Stenosis measurements made according to NASCET criteria unless otherwise specified.    FINDINGS:  HEAD MRI:  INTRACRANIAL CONTENTS: No acute or subacute infarct. No mass, acute  hemorrhage, or extra-axial fluid collections. Scattered nonspecific T2/FLAIR hyperintensities within the cerebral white matter most consistent with mild chronic microvascular ischemic   change. Moderate generalized cerebral atrophy. No hydrocephalus. Normal position of the cerebellar tonsils. No pathologic contrast enhancement. Small chronic infarction right basal ganglia.    SELLA: No abnormality accounting for technique.    OSSEOUS STRUCTURES/SOFT TISSUES: Normal marrow signal. The major intracranial vascular flow voids are maintained.     ORBITS: No abnormality accounting for technique.     SINUSES/MASTOIDS: No paranasal sinus mucosal disease. No middle ear or mastoid effusion.     HEAD MRA:   ANTERIOR CIRCULATION: Mild to moderate atherosclerotic changes scattered throughout M2 and M3 branches bilaterally. Standard [place] of [place] anatomy.    POSTERIOR CIRCULATION: Mild to moderate atherosclerotic changes scattered throughout smaller branches of posterior circulation bilaterally. Dominant left and smaller right vertebral artery contribute to a normal basilar artery.     NECK MRA:   RIGHT CAROTID: No measurable stenosis or dissection.    LEFT CAROTID: No measurable stenosis or dissection.    VERTEBRAL ARTERIES: No focal stenosis or dissection. Dominant left and smaller right vertebral arteries.    AORTIC ARCH: Classic aortic arch anatomy with no significant stenosis at the origin of the great vessels.      Impression    IMPRESSION:  HEAD MRI:  1.  No acute infarct, mass, mass effect, or hemorrhage.  2.  Mild chronic small vessel ischemia.  3.  Small chronic infarction right basal ganglia.  4.  Moderate atrophy.    HEAD MRA:  1.  Mild to moderate atherosclerotic changes.  2.  Intracranial circulation appears otherwise normal.    NECK MRA:  Normal neck MRA.     MRA Brain ([place] of [place]) wo Contrast    Narrative    EXAM: MR BRAIN W/O and W CONTRAST, MRA NECK (CAROTIDS) W/O and W CONTRAST, MRA BRAIN ([place] OF  ZAMUD[place]) W/O CONTRAST  LOCATION: [facility]  DATE: 9/10/2023    INDICATION: Word finding difficulty  COMPARISON: None.  CONTRAST: 10mL Gadavist  TECHNIQUE:   1) Routine multiplanar multisequence head MRI without and with intravenous contrast.  2) 3D time-of-flight head MRA without intravenous contrast.  3) Neck MRA without and with IV contrast. Stenosis measurements made according to NASCET criteria unless otherwise specified.    FINDINGS:  HEAD MRI:  INTRACRANIAL CONTENTS: No acute or subacute infarct. No mass, acute hemorrhage, or extra-axial fluid collections. Scattered nonspecific T2/FLAIR hyperintensities within the cerebral white matter most consistent with mild chronic microvascular ischemic   change. Moderate generalized cerebral atrophy. No hydrocephalus. Normal position of the cerebellar tonsils. No pathologic contrast enhancement. Small chronic infarction right basal ganglia.    SELLA: No abnormality accounting for technique.    OSSEOUS STRUCTURES/SOFT TISSUES: Normal marrow signal. The major intracranial vascular flow voids are maintained.     ORBITS: No abnormality accounting for technique.     SINUSES/MASTOIDS: No paranasal sinus mucosal disease. No middle ear or mastoid effusion.     HEAD MRA:   ANTERIOR CIRCULATION: Mild to moderate atherosclerotic changes scattered throughout M2 and M3 branches bilaterally. Standard [place] of [place] anatomy.    POSTERIOR CIRCULATION: Mild to moderate atherosclerotic changes scattered throughout smaller branches of posterior circulation bilaterally. Dominant left and smaller right vertebral artery contribute to a normal basilar artery.     NECK MRA:   RIGHT CAROTID: No measurable stenosis or dissection.    LEFT CAROTID: No measurable stenosis or dissection.    VERTEBRAL ARTERIES: No focal stenosis or dissection. Dominant left and smaller right vertebral arteries.    AORTIC ARCH: Classic aortic arch anatomy with no significant stenosis at the  origin of the great vessels.      Impression    IMPRESSION:  HEAD MRI:  1.  No acute infarct, mass, mass effect, or hemorrhage.  2.  Mild chronic small vessel ischemia.  3.  Small chronic infarction right basal ganglia.  4.  Moderate atrophy.    HEAD MRA:  1.  Mild to moderate atherosclerotic changes.  2.  Intracranial circulation appears otherwise normal.    NECK MRA:  Normal neck MRA.     MRA Neck (Carotids) wo & w Contrast    Narrative    EXAM: MR BRAIN W/O and W CONTRAST, MRA NECK (CAROTIDS) W/O and W CONTRAST, MRA BRAIN ([place] OF ZAMUDIO) W/O CONTRAST  LOCATION: [facility]  DATE: 9/10/2023    INDICATION: Word finding difficulty  COMPARISON: None.  CONTRAST: 10mL Gadavist  TECHNIQUE:   1) Routine multiplanar multisequence head MRI without and with intravenous contrast.  2) 3D time-of-flight head MRA without intravenous contrast.  3) Neck MRA without and with IV contrast. Stenosis measurements made according to NASCET criteria unless otherwise specified.    FINDINGS:  HEAD MRI:  INTRACRANIAL CONTENTS: No acute or subacute infarct. No mass, acute hemorrhage, or extra-axial fluid collections. Scattered nonspecific T2/FLAIR hyperintensities within the cerebral white matter most consistent with mild chronic microvascular ischemic   change. Moderate generalized cerebral atrophy. No hydrocephalus. Normal position of the cerebellar tonsils. No pathologic contrast enhancement. Small chronic infarction right basal ganglia.    SELLA: No abnormality accounting for technique.    OSSEOUS STRUCTURES/SOFT TISSUES: Normal marrow signal. The major intracranial vascular flow voids are maintained.     ORBITS: No abnormality accounting for technique.     SINUSES/MASTOIDS: No paranasal sinus mucosal disease. No middle ear or mastoid effusion.     HEAD MRA:   ANTERIOR CIRCULATION: Mild to moderate atherosclerotic changes scattered throughout M2 and M3 branches bilaterally. Standard [place] of [place]  anatomy.    POSTERIOR CIRCULATION: Mild to moderate atherosclerotic changes scattered throughout smaller branches of posterior circulation bilaterally. Dominant left and smaller right vertebral artery contribute to a normal basilar artery.     NECK MRA:   RIGHT CAROTID: No measurable stenosis or dissection.    LEFT CAROTID: No measurable stenosis or dissection.    VERTEBRAL ARTERIES: No focal stenosis or dissection. Dominant left and smaller right vertebral arteries.    AORTIC ARCH: Classic aortic arch anatomy with no significant stenosis at the origin of the great vessels.      Impression    IMPRESSION:  HEAD MRI:  1.  No acute infarct, mass, mass effect, or hemorrhage.  2.  Mild chronic small vessel ischemia.  3.  Small chronic infarction right basal ganglia.  4.  Moderate atrophy.    HEAD MRA:  1.  Mild to moderate atherosclerotic changes.  2.  Intracranial circulation appears otherwise normal.    NECK MRA:  Normal neck MRA.     CT Chest/Abdomen/Pelvis w Contrast    Narrative    EXAM: CT CHEST/ABDOMEN/PELVIS W CONTRAST  LOCATION: [facility]  DATE: 9/10/2023    INDICATION: rule out occult malignancy. 20lb wt loss, decreased appetite, increased fatigue. fam hx lung, pancreatic, colon ca. Personal hx skin cancer  COMPARISON: None.  TECHNIQUE: CT scan of the chest, abdomen, and pelvis was performed following injection of IV contrast. Multiplanar reformats were obtained. Dose reduction techniques were used.   CONTRAST: 81 mL Isovue 370    FINDINGS:   LUNGS AND PLEURA: Biapical pleural-parenchymal thickening. No focal consolidation or effusion.    MEDIASTINUM/AXILLAE: Heart is normal in size. No mediastinal, axillary, or hilar adenopathy.    CORONARY ARTERY CALCIFICATION: Mild.    HEPATOBILIARY: Liver and gallbladder within normal limits.    PANCREAS: Normal.    SPLEEN: Normal.    ADRENAL GLANDS: Normal.    KIDNEYS/BLADDER: Left renal cyst which is benign and needs no further follow-up.  Kidneys are otherwise unremarkable with no evidence of hydronephrosis.    BOWEL: Diverticulosis of the colon. No acute inflammatory change. No obstruction. Large hiatal hernia.    LYMPH NODES: Normal.    VASCULATURE: Mild atherosclerotic disease of the abdominal aorta and its branches.    PELVIC ORGANS: Bladder within normal limits. Uterus within normal limits.    MUSCULOSKELETAL: Degenerative changes of the spine. Mild compression deformity of L1.      Impression    IMPRESSION:  1.  No acute findings in the chest, abdomen, and pelvis. No evidence of malignancy.  2.  Large hiatal hernia.   Echocardiogram Complete     Value    LVEF  65-70%    [facility]    [id number]  [id number]  [id number]^GURINDER^FRANCINE^BIANCA     [facility]  Echocardiography Laboratory  [address] [place] [address]     Name: EDWIN LOZANO  MRN: [id number]  : 1940  Study Date: 2023 10:23 AM  Age: 83 yrs  Gender: Female  Patient Location: [facility]  Reason For Study: TIA  Ordering Physician: FRANCINE FAIRCHILD  Referring Physician: FRANCINE FAIRCHILD  Performed By: Ade Han     BSA: 1.8 m2  Height: 64 in  Weight: 156 lb  HR: 98  ______________________________________________________________________________  Procedure  Complete Echo Adult. Optison (NDC #[id number]) given intravenously.  ______________________________________________________________________________  Interpretation Summary     The visual ejection fraction is 65-70%.  Small LV cavity due to hyperdynamic function.  Peak gradient with Valsalva is 50 mmHg (CW applied for peak gradient thus  level is likely intracavitary and not at the level of the left ventricular  outflow tract).  The right ventricle is normal in structure, function and size.  Calcified aortic valve. Vmax 2.4 m/sec. Mean gradient 12 mmHg. YRN by  continuity is 1.3 cm2. DVI 0.57. Stroke volume index is 28 ml/m2 (reduced) due  to proximal intracavitary  obstruction. Probable mild-moderate aortic stenosis.  The inferior vena cava was normal in size with preserved respiratory  variability.  There is no pericardial effusion.     Compared to prior study there is a new gradient likely at the intracavitary  level versus LVOT. Aortic stenosis is now present. Recommend cardiac MRI if  clinically relevant.     ______________________________________________________________________________  Left Ventricle  The left ventricle is normal in size. The visual ejection fraction is 65-70%.  Grade I or early diastolic dysfunction. Peak gradient with Valsalva 50 mmHg.  Hyperdynamic left ventricular function. Normal left ventricular wall motion.     Right Ventricle  The right ventricle is normal in structure, function and size.     Atria  Normal left atrial size. Right atrial size is normal.     Mitral Valve  There is moderate mitral annular calcification.     Tricuspid Valve  The tricuspid valve is normal in structure and function. The right ventricular  systolic pressure is approximated at 32mmHg plus the right atrial pressure.  There is mild (1+) tricuspid regurgitation. Mild (35-45mmHg) pulmonary  hypertension is present.     Aortic Valve  Vmax 2.4 m/sec. Mean gradient 12 mmHg. YRN by continuity is 1.3 cm2. DVI 0.57.  Stroke volume index is 28 ml/m2 (reduced) due to proximal intracavitary  obstruction. Probable mild-moderate aortic stenosis.     Pulmonic Valve  The pulmonic valve is normal in structure and function. There is mild (1+)  pulmonic valvular regurgitation.     Vessels  The aortic root is normal size. Normal size ascending aorta. The inferior vena  cava was normal in size with preserved respiratory variability.     Pericardium  There is no pericardial effusion.     ______________________________________________________________________________  MMode/2D Measurements & Calculations  IVSd: 1.0 cm  LVIDd: 3.4 cm  LVIDs: 2.0 cm  LVPWd: 0.93 cm  LVPWs: 0.06 cm  FS: 41.4 %      LV mass(C)d: 93.4 grams  LV mass(C)dI: 53.1 grams/m2  Ao root diam: 2.4 cm  LA dimension: 2.6 cm  asc Aorta Diam: 2.4 cm  LA/Ao: 1.1  LVOT diam: 1.7 cm  LVOT area: 2.3 cm2  Ao root diam Index (cm/m2): 1.4  asc Aorta Diam Index (cm/m2): 1.4  LA Volume (BP): 27.9 ml  LA Volume Index (BP): 15.9 ml/m2  RWT: 0.55  TAPSE: 2.6 cm     Doppler Measurements & Calculations  MV E max savage: 85.1 cm/sec  MV A max savage: 172.0 cm/sec  MV E/A: 0.49  MV dec time: 0.17 sec  Ao V2 max: 228.3 cm/sec  Ao max P.0 mmHg  Ao V2 mean: 157.3 cm/sec  Ao mean P.3 mmHg  Ao V2 VTI: 36.9 cm  YRN(I,D): 1.3 cm2  YRN(V,D): 1.4 cm2  LV V1 max P.4 mmHg  LV V1 max: 136.0 cm/sec  LV V1 VTI: 21.5 cm  SV(LVOT): 49.2 ml  SI(LVOT): 27.9 ml/m2  PA acc time: 0.15 sec  TR max savage: 278.1 cm/sec  TR max P.1 mmHg  AV Savage Ratio (DI): 0.60  YRN Index (cm2/m2): 0.76  E/E' av.5  Lateral E/e': 23.0  Medial E/e': 14.0  RV S Savage: 20.0 cm/sec     ______________________________________________________________________________  Report approved by: Gallo An 2023 01:20 PM             Discharge Medications   Current Discharge Medication List        START taking these medications    Details   clopidogrel (PLAVIX) 75 MG tablet Take 1 tablet (75 mg) by mouth daily  Qty: 20 tablet, Refills: 0    Associated Diagnoses: Transient speech disturbance      metoprolol succinate ER (TOPROL XL) 50 MG 24 hr tablet Take 1 tablet (50 mg) by mouth daily  Qty: 30 tablet, Refills: 0    Associated Diagnoses: Transient speech disturbance           CONTINUE these medications which have NOT CHANGED    Details   ASPIRIN 81 MG OR TABS Take 81 mg by mouth daily     Associated Diagnoses: Other and unspecified hyperlipidemia      calcium 600 MG tablet Take 1 tablet by mouth daily    Associated Diagnoses: Osteopenia of hip, unspecified laterality      Cholecalciferol (VITAMIN D) 2000 UNIT tablet Take 2,000 Units by mouth daily.    Associated Diagnoses: Major  depressive disorder, single episode, mild (H)      Cyanocobalamin (B-12) 1000 MCG SUBL Place 1,000 mcg under the tongue daily    Associated Diagnoses: Benign essential hypertension      oxybutynin ER (DITROPAN XL) 10 MG 24 hr tablet Take 1 tablet (10 mg) by mouth daily  Qty: 90 tablet, Refills: 3    Comments: PROFILE FOR FUTURE ONGOING REFILLS  Associated Diagnoses: OAB (overactive bladder)      pravastatin (PRAVACHOL) 40 MG tablet Take 1 tablet (40 mg) by mouth daily  Qty: 30 tablet, Refills: 0    Associated Diagnoses: Hyperlipidemia LDL goal <130      VITAMIN C 500 MG OR TABS 1 tablet daily  Qty: 60, Refills: 0    Associated Diagnoses: Routine gynecological examination      zinc gluconate 50 MG tablet Take 50 mg by mouth daily           STOP taking these medications       atenolol (TENORMIN) 25 MG tablet Comments:   Reason for Stopping:             Allergies   Allergies   Allergen Reactions    Codeine Nausea and Vomiting    Morphine Nausea and Vomiting     nausea    Morphine And Related Nausea and Vomiting     vomiting    Oxycodone Nausea and Vomiting    Vicodin [Hydrocodone-Acetaminophen] Nausea and Vomiting

## 2023-09-11 NOTE — PLAN OF CARE
Goal Outcome Evaluation:      Plan of Care Reviewed With: patient    Overall Patient Progress: improvingOverall Patient Progress: improving    Nursing Note    Patient Information  Name: Brielle Marvin  Age: 83 year old    Assessment  Orientation/Neuro: Alert and Oriented x4  Cardiac/Tele: SR with occ PAC  Resp: WDL no shortness of breath  GI/: WDL No nausea, vomiting, abdominal pain, diarrhea, constipation or change in bowel habits   Mobility: walks with assist/SBA, steady on feet   Pain: denies   Diet: Orders Placed This Encounter      Combination Diet Regular Diet; Thin Liquids (level 0)  Procedures/Imaging: echo completed    Vital Signs  B/P: 145/75, T: 97.6, P: 83, R: 16, O2: 96% on RA    Plan  Discharge home, echo with abnormal results - will need OP cardiac MRI    Lorena Dover RN

## 2023-09-11 NOTE — PROGRESS NOTES
Stroke team following peripherally for results of TTE. LV normal. EF 65-70%, grade I/early diastolic dysfunction, hyperdynamic LV function, no LV wma, RV norm, norm b/l atria, moderate mitral annular calcification, mild TR, mild pulmonary HTN, Stroke volume index is 28 ml/m2 (reduced) due to proximal intracavitary obstruction. Probable mild-moderate aortic stenosis, mild KS, Peak gradient with Valsalva is 50 mmHg (CW applied for peak gradient thus level is likely intracavitary and not at the level of the left ventricular  outflow tract).Compared to prior study there is a new gradient likely at the intracavitary  level versus LVOT. Aortic stenosis is now present. Recommend cardiac MRI if  clinically relevant.    Would defer to cardiology regarding the aortic stenosis. No findings to warrant change to current recommendations from a stroke perspective. No further stroke work-up needed so we will sign off.

## 2023-09-11 NOTE — PLAN OF CARE
Goal Outcome Evaluation:      Plan of Care Reviewed With: patient        Patient here with stroke like symptoms. Alert, oriented times 4. Neuro's intact. VSS. Tele NSR. Patient up with SBA. Steady on feet. T C/A/P completed. On a regular diet, good appetite. Plan for echo tomorrow, and plan to discharge tomorrow back to home.

## 2023-09-12 ENCOUNTER — PATIENT OUTREACH (OUTPATIENT)
Dept: INTERNAL MEDICINE | Facility: CLINIC | Age: 83
End: 2023-09-12
Payer: COMMERCIAL

## 2023-09-12 NOTE — TELEPHONE ENCOUNTER
What type of discharge? Observation  Risk of Hospital admission or ED visit: 63%  Is a TCM episode required? Yes  When should the patient follow up with PCP? 7 days of discharge.    Nicole Kiser RN  [facility]

## 2023-09-13 NOTE — TELEPHONE ENCOUNTER
"Appointment request:     - Weds sep20 th at 830A? 1p is ok too    Patient Contact    Attempt # 1    Was call answered?  Yes.     ED/Discharge Protocol    \"Hi, my name is Yaz Alfaro RN, a registered nurse, and I am calling on behalf of Dr. Emmanuel's office at [place].  I am calling to follow up and see how things are going for you after your recent visit.\"    \"I see that you were in the (ER/UC/IP) on ER.    How are you doing now that you are home?\" Feels better - more clear     Is patient experiencing symptoms that may require a hospital visit?  No     Discharge Instructions    \"Let's review your discharge instructions.  What is/are the follow-up recommendations?  Pt. Response: Discharge Orders    Cardiac Event Monitor Adult/Pediatric: on pt now     MRI Cardiac w/contrast - pt repeat needed - MD place order     XR Esophagram - MD please place order     Speech Therapy Referral Authorized    Stroke Hospital Follow Up    \"Were you instructed to make a follow-up appointment?\"  Pt. Response: Yes.  Has appointment been made?   No.  \"Can I help you schedule that appointment?\" Send MD request       \"When you see the provider, I would recommend that you bring your discharge instructions with you.    Medications    \"How many new medications are you on since your hospitalization/ED visit?\"    2 or more - Epic MTM referral needed  \"How many of your current medicines changed (dose, timing, name, etc.) while you were in the hospital/ED visit?\"   0-1  \"Do you have questions about your medications?\"   No  \"Were you newly diagnosed with heart failure, COPD, diabetes or did you have a heart attack?\"   No  For patients on insulin: \"Did you start on insulin in the hospital or did you have your insulin dose changed?\"   No  Post Discharge Medication Reconciliation Status: discharge medications reconciled and changed, per note/orders.    Was MTM referral placed (*Make sure to put transitions as reason for referral)?   No    Call " "Summary    \"Do you have any questions or concerns about your condition or care plan at the moment?\"    No  Triage nurse advice given: Follow-up Appointments     Follow-up and recommended labs and tests       Follow up with primary care provider, Jared Emmanuel, within 7   days for hospital follow- up.  The following labs/tests are recommended:   Cardiac MRI, esophagram.     Follow up with stroke practitioner in 6-8 weeks. Call [phone] to make   an appointment      Call      Patient was in ER once in the past year (assess appropriateness of ER visits.)      \"If you have questions or things don't continue to improve, we encourage you contact us through the main clinic number,  [phone].  Even if the clinic is not open, triage nurses are available 24/7 to help you.     We would like you to know that our clinic has extended hours (provide information).  We also have urgent care (provide details on closest location and hours/contact info)\"      \"Thank you for your time and take care!\"        "

## 2023-09-14 ENCOUNTER — TELEPHONE (OUTPATIENT)
Dept: NEUROLOGY | Facility: CLINIC | Age: 83
End: 2023-09-14
Payer: COMMERCIAL

## 2023-09-14 NOTE — TELEPHONE ENCOUNTER
Called and spoke to the patient. Assisted in getting the patient scheduled for a hospital follow-up appointment with PCP.     Appointments in Next Year      Sep 20, 2023  8:30 AM  (Arrive by 8:10 AM)  Provider Visit with Jared Emmanuel MD  [facility] ([facility][1] ) [phone]          Patient expressed understanding and had no additional questions at this time.     Nevaeh Moralez RN

## 2023-09-14 NOTE — TELEPHONE ENCOUNTER
[facility] Call Center    Phone Message    May a detailed message be left on voicemail: yes     Reason for Call: Appointment Intake    Referring Provider Name: Hermelinda Recio PA-C  Diagnosis and/or Symptoms: Transient speech disturbance     Pt is requesting a call back to schedule this referral. Writer unable to schedule due to templates not being available for MARLEN stroke. Please call pt back to schedule at # [phone].    Action Taken: Message routed to:  Other: [facility]     Travel Screening: Not Applicable

## 2023-09-14 NOTE — TELEPHONE ENCOUNTER
OK to schedule a hospital follow up appointment.    OK to use any open same day, next day, or virtual appointment spot to schedule this office visit.    If the patient declines to schedule a follow up appointment, please make a notation and let me know.    Close encounter when done.

## 2023-09-19 ENCOUNTER — NURSE TRIAGE (OUTPATIENT)
Dept: INTERNAL MEDICINE | Facility: CLINIC | Age: 83
End: 2023-09-19

## 2023-09-19 ENCOUNTER — THERAPY VISIT (OUTPATIENT)
Dept: SPEECH THERAPY | Facility: CLINIC | Age: 83
End: 2023-09-19
Attending: HOSPITALIST
Payer: COMMERCIAL

## 2023-09-19 DIAGNOSIS — R47.9 TRANSIENT SPEECH DISTURBANCE: ICD-10-CM

## 2023-09-19 DIAGNOSIS — R13.10 DYSPHAGIA, UNSPECIFIED TYPE: Primary | ICD-10-CM

## 2023-09-19 PROCEDURE — 92526 ORAL FUNCTION THERAPY: CPT | Mod: GN

## 2023-09-19 PROCEDURE — 92610 EVALUATE SWALLOWING FUNCTION: CPT | Mod: GN

## 2023-09-19 NOTE — TELEPHONE ENCOUNTER
Patient reports home covid test is negative.     Oral temp read 100, states she is unsure if it read correctly because reading was close to 100F before patient placed thermometer under tongue.     Patient states she is feeling better and is not cold anymore.     Patient has Hosp F/U already scheduled for 9/20/23.  Routing to provider as FYI only.

## 2023-09-19 NOTE — TELEPHONE ENCOUNTER
Pt called recently discharged from hospital     Having chills/feels very cold     Has not checked temp     States her BP was normal today     Had a visit this AM with speech therapy -   Got home around 9am and started to feel cold/chilled     Unable to find her thermometer so unsure what her temp is     Overall does not feel well today- does not really have much energy. Not weak. No urinary symptoms. Overall malaise today     Pt has not checked a COVID19 home test but does have one at home     Recommended pt     1) check her temp with a thermometer   2) do home COVID 19 test     And call back after checking these 2 things - pt agreeable to this     Melva JUDD, Triage RN  [facility]     Additional Information   Negative: Sounds like a life-threatening emergency to the triager   Negative: Information only call about a Well Adult (no illness or injury)   Negative: Nursing judgment   Negative: Nursing judgment   Negative: Nursing judgment   Negative: Nursing judgment   Negative: Nursing judgment   Negative: Patient wants to be seen   Negative: Nursing judgment   Negative: Nursing judgment    Protocols used: No Protocol Available - Sick Adult-A-[place]

## 2023-09-19 NOTE — PROGRESS NOTES
"SPEECH LANGUAGE PATHOLOGY EVALUATION    [facility]        OUTPATIENT SPEECH LANGUAGE PATHOLOGY EVALUATION  See electronic medical record for Abuse and Falls Screening details.    Subjective      Presenting condition or subjective complaint: follow up from hospital stay; sometimes swallowing  Ms. Brielle Marvin is an 83 year old woman who was referred to outpatient speech language pathology for clinical swallow evaluation following referral from recent hospital stay of suspected TIA. It could not be medically determined if TIA happened recently vs. Years ago. She presented to the ED initially with 'feeling off and feeling like tongue was thick\". MRI, MRA negative for any acute pathology or stroke. She has a history of two syncopal episodes since 2018. Other pertinent PMHx to note is hx of GERD (although pt endorses this is has not been a problem for around 20 years), recent 20 lb weight loss, reduced appetite, chronic fatigue, large hiatal hernia prior to recent hospital admission. Brielle further reported globus sensations in pharyngeal and chest cavity starting around 5 years ago, but most recently exacerbated by recent hospital stay. Advocates for 'squeezing feeling' in pharyngeal and chest cavity. As well as difficulty w/ swallowing 'big pills like calcium'. She states her speech and her voice has returned to baseline. Reported some intermittent word finding difficulty, however denied objective concern and would like to personally monitor. Brielle currently has an active order for esophagram (has not been scheduled) and will see her PCP tomorrow. Please see below and/or medical chart for more information pertaining to PMHx as needed.     Date of onset: 09/11/23 (order date)    Relevant medical history: Incontinence; Bladder or bowel problems (?TIA)   Active Ambulatory Problems     Diagnosis Date Noted    Lumbago 08/07/2009    Enthesopathy of hip region 08/07/2009    Muscle weakness " (generalized) 05/06/2010    ACP (advance care planning) 05/22/2012    Sprain of neck 09/12/2013    Upper back strain 09/12/2013    Neck pain 10/03/2013    Gastroesophageal reflux disease without esophagitis 06/17/2015    Benign essential hypertension 05/29/2018    Hypercholesterolemia 07/06/2018    Overweight (BMI 25.0-29.9) 07/06/2018    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    On continuous oral anticoagulation 11/09/2018    Osteoarthritis of right knee 03/28/2019    Left leg cellulitis 11/20/2021    Venous insufficiency of left lower extremity 12/01/2021    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 12/01/2021    Transient speech disturbance 09/10/2023     Resolved Ambulatory Problems     Diagnosis Date Noted    iamENTHESOPATHY OF HIP 09/12/2005    Urge incontinence 03/07/2010    Hyperlipidemia LDL goal <130 10/31/2010    Major depressive disorder, single episode, mild (H) 04/22/2013    Major depression in complete remission (H) 07/06/2018    Acute deep vein thrombosis (DVT) of femoral vein of left lower extremity (H) 04/03/2019    Peroneal DVT (deep venous thrombosis), right (H) 11/03/2018    Hip pain, left 06/15/2021     Past Medical History:   Diagnosis Date    Acute deep vein thrombosis (DVT) of right peroneal vein (H) 11/09/2018    Esophageal reflux     Family history of malignant neoplasm of gastrointestinal tract     NONSPECIFIC MEDICAL HISTORY     Outcome of delivery, single liveborn 1972    Outcome of delivery, single liveborn 1977    Unspecified menopausal and postmenopausal disorder       Dates & types of surgery: back surgery 1961; knee surgery 2018  Past Surgical History:   Procedure Laterality Date    HC CORRECT BUNION,SIMPLE  1981    Z REYNOSO W/O FACETEC FORAMOT/DSKC 1/2 VRT SEG, LUMBAR  1962    Lami, Lumbar    [facility] TOTAL KNEE ARTHROPLASTY Right 04/12/2019    right TKA at [facility]    [facility] COLONOSCOPY THRU STOMA, DIAGNOSTIC  8/31/02        Prior diagnostic imaging/testing results:  MRI     Prior therapy history for the same diagnosis, illness or injury: No      Living Environment  Social support: Alone   Help at home: None      Employment: No retired  Hobbies/Interests: golf, cards, volunteering, gardening    Patient goals for therapy: swallow without stuck feeling         Objective     SWALLOW EVALUTION  Dysphagia history: notes globus sensations in pharyngeal and chest cavity starting around 5 years ago per pt. However she endorses exacerbation of s/sx since last hospital stay. Recently hx of 20 lb weight loss and reduced appetite. Difficulty w/ swallowing bigger pills at baseline. She mostly recently completed clinical bedside swallow evaluation at recent hospital stay in which she was dx w/ 'mild oral-pharyngeal dysphagia' recommended diet of regular solids and thin liquids w/ use of safe swallow strategies - follow up in OP setting. Also of note, pt endorsed baseline throat clear secondary to chronic phlegm. It was further recommended by hospital medical team to fist complete esophagram and then VFSS as clinically appropriate per discharge note. Please see swallow eval from 09/10/23 as needed.   Current Diet/Method of Nutritional Intake: oral diet, thin liquids (level 0), regular diet        CLINICAL SWALLOW EVALUATION  Oral Motor Function: generally intact  Dentition: some missing teeth  Secretion management: WFL  Oral labial function: WFL  Lingual function: WFL  Laryngeal function: volitional swallow, slight elevation on dry swallow  Pt states speech and voice quality is at baseline       Level of assist required for feeding: no assistance needed   Textures Trialed:   Clinical Swallow Eval: Thin Liquids  Mode of presentation: spoon, straw, self-fed   Volume presented: 4 oz h20  Preparatory Phase: WFL  Oral Phase: WFL  Pharyngeal phase of swallow: reduction in laryngeal movement, repeated swallows   Strategies trialed during procedure: SHAQUILLE SLP CLINICAL EVAL STRATEGIES: n/a    Diagnostic  statement: reduced in laryngeal movement; repeated swallow. X1 throat clear. Pt reports minimal intermittent pharyngeal and chest globus sensation(s).     Clinical Swallow Eval: Purees  Mode of presentation: spoon, self-fed   Volume presented: 1 applesauce cup   Preparatory Phase: WFL  Oral Phase: WFL  Pharyngeal phase of swallow: intact   Strategies trialed during procedure: SHAQUILLE SLP CLINICAL EVAL STRATEGIES: n/a    Diagnostic statement: no overt clinical s/sx of aspiration. Pt declines globus sensations     Clinical Swallow Eval: Solids  Mode of presentation: self-fed   Volume presented: 2 short bread cookies   Preparatory Phase: WFL  Oral Phase: WFL  Pharyngeal phase of swallow: feeling of something stuck in throat, reduction in laryngeal movement, repeated swallows   Strategies trialed during procedure: SHAQUILLE SLP CLINICAL EVAL STRATEGIES: hard swallow   Diagnostic statement: throat clear x 1, pt endorsed x1 occurrence of chest globus Sensations(s).      ADDITIONAL EVAL COMPLETED TODAY : no     ESOPHAGEAL PHASE OF SWALLOW  patient reports symptoms of esophageal dysphagia  patient presents with symptoms of esophageal dysphagia     SWALLOW ASSESSMENT CLINICAL IMPRESSIONS AND RATIONALE  Diet Consistency Recommendations: oral diet, thin liquids (level 0), regular diet    Recommended Feeding/Eating Techniques: slow rate of intake, alternate food and liquid intake, discontinue eating activities if fatigue is present, monitor for cough or change in vocal quality with intake, maintain upright sitting position for eating, maintain upright posture during/after eating for 30 minutes, minimize distractions during oral intake   Medication Administration Recommendations: whole in tolerated or in puree  Instrumental Assessment Recommendations: VFSS (videofluoroscopic swallowing study), if esophagram results are inconclusive       Assessment & Plan   CLINICAL IMPRESSIONS   Medical Diagnosis: Transient speech disturbance (R47.9)   "  Treatment Diagnosis: mild dysphagia   Impression/Assessment: Pt is a 83 year old female with dysphagia complaints. The following significant findings have been identified: impaired swallowing, characterized by repeated swallows, slight reduction in laryngeal elevation upon palpation and visualization, intermittent pharyngeal and chest globus sensations. Identified deficits interfere with their ability to consume an oral diet and maintain nutrition as compared to previous level of function.    Suspect pt's primary complaints are possibly related to esophageal dysfunction. Pt describes \"squeezing\" globus sensations across thin liquids and regular solids textures at mid-chest level, . She reports these s/sx are not new. However endorsed recent exacerbation of s/sliding scale.  Highly suspect r/t esophagus vs oropharyngeal swallow as pt presented with functional oromotor function,  timely swallow.  Will continue to monitor need for further objective assessment as unable to determine aspiration at beside, reinforce pharyngo-esophageal clearance and safe swallow strategies. strategies    PLAN OF CARE  Treatment Interventions: Swallowing dysfunction and/or oral function for feeding    Prognosis to achieve stated therapy goals is good   Rehab potential is impacted by: comorbidities, prior level of function    Long Term Goals          Frequency of Treatment: x1-2/month ((potential to change given objective assessment results))  Duration of Treatment: 90 days     Recommended Referrals to Other Professionals: Speech Language Pathology, VFSS following results of esophagram as pt and PCP feels indicated   Education Assessment:   Learner/Method: Patient;No Barriers to Learning;Listening;Demonstration;Pictures/Video;Reading    Risks and benefits of evaluation/treatment have been explained.   Patient/Family/caregiver agrees with Plan of Care.     Evaluation Time:    SLP Eval: oral/pharyngeal swallow function, clinical minutes " (31535): 30     Present: Not applicable     Signing Clinician: JOLANTA Berger    [facility]                                                                                   OUTPATIENT SPEECH LANGUAGE PATHOLOGY      PLAN OF TREATMENT FOR OUTPATIENT REHABILITATION   Patient's Last Name, First Name, Brielle Yuan YOB: 1940   Provider's Name   [facility]   Medical Record No.  [id number]     Onset Date: 09/11/23 (order date) Start of Care Date: 09/19/23     Medical Diagnosis:  Transient speech disturbance (R47.9)      SLP Treatment Diagnosis: mild dysphagia  Plan of Treatment  Frequency/Duration: x1-2/month ((potential to change given objective assessment results))  / 90 days     Certification date from 09/19/23   To 12/18/23          See note for plan of treatment details and functional goals     JOLANTA Berger                         Referring Provider:  Kary De La Cruz      Initial Assessment  See Epic Evaluation- 09/19/23    Thank you for referring Brielle Marvin to outpatient speech therapy at [facility].  Please call [phone] or email  Chacha Leon MS, CCC-SLP at [email or web address] with any questions or concerns.           Chacha Leon MS, CCC-SLP      Speech-Language Pathologist

## 2023-09-20 ENCOUNTER — HOSPITAL ENCOUNTER (OUTPATIENT)
Dept: GENERAL RADIOLOGY | Facility: CLINIC | Age: 83
Discharge: HOME | End: 2023-09-20
Attending: HOSPITALIST | Admitting: HOSPITALIST
Payer: COMMERCIAL

## 2023-09-20 ENCOUNTER — OFFICE VISIT (OUTPATIENT)
Dept: INTERNAL MEDICINE | Facility: CLINIC | Age: 83
End: 2023-09-20
Payer: COMMERCIAL

## 2023-09-20 VITALS
SYSTOLIC BLOOD PRESSURE: 138 MMHG | TEMPERATURE: 98.2 F | BODY MASS INDEX: 27.41 KG/M2 | OXYGEN SATURATION: 99 % | DIASTOLIC BLOOD PRESSURE: 82 MMHG | WEIGHT: 159.7 LBS | HEART RATE: 64 BPM

## 2023-09-20 DIAGNOSIS — K21.9 GASTROESOPHAGEAL REFLUX DISEASE WITHOUT ESOPHAGITIS: ICD-10-CM

## 2023-09-20 DIAGNOSIS — I10 BENIGN ESSENTIAL HYPERTENSION: ICD-10-CM

## 2023-09-20 DIAGNOSIS — E78.5 HYPERLIPIDEMIA LDL GOAL <130: ICD-10-CM

## 2023-09-20 DIAGNOSIS — Z23 NEED FOR PROPHYLACTIC VACCINATION AND INOCULATION AGAINST INFLUENZA: ICD-10-CM

## 2023-09-20 DIAGNOSIS — K44.9 HIATAL HERNIA: ICD-10-CM

## 2023-09-20 DIAGNOSIS — R47.9 TRANSIENT SPEECH DISTURBANCE: Primary | ICD-10-CM

## 2023-09-20 DIAGNOSIS — R47.9 TRANSIENT SPEECH DISTURBANCE: ICD-10-CM

## 2023-09-20 PROCEDURE — 74221 X-RAY XM ESOPHAGUS 2CNTRST: CPT

## 2023-09-20 PROCEDURE — 90662 IIV NO PRSV INCREASED AG IM: CPT | Performed by: INTERNAL MEDICINE

## 2023-09-20 PROCEDURE — 250N000013 HC RX MED GY IP 250 OP 250 PS 637: Performed by: RADIOLOGY

## 2023-09-20 PROCEDURE — G0008 ADMIN INFLUENZA VIRUS VAC: HCPCS | Performed by: INTERNAL MEDICINE

## 2023-09-20 PROCEDURE — 99495 TRANSJ CARE MGMT MOD F2F 14D: CPT | Mod: 25 | Performed by: INTERNAL MEDICINE

## 2023-09-20 RX ORDER — METOPROLOL SUCCINATE 50 MG/1
50 TABLET, EXTENDED RELEASE ORAL DAILY
Qty: 90 TABLET | Refills: 3 | Status: SHIPPED | OUTPATIENT
Start: 2023-09-20 | End: 2024-02-29

## 2023-09-20 RX ORDER — PRAVASTATIN SODIUM 40 MG
40 TABLET ORAL DAILY
Qty: 90 TABLET | Refills: 3 | Status: SHIPPED | OUTPATIENT
Start: 2023-09-20 | End: 2024-01-04

## 2023-09-20 RX ADMIN — ANTACID/ANTIFLATULENT 4 G: 380; 550; 10; 10 GRANULE, EFFERVESCENT ORAL at 11:00

## 2023-09-20 NOTE — PROGRESS NOTES
Assessment & Plan   Problem List Items Addressed This Visit       Gastroesophageal reflux disease without esophagitis    Relevant Medications    omeprazole (PRILOSEC) 20 MG DR capsule    Other Relevant Orders    PRIMARY CARE FOLLOW-UP SCHEDULING    Benign essential hypertension    Relevant Orders    PRIMARY CARE FOLLOW-UP SCHEDULING    Transient speech disturbance - Primary    Relevant Medications    metoprolol succinate ER (TOPROL XL) 50 MG 24 hr tablet    Other Relevant Orders    PRIMARY CARE FOLLOW-UP SCHEDULING    Hiatal hernia    Relevant Medications    omeprazole (PRILOSEC) 20 MG DR capsule    Other Relevant Orders    Adult General Surg Referral    PRIMARY CARE FOLLOW-UP SCHEDULING     Other Visit Diagnoses       Hyperlipidemia LDL goal <130        Relevant Medications    pravastatin (PRAVACHOL) 40 MG tablet    Other Relevant Orders    PRIMARY CARE FOLLOW-UP SCHEDULING    Need for prophylactic vaccination and inoculation against influenza        Relevant Orders    INFLUENZA VACCINE 65+ (FLUZONE HD) (Completed)    PRIMARY CARE FOLLOW-UP SCHEDULING              Finish Clopidogrel until gone.      The continue daily aspirin 81 mg once per day     Perform the esophogram xray study later today.      If there are significant abnormalities, you may need to consider repair if you have enough symptoms.   We can refer you to a general surgeon to evaluate further.     [facility]   [address], [address]   [facility] [address]   Phone: [phone]   Fax: [phone]        Once you are done with the Clopidogrel, resume Omeprazole 20 mg once per day to reduce future acid reflux symptoms due to the hiatal hernia.       Aortic stenosis seen on the echocardiogram.  MRI of the cardiac muscles to evaluate the muscles around the aortic valve.       Continue the Metoprolol instead of the Atenolol.       Follow up with Cardiology Clinic if there are any significant abnormalities seen on the  MRI scan.       We will continue to monitor the aortic valve over the coming years depending on how the heart murmur.     Continue all other medications at the same doses.  Contact your usual pharmacy if you need refills.        Covid vaccines are now recommended annually for all adults.  The most recent updated Covid vaccine has been approved and will be widely available at any pharmacy or vaccine clinic location.  Consider getting this at the same time as the annual influenza vaccine.        Return to see me in January 2024 for yoru annual wellness exam, return earlier for any other issues..  Use [employer] or Call [phone] to schedule the appointment with me.        MED REC REQUIRED  Post Medication Reconciliation Status: discharge medications reconciled, continue medications without change      Jared Emmanuel MD  [facility]    Nancy Hannah is a 83 year old, presenting for the following health issues:  Hospital F/U and Imm/Inj (Flu Shot)        9/20/2023     8:12 AM   Additional Questions   [facility]:    Hospital/Nursing Home/ Rehab Facility: [facility]  Date of Admission: 9/9/23  Date of Discharge: 9/11/23  Reason(s) for Admission: Transient speech disturbance     Was your hospitalization related to COVID-19? No   Problems taking medications regularly:  None  Medication changes since discharge: None  Problems adhering to non-medication therapy:  None    Summary of hospitalization:  [facility] discharge summary reviewed  Diagnostic Tests/Treatments reviewed.  Follow up needed: Neurology  Other Healthcare Providers Involved in Patient s Care:         Neurology  Update since discharge: improved.       Since home from hospital, they report that they are feeling better.   Energy level and stamina are slowly improving.    Appetite and intake are improving.   No fevers, no  chills  No shortness of breath.   No abdominal pain.   They have returned to prior routine and activities.     Plan of care communicated with patient             **I reviewed the information recorded in the patient's EPIC chart (including but not limited to medical history, surgical history, family history, problem list, medication list, and allergy list) and updated the information as indicated based on the patients reported information.         Review of Systems   Constitutional, HEENT, cardiovascular, pulmonary, gi and gu systems are negative, except as otherwise noted.      Objective    /82   Pulse 64   Temp 98.2  F (36.8  C) (Oral)   Wt 72.4 kg (159 lb 11.2 oz)   LMP 11/08/1991   SpO2 99%   BMI 27.41 kg/m    Body mass index is 27.41 kg/m .  Physical Exam   GENERAL alert and no distress; speech normal at usual baseline  EYES:  Normal sclera,conjunctiva, EOMI  HENT: oral and posterior pharynx without lesions or erythema, facies symmetric  NECK: Neck supple. No LAD, without thyroidmegaly.  RESP: Clear to ausculation bilaterally without wheezes or crackles. Normal BS in all fields.  CV: RRR normal S1S2 slight systolic ejection murmur, without rubs or gallops.  LYMPH: no cervical lymph adenopathy appreciated  MS: extremities- no gross deformities of the visible extremities noted,   EXT:  no lower extremity edema  PSYCH: Alert and oriented times 3; speech- coherent  SKIN:  No obvious significant skin lesions on visible portions of face

## 2023-09-29 ENCOUNTER — NURSE TRIAGE (OUTPATIENT)
Dept: INTERNAL MEDICINE | Facility: CLINIC | Age: 83
End: 2023-09-29

## 2023-09-29 ENCOUNTER — OFFICE VISIT (OUTPATIENT)
Dept: URGENT CARE | Facility: URGENT CARE | Age: 83
End: 2023-09-29
Payer: COMMERCIAL

## 2023-09-29 VITALS
BODY MASS INDEX: 27.29 KG/M2 | HEART RATE: 81 BPM | SYSTOLIC BLOOD PRESSURE: 178 MMHG | RESPIRATION RATE: 16 BRPM | DIASTOLIC BLOOD PRESSURE: 92 MMHG | OXYGEN SATURATION: 96 % | WEIGHT: 159 LBS

## 2023-09-29 DIAGNOSIS — I10 UNCONTROLLED HYPERTENSION: Primary | ICD-10-CM

## 2023-09-29 LAB
ALBUMIN UR-MCNC: NEGATIVE MG/DL
ANION GAP SERPL CALCULATED.3IONS-SCNC: 9 MMOL/L (ref 3–14)
APPEARANCE UR: CLEAR
BASOPHILS # BLD AUTO: 0 10E3/UL (ref 0–0.2)
BASOPHILS NFR BLD AUTO: 1 %
BILIRUB UR QL STRIP: NEGATIVE
BUN SERPL-MCNC: 12 MG/DL (ref 7–30)
CALCIUM SERPL-MCNC: 10 MG/DL (ref 8.5–10.1)
CHLORIDE BLD-SCNC: 99 MMOL/L (ref 94–109)
CO2 SERPL-SCNC: 32 MMOL/L (ref 20–32)
COLOR UR AUTO: YELLOW
CREAT SERPL-MCNC: 0.3 MG/DL (ref 0.52–1.04)
EGFRCR SERPLBLD CKD-EPI 2021: >90 ML/MIN/1.73M2
EOSINOPHIL # BLD AUTO: 0.1 10E3/UL (ref 0–0.7)
EOSINOPHIL NFR BLD AUTO: 1 %
ERYTHROCYTE [DISTWIDTH] IN BLOOD BY AUTOMATED COUNT: 11.9 % (ref 10–15)
GLUCOSE BLD-MCNC: 105 MG/DL (ref 70–99)
GLUCOSE UR STRIP-MCNC: NEGATIVE MG/DL
HCT VFR BLD AUTO: 40.1 % (ref 35–47)
HGB BLD-MCNC: 13.4 G/DL (ref 11.7–15.7)
HGB UR QL STRIP: NEGATIVE
IMM GRANULOCYTES # BLD: 0 10E3/UL
IMM GRANULOCYTES NFR BLD: 0 %
KETONES UR STRIP-MCNC: NEGATIVE MG/DL
LEUKOCYTE ESTERASE UR QL STRIP: NEGATIVE
LYMPHOCYTES # BLD AUTO: 1.6 10E3/UL (ref 0.8–5.3)
LYMPHOCYTES NFR BLD AUTO: 28 %
MCH RBC QN AUTO: 31.7 PG (ref 26.5–33)
MCHC RBC AUTO-ENTMCNC: 33.4 G/DL (ref 31.5–36.5)
MCV RBC AUTO: 95 FL (ref 78–100)
MONOCYTES # BLD AUTO: 0.5 10E3/UL (ref 0–1.3)
MONOCYTES NFR BLD AUTO: 10 %
NEUTROPHILS # BLD AUTO: 3.4 10E3/UL (ref 1.6–8.3)
NEUTROPHILS NFR BLD AUTO: 60 %
NITRATE UR QL: NEGATIVE
PH UR STRIP: 7 [PH] (ref 5–7)
PLATELET # BLD AUTO: 169 10E3/UL (ref 150–450)
POTASSIUM BLD-SCNC: 4.5 MMOL/L (ref 3.4–5.3)
RBC # BLD AUTO: 4.23 10E6/UL (ref 3.8–5.2)
SODIUM SERPL-SCNC: 140 MMOL/L (ref 133–144)
SP GR UR STRIP: 1.01 (ref 1–1.03)
UROBILINOGEN UR STRIP-ACNC: 0.2 E.U./DL
WBC # BLD AUTO: 5.6 10E3/UL (ref 4–11)

## 2023-09-29 PROCEDURE — 93000 ELECTROCARDIOGRAM COMPLETE: CPT | Performed by: PHYSICIAN ASSISTANT

## 2023-09-29 PROCEDURE — 80048 BASIC METABOLIC PNL TOTAL CA: CPT | Performed by: PHYSICIAN ASSISTANT

## 2023-09-29 PROCEDURE — 99214 OFFICE O/P EST MOD 30 MIN: CPT | Mod: 25 | Performed by: PHYSICIAN ASSISTANT

## 2023-09-29 PROCEDURE — 36415 COLL VENOUS BLD VENIPUNCTURE: CPT | Performed by: PHYSICIAN ASSISTANT

## 2023-09-29 PROCEDURE — 85025 COMPLETE CBC W/AUTO DIFF WBC: CPT | Performed by: PHYSICIAN ASSISTANT

## 2023-09-29 PROCEDURE — 81003 URINALYSIS AUTO W/O SCOPE: CPT | Performed by: PHYSICIAN ASSISTANT

## 2023-09-29 RX ORDER — METOPROLOL SUCCINATE 25 MG/1
25 TABLET, EXTENDED RELEASE ORAL DAILY
Qty: 30 TABLET | Refills: 0 | Status: SHIPPED | OUTPATIENT
Start: 2023-09-29 | End: 2023-10-25

## 2023-09-29 NOTE — TELEPHONE ENCOUNTER
Triage RN sent teams message to the RN below to determine if anything additional is needed for this patient. Unsure if patient was going to come to  for further evaluation?    Nevaeh Moralez, RN

## 2023-09-29 NOTE — TELEPHONE ENCOUNTER
"Patient will call her son to take her to clinic now.  She did just drive her brother to the airport this morning in the rain which may be adding to her high BP this morning.  Do not want her to drive herself.  Aracelis Aj RN  [facility]    Reason for Disposition   Systolic BP >= 200 OR Diastolic >= 120 and having NO cardiac or neurologic symptoms    Additional Information   Negative: Sounds like a life-threatening emergency to the triager   Negative: Symptom is main concern (e.g., headache, chest pain)   Negative: Low blood pressure is main concern   Negative: Systolic BP >= 160 OR Diastolic >= 100, and any cardiac (e.g., breathing difficulty, chest pain) or neurologic symptoms (e.g., new-onset blurred or double vision)   Negative: Pregnant 20 or more weeks (or postpartum < 6 weeks) with new hand or face swelling   Negative: Pregnant 20 or more weeks (or postpartum < 6 weeks) and Systolic BP >= 160 OR Diastolic >= 110   Negative: Patient sounds very sick or weak to the triager   Negative: Systolic BP >= 180 OR Diastolic >= 110, and missed most recent dose of blood pressure medication    Answer Assessment - Initial Assessment Questions  1. BLOOD PRESSURE: \"What is the blood pressure?\" \"Did you take at least two measurements 5 minutes apart?\"      201/106 5 minutes ago and recheck of 214/107  2. ONSET: \"When did you take your blood pressure?\"      5 minutes prior to call and while on phone with RN. Just got a new cuff, but it is not working this morning.  Is currently on a heart monitor, recent hospitalization.  3. HOW: \"How did you take your blood pressure?\" (e.g., automatic home BP monitor, visiting nurse)      Automatic home BP monitor  4. HISTORY: \"Do you have a history of high blood pressure?\"      yes  5. MEDICINES: \"Are you taking any medicines for blood pressure?\" \"Have you missed any doses recently?\"      No missed doses  6. OTHER SYMPTOMS: \"Do you have any symptoms?\" (e.g., " "blurred vision, chest pain, difficulty breathing, headache, weakness)      No other symptoms, \"I feel just fine\"  7. PREGNANCY: \"Is there any chance you are pregnant?\" \"When was your last menstrual period?\"      no    Protocols used: Blood Pressure - High-A-OH    "
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
## 2023-09-29 NOTE — TELEPHONE ENCOUNTER
Received teams message back from RN below...        Upon chart review, patient is currently at [facility].     Closing encounter.    Nevaeh Moralez RN

## 2023-10-11 ENCOUNTER — OFFICE VISIT (OUTPATIENT)
Dept: SURGERY | Facility: CLINIC | Age: 83
End: 2023-10-11
Attending: INTERNAL MEDICINE
Payer: COMMERCIAL

## 2023-10-11 VITALS
DIASTOLIC BLOOD PRESSURE: 60 MMHG | HEART RATE: 85 BPM | HEIGHT: 64 IN | SYSTOLIC BLOOD PRESSURE: 118 MMHG | WEIGHT: 159 LBS | BODY MASS INDEX: 27.14 KG/M2

## 2023-10-11 DIAGNOSIS — K44.9 HIATAL HERNIA: ICD-10-CM

## 2023-10-11 PROCEDURE — 99203 OFFICE O/P NEW LOW 30 MIN: CPT | Performed by: SURGERY

## 2023-10-12 ENCOUNTER — HOSPITAL ENCOUNTER (OUTPATIENT)
Dept: CARDIOLOGY | Facility: CLINIC | Age: 83
Discharge: HOME | End: 2023-10-12
Attending: HOSPITALIST | Admitting: HOSPITALIST
Payer: COMMERCIAL

## 2023-10-12 DIAGNOSIS — R47.9 TRANSIENT SPEECH DISTURBANCE: ICD-10-CM

## 2023-10-12 PROCEDURE — A9585 GADOBUTROL INJECTION: HCPCS | Performed by: HOSPITALIST

## 2023-10-12 PROCEDURE — 75565 CARD MRI VELOC FLOW MAPPING: CPT | Mod: 26 | Performed by: INTERNAL MEDICINE

## 2023-10-12 PROCEDURE — 75561 CARDIAC MRI FOR MORPH W/DYE: CPT

## 2023-10-12 PROCEDURE — 75561 CARDIAC MRI FOR MORPH W/DYE: CPT | Mod: 26 | Performed by: INTERNAL MEDICINE

## 2023-10-12 PROCEDURE — 255N000002 HC RX 255 OP 636: Performed by: HOSPITALIST

## 2023-10-12 RX ORDER — GADOBUTROL 604.72 MG/ML
10 INJECTION INTRAVENOUS ONCE
Status: COMPLETED | OUTPATIENT
Start: 2023-10-12 | End: 2023-10-12

## 2023-10-12 RX ORDER — DIPHENHYDRAMINE HYDROCHLORIDE 50 MG/ML
25-50 INJECTION INTRAMUSCULAR; INTRAVENOUS
Status: DISCONTINUED | OUTPATIENT
Start: 2023-10-12 | End: 2023-10-13 | Stop reason: HOSPADM

## 2023-10-12 RX ORDER — ONDANSETRON 2 MG/ML
4 INJECTION INTRAMUSCULAR; INTRAVENOUS
Status: DISCONTINUED | OUTPATIENT
Start: 2023-10-12 | End: 2023-10-13 | Stop reason: HOSPADM

## 2023-10-12 RX ORDER — DIAZEPAM 5 MG
5 TABLET ORAL EVERY 30 MIN PRN
Status: DISCONTINUED | OUTPATIENT
Start: 2023-10-12 | End: 2023-10-13 | Stop reason: HOSPADM

## 2023-10-12 RX ORDER — METHYLPREDNISOLONE SODIUM SUCCINATE 125 MG/2ML
125 INJECTION, POWDER, LYOPHILIZED, FOR SOLUTION INTRAMUSCULAR; INTRAVENOUS
Status: DISCONTINUED | OUTPATIENT
Start: 2023-10-12 | End: 2023-10-13 | Stop reason: HOSPADM

## 2023-10-12 RX ORDER — ACYCLOVIR 200 MG/1
0-1 CAPSULE ORAL
Status: DISCONTINUED | OUTPATIENT
Start: 2023-10-12 | End: 2023-10-13 | Stop reason: HOSPADM

## 2023-10-12 RX ORDER — DIPHENHYDRAMINE HCL 25 MG
25 CAPSULE ORAL
Status: DISCONTINUED | OUTPATIENT
Start: 2023-10-12 | End: 2023-10-13 | Stop reason: HOSPADM

## 2023-10-12 RX ADMIN — GADOBUTROL 10 ML: 604.72 INJECTION INTRAVENOUS at 08:47

## 2023-10-14 NOTE — PROGRESS NOTES
General surgery clinic note    Had the pleasure to meet with Mrs. Marvin regarding her recently discovered hiatal hernia.  Based on imaging studies this hernias been there for many years but is quite evident on her recent CT scan.  2 weeks ago she started to feel poorly, had some elevated blood pressure.  Evaluation with a CT scan of her abdomen showed a hiatal hernia with probably the upper third of her stomach within the posterior mediastinum.  She has not been complaining of any coughing, reflux, dysphagia, nor odynophagia.  Her bowel habits have been normal and she has been enjoying her meals without difficulty.    We discussed the pathophysiology of hiatal hernia as well as what the symptoms of potential complications from it could be.  Her daughter is quite familiar with this as she suffers from similar problem.    After discussing the surgical treatment for hiatal hernia and explaining the risk, benefits, hopeful outcomes, possible complications, all her questions were answered.  She feels well-informed and will return to see us should any symptoms develop or should she want to pursue repair.    Total encounter time 30 minutes, more than half spent in counseling, review of data, and coordination of care.
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
## 2023-10-25 ENCOUNTER — VIRTUAL VISIT (OUTPATIENT)
Dept: NEUROLOGY | Facility: CLINIC | Age: 83
End: 2023-10-25
Attending: PHYSICIAN ASSISTANT
Payer: COMMERCIAL

## 2023-10-25 VITALS
SYSTOLIC BLOOD PRESSURE: 141 MMHG | DIASTOLIC BLOOD PRESSURE: 80 MMHG | BODY MASS INDEX: 26.49 KG/M2 | HEIGHT: 65 IN | WEIGHT: 159 LBS

## 2023-10-25 DIAGNOSIS — R47.9 TRANSIENT SPEECH DISTURBANCE: ICD-10-CM

## 2023-10-25 PROCEDURE — 99215 OFFICE O/P EST HI 40 MIN: CPT | Mod: 95 | Performed by: PHYSICIAN ASSISTANT

## 2023-10-25 ASSESSMENT — PAIN SCALES - GENERAL: PAINLEVEL: NO PAIN (0)
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
## 2023-10-25 NOTE — PATIENT INSTRUCTIONS
Medications:  -continue ASA 81 mg daily indefinitely  -continue pravastatin 40 mg daily    Aspirin precautions:  -Contact provider immediately with any signs of bleeding or black/tarry stools  -if any future providers request that you hold or stop taking this medication then please reach out to our stroke team to be included in the discussion.    Daily activities:  -home blood pressure monitoring twice daily AM and PM, keep log and bring to medical visits  -Mediterranean diet can be beneficial for overall decreased cardiovascular risk, moderate aerobic exercise at least 30 minutes/day x 5 days/week    Follow-up:  -Ongoing follow-up with PCP for management of vascular risk factors: BP goal <130/80, A1c goal <7%, LDL goal 40-70 (<40 increases risk of intracranial hemorrhage)  -Follow-up again with stroke team in 6 months   -Follow-up with sleep medicine clinic to check for sleep apnea    Precautions:  - Call our stroke clinic with any questions or concerns at [phone], or send a [employer] medical advice message  - Call 524 with any new stroke symptoms    B - Balance problems  E - Eyes (vision loss)  F - Facial weakness or droop  A - Arm weakness  S - Speech/language  T - Time is brain!
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
## 2023-10-25 NOTE — NURSING NOTE
Is the patient currently in the state of [place]? YES    Visit mode:VIDEO    If the visit is dropped, the patient can be reconnected by: TELEPHONE VISIT: Phone number: [phone]    Will anyone else be joining the visit? NO  (If patient encounters technical issues they should call [phone]    How would you like to obtain your AVS? MyChart    Are changes needed to the allergy or medication list? No    Reason for visit: Stroke    Natalie Lynn MA

## 2023-12-06 ENCOUNTER — PATIENT OUTREACH (OUTPATIENT)
Dept: CARE COORDINATION | Facility: CLINIC | Age: 83
End: 2023-12-06
Payer: COMMERCIAL

## 2023-12-16 ENCOUNTER — NURSE TRIAGE (OUTPATIENT)
Dept: NURSING | Facility: CLINIC | Age: 83
End: 2023-12-16
Payer: COMMERCIAL

## 2023-12-16 NOTE — TELEPHONE ENCOUNTER
Nurse Triage SBAR    Is this a 2nd Level Triage? NO    Situation:  possible UTI    Background:  beginning mid morning patient noticed increased frequency, urgency, and minimal burning with urination.  Patient denies fever or pain.    Assessment:  increased frequency urgency and some burning with urination    Protocol Recommended Disposition:   See PCP Within 24 Hours    Recommendation: Patient verbalized understanding of care advice.       Sarai Asif RN on 12/16/2023 at 5:31 PM      Does the patient meet one of the following criteria for ADS visit consideration? No      Reason for Disposition   Urinating more frequently than usual (i.e., frequency)    Additional Information   Negative: Shock suspected (e.g., cold/pale/clammy skin, too weak to stand, low BP, rapid pulse)   Negative: Sounds like a life-threatening emergency to the triager   Negative: [1] Unable to urinate (or only a few drops) > 4 hours AND [2] bladder feels very full (e.g., palpable bladder or strong urge to urinate)   Negative: [1] Decreased urination and [2] drinking very little AND [2] dehydration suspected (e.g., dark urine, no urine > 12 hours, very dry mouth, very lightheaded)   Negative: Patient sounds very sick or weak to the triager   Negative: Fever > 100.4 F (38.0 C)   Negative: Side (flank) or lower back pain present    Protocols used: Urinary Symptoms-A-AH

## 2023-12-17 NOTE — TELEPHONE ENCOUNTER
Went to [facility]--closed earlier today=6pm, unable to be seen. Not taking any more patients.   I think I have a UTI. I called earlier and was told to go to . I plan to go tomorrow. Would it be beneficial to eat cranberry jelly or fresh cranberries?.   SX: Urgency and frequency. (See previous triage= U24 disposition )  Advised usually cranberry juice or cranberry cocktail is recommended by home care guidelines, but no harm noted in eating the jelly or cooked fresh cranberries. Encouraged fluid intake.     Elif Flores RN Triage Nurse Advisor 9:10 PM 12/16/2023  Reason for Disposition   Health Information question, no triage required and triager able to answer question    Additional Information   Negative: [1] Caller is not with the adult (patient) AND [2] reporting urgent symptoms   Negative: Lab result questions   Negative: Medication questions   Negative: Caller can't be reached by phone   Negative: Caller has already spoken to PCP or another triager   Negative: RN needs further essential information from caller in order to complete triage   Negative: Requesting regular office appointment   Negative: [1] Caller requesting NON-URGENT health information AND [2] PCP's office is the best resource    Protocols used: Information Only Call - No Triage-A-

## 2023-12-20 ENCOUNTER — PATIENT OUTREACH (OUTPATIENT)
Dept: CARE COORDINATION | Facility: CLINIC | Age: 83
End: 2023-12-20
Payer: COMMERCIAL

## 2024-01-04 DIAGNOSIS — N32.81 OAB (OVERACTIVE BLADDER): ICD-10-CM

## 2024-01-04 DIAGNOSIS — E78.5 HYPERLIPIDEMIA LDL GOAL <130: ICD-10-CM

## 2024-01-04 RX ORDER — PRAVASTATIN SODIUM 40 MG
40 TABLET ORAL DAILY
Qty: 90 TABLET | Refills: 3 | Status: CANCELLED | OUTPATIENT
Start: 2024-01-04

## 2024-01-04 RX ORDER — OXYBUTYNIN CHLORIDE 10 MG/1
10 TABLET, EXTENDED RELEASE ORAL DAILY
Qty: 90 TABLET | Refills: 1 | Status: SHIPPED | OUTPATIENT
Start: 2024-01-04 | End: 2024-02-29

## 2024-01-04 RX ORDER — PRAVASTATIN SODIUM 40 MG
40 TABLET ORAL DAILY
Qty: 90 TABLET | Refills: 1 | Status: SHIPPED | OUTPATIENT
Start: 2024-01-04 | End: 2024-02-29

## 2024-01-16 ENCOUNTER — ANCILLARY PROCEDURE (OUTPATIENT)
Dept: MAMMOGRAPHY | Facility: CLINIC | Age: 84
End: 2024-01-16
Attending: INTERNAL MEDICINE
Payer: COMMERCIAL

## 2024-01-16 DIAGNOSIS — Z12.31 VISIT FOR SCREENING MAMMOGRAM: ICD-10-CM

## 2024-01-16 PROCEDURE — 77063 BREAST TOMOSYNTHESIS BI: CPT | Mod: TC | Performed by: RADIOLOGY

## 2024-01-16 PROCEDURE — 77067 SCR MAMMO BI INCL CAD: CPT | Mod: TC | Performed by: RADIOLOGY

## 2024-02-29 ENCOUNTER — OFFICE VISIT (OUTPATIENT)
Dept: INTERNAL MEDICINE | Facility: CLINIC | Age: 84
End: 2024-02-29
Payer: COMMERCIAL

## 2024-02-29 VITALS
OXYGEN SATURATION: 97 % | SYSTOLIC BLOOD PRESSURE: 136 MMHG | TEMPERATURE: 97.5 F | BODY MASS INDEX: 27.45 KG/M2 | DIASTOLIC BLOOD PRESSURE: 76 MMHG | WEIGHT: 160.8 LBS | RESPIRATION RATE: 16 BRPM | HEART RATE: 80 BPM | HEIGHT: 64 IN

## 2024-02-29 DIAGNOSIS — N32.81 OAB (OVERACTIVE BLADDER): ICD-10-CM

## 2024-02-29 DIAGNOSIS — Z00.00 MEDICARE ANNUAL WELLNESS VISIT, SUBSEQUENT: Primary | ICD-10-CM

## 2024-02-29 DIAGNOSIS — R47.9 TRANSIENT SPEECH DISTURBANCE: ICD-10-CM

## 2024-02-29 DIAGNOSIS — I10 BENIGN ESSENTIAL HYPERTENSION: ICD-10-CM

## 2024-02-29 DIAGNOSIS — K44.9 HIATAL HERNIA: ICD-10-CM

## 2024-02-29 DIAGNOSIS — E78.5 HYPERLIPIDEMIA LDL GOAL <100: ICD-10-CM

## 2024-02-29 DIAGNOSIS — K21.9 GASTROESOPHAGEAL REFLUX DISEASE WITHOUT ESOPHAGITIS: ICD-10-CM

## 2024-02-29 LAB
ANION GAP SERPL CALCULATED.3IONS-SCNC: 8 MMOL/L (ref 7–15)
BUN SERPL-MCNC: 11.1 MG/DL (ref 8–23)
CALCIUM SERPL-MCNC: 9.2 MG/DL (ref 8.8–10.2)
CHLORIDE SERPL-SCNC: 104 MMOL/L (ref 98–107)
CREAT SERPL-MCNC: 0.6 MG/DL (ref 0.51–0.95)
DEPRECATED HCO3 PLAS-SCNC: 29 MMOL/L (ref 22–29)
EGFRCR SERPLBLD CKD-EPI 2021: 89 ML/MIN/1.73M2
ERYTHROCYTE [DISTWIDTH] IN BLOOD BY AUTOMATED COUNT: 12.9 % (ref 10–15)
GLUCOSE SERPL-MCNC: 99 MG/DL (ref 70–99)
HCT VFR BLD AUTO: 41.6 % (ref 35–47)
HGB BLD-MCNC: 13.3 G/DL (ref 11.7–15.7)
LDLC SERPL DIRECT ASSAY-MCNC: 106 MG/DL
MCH RBC QN AUTO: 30.8 PG (ref 26.5–33)
MCHC RBC AUTO-ENTMCNC: 32 G/DL (ref 31.5–36.5)
MCV RBC AUTO: 96 FL (ref 78–100)
PLATELET # BLD AUTO: 185 10E3/UL (ref 150–450)
POTASSIUM SERPL-SCNC: 3.8 MMOL/L (ref 3.4–5.3)
RBC # BLD AUTO: 4.32 10E6/UL (ref 3.8–5.2)
SODIUM SERPL-SCNC: 141 MMOL/L (ref 135–145)
WBC # BLD AUTO: 6.1 10E3/UL (ref 4–11)

## 2024-02-29 PROCEDURE — 85027 COMPLETE CBC AUTOMATED: CPT | Performed by: INTERNAL MEDICINE

## 2024-02-29 PROCEDURE — 99214 OFFICE O/P EST MOD 30 MIN: CPT | Mod: 25 | Performed by: INTERNAL MEDICINE

## 2024-02-29 PROCEDURE — 83721 ASSAY OF BLOOD LIPOPROTEIN: CPT | Performed by: INTERNAL MEDICINE

## 2024-02-29 PROCEDURE — 36415 COLL VENOUS BLD VENIPUNCTURE: CPT | Performed by: INTERNAL MEDICINE

## 2024-02-29 PROCEDURE — G0439 PPPS, SUBSEQ VISIT: HCPCS | Performed by: INTERNAL MEDICINE

## 2024-02-29 PROCEDURE — 80048 BASIC METABOLIC PNL TOTAL CA: CPT | Performed by: INTERNAL MEDICINE

## 2024-02-29 RX ORDER — PRAVASTATIN SODIUM 40 MG
40 TABLET ORAL DAILY
Qty: 90 TABLET | Refills: 3 | Status: SHIPPED | OUTPATIENT
Start: 2024-02-29 | End: 2024-02-29

## 2024-02-29 RX ORDER — OXYBUTYNIN CHLORIDE 10 MG/1
10 TABLET, EXTENDED RELEASE ORAL DAILY
Qty: 90 TABLET | Refills: 3 | Status: SHIPPED | OUTPATIENT
Start: 2024-02-29 | End: 2024-10-04

## 2024-02-29 RX ORDER — METOPROLOL SUCCINATE 50 MG/1
50 TABLET, EXTENDED RELEASE ORAL DAILY
Qty: 90 TABLET | Refills: 3 | Status: SHIPPED | OUTPATIENT
Start: 2024-02-29 | End: 2024-02-29

## 2024-02-29 RX ORDER — PRAVASTATIN SODIUM 40 MG
40 TABLET ORAL DAILY
Qty: 90 TABLET | Refills: 3 | Status: SHIPPED | OUTPATIENT
Start: 2024-02-29 | End: 2024-07-01

## 2024-02-29 RX ORDER — OXYBUTYNIN CHLORIDE 10 MG/1
10 TABLET, EXTENDED RELEASE ORAL DAILY
Qty: 90 TABLET | Refills: 3 | Status: SHIPPED | OUTPATIENT
Start: 2024-02-29 | End: 2024-02-29

## 2024-02-29 RX ORDER — METOPROLOL SUCCINATE 50 MG/1
50 TABLET, EXTENDED RELEASE ORAL DAILY
Qty: 90 TABLET | Refills: 3 | Status: SHIPPED | OUTPATIENT
Start: 2024-02-29

## 2024-02-29 ASSESSMENT — PATIENT HEALTH QUESTIONNAIRE - PHQ9
10. IF YOU CHECKED OFF ANY PROBLEMS, HOW DIFFICULT HAVE THESE PROBLEMS MADE IT FOR YOU TO DO YOUR WORK, TAKE CARE OF THINGS AT HOME, OR GET ALONG WITH OTHER PEOPLE: NOT DIFFICULT AT ALL
SUM OF ALL RESPONSES TO PHQ QUESTIONS 1-9: 0

## 2024-02-29 NOTE — PATIENT INSTRUCTIONS
" Continue all medications at the same doses.  Contact your usual pharmacy if you need refills.      RSV vaccines are now available.  The will help provide protection against respiratory syncytial virus (RSV), a common upper respiratory virus that is known to cause severe inflammation in the airways.  This vaccine is recommended for adults over age 60, especially those with high risk conditions and/or chronic respiratory illnesses such as asthma or COPD.  This vaccine is available at most pharmacies and clinics.    If you are on Medicare insurance, get this vaccine from a pharmacist in a pharmacy for the best cost and to confirm coverage, it will be less expensive to get this there rather than from our clinic.        I strongly recommend that you receive the most current COVID vaccination as the best possible way to avoid COVID infections.  You can get this from any pharmacy or [facility] (schedule on [employer] or phone)     Return to see me in 1 year, sooner if needed.  Use [employer] or Call [phone] to schedule the appointment with me.       5 GOALS TO PREVENT VASCULAR DISEASE:     1.  Aggressive blood pressure control, under 130/80 ideally.  Using medications if needed.    Your blood pressure is under good control    BP Readings from Last 4 Encounters:   02/29/24 136/76   10/25/23 (!) 141/80   10/11/23 118/60   09/29/23 (!) 178/92       2.  Aggressive LDL cholesterol (\"bad cholesterol\") lowering as indicated.    Your goal is an LDL under 130 for sure, preferably under 100.  (If you have diabetes or previous vascular disease, the the LDL goals would be under 100 for sure, preferably under 70.)    New guidelines identify four high-risk groups who could benefit from statins:   *people with pre-existing heart disease, such as those who have had a heart attack;   *people ages 40 to 75 who have diabetes of any type  *patients ages 40 to 75 with at least a 7.5% risk of developing cardiovascular disease over " the next decade, according to a formula described in the guidelines  *patients with the sort of super-high cholesterol that sometimes runs in families, as evidenced by an LDL of 190 milligrams per deciliter or higher    Your cholesterol levels are well controlled.    Recent Labs   Lab Test 09/10/23  0122 01/05/23  1117   CHOL 140 164   HDL 53 51   LDL 72 95   TRIG 75 90       3.  Aggressive diabetic prevention, screening and/or management.      You do not have diabetes as of the most recent blood tests.     4.  No smoking    5.  Consider daily preventative aspirin over age 50 if you have enough cardiac risk factors to place you at higher risk for the presence of vascular disease.    If you have any reason not to take aspirin such easy bruising or bleeding, stomach problems, other anticoagulant medications, or any other side effects, then you should not take Aspirin.     --Based on your current cardiac risk factor profile, you should take regular daily Aspirin 81 mg once per day (as long as you do not have any side effects from taking aspirin).             Preventive Health Recommendations  Female Ages 75+    Yearly exam: See your health care provider every year in order to  Review health changes in your health history  Discuss preventive care.    Review and reevaluate any chronic medical conditions  Review and renew any regular prescription medicines if you take any.  Review and aggressively manage any significant risk factors for vascular disease    Routine pelvic exams and PAP smears no longer indiacted over age 75.  You do not need a Pap test if your uterus was removed (hysterectomy) and you have not had cancer.  You should be tested each year for STDs (sexually transmitted diseases) if you're at risk for STDs.   Routine screening mammogram no longer indicated.  Routine colon cancer screening no longer recommended over age 75-80  Have a cholesterol test every 5 years, or more often if advised.  Have a diabetes  "test (fasting glucose) every three years. If you are at risk for diabetes, you should have this test more often.   If you are at risk for osteoporosis (brittle bone disease), think about having a bone density scan (DEXA).  Consider lung cancer screening with low-dose chest CT if you have any significant smoking history.      Shots:   Get a flu shot each year.   Covid vaccines are now recommended annually.  Get the most updated Covid vaccine when it becomes available, consider getting this at the same time as the annual influenza vaccine.   Get a tetanus shot every 10 years.  Strongly consider the Shingrix shingles vaccine to give you the best chance of avoiding future shingles infection (as many as 1 and 3 adults over age 50 may develop this condition in their lifetime).    If you have Medicare insurance, investigate the cost and coverage for Shingrix shingles vaccines with a pharmacist at a pharmacy.  They can tell you the coverage and cost and then give it to you if the price is acceptable.    Medicare sometimes does not cover these Shingrix shingles vaccines, and with Medicare insurance it is usually cheaper to receive this shingles vaccine from a pharmacist in a pharmacy rather than in our clinic.    At this time, you only need the 2 Shingrix vaccines and then you are done.       Nutrition:   Eat at least 5 servings of fruits and vegetables each day.  Eat whole-grain bread, whole-wheat pasta and brown rice instead of white grains and rice.  Talk to your provider about Calcium and Vitamin D.    --Calcium: aim for 1200 mg per day (any brand is fine)   --Vitamin D3 at least 1000 units per day (any brand is fine)       --Good Grains:  Oats, brown rice, Quinoa (these do not raise the blood sugar as much)     --Bad grains:  Anything made from wheat or white rice     (because these raise the blood sugars significantly, and the possible gluten issue from wheat for some people).      --Proteins:  Aim for \"lean proteins\" " including chicken, fish, seafood, pork, turkey, and eggs (in moderation); Eat red meat only occasionally        Lifestyle  Exercise at least 150 minutes a week (30 minutes a day, 5 days a week). This will help you control your weight and prevent disease.  Limit alcohol to one drink per day.  No smoking.   Wear sunscreen to prevent skin cancer.   See your dentist every six months for an exam and cleaning.  See your eye doctor every 1 to 2 years.

## 2024-02-29 NOTE — PROGRESS NOTES
Preventive Care Visit  [facility]  Jared Emmanuel MD, Internal Medicine  Feb 29, 2024      Assessment & Plan     (Z00.00) Medicare annual wellness visit, subsequent  (primary encounter diagnosis)  Comment: Discussed cardiac disease risk factors and cardiac disease risk factor modification, including diabetes screening, blood pressure screening (and management if indicated), and cholesterol screening.   Reviewed immunzation guidelines, including pneumococcal vaccines, annual influenza, and shingles vaccines.   Discussed routine cancer screenings, including skin cancer, colon cancer screening for everyone until age 80, prostate cancer screening in men until age 75, mammogram and PAP/pelvic for women until age 75.   Recommended regular dentist visits to care for remaining teeth.   Recommended regular screening for vision and glaucoma.   Recommended safe driving and accident avoidance.    Counseling:         Reviewed preventive health counseling, as reflected in patient instructions       Regular exercise       Healthy diet/nutrition       Vision screening       Hearing screening       Immunizations  up to date exceot needs most current covid booster and should consider new RSV vaccine.   Get from local pharmacy where she might get the best prices for both.            Aspirin prophylaxis       Colorectal Cancer Screening    Patient has been advised of split billing requirements and indicates understanding: Yes   Plan: REVIEW OF HEALTH MAINTENANCE PROTOCOL ORDERS            (K44.9) Hiatal hernia  Comment: moderate/large hiatal hernia seen on imgaing.   Asymptomatic at this time.   Madhavi with surgeron and patient is to [facility].   Continue PPI indewfiiniteily.   Discussed anti-reflux maneuvers.   Plan: REVIEW OF HEALTH MAINTENANCE PROTOCOL ORDERS,         Basic metabolic panel, CBC with platelets,         omeprazole (PRILOSEC) 20 MG DR capsule,         DISCONTINUED: omeprazole (PRILOSEC) 20 MG           capsule, DISCONTINUED: omeprazole (PRILOSEC) 20        MG DR capsule            (K21.9) Gastroesophageal reflux disease without esophagitis  Comment: This condition is currently controlled on the current medical regimen.  Continue current therapy.   Plan: REVIEW OF HEALTH MAINTENANCE PROTOCOL ORDERS,         Basic metabolic panel, CBC with platelets,         omeprazole (PRILOSEC) 20 MG DR capsule,         DISCONTINUED: omeprazole (PRILOSEC) 20 MG DR         capsule, DISCONTINUED: omeprazole (PRILOSEC) 20        MG DR capsule            (N32.81) OAB (overactive bladder)  Comment: This condition is currently controlled on the current medical regimen.  Continue current therapy.   She has not experienced any significant side effects of this medication.   Plan: REVIEW OF HEALTH MAINTENANCE PROTOCOL ORDERS,         oxyBUTYnin ER (DITROPAN XL) 10 MG 24 hr tablet,        DISCONTINUED: oxyBUTYnin ER (DITROPAN XL) 10 MG        24 hr tablet, DISCONTINUED: oxyBUTYnin ER         (DITROPAN XL) 10 MG 24 hr tablet            (I10) Benign essential hypertension  Comment: This condition is currently controlled on the current medical regimen.  Continue current therapy.   Plan: REVIEW OF HEALTH MAINTENANCE PROTOCOL ORDERS,         Basic metabolic panel, CBC with platelets,         metoprolol succinate ER (TOPROL XL) 50 MG 24 hr        tablet, DISCONTINUED: metoprolol succinate ER         (TOPROL XL) 50 MG 24 hr tablet, DISCONTINUED:         metoprolol succinate ER (TOPROL XL) 50 MG 24 hr        tablet            (R47.9) Transient speech disturbance  Comment: no further ocurrances.   Discussed secondary risk factor modification and recommended continuing aggressive management of these items.   Plan: REVIEW OF HEALTH MAINTENANCE PROTOCOL ORDERS,         metoprolol succinate ER (TOPROL XL) 50 MG 24 hr        tablet, DISCONTINUED: metoprolol succinate ER         (TOPROL XL) 50 MG 24 hr tablet, DISCONTINUED:         metoprolol  "succinate ER (TOPROL XL) 50 MG 24 hr        tablet            (E78.5) Hyperlipidemia LDL goal <100  Comment: This condition is currently controlled on the current medical regimen.  Continue current therapy.   Discussed guidelines recommending a statin cholesterol lowering medication for any patient with either diabetes and/or vascular disease, aiming for a LDL goal under 100 for sure, ideally under 70, using whatever dose of statin tolerated.    Plan: REVIEW OF HEALTH MAINTENANCE PROTOCOL ORDERS,         Basic metabolic panel, CBC with platelets, LDL         cholesterol direct, pravastatin (PRAVACHOL) 40         MG tablet, DISCONTINUED: pravastatin         (PRAVACHOL) 40 MG tablet, DISCONTINUED:         pravastatin (PRAVACHOL) 40 MG tablet,         DISCONTINUED: pravastatin (PRAVACHOL) 40 MG         tablet               Patient has been advised of split billing requirements and indicates understanding: Yes          BMI  Estimated body mass index is 27.6 kg/m  as calculated from the following:    Height as of this encounter: 1.626 m (5' 4\").    Weight as of this encounter: 72.9 kg (160 lb 12.8 oz).       Counseling  Appropriate preventive services were discussed with this patient, including applicable screening as appropriate for fall prevention, nutrition, physical activity, Tobacco-use cessation, weight loss and cognition.  Checklist reviewing preventive services available has been given to the patient.  Reviewed patient's diet, addressing concerns and/or questions.   She is at risk for lack of exercise and has been provided with information to increase physical activity for the benefit of her well-being.   Discussed possible causes of fatigue. The patient was provided with written information regarding signs of hearing loss.   Information on urinary incontinence and treatment options given to patient.           Nancy Hannah is a 83 year old, presenting for the following:  Medicare Visit        2/29/2024     " 7:17 AM   Additional Questions   Roomed by Claudia SKELTON CMA     Health Care Directive  Patient has a Health Care Directive on file  Discussed advance care planning with patient.    HPI      1.)  Hypertension:  History of hypertension, on medication.  No reported side effects from medications.    Reviewed last 6 BP readings in chart:  BP Readings from Last 6 Encounters:   02/29/24 136/76   10/25/23 (!) 141/80   10/11/23 118/60   09/29/23 (!) 178/92   09/20/23 138/82   09/11/23 108/73     No active cardiac complaints or symptoms with exercise.     2.)  Hyperlipidemia:  Has history of hyperlipidemia.    The patient is taking a medication for this.  Denies any significant side effects from his medication.      Latest labs reviewed:    Recent Labs   Lab Test 09/10/23  0122 01/05/23  1117   CHOL 140 164   HDL 53 51   LDL 72 95   TRIG 75 90        Lab Results   Component Value Date    AST 18 09/09/2023    AST 19 06/02/2021         3.)  transient sppech disturbance last fall.  No further occruances. No reversisble or significnat etiologies found.     4.)  moderate to large hiatal hernia seen on imaging.   Saw surgery who evaluated her hiatal hernia.   Patient denies symptoms at this time.   Surgerical repair considered, but not planned at this time, monitoring symptoms.   Reviewed surgery clinic note.       5.)  GERD stable.  Taking meds as ordered, no reported side effects from medicines.  Eating properly to avoid sx.   No melena, no hematochezia, no diarrhea.  No unintended weigth loss.  No dysphagia.  Reports no significant regular difficulties swallowing foods or medications.     6.)  overactive bladder, taking oxybutynin with fair control of symptoms without significant side effects           2/29/2024   General Health   How would you rate your overall physical health? Excellent   Feel stress (tense, anxious, or unable to sleep) Not at all         2/29/2024   Nutrition   Diet: Regular (no restrictions)         2/29/2024    Exercise   Days per week of moderate/strenous exercise 2 days   Average minutes spent exercising at this level 20 min   (!) EXERCISE CONCERN      2/29/2024   Social Factors   Frequency of gathering with friends or relatives More than three times a week   Worry food won't last until get money to buy more No   Food not last or not have enough money for food? No   Do you have housing?  Yes   Are you worried about losing your housing? No   Lack of transportation? No   Unable to get utilities (heat,electricity)? No         2/29/2024   Fall Risk   Fallen 2 or more times in the past year? No    No   Trouble with walking or balance? Yes    Yes   Gait Speed Test (Document in seconds) 5.07          2/29/2024   Activities of Daily Living- Home Safety   Needs help with the following daily activites None of the above   Safety concerns in the home None of the above         2/29/2024   Dental   Dentist two times every year? Yes         2/29/2024   Hearing Screening   Hearing concerns? (!) IT'S HARD TO FOLLOW A CONVERSATION IN A NOISY RESTAURANT OR CROWDED ROOM.    (!) TROUBLE UNDERSTANDING SOFT OR WHISPERED SPEECH.         2/29/2024   Driving Risk Screening   Patient/family members have concerns about driving No         2/29/2024   General Alertness/Fatigue Screening   Have you been more tired than usual lately? (!) YES         2/29/2024   Urinary Incontinence Screening   Bothered by leaking urine in past 6 months Yes          Today's PHQ-9 Score:       2/29/2024     7:07 AM   PHQ-9 SCORE   PHQ-9 Total Score MyChart 0   PHQ-9 Total Score 0         2/29/2024   Substance Use   Alcohol more than 3/day or more than 7/wk Not Applicable   Do you have a current opioid prescription? No   How severe/bad is pain from 1 to 10? 0/10 (No Pain)   Do you use any other substances recreationally? No     Social History     Tobacco Use    Smoking status: Never    Smokeless tobacco: Never   Vaping Use    Vaping Use: Never used   Substance Use  Topics    Alcohol use: No    Drug use: No           1/16/2024   LAST FHS-7 RESULTS   1st degree relative breast or ovarian cancer No   Any relative bilateral breast cancer No   Any male have breast cancer No   Any ONE woman have BOTH breast AND ovarian cancer No   Any woman with breast cancer before 50yrs No   2 or more relatives with breast AND/OR ovarian cancer Yes   2 or more relatives with breast AND/OR bowel cancer No                      Reviewed and updated as needed this visit by Provider                      **I reviewed the information recorded in the patient's EPIC chart (including but not limited to medical history, surgical history, family history, problem list, medication list, and allergy list) and updated the information as indicated based on the patients reported information.           Current providers sharing in care for this patient include:  Patient Care Team:  Jared Emmanuel MD as PCP - General  Jared Emmanuel MD as Assigned PCP  AndAdamaris briscoe PA-C as Physician Assistant (Psychiatry & Neurology Vascular Neurology)  Adamaris Nunn PA-C as Assigned Surgical Provider    The following health maintenance items are reviewed in Epic and correct as of today:  Health Maintenance   Topic Date Due    RSV VACCINE (Pregnancy & 60+) (1 - 1-dose 60+ series) Never done    COVID-19 Vaccine (4 - 2023-24 season) 09/01/2023    PHQ-9  08/29/2024    ALT  09/09/2024    LIPID  09/10/2024    MEDICARE ANNUAL WELLNESS VISIT  02/28/2025    ANNUAL REVIEW OF HM ORDERS  02/28/2025    FALL RISK ASSESSMENT  02/28/2025    ADVANCE CARE PLANNING  01/07/2028    DTAP/TDAP/TD IMMUNIZATION (2 - Td or Tdap) 02/22/2028    DEXA  07/31/2034    DEPRESSION ACTION PLAN  Completed    INFLUENZA VACCINE  Completed    Pneumococcal Vaccine: 65+ Years  Completed    ZOSTER IMMUNIZATION  Completed    IPV IMMUNIZATION  Aged Out    HPV IMMUNIZATION  Aged Out    MENINGITIS IMMUNIZATION  Aged Out    RSV MONOCLONAL ANTIBODY   "Aged Out    MAMMO SCREENING  Discontinued         Review of Systems  Constitutional, neuro, ENT, endocrine, pulmonary, cardiac, gastrointestinal, genitourinary, musculoskeletal, integument and psychiatric systems are negative, except as otherwise noted.     Objective    Exam  /76   Pulse 80   Temp 97.5  F (36.4  C) (Tympanic)   Resp 16   Ht 1.626 m (5' 4\")   Wt 72.9 kg (160 lb 12.8 oz)   LMP 11/08/1991 (Exact Date)   SpO2 97%   Breastfeeding No   BMI 27.60 kg/m     Estimated body mass index is 27.6 kg/m  as calculated from the following:    Height as of this encounter: 1.626 m (5' 4\").    Weight as of this encounter: 72.9 kg (160 lb 12.8 oz).    Physical Exam  GENERAL alert and no distress  EYES:  Normal sclera,conjunctiva, EOMI  HENT: oral and posterior pharynx without lesions or erythema, facies symmetric  NECK: Neck supple. No LAD, without thyroidmegaly.  RESP: Clear to ausculation bilaterally without wheezes or crackles. Normal BS in all fields.  CV: RRR normal S1S2 without murmurs, rubs or gallops.  LYMPH: no cervical lymph adenopathy appreciated  MS: extremities- no gross deformities of the visible extremities noted,   EXT:  no lower extremity edema  PSYCH: Alert and oriented times 3; speech- coherent  SKIN:  No obvious significant skin lesions on visible portions of face         2/29/2024   Mini Cog   Clock Draw Score 0 Abnormal   3 Item Recall 3 objects recalled   Mini Cog Total Score 3            Signed Electronically by: Jared Emmanuel MD    Answers submitted by the patient for this visit:  Patient Health Questionnaire (Submitted on 2/29/2024)  If you checked off any problems, how difficult have these problems made it for you to do your work, take care of things at home, or get along with other people?: Not difficult at all  PHQ9 TOTAL SCORE: 0    "

## 2024-04-16 ENCOUNTER — TELEPHONE (OUTPATIENT)
Dept: NEUROLOGY | Facility: CLINIC | Age: 84
End: 2024-04-16
Payer: COMMERCIAL

## 2024-04-16 NOTE — TELEPHONE ENCOUNTER
"Called pt to schedule stroke return visit, left message to call back. Patient was seen by the stroke team about 6 months ago, at that time it was recommended patient follow up with the stroke team to check in, review plan and optimize overall stroke risk management and we would like to offer an appointment.     When patient calls back: please schedule Stroke Return with Adamaris Nunn PA-C however if no availability with this provider, ok to schedule with a different stroke MARLEN, any visit type as per patient preference, appointment notes \"6 month follow up for CVA\".    "

## 2024-05-09 NOTE — TELEPHONE ENCOUNTER
Message left for patient to call and schedule a stroke clinic follow  up appointment. Patient was advised that first opening with Stroke MARLEN is on July 11 at the time the message was left.

## 2024-05-13 ENCOUNTER — OFFICE VISIT (OUTPATIENT)
Dept: URGENT CARE | Facility: URGENT CARE | Age: 84
End: 2024-05-13
Payer: COMMERCIAL

## 2024-05-13 VITALS
DIASTOLIC BLOOD PRESSURE: 80 MMHG | SYSTOLIC BLOOD PRESSURE: 130 MMHG | HEART RATE: 80 BPM | RESPIRATION RATE: 16 BRPM | TEMPERATURE: 98 F | OXYGEN SATURATION: 95 %

## 2024-05-13 DIAGNOSIS — L03.90 CELLULITIS, UNSPECIFIED CELLULITIS SITE: ICD-10-CM

## 2024-05-13 DIAGNOSIS — S91.002A ANKLE WOUND, LEFT, INITIAL ENCOUNTER: Primary | ICD-10-CM

## 2024-05-13 PROCEDURE — 90471 IMMUNIZATION ADMIN: CPT | Performed by: PHYSICIAN ASSISTANT

## 2024-05-13 PROCEDURE — 90715 TDAP VACCINE 7 YRS/> IM: CPT | Performed by: PHYSICIAN ASSISTANT

## 2024-05-13 PROCEDURE — 99213 OFFICE O/P EST LOW 20 MIN: CPT | Mod: 25 | Performed by: PHYSICIAN ASSISTANT

## 2024-05-13 RX ORDER — CEPHALEXIN 500 MG/1
500 CAPSULE ORAL 3 TIMES DAILY
Qty: 30 CAPSULE | Refills: 0 | Status: SHIPPED | OUTPATIENT
Start: 2024-05-13 | End: 2024-05-23

## 2024-05-13 NOTE — PROGRESS NOTES
"  Assessment & Plan     Ankle wound, left, initial encounter    Due to having left ankle wound  Tdap was updated for dirty wound  - TDAP 7+ (ADACEL,BOOSTRIX)    Cellulitis, unspecified cellulitis site    Cellulitis is an infection of the deep layers of skin. A break in the skin, such as a cut or scratch, can let bacteria under the skin. If the bacteria get to deep layers of the skin, it can be serious. If not treated, cellulitis can get into the bloodstream and lymph nodes. The infection can then spread throughout the body. This causes serious illness.   Cellulitis causes the affected skin to become red, swollen, warm, and sore. The reddened areas have a visible border. An open sore may leak fluid (pus). You may have a fever, chills, and pain.   Cellulitis is treated with antibiotics taken for 7 to 10 days. An open sore may be cleaned and covered with cool wet gauze. Symptoms should get better 1 to 2 days after treatment is started. Make sure to take all the antibiotics for the full number of days until they are gone. Keep taking the medicine even if your symptoms go away.     - cephALEXin (KEFLEX) 500 MG capsule; Take 1 capsule (500 mg) by mouth 3 times daily    Review of external notes as documented elsewhere in note    BMI  Estimated body mass index is 27.6 kg/m  as calculated from the following:    Height as of 2/29/24: 1.626 m (5' 4\").    Weight as of 2/29/24: 72.9 kg (160 lb 12.8 oz).       At today's visit with Brielle Marvin , we discussed results, diagnosis, medications and formulated a plan.  We also discussed red flags for immediate return to clinic/ER, as well as indications for follow up with PCP if not improved in 3 days. Patient understood and agreed to plan. Brielle Marvin was discharged with stable vitals and has no further questions.       No follow-ups on file.    Subjective   Brielle is a 84 year old, presenting for the following health issues:  Urgent Care (Cut L ankle last Thursday and now the " cut has redness around it and may be infected//tdap in 2018)    HPI     Review of Systems  Constitutional, HEENT, cardiovascular, pulmonary, gi and gu systems are negative, except as otherwise noted.      Objective    /80   Pulse 80   Temp 98  F (36.7  C) (Tympanic)   Resp 16   LMP 11/08/1991 (Exact Date)   SpO2 95%   There is no height or weight on file to calculate BMI.  Physical Exam   GENERAL: alert and no distress  EYES: Eyes grossly normal to inspection, PERRL and conjunctivae and sclerae normal  MS: pos for left lateral ankle tenderness, slow healing wound with swelling, warmth and erythema  SKIN: pos for left lateral ankle tenderness, warmth and erythema  NEURO: Normal strength and tone, mentation intact and speech normal  PSYCH: mentation appears normal, affect normal/bright            Signed Electronically by: Enmanuel Carbajal, [facility], PAKathrynC

## 2024-05-20 ENCOUNTER — NURSE TRIAGE (OUTPATIENT)
Dept: INTERNAL MEDICINE | Facility: CLINIC | Age: 84
End: 2024-05-20
Payer: COMMERCIAL

## 2024-05-20 ENCOUNTER — OFFICE VISIT (OUTPATIENT)
Dept: URGENT CARE | Facility: URGENT CARE | Age: 84
End: 2024-05-20
Payer: COMMERCIAL

## 2024-05-20 VITALS
SYSTOLIC BLOOD PRESSURE: 138 MMHG | WEIGHT: 160 LBS | HEART RATE: 64 BPM | TEMPERATURE: 97.5 F | OXYGEN SATURATION: 96 % | BODY MASS INDEX: 27.46 KG/M2 | DIASTOLIC BLOOD PRESSURE: 70 MMHG

## 2024-05-20 DIAGNOSIS — L03.119 CELLULITIS AND ABSCESS OF LEG: ICD-10-CM
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
## 2024-05-20 DIAGNOSIS — L02.419 CELLULITIS AND ABSCESS OF LEG: ICD-10-CM

## 2024-05-20 DIAGNOSIS — B37.0 THRUSH: ICD-10-CM

## 2024-05-20 DIAGNOSIS — S81.802A OPEN WOUND OF LOWER LIMB, LEFT, INITIAL ENCOUNTER: Primary | ICD-10-CM

## 2024-05-20 PROCEDURE — 99214 OFFICE O/P EST MOD 30 MIN: CPT | Performed by: PHYSICIAN ASSISTANT

## 2024-05-20 RX ORDER — TRIAMCINOLONE ACETONIDE 1 MG/G
CREAM TOPICAL 2 TIMES DAILY
Qty: 30 G | Refills: 0 | Status: SHIPPED | OUTPATIENT
Start: 2024-05-20

## 2024-05-20 RX ORDER — NYSTATIN 100000/ML
500000 SUSPENSION, ORAL (FINAL DOSE FORM) ORAL 4 TIMES DAILY
Qty: 280 ML | Refills: 0 | Status: SHIPPED | OUTPATIENT
Start: 2024-05-20 | End: 2024-06-03

## 2024-05-20 RX ORDER — DOXYCYCLINE 100 MG/1
100 CAPSULE ORAL 2 TIMES DAILY
Qty: 20 CAPSULE | Refills: 0 | Status: SHIPPED | OUTPATIENT
Start: 2024-05-20

## 2024-05-20 NOTE — PROGRESS NOTES
Assessment & Plan     Open wound of lower limb, left, initial encounter    Patient has left lateral ankle tenderness, erythema and mild swelling  - doxycycline monohydrate (MONODOX) 100 MG capsule; Take 1 capsule (100 mg) by mouth 2 times daily    Cellulitis and abscess of leg    Cellulitis is an infection of the deep layers of skin. A break in the skin, such as a cut or scratch, can let bacteria under the skin. If the bacteria get to deep layers of the skin, it can be serious. If not treated, cellulitis can get into the bloodstream and lymph nodes. The infection can then spread throughout the body. This causes serious illness.   Cellulitis causes the affected skin to become red, swollen, warm, and sore. The reddened areas have a visible border. An open sore may leak fluid (pus). You may have a fever, chills, and pain.   Cellulitis is treated with antibiotics taken for 7 to 10 days. An open sore may be cleaned and covered with cool wet gauze. Symptoms should get better 1 to 2 days after treatment is started. Make sure to take all the antibiotics for the full number of days until they are gone. Keep taking the medicine even if your symptoms go away.     - doxycycline monohydrate (MONODOX) 100 MG capsule; Take 1 capsule (100 mg) by mouth 2 times daily    Thrush    Thrush is a yeast infection in the mouth. It usually occurs in people with weak immune systems. It causes white patches inside the mouth and throat     - nystatin (MYCOSTATIN) 421581 UNIT/ML suspension; Take 5 mLs (500,000 Units) by mouth 4 times daily for 14 days    Bug bite, initial encounter    Stings and bites from bees, wasps, ants, and other insects often cause pain, swelling, redness, and itching. In some people, especially children, the redness and swelling may be worse. It may extend several inches beyond the affected area.     - triamcinolone (KENALOG) 0.1 % external cream; Apply topically 2 times daily      BMI  Estimated body mass index is  "27.46 kg/m  as calculated from the following:    Height as of 2/29/24: 1.626 m (5' 4\").    Weight as of this encounter: 72.6 kg (160 lb).       At today's visit with Brielle Marvin , we discussed results, diagnosis, medications and formulated a plan.  We also discussed red flags for immediate return to clinic/ER, as well as indications for follow up with PCP if not improved in 3 days. Patient understood and agreed to plan. Brielle Marvin was discharged with stable vitals and has no further questions.       No follow-ups on file.    Subjective   Brielle is a 84 year old, presenting for the following health issues:  Urgent Care (Check the wound on left ankle. She stopped taking her antibiotic cephalexin  on Saturday because of dry mouth)    HPI     Review of Systems  Constitutional, HEENT, cardiovascular, pulmonary, gi and gu systems are negative, except as otherwise noted.      Objective    /70 (BP Location: Right arm)   Pulse 64   Temp 97.5  F (36.4  C) (Tympanic)   Wt 72.6 kg (160 lb)   LMP 11/08/1991 (Exact Date)   SpO2 96%   BMI 27.46 kg/m    Body mass index is 27.46 kg/m .  Physical Exam   GENERAL: alert and no distress  EYES: Eyes grossly normal to inspection, PERRL and conjunctivae and sclerae normal  HENT: ear canals and TM's normal, pos for dry mouth with erythema, cracking lips  MS: pos for left lower leg, lateral, wound with erythema and swelling, warmth and tenderness  SKIN: pos for left lateral ankle erythema, warmth  NEURO: Normal strength and tone, mentation intact and speech normal  PSYCH: mentation appears normal, affect normal/bright            Signed Electronically by: Enmanuel Carbajal, [facility], PA-C    "

## 2024-05-20 NOTE — TELEPHONE ENCOUNTER
"Nurse Triage SBAR    Is this a 2nd Level Triage? NO    Situation: Pt was prescribed Keflex 05/13 urgent care for for tx of cellulitis. Pt stopped Keflex 05/17/2024 due to \"extreme mouth dryness\". Pt wants to know what to do.     Background:Pt has left ankle wound.     Assessment: Pt denies fever or wound swelling, but still has a krystle size area with redness. Pt also got bit by a gnat in her RT leg and area is tender to touch..     Protocol Recommended Disposition:   See in Office Today     Recommendation: Route to PCP for advice on antibiotic/ appt. Advised pt to push fluids and use hydrocortisone cream if itching on rt leg.     No office visit available today with PCP or team providers. Please advice if VV should be scheduled or have pt seen at  to discuss alternative abx.     Routed to provider- Pt is a able to schedule VV after 4:00 PM.     Does the patient meet one of the following criteria for ADS visit consideration? 16+ years old, with an [facility] PCP     TIP  Providers, please consider if this condition is appropriate for management at one of our Acute and Diagnostic Services sites.     If patient is a good candidate, please use dotphrase <dot>triageresponse and select Refer to ADS to document.    Reason for Disposition   Red streak (or line) longer than 4 inches (10 cm) runs from area of infection    Additional Information   Negative: Shock suspected (e.g., cold/pale/clammy skin, too weak to stand, low BP, rapid pulse)   Negative: Sounds like a life-threatening emergency to the triager   Negative: Surgical wound infection suspected (post-op)   Negative: Widespread rash and drug rash suspected (i.e., allergic reaction to antibiotic)   Negative: Animal bite wound infection suspected   Negative: SEVERE pain   Negative: SEVERE pain with bending of finger (or toe) and cellulitis on hand (or foot)   Negative: Widespread rash and bright red, sunburn-like   Negative: Fever > 103 F (39.4 C)   Negative: Black " (necrotic), dark purple, or blisters develop in area of cellulitis   Negative: Patient sounds very sick or weak to the triager   Negative: Taking antibiotic > 24 hours and fever persists   Negative: Taking antibiotic > 24 hours and red streak (or line) runs from area of infection   Negative: Fever > 100 F (37.8 C) and new-onset   Negative: Red streak runs from area of infection and new-onset   Negative: Caller has URGENT question and triager unable to answer question   Negative: Taking antibiotic > 24 hours and cellulitis symptoms are WORSE (e.g., spreading redness, pain, swelling)   Negative: Finished taking antibiotic and cellulitis symptoms are WORSE (e.g., redness, pain, drainage, swelling)    Protocols used: Cellulitis on Antibiotic Follow-up Call-A-[place]

## 2024-05-20 NOTE — TELEPHONE ENCOUNTER
She really should have someone look and re-evaluate the wound to give the best recommendation.  Hard to do via virtual visit.   Need to check size, check warmth of surrounding skin, check erythema.   I recommend that she be seen in Urgent Care for the most flexibility.

## 2024-06-29 DIAGNOSIS — E78.5 HYPERLIPIDEMIA LDL GOAL <100: ICD-10-CM

## 2024-07-01 RX ORDER — PRAVASTATIN SODIUM 40 MG
40 TABLET ORAL DAILY
Qty: 90 TABLET | Refills: 0 | Status: SHIPPED | OUTPATIENT
Start: 2024-07-01

## 2024-08-20 NOTE — PROGRESS NOTES
[facility]        OUTPATIENT SPEECH LANGUAGE PATHOLOGY DISCHARGE NOTE    09/19/23 0500   Appointment Info   Medical Diagnosis Transient speech disturbance (R47.9)   SLP Tx Diagnosis mild dysphagia   Quick Adds Certification   Progress Note/Certification   Start Of Care Date 09/19/23   Onset Of Illness/injury Or Date Of Surgery 09/11/23  (order date)   Therapy Frequency x1-2/month  ((potential to change given objective assessment results))   Predicted Duration 90 days   Certification date from 09/19/23   Certification date to 12/18/23   Progress Note Due Date 12/18/23   SLP Goals   SLP Goals 1;2   SLP Goal 1   Goal Identifier Objective Assessmet   Goal Description Patient will complete video swallow study as further determined by esophagram to objectively assess swallow function, aspiration risk, guide treatment recommendations and diet recommendations within 1-3 months and will demonstrate understanding of study outcomes in order to generate/modify goals as needed  Rationale: To maximize safety, ease and/or independence of oral intake   Rationale To maximize safety, ease and/or independence of oral intake   Target Date 12/18/23   SLP Goal 2   Goal Identifier Strateiges/tolerance   Goal Description pt will state and/or utilize safe swallow strategies and pharyngeal esophageal clearance strategies with optimum safety and efficacy of swallowing function on LRD P.O intake with no overt s/sx of aspiration for the highest appropriate diet level across at least 2 consecutive sessions with implementation/comprehension of at least 90% demonstrated/stated during therapeutic opportunities   Rationale To maximize safety, ease and/or independence of oral intake   Target Date 12/18/23   Treatment Interventions (SLP)   Treatment Interventions Treatment Swallow/Oral dysfunction   Treatment Swallow/Oral dysfunction   Swallow/Oral Dysfunction 1 Strateiges/tolerance   Swallow/Oral Dysfunction 1 -  Details Pt trained in safe swallow strategies and pharyngeal esophageal clearance strategies (i.e., slow rate of PO intake, alternate solids w/ liquids, single/small bites/sips, upright and midline posture during consumption and 30 min after, etc through SLP verbal education/demonstration and visual demonstration/model. Pt demonstrates ability to implement strategies in 100% of opportunities given min cues or less demonstrating carry-over of skills. Pt endorses comprehension of strategies and recommendations trained today.   Treatment of Swallowing Dysfunction &/or Oral Function for Feeding Minutes (76975) 10 Minutes   Skilled Intervention Provided written and verbal information on diet modifications.;Educated patient on swallowing strategies.;Educated patient on risks of aspiration;Demonstrated safe swallow strategies;Demonstrated liquid preparation;Cued swallowing strategies (auditory, visual, tactile);Assessed oral intake trials   Patient Response/Progress demonstrates comprehension and compliance with recommendations and safe swallow strategies implementation   Eval/Assessments   Eval/Assessments Oral/Pharyngeal Swallow Function   SLP Eval: oral/pharyngeal swallow function, clinical minutes (49314) 35   Education   Learner/Method Patient;No Barriers to Learning;Listening;Demonstration;Pictures/Video;Reading   Education Comments Created cohesive and individualized treatment/POC w/ pt and interdisplinary team. Pt stated comprehension of evaluation results, recommendations, and POC   Plan   Home program complete esophagram; daily use of safe swallow and pharyngeal esophageal clearance strategies   Plan for next session ?completion of esophagram; monitor need for objective assessment for swallow   Total Session Time   Total Treatment Time (sum of timed and untimed services) 45         DISCHARGE  Reason for Discharge: Patient has failed to schedule further appointments.  Pt has not been seen since initial evaluation  on 9/19/2023. Pt has not made any follow up appointments.     Equipment Issued: n/a    Discharge Plan: Patient to continue home program.    Referring Provider:  Kary De La Cruz     Initial Assessment  See Epic Evaluation- 09/19/23     Thank you for referring Brielle Marvin to outpatient speech therapy at [facility].  Please call [phone] or email  Chacha Leon MS, CCC-SLP at [email or web address] with any questions or concerns.             Chacha Leon MS, CCC-SLP       Speech-Language Pathologist

## 2024-09-12 ENCOUNTER — OFFICE VISIT (OUTPATIENT)
Dept: URGENT CARE | Facility: URGENT CARE | Age: 84
End: 2024-09-12
Payer: COMMERCIAL

## 2024-09-12 VITALS
SYSTOLIC BLOOD PRESSURE: 108 MMHG | DIASTOLIC BLOOD PRESSURE: 72 MMHG | OXYGEN SATURATION: 95 % | TEMPERATURE: 99 F | HEART RATE: 78 BPM

## 2024-09-12 DIAGNOSIS — R30.0 DYSURIA: ICD-10-CM

## 2024-09-12 DIAGNOSIS — N30.01 ACUTE CYSTITIS WITH HEMATURIA: Primary | ICD-10-CM

## 2024-09-12 LAB
ALBUMIN UR-MCNC: 100 MG/DL
APPEARANCE UR: ABNORMAL
BACTERIA #/AREA URNS HPF: ABNORMAL /HPF
BILIRUB UR QL STRIP: NEGATIVE
COLOR UR AUTO: YELLOW
GLUCOSE UR STRIP-MCNC: NEGATIVE MG/DL
HGB UR QL STRIP: ABNORMAL
KETONES UR STRIP-MCNC: NEGATIVE MG/DL
LEUKOCYTE ESTERASE UR QL STRIP: ABNORMAL
NITRATE UR QL: POSITIVE
PH UR STRIP: 6 [PH] (ref 5–7)
RBC #/AREA URNS AUTO: ABNORMAL /HPF
SP GR UR STRIP: >=1.03 (ref 1–1.03)
SQUAMOUS #/AREA URNS AUTO: ABNORMAL /LPF
UROBILINOGEN UR STRIP-ACNC: 0.2 E.U./DL
WBC #/AREA URNS AUTO: >100 /HPF

## 2024-09-12 PROCEDURE — 81001 URINALYSIS AUTO W/SCOPE: CPT | Performed by: NURSE PRACTITIONER

## 2024-09-12 PROCEDURE — 99213 OFFICE O/P EST LOW 20 MIN: CPT | Performed by: NURSE PRACTITIONER

## 2024-09-12 PROCEDURE — 87086 URINE CULTURE/COLONY COUNT: CPT | Performed by: NURSE PRACTITIONER

## 2024-09-12 PROCEDURE — 87186 SC STD MICRODIL/AGAR DIL: CPT | Performed by: NURSE PRACTITIONER

## 2024-09-12 RX ORDER — CEFDINIR 300 MG/1
300 CAPSULE ORAL 2 TIMES DAILY
Qty: 10 CAPSULE | Refills: 0 | Status: SHIPPED | OUTPATIENT
Start: 2024-09-12 | End: 2024-09-17

## 2024-09-12 NOTE — PROGRESS NOTES
"Chief Complaint   Patient presents with    Urgent Care     \"I think I either have a UTI or that I'm getting one\"  Symptoms started approx Sunday/Monday. Yesterday was suddenly worse.  Increased frequency and burning with urination.  Denies Vaginal Discharge, Denies Itching.         ICD-10-CM    1. Acute cystitis with hematuria  N30.01 cefdinir (OMNICEF) 300 MG capsule      2. Dysuria  R30.0 UA Macroscopic with reflex to Microscopic and Culture - Clinic Collect     Urine Microscopic Exam     Urine Culture      Cefdinir.  Increase fluid intake.  Recheck in 7 days if symptoms have not completely resolved.      Red flag warning signs and when to go to the emergency room discussed.  Reviewed potential adverse reactions to medications.    Results for orders placed or performed in visit on 09/12/24 (from the past 24 hour(s))   UA Macroscopic with reflex to Microscopic and Culture - Clinic Collect    Specimen: Urine, Clean Catch   Result Value Ref Range    Color Urine Yellow Colorless, Straw, Light Yellow, Yellow    Appearance Urine Turbid (A) Clear    Glucose Urine Negative Negative mg/dL    Bilirubin Urine Negative Negative    Ketones Urine Negative Negative mg/dL    Specific Gravity Urine >=1.030 1.003 - 1.035    Blood Urine Large (A) Negative    pH Urine 6.0 5.0 - 7.0    Protein Albumin Urine 100 (A) Negative mg/dL    Urobilinogen Urine 0.2 0.2, 1.0 E.U./dL    Nitrite Urine Positive (A) Negative    Leukocyte Esterase Urine Large (A) Negative   Urine Microscopic Exam   Result Value Ref Range    Bacteria Urine Many (A) None Seen /HPF    RBC Urine  (A) 0-2 /HPF /HPF    WBC Urine >100 (A) 0-5 /HPF /HPF    Squamous Epithelials Urine Few (A) None Seen /LPF       Subjective     Brielle Marvin is an 84 year old female who presents to clinic today for dysuria and urinary urgency for several days.  Urinary frequency increase significantly yesterday.  She denies fever, chills, nausea vomiting or low back pain.     "   Objective    /72   Pulse 78   Temp 99  F (37.2  C) (Tympanic)   LMP 11/08/1991 (Exact Date)   SpO2 95%   Nurses notes and VS have been reviewed.    Physical Exam       GENERAL APPEARANCE: healthy appearing, alert     ABDOMEN:  soft, nontender, no HSM or masses and bowel sounds normal, no CVA tenderness     MS: extremities normal- no gross deformities noted; normal muscle tone.     SKIN: no suspicious lesions or rashes      ISABEL Boo, CNP  [place] [facility] Provider    The use of [employer] dictation services may have been used to construct the content in this note; any grammatical or spelling errors are non-intentional. Please contact the author of this note directly if you are in need of any clarification.

## 2024-09-13 LAB — BACTERIA UR CULT: ABNORMAL

## 2024-10-03 DIAGNOSIS — N32.81 OAB (OVERACTIVE BLADDER): ICD-10-CM

## 2024-10-03 NOTE — TELEPHONE ENCOUNTER
Patient is requesting change in medication due to dry mouth. Asking if she could be prescribed different medication for over active bladder that would not have this side effect of dry mouth.     Patient has only 1 week of medication left. Requests call back by triage.  Emperatriz Barkley RN

## 2024-10-04 RX ORDER — OXYBUTYNIN CHLORIDE 5 MG/1
5 TABLET, EXTENDED RELEASE ORAL DAILY
Qty: 90 TABLET | Refills: 1 | Status: SHIPPED | OUTPATIENT
Start: 2024-10-04 | End: 2024-10-07

## 2024-10-04 RX ORDER — OXYBUTYNIN CHLORIDE 5 MG/1
5 TABLET, EXTENDED RELEASE ORAL DAILY
Qty: 90 TABLET | Refills: 1 | OUTPATIENT
Start: 2024-10-04

## 2024-10-04 NOTE — TELEPHONE ENCOUNTER
Spoke with pt and relayed providers message. Pt stated understanding and requested pharmacy be changed to [facility].     Pt states she only has 1 week supply.    Rohini Renee RN

## 2024-10-04 NOTE — TELEPHONE ENCOUNTER
All medications in this class will produce dry mouth as a side effect.  The only thing to do is take a lower dose which may help reduce side effects.     Prescription sent electronically to specified pharmacy for the lower dose. .     If she still has problems with this lower dose or dose not feel it works as well, then schedule a virtual visit ( telephone or video) to discuss this further.      Close encounter when done.

## 2024-10-04 NOTE — TELEPHONE ENCOUNTER
Patient Contact    Attempt # 1    Was call answered?  No.  Left message on voicemail with detailed information and OK to call back.    Angela Skelton RN

## 2024-10-07 RX ORDER — OXYBUTYNIN CHLORIDE 5 MG/1
5 TABLET, EXTENDED RELEASE ORAL DAILY
Qty: 90 TABLET | Refills: 1 | Status: SHIPPED | OUTPATIENT
Start: 2024-10-07

## 2024-10-07 NOTE — TELEPHONE ENCOUNTER
Assisted with sending medication to preferred pharmacy.  Called and informed pt that pharmacy change was complete.   She verbalized understanding.     Thank you,  Daja Joel RN

## 2024-10-11 DIAGNOSIS — K21.9 GASTROESOPHAGEAL REFLUX DISEASE WITHOUT ESOPHAGITIS: ICD-10-CM

## 2024-10-11 DIAGNOSIS — K44.9 HIATAL HERNIA: ICD-10-CM

## 2024-10-13 ENCOUNTER — NURSE TRIAGE (OUTPATIENT)
Dept: NURSING | Facility: CLINIC | Age: 84
End: 2024-10-13
Payer: COMMERCIAL

## 2024-10-13 ENCOUNTER — OFFICE VISIT (OUTPATIENT)
Dept: URGENT CARE | Facility: URGENT CARE | Age: 84
End: 2024-10-13
Payer: COMMERCIAL

## 2024-10-13 VITALS
DIASTOLIC BLOOD PRESSURE: 76 MMHG | TEMPERATURE: 98.8 F | BODY MASS INDEX: 27.46 KG/M2 | WEIGHT: 160 LBS | SYSTOLIC BLOOD PRESSURE: 144 MMHG | RESPIRATION RATE: 18 BRPM | OXYGEN SATURATION: 97 % | HEART RATE: 68 BPM

## 2024-10-13 DIAGNOSIS — S05.02XA ABRASION OF LEFT CORNEA, INITIAL ENCOUNTER: Primary | ICD-10-CM

## 2024-10-13 PROCEDURE — 99213 OFFICE O/P EST LOW 20 MIN: CPT | Performed by: EMERGENCY MEDICINE

## 2024-10-13 RX ORDER — ERYTHROMYCIN 5 MG/G
0.5 OINTMENT OPHTHALMIC 4 TIMES DAILY
Qty: 3.5 G | Refills: 0 | Status: SHIPPED | OUTPATIENT
Start: 2024-10-13 | End: 2024-10-20

## 2024-10-13 NOTE — PROGRESS NOTES
Assessment & Plan     Diagnosis:    ICD-10-CM    1. Abrasion of left cornea, initial encounter  S05.02XA erythromycin (ROMYCIN) 5 MG/GM ophthalmic ointment          Medical Decision Making:  Brielle Marvin is a 84 year old female who presents for evaluation of left eye pain. Differential diagnosis for eye pain including conjunctivitis, foreign body, corneal abrasion, acute glaucoma, orbital or periorbital cellulitis, migraine, temporal arteritis, uveitis, or other intracranial abnormality. History, physical exam, Wood's lamp and Bi-focal exam do not support any of the above diagnoses with the exception of corneal abrasion, with an obvious small abrasion as noted below. No sign of globe rupture or sign of foreign body in the inverted eyelids or anywhere on the eye. Vision is intact; she has no complaints of vision changes. Patient was given a prescription for antibiotic ointment and instructions to follow up with PCP and ophthalmology ASAP.  Patient voices understanding and agreement with the plan.  All questions answered.      Jamison Sanchez PA-C  [facility]    Subjective     Brielle Marvin is a 84 year old female who presents to clinic today for the following health issues:  Chief Complaint   Patient presents with    Eye Problem     Left eye pain,redness since getting poked in with a twig during yard work this a.m.     HPI    Eye Problem    Onset of symptoms was this morning; was poked in the eye with a twig while doing yard work  Location: left eye   Course of illness is about the same.    Severity: mild  Current and Associated symptoms: pain, redness,, vision is normal after the tears go away.    Patient denies any vision changes, headache, blood or purulent discharge from the eye.      Review of Systems    See HPI    Objective      Vitals: BP (!) 144/76   Pulse 68   Temp 98.8  F (37.1  C)   Resp 18   Wt 72.6 kg (160 lb)   LMP 11/08/1991 (Exact Date)   SpO2 97%   BMI 27.46 kg/m         Vital signs reviewed by: Jamison Sanchez PA-C      Physical Exam   Constitutional: Patient is alert and cooperative. Mild acute distress.  Eyes: Conjunctivae is injected on the left. Wood's lamp and bifocal exam show corneal abrasion at the 5 o'clock position on the cornea. No foreign body, ulcer or dendritic lesion.  Negative Vazquez sign. EOMI and are normal. PERRL.   Neurological: Alert and oriented x3. CN 3-7 intact.  Skin: No rash noted on visualized skin.  Psychiatric:The patient has a normal mood and affect.       Jamison Sanchez PA-C  [facility]

## 2024-10-13 NOTE — TELEPHONE ENCOUNTER
Pt is calling with concerns of eye injury, was working in the yard yesterday, a branch poked her in the left eye, now is sore and painful, constant tearing, redness, hard to keep it open. Feels like something is in there. putting vysine on it, taking ibuprofen  Denies swelling,   Denies blurred vision, or loss of vision    Triage to be seen within 4 hours, care advice given. Pt states she will go to [facility].    Linda Martinez RN, BSN  10/13/2024 at 9:24 AM  [facility]      Reason for Disposition   Constant tearing or blinking    Additional Information   Negative: [1] Major bleeding (e.g., actively dripping or spurting) AND [2] can't be stopped   Negative: Knocked out (unconscious) > 1 minute   Negative: Difficult to awaken or acting confused (e.g., disoriented, slurred speech)   Negative: Severe neck pain   Negative: Sounds like a life-threatening emergency to the triager   Negative: Vision is blurred or lost in either eye   Negative: Double vision or unable to look upwards   Negative: Cut on eyelid or eyeball  (Exception: Superficial scratch on eyelid.)   Negative: [1] Bleeding AND [2] won't stop after 10 minutes of direct pressure (using correct technique)   Negative: Skin is split open or gaping (or length > 1/4 inch or 6 mm)   Negative: Bloody or cloudy fluid behind the cornea (clear part)   Negative: Sharp object hit the eye (e.g., metallic chip or flying glass)   Negative: Foreign body (FB) hit eye at high speed (e.g., small metallic chip from hammering, lawnmower, BB gun, explosion)   Negative: Two black eyes (bilateral)   Negative: Sounds like a serious injury to the triager   Negative: [1] SEVERE pain AND [2] not improved 2 hours after pain medicine/ice packs   Negative: [1] Eye pain AND [2] light increases pain   Negative: Flashes of light or floaters in the eye   Negative: [1] Unequal pupils AND [2] new-onset after eye injury    Protocols used: Eye Injury-A-

## 2024-10-23 NOTE — PROGRESS NOTES
Left message for Tessa to return call to discuss her current symptoms further.  Briseyda Acharya RN, CNRN     Pt completed IE. Canceled 2nd visit without re-scheduling. No further visits scheduled at this time, will discharge.

## 2024-11-13 DIAGNOSIS — I10 BENIGN ESSENTIAL HYPERTENSION: ICD-10-CM

## 2024-11-13 DIAGNOSIS — R47.9 TRANSIENT SPEECH DISTURBANCE: ICD-10-CM

## 2024-11-13 RX ORDER — METOPROLOL SUCCINATE 50 MG/1
TABLET, EXTENDED RELEASE ORAL
Qty: 90 TABLET | Refills: 0 | Status: SHIPPED | OUTPATIENT
Start: 2024-11-13

## 2024-12-04 NOTE — PATIENT INSTRUCTIONS
Patient Education     Anemia  Anemia is a condition that occurs when your body does not have enough healthy red blood cells (RBCs). RBCs are the parts of your blood that carry oxygen all over your body. A protein called hemoglobin allows your RBCs to absorb and release oxygen. Without enough RBCs or hemoglobin, your body doesn't get enough oxygen. Symptoms of anemia may then occur.    What are the symptoms of anemia?  Some people with anemia have no symptoms. But most people have symptoms that range from mild to severe. These can include:    Tiredness (fatigue)    Weakness    Pale skin    Shortness of breath    Dizziness or fainting    Rapid heartbeat    Trouble doing normal amounts of activity    Yellowing of your eyes, skin, or mouth and dark urine (jaundice)  What causes anemia?  Anemia can occur when your body:    Loses too much blood    Does not make enough RBCs    Destroys your RBCs at a faster rate than it can replace them    Does not make a normal amount of hemoglobin in your RBCs  These problems can occur for many reasons, including:    A condition that you are born with (congenital or inherited), such as sickle cell disease or thalassemia    Heavy bleeding for any reason, including injury, surgery, childbirth, or even heavy menstrual periods    Being low in certain nutrients, such as iron, folate, or vitamin B-12    Certain long-term (chronic) conditions such as diabetes, arthritis, or kidney disease    Certain chronic infections such as tuberculosis or HIV    Exposure to certain medicines, such as those used for chemotherapy  There are different types of anemia. Your healthcare provider can tell you more about the type of anemia you have and what may have caused it.  How is anemia diagnosed?  To diagnose anemia, your healthcare provider orders blood tests. These can include:    Complete blood cell count (CBC). This test measures the amounts of the different types of blood cells.    Blood smear. This  Medication: ALPRAZolam (XANAX) 0.25 MG tablet     Last office visit date: 11/25/2024  Medication Refill Protocol Failed.     FORWARD TO PROVIDER - Refill requests for these medications must ALWAYS be forwarded to the ordering provider, even if all criteria are met    PDMP has been reviewed in last 24 hours    Urine/serum drug screen on file in the appropriate time frame    Active/up-to-date controlled substances agreement/consent on file     [employer] texted and needs provider review.   test checks the size and shape of your blood cells. To do the test, a drop of your blood is looked at under a microscope. A stain is used to make the blood cells easier to see.    Iron studies. These tests measure the amount of iron in your blood. Your body needs iron to make hemoglobin in your RBCs.    Vitamin B-12 and folate studies. These tests check for some of the components that help give RBCs a normal size and shape.    Reticulocyte count. This test measures the amount of new RBCs that your bone marrow makes.    Hemoglobin electrophoresis. This test checks for problems with your hemoglobin in RBCs.    Bone marrow biopsy. This test evaluates the bone marrow where RBCs are made.  How is anemia treated?  Treatment for anemia is based on the type of anemia, its cause, and the severity of your symptoms. Treatments may include:    Diet changes. This includes increasing the amount of certain nutrients in your diet, such as iron, vitamin B-12, or folate. Your healthcare provider may also prescribe nutrient supplements.    Medicines. Certain medicines treat the cause of your anemia. Others help build new RBCs or ease symptoms. If a medicine is the cause of your anemia, you may need to stop or change it.    Blood transfusions. Replacing some of your blood can increase the number of healthy RBCs in your body.    Surgery. In some cases, your healthcare provider may do surgery to treat the underlying cause of anemia. If you need surgery, your healthcare provider will explain the procedure and outline the risks and benefits for you.  What are the long-term concerns?  If you have a certain type of anemia, you can expect a full recovery after treatment. If you have other types of anemia (especially a type you're born with), you will need to manage it for life. Your healthcare provider can tell you more.  [employer] last reviewed this educational content on 4/1/2019 2000-2021 [employer]. All rights reserved.  This information is not intended as a substitute for professional medical care. Always follow your healthcare professional's instructions.

## 2024-12-14 ENCOUNTER — APPOINTMENT (OUTPATIENT)
Dept: ULTRASOUND IMAGING | Facility: CLINIC | Age: 84
DRG: 603 | End: 2024-12-14
Attending: EMERGENCY MEDICINE
Payer: COMMERCIAL

## 2024-12-14 ENCOUNTER — HOSPITAL ENCOUNTER (INPATIENT)
Facility: CLINIC | Age: 84
LOS: 1 days | Discharge: HOME | DRG: 603 | End: 2024-12-17
Attending: EMERGENCY MEDICINE | Admitting: INTERNAL MEDICINE
Payer: COMMERCIAL

## 2024-12-14 DIAGNOSIS — Z86.69 HISTORY OF SLEEP DISTURBANCE: ICD-10-CM

## 2024-12-14 DIAGNOSIS — G47.33 OSA (OBSTRUCTIVE SLEEP APNEA): ICD-10-CM

## 2024-12-14 DIAGNOSIS — J02.0 STREP PHARYNGITIS: ICD-10-CM

## 2024-12-14 DIAGNOSIS — S05.8X9A ABRASION OF EYE, UNSPECIFIED LATERALITY, INITIAL ENCOUNTER: Primary | ICD-10-CM

## 2024-12-14 DIAGNOSIS — L03.116 CELLULITIS OF LEFT LOWER EXTREMITY: ICD-10-CM

## 2024-12-14 LAB
ALBUMIN SERPL BCG-MCNC: 4 G/DL (ref 3.5–5.2)
ALBUMIN UR-MCNC: NEGATIVE MG/DL
ALP SERPL-CCNC: 61 U/L (ref 40–150)
ALT SERPL W P-5'-P-CCNC: 14 U/L (ref 0–50)
ANION GAP SERPL CALCULATED.3IONS-SCNC: 8 MMOL/L (ref 7–15)
APPEARANCE UR: CLEAR
AST SERPL W P-5'-P-CCNC: 22 U/L (ref 0–45)
BASE EXCESS BLDV CALC-SCNC: 5 MMOL/L (ref -3–3)
BASOPHILS # BLD AUTO: 0 10E3/UL (ref 0–0.2)
BASOPHILS NFR BLD AUTO: 0 %
BILIRUB SERPL-MCNC: 0.5 MG/DL
BILIRUB UR QL STRIP: NEGATIVE
BUN SERPL-MCNC: 12.6 MG/DL (ref 8–23)
CALCIUM SERPL-MCNC: 8.9 MG/DL (ref 8.8–10.4)
CHLORIDE SERPL-SCNC: 101 MMOL/L (ref 98–107)
COLOR UR AUTO: NORMAL
CREAT SERPL-MCNC: 0.6 MG/DL (ref 0.51–0.95)
EGFRCR SERPLBLD CKD-EPI 2021: 89 ML/MIN/1.73M2
EOSINOPHIL # BLD AUTO: 0.2 10E3/UL (ref 0–0.7)
EOSINOPHIL NFR BLD AUTO: 3 %
ERYTHROCYTE [DISTWIDTH] IN BLOOD BY AUTOMATED COUNT: 12.3 % (ref 10–15)
GLUCOSE SERPL-MCNC: 97 MG/DL (ref 70–99)
GLUCOSE UR STRIP-MCNC: NEGATIVE MG/DL
HCO3 BLDV-SCNC: 32 MMOL/L (ref 21–28)
HCO3 SERPL-SCNC: 30 MMOL/L (ref 22–29)
HCT VFR BLD AUTO: 41.5 % (ref 35–47)
HGB BLD-MCNC: 13.5 G/DL (ref 11.7–15.7)
HGB UR QL STRIP: NEGATIVE
HOLD SPECIMEN: NORMAL
IMM GRANULOCYTES # BLD: 0 10E3/UL
IMM GRANULOCYTES NFR BLD: 0 %
KETONES UR STRIP-MCNC: NEGATIVE MG/DL
LACTATE BLD-SCNC: 0.5 MMOL/L
LEUKOCYTE ESTERASE UR QL STRIP: NEGATIVE
LYMPHOCYTES # BLD AUTO: 1.7 10E3/UL (ref 0.8–5.3)
LYMPHOCYTES NFR BLD AUTO: 24 %
MCH RBC QN AUTO: 31.5 PG (ref 26.5–33)
MCHC RBC AUTO-ENTMCNC: 32.5 G/DL (ref 31.5–36.5)
MCV RBC AUTO: 97 FL (ref 78–100)
MONOCYTES # BLD AUTO: 0.9 10E3/UL (ref 0–1.3)
MONOCYTES NFR BLD AUTO: 12 %
NEUTROPHILS # BLD AUTO: 4.5 10E3/UL (ref 1.6–8.3)
NEUTROPHILS NFR BLD AUTO: 61 %
NITRATE UR QL: NEGATIVE
NRBC # BLD AUTO: 0 10E3/UL
NRBC BLD AUTO-RTO: 0 /100
PCO2 BLDV: 55 MM HG (ref 40–50)
PH BLDV: 7.37 [PH] (ref 7.32–7.43)
PH UR STRIP: 7 [PH] (ref 5–7)
PLATELET # BLD AUTO: 168 10E3/UL (ref 150–450)
PO2 BLDV: 23 MM HG (ref 25–47)
POTASSIUM SERPL-SCNC: 4.2 MMOL/L (ref 3.4–5.3)
PROT SERPL-MCNC: 7.3 G/DL (ref 6.4–8.3)
RBC # BLD AUTO: 4.29 10E6/UL (ref 3.8–5.2)
RBC URINE: <1 /HPF
SAO2 % BLDV: 36 % (ref 70–75)
SODIUM SERPL-SCNC: 139 MMOL/L (ref 135–145)
SP GR UR STRIP: 1.01 (ref 1–1.03)
SQUAMOUS EPITHELIAL: <1 /HPF
UROBILINOGEN UR STRIP-MCNC: NORMAL MG/DL
WBC # BLD AUTO: 7.3 10E3/UL (ref 4–11)
WBC URINE: 1 /HPF

## 2024-12-14 PROCEDURE — 96372 THER/PROPH/DIAG INJ SC/IM: CPT | Performed by: INTERNAL MEDICINE

## 2024-12-14 PROCEDURE — 36415 COLL VENOUS BLD VENIPUNCTURE: CPT | Performed by: EMERGENCY MEDICINE

## 2024-12-14 PROCEDURE — 99285 EMERGENCY DEPT VISIT HI MDM: CPT | Mod: 25

## 2024-12-14 PROCEDURE — 96365 THER/PROPH/DIAG IV INF INIT: CPT

## 2024-12-14 PROCEDURE — 99223 1ST HOSP IP/OBS HIGH 75: CPT | Mod: AI | Performed by: INTERNAL MEDICINE

## 2024-12-14 PROCEDURE — 250N000009 HC RX 250: Performed by: INTERNAL MEDICINE

## 2024-12-14 PROCEDURE — 86140 C-REACTIVE PROTEIN: CPT

## 2024-12-14 PROCEDURE — G0378 HOSPITAL OBSERVATION PER HR: HCPCS

## 2024-12-14 PROCEDURE — 93005 ELECTROCARDIOGRAM TRACING: CPT

## 2024-12-14 PROCEDURE — 93971 EXTREMITY STUDY: CPT | Mod: LT

## 2024-12-14 PROCEDURE — 82374 ASSAY BLOOD CARBON DIOXIDE: CPT | Performed by: EMERGENCY MEDICINE

## 2024-12-14 PROCEDURE — 82803 BLOOD GASES ANY COMBINATION: CPT

## 2024-12-14 PROCEDURE — 250N000011 HC RX IP 250 OP 636: Performed by: INTERNAL MEDICINE

## 2024-12-14 PROCEDURE — 250N000011 HC RX IP 250 OP 636: Performed by: EMERGENCY MEDICINE

## 2024-12-14 PROCEDURE — 96366 THER/PROPH/DIAG IV INF ADDON: CPT

## 2024-12-14 PROCEDURE — 85004 AUTOMATED DIFF WBC COUNT: CPT | Performed by: EMERGENCY MEDICINE

## 2024-12-14 PROCEDURE — 87040 BLOOD CULTURE FOR BACTERIA: CPT | Performed by: EMERGENCY MEDICINE

## 2024-12-14 PROCEDURE — 84155 ASSAY OF PROTEIN SERUM: CPT | Performed by: EMERGENCY MEDICINE

## 2024-12-14 PROCEDURE — 250N000013 HC RX MED GY IP 250 OP 250 PS 637: Performed by: INTERNAL MEDICINE

## 2024-12-14 PROCEDURE — 81001 URINALYSIS AUTO W/SCOPE: CPT | Performed by: EMERGENCY MEDICINE

## 2024-12-14 RX ORDER — ACETAMINOPHEN 325 MG/1
650 TABLET ORAL EVERY 4 HOURS PRN
Status: DISCONTINUED | OUTPATIENT
Start: 2024-12-14 | End: 2024-12-17 | Stop reason: HOSPADM

## 2024-12-14 RX ORDER — ACETAMINOPHEN 650 MG/1
650 SUPPOSITORY RECTAL EVERY 4 HOURS PRN
Status: DISCONTINUED | OUTPATIENT
Start: 2024-12-14 | End: 2024-12-17 | Stop reason: HOSPADM

## 2024-12-14 RX ORDER — ASPIRIN 81 MG/1
81 TABLET ORAL DAILY
Status: DISCONTINUED | OUTPATIENT
Start: 2024-12-14 | End: 2024-12-17 | Stop reason: HOSPADM

## 2024-12-14 RX ORDER — IBUPROFEN 200 MG
1 CAPSULE ORAL DAILY
Status: DISCONTINUED | OUTPATIENT
Start: 2024-12-14 | End: 2024-12-14

## 2024-12-14 RX ORDER — PROCHLORPERAZINE MALEATE 5 MG/1
5 TABLET ORAL EVERY 6 HOURS PRN
Status: DISCONTINUED | OUTPATIENT
Start: 2024-12-14 | End: 2024-12-17 | Stop reason: HOSPADM

## 2024-12-14 RX ORDER — CEFTRIAXONE 2 G/1
2 INJECTION, POWDER, FOR SOLUTION INTRAMUSCULAR; INTRAVENOUS EVERY 24 HOURS
Status: DISCONTINUED | OUTPATIENT
Start: 2024-12-15 | End: 2024-12-17 | Stop reason: HOSPADM

## 2024-12-14 RX ORDER — ASCORBIC ACID 500 MG
500 TABLET ORAL DAILY
Status: DISCONTINUED | OUTPATIENT
Start: 2024-12-14 | End: 2024-12-15

## 2024-12-14 RX ORDER — ZINC SULFATE 50(220)MG
220 CAPSULE ORAL DAILY
Status: DISCONTINUED | OUTPATIENT
Start: 2024-12-14 | End: 2024-12-15

## 2024-12-14 RX ORDER — PANTOPRAZOLE SODIUM 40 MG/1
40 TABLET, DELAYED RELEASE ORAL DAILY
Status: DISCONTINUED | OUTPATIENT
Start: 2024-12-14 | End: 2024-12-17 | Stop reason: HOSPADM

## 2024-12-14 RX ORDER — ONDANSETRON 2 MG/ML
4 INJECTION INTRAMUSCULAR; INTRAVENOUS EVERY 6 HOURS PRN
Status: DISCONTINUED | OUTPATIENT
Start: 2024-12-14 | End: 2024-12-17 | Stop reason: HOSPADM

## 2024-12-14 RX ORDER — AMOXICILLIN 250 MG
1 CAPSULE ORAL 2 TIMES DAILY PRN
Status: DISCONTINUED | OUTPATIENT
Start: 2024-12-14 | End: 2024-12-17 | Stop reason: HOSPADM

## 2024-12-14 RX ORDER — CHOLECALCIFEROL (VITAMIN D3) 50 MCG
50 TABLET ORAL DAILY
Status: DISCONTINUED | OUTPATIENT
Start: 2024-12-14 | End: 2024-12-15

## 2024-12-14 RX ORDER — ENOXAPARIN SODIUM 100 MG/ML
40 INJECTION SUBCUTANEOUS EVERY 24 HOURS
Status: DISCONTINUED | OUTPATIENT
Start: 2024-12-14 | End: 2024-12-17 | Stop reason: HOSPADM

## 2024-12-14 RX ORDER — PRAVASTATIN SODIUM 40 MG
40 TABLET ORAL DAILY
Status: DISCONTINUED | OUTPATIENT
Start: 2024-12-14 | End: 2024-12-17 | Stop reason: HOSPADM

## 2024-12-14 RX ORDER — AMOXICILLIN 250 MG
2 CAPSULE ORAL 2 TIMES DAILY PRN
Status: DISCONTINUED | OUTPATIENT
Start: 2024-12-14 | End: 2024-12-17 | Stop reason: HOSPADM

## 2024-12-14 RX ORDER — METOPROLOL SUCCINATE 50 MG/1
50 TABLET, EXTENDED RELEASE ORAL DAILY
Status: DISCONTINUED | OUTPATIENT
Start: 2024-12-14 | End: 2024-12-17 | Stop reason: HOSPADM

## 2024-12-14 RX ORDER — CEFTRIAXONE 2 G/1
2 INJECTION, POWDER, FOR SOLUTION INTRAMUSCULAR; INTRAVENOUS ONCE
Status: COMPLETED | OUTPATIENT
Start: 2024-12-14 | End: 2024-12-14

## 2024-12-14 RX ORDER — ERYTHROMYCIN 5 MG/G
OINTMENT OPHTHALMIC AT BEDTIME
Status: DISCONTINUED | OUTPATIENT
Start: 2024-12-14 | End: 2024-12-17 | Stop reason: HOSPADM

## 2024-12-14 RX ORDER — OXYBUTYNIN CHLORIDE 5 MG/1
5 TABLET, EXTENDED RELEASE ORAL DAILY
Status: DISCONTINUED | OUTPATIENT
Start: 2024-12-14 | End: 2024-12-17 | Stop reason: HOSPADM

## 2024-12-14 RX ORDER — ONDANSETRON 4 MG/1
4 TABLET, ORALLY DISINTEGRATING ORAL EVERY 6 HOURS PRN
Status: DISCONTINUED | OUTPATIENT
Start: 2024-12-14 | End: 2024-12-17 | Stop reason: HOSPADM

## 2024-12-14 RX ADMIN — ENOXAPARIN SODIUM 40 MG: 40 INJECTION SUBCUTANEOUS at 20:20

## 2024-12-14 RX ADMIN — ERYTHROMYCIN: 5 OINTMENT OPHTHALMIC at 21:01

## 2024-12-14 RX ADMIN — ZINC SULFATE 220 MG (50 MG) CAPSULE 220 MG: CAPSULE at 20:41

## 2024-12-14 RX ADMIN — Medication 1000 MCG: at 20:41

## 2024-12-14 RX ADMIN — PANTOPRAZOLE SODIUM 40 MG: 40 TABLET, DELAYED RELEASE ORAL at 20:20

## 2024-12-14 RX ADMIN — OXYCODONE HYDROCHLORIDE AND ACETAMINOPHEN 500 MG: 500 TABLET ORAL at 20:20

## 2024-12-14 RX ADMIN — ASPIRIN 81 MG: 81 TABLET, COATED ORAL at 20:20

## 2024-12-14 RX ADMIN — PRAVASTATIN SODIUM 40 MG: 40 TABLET ORAL at 20:20

## 2024-12-14 RX ADMIN — Medication 600 MG: at 20:41

## 2024-12-14 RX ADMIN — Medication 50 MCG: at 20:20

## 2024-12-14 RX ADMIN — CEFTRIAXONE SODIUM 2 G: 2 INJECTION, POWDER, FOR SOLUTION INTRAMUSCULAR; INTRAVENOUS at 14:37

## 2024-12-14 RX ADMIN — METOPROLOL SUCCINATE 50 MG: 50 TABLET, EXTENDED RELEASE ORAL at 20:20

## 2024-12-14 RX ADMIN — OXYBUTYNIN CHLORIDE 5 MG: 5 TABLET, EXTENDED RELEASE ORAL at 20:41

## 2024-12-14 ASSESSMENT — COLUMBIA-SUICIDE SEVERITY RATING SCALE - C-SSRS
2. HAVE YOU ACTUALLY HAD ANY THOUGHTS OF KILLING YOURSELF IN THE PAST MONTH?: NO
1. IN THE PAST MONTH, HAVE YOU WISHED YOU WERE DEAD OR WISHED YOU COULD GO TO SLEEP AND NOT WAKE UP?: NO
6. HAVE YOU EVER DONE ANYTHING, STARTED TO DO ANYTHING, OR PREPARED TO DO ANYTHING TO END YOUR LIFE?: NO

## 2024-12-14 ASSESSMENT — ACTIVITIES OF DAILY LIVING (ADL)
ADLS_ACUITY_SCORE: 47
ADLS_ACUITY_SCORE: 53
ADLS_ACUITY_SCORE: 46
ADLS_ACUITY_SCORE: 46
ADLS_ACUITY_SCORE: 53
ADLS_ACUITY_SCORE: 47
ADLS_ACUITY_SCORE: 53
ADLS_ACUITY_SCORE: 47

## 2024-12-14 NOTE — PROGRESS NOTES
RECEIVING UNIT ED HANDOFF REVIEW    ED Nurse Handoff Report was reviewed by: Roberta Bryan RN on December 14, 2024 at 5:03 PM

## 2024-12-14 NOTE — PLAN OF CARE
PRIMARY Concern: left lower extremity erythema and itching; strep pharyngitis   Tests/Procedures for NEXT shift: none  Consults? (Pending/following, signed-off?): none  Safety Risk CONCERNS (fall risk, behaviors, etc.): none      Where is patient from? (Home, TCU, etc.): home  Isolation/Type: none  Other Important info for next shift: none  Anticipated DC date & active delays: pending  SUMMARY NOTE:  Orientation/Cognitive: A/Ox4  Observation Goals (Met/ Not Met): not met   Mobility Level/Assist Equipment: IND  Antibiotics & Plan (IV/po, length of tx left): IV rocephin   Pain Management: denies pain  Tele/VS/O2: VSS on room air   ABNL Lab/BG: Venous gases abnormal  Diet: regular  Bowel/Bladder: up to bathroom, independently   Skin Concerns: left lower extremity erythema, +2 edema to BLE  Drains/Devices: IVSL  Patient Stated Goal for Today: to get better

## 2024-12-14 NOTE — ED NOTES
[facility]  ED Nurse Handoff Report    ED Chief complaint: Leg Pain      ED Diagnosis:   Final diagnoses:   Cellulitis of left lower extremity   Strep pharyngitis       Code Status: Full Code    Allergies:   Allergies   Allergen Reactions    Codeine Nausea and Vomiting    Morphine Nausea and Vomiting     nausea    Morphine And Codeine Nausea and Vomiting     vomiting    Oxycodone Nausea and Vomiting    Vicodin [Hydrocodone-Acetaminophen] Nausea and Vomiting       Patient Story: Sent from [facility].  Has redness p/s cellulitis to [facility].  Started AB yesterday for strep.  Has been hospitalized in the past for cellulitis.   Focused Assessment:  Brielle Marvin is an 84 year old female with a history of hypertension, hyperlipidemia, DVT of right peroneal vein, venous insufficiency, and left leg cellulitis who presents to the ED from [facility] with her daughter for evaluation of left leg pain, redness, and swelling. Patient presents with left lower extremity erythema and itching. She developed a sore throat yesterday morning and was started on Amoxicillin after testing positive for strep throat at Urgent Care yesterday. Brielle had a subjective fever at home and took Tylenol prior to evaluation at Urgent Care. Patient denies chest pain, shortness of breath, cough, rhinorrhea, or recent travel. Daughter notes today's left lower extremity presents the same as previous episodes which required admission for cellulitis.  Of note, the patient and her daughter first went to [facility] urgent care today and they were subsequently referred here.     Treatments and/or interventions provided:   Labs Ordered and Resulted from Time of ED Arrival to Time of ED Departure   COMPREHENSIVE METABOLIC PANEL - Abnormal       Result Value    Sodium 139      Potassium 4.2      Carbon Dioxide (CO2) 30 (*)     Anion Gap 8      Urea Nitrogen 12.6      Creatinine 0.60      GFR Estimate 89      Calcium 8.9      Chloride 101      Glucose  97      Alkaline Phosphatase 61      AST 22      ALT 14      Protein Total 7.3      Albumin 4.0      Bilirubin Total 0.5     ISTAT GASES LACTATE VENOUS POCT - Abnormal    Lactic Acid POCT 0.5      Bicarbonate Venous POCT 32 (*)     O2 Sat, Venous POCT 36 (*)     pCO2 Venous POCT 55 (*)     pH Venous POCT 7.37      pO2 Venous POCT 23 (*)     Base Excess/Deficit (+/-) POCT 5.0 (*)    ROUTINE UA WITH MICROSCOPIC REFLEX TO CULTURE - Normal    Color Urine Straw      Appearance Urine Clear      Glucose Urine Negative      Bilirubin Urine Negative      Ketones Urine Negative      Specific Gravity Urine 1.009      Blood Urine Negative      pH Urine 7.0      Protein Albumin Urine Negative      Urobilinogen Urine Normal      Nitrite Urine Negative      Leukocyte Esterase Urine Negative      RBC Urine <1      WBC Urine 1      Squamous Epithelials Urine <1     CBC WITH PLATELETS AND DIFFERENTIAL    WBC Count 7.3      RBC Count 4.29      Hemoglobin 13.5      Hematocrit 41.5      MCV 97      MCH 31.5      MCHC 32.5      RDW 12.3      Platelet Count 168      % Neutrophils 61      % Lymphocytes 24      % Monocytes 12      % Eosinophils 3      % Basophils 0      % Immature Granulocytes 0      NRBCs per 100 WBC 0      Absolute Neutrophils 4.5      Absolute Lymphocytes 1.7      Absolute Monocytes 0.9      Absolute Eosinophils 0.2      Absolute Basophils 0.0      Absolute Immature Granulocytes 0.0      Absolute NRBCs 0.0     BLOOD CULTURE   BLOOD CULTURE      US Lower Extremity Venous Duplex Left   Final Result   IMPRESSION:   1.  No deep venous thrombosis in the left lower extremity.         Patient's response to treatments and/or interventions:     To be done/followed up on inpatient unit:      Does this patient have any cognitive concerns?:  None    Activity level - Baseline/Home:  Independent  Activity Level - Current:   Independent    Patient's Preferred language: English   Needed?: No    Isolation: None  Infection: Not  "Applicable  Patient tested for COVID 19 prior to admission: NO  Bariatric?: No    Vital Signs:   Vitals:    12/14/24 1357   BP: (!) 168/85   Pulse: 89   Resp: 16   Temp: 97.6  F (36.4  C)   TempSrc: Temporal   SpO2: 100%   Weight: 72.6 kg (160 lb)   Height: 1.626 m (5' 4\")       Cardiac Rhythm:     Was the PSS-3 completed:   Yes  What interventions are required if any?               Family Comments: At bedside  OBS brochure/video discussed/provided to patient/family: No              Name of person given brochre if not patient:               Relationship to patient:     For the majority of the shift this patient's behavior was Green.   Behavioral interventions performed were .    ED NURSE PHONE NUMBER: [phone]         "

## 2024-12-14 NOTE — ED PROVIDER NOTES
Emergency Department Note      History of Present Illness     Chief Complaint   Leg Pain      HPI   Brielle Marvin is an 84 year old female with a history of hypertension, hyperlipidemia, DVT of right peroneal vein, venous insufficiency, and left leg cellulitis who presents to the ED from [facility] with her daughter for evaluation of left leg pain, redness, and swelling. Patient presents with left lower extremity erythema and itching. She developed a sore throat yesterday morning and was started on Amoxicillin after testing positive for strep throat at Urgent Care yesterday. Brielle had a subjective fever at home and took Tylenol prior to evaluation at Urgent Care. Patient denies chest pain, shortness of breath, cough, rhinorrhea, or recent travel. Daughter notes today's left lower extremity presents the same as previous episodes which required admission for cellulitis.  Of note, the patient and her daughter first went to [facility] urgent care today and they were subsequently referred here.    Independent Historian   Daughter as detailed above.    Review of External Notes   I reviewed the [facility] note from yesterday discussing pharyngitis. Patient found to be strep positive and started on Amoxicillin.     I reviewed Dr. Carrasco's 11/22/21 Discharge Summary regarding her admission at that time for left leg cellulitis and sepsis.    Past Medical History     Medical History and Problem List   Acute deep vein thrombosis of right peroneal vein  Esophageal reflux  Hypertension  Cellulitis  Hyperlipidemia  Lumbago  Enthesopathy of hip region  GERD  Osteoarthritis of right knee  Left leg cellulitis  Venous insufficiency   Chronic venous stasis dermatitis   Hiatal hernia    Medications   Amoxicillin  Aspirin 81 mg  Metoprolol succinate   Oxybutynin ER  Pravastatin     Surgical History   Bunionectomy   Lumbar spine surgery    Right total knee arthroplasty  Colonoscopy   Cataract removal     Physical Exam  "    Patient Vitals for the past 24 hrs:   BP Temp Temp src Pulse Resp SpO2 Height Weight   12/14/24 1737 (!) 155/81 98  F (36.7  C) Oral 92 -- 94 % -- --   12/14/24 1655 (!) 147/100 -- -- 90 16 100 % -- --   12/14/24 1357 (!) 168/85 97.6  F (36.4  C) Temporal 89 16 100 % 1.626 m (5' 4\") 72.6 kg (160 lb)     Physical Exam  Nursing note and vitals reviewed.  Constitutional:  Oriented to person, place, and time. Cooperative.   HENT:   Nose:    Nose normal.   Mouth/Throat:   Some erythema to the posterior oropharynx.  Eyes:    Conjunctivae normal and EOM are normal.      Pupils are equal, round, and reactive to light.   Neck:    Trachea normal.   Cardiovascular:  Normal rate, regular rhythm, normal heart sounds and normal pulses. No murmur heard.  Pulmonary/Chest:  Effort normal and breath sounds normal.   Abdominal:   Soft. Normal appearance and bowel sounds are normal.      There is no tenderness.      There is no rebound and no CVA tenderness.   Musculoskeletal:  Extremities atraumatic x 4.   Lymphadenopathy:  No cervical adenopathy.   Neurological:   Alert and oriented to person, place, and time. Normal strength.      No cranial nerve deficit or sensory deficit. GCS eye subscore is 4. GCS verbal subscore is 5. GCS motor subscore is 6.   Skin:    Swelling, erythema, and warmth to the lower half of the left lower leg.   Psychiatric:   Normal mood and affect.     Diagnostics     Lab Results   Labs Ordered and Resulted from Time of ED Arrival to Time of ED Departure   COMPREHENSIVE METABOLIC PANEL - Abnormal       Result Value    Sodium 139      Potassium 4.2      Carbon Dioxide (CO2) 30 (*)     Anion Gap 8      Urea Nitrogen 12.6      Creatinine 0.60      GFR Estimate 89      Calcium 8.9      Chloride 101      Glucose 97      Alkaline Phosphatase 61      AST 22      ALT 14      Protein Total 7.3      Albumin 4.0      Bilirubin Total 0.5     ISTAT GASES LACTATE VENOUS POCT - Abnormal    Lactic Acid POCT 0.5      " Bicarbonate Venous POCT 32 (*)     O2 Sat, Venous POCT 36 (*)     pCO2 Venous POCT 55 (*)     pH Venous POCT 7.37      pO2 Venous POCT 23 (*)     Base Excess/Deficit (+/-) POCT 5.0 (*)    ROUTINE UA WITH MICROSCOPIC REFLEX TO CULTURE - Normal    Color Urine Straw      Appearance Urine Clear      Glucose Urine Negative      Bilirubin Urine Negative      Ketones Urine Negative      Specific Gravity Urine 1.009      Blood Urine Negative      pH Urine 7.0      Protein Albumin Urine Negative      Urobilinogen Urine Normal      Nitrite Urine Negative      Leukocyte Esterase Urine Negative      RBC Urine <1      WBC Urine 1      Squamous Epithelials Urine <1     CBC WITH PLATELETS AND DIFFERENTIAL    WBC Count 7.3      RBC Count 4.29      Hemoglobin 13.5      Hematocrit 41.5      MCV 97      MCH 31.5      MCHC 32.5      RDW 12.3      Platelet Count 168      % Neutrophils 61      % Lymphocytes 24      % Monocytes 12      % Eosinophils 3      % Basophils 0      % Immature Granulocytes 0      NRBCs per 100 WBC 0      Absolute Neutrophils 4.5      Absolute Lymphocytes 1.7      Absolute Monocytes 0.9      Absolute Eosinophils 0.2      Absolute Basophils 0.0      Absolute Immature Granulocytes 0.0      Absolute NRBCs 0.0     BLOOD CULTURE   BLOOD CULTURE       Imaging   US Lower Extremity Venous Duplex Left   Final Result   IMPRESSION:   1.  No deep venous thrombosis in the left lower extremity.          Independent Interpretation   None    ED Course      Medications Administered   Medications   senna-docusate (SENOKOT-S/PERICOLACE) 8.6-50 MG per tablet 1 tablet (has no administration in time range)     Or   senna-docusate (SENOKOT-S/PERICOLACE) 8.6-50 MG per tablet 2 tablet (has no administration in time range)   ondansetron (ZOFRAN ODT) ODT tab 4 mg (has no administration in time range)     Or   ondansetron (ZOFRAN) injection 4 mg (has no administration in time range)   prochlorperazine (COMPAZINE) injection 5 mg (has no  administration in time range)     Or   prochlorperazine (COMPAZINE) tablet 5 mg (has no administration in time range)   enoxaparin ANTICOAGULANT (LOVENOX) injection 40 mg (has no administration in time range)   acetaminophen (TYLENOL) tablet 650 mg (has no administration in time range)     Or   acetaminophen (TYLENOL) Suppository 650 mg (has no administration in time range)   cefTRIAXone (ROCEPHIN) 2 g vial to attach to  ml bag for ADULTS or NS 50 ml bag for PEDS (has no administration in time range)   cefTRIAXone (ROCEPHIN) 2 g vial to attach to  ml bag for ADULTS or NS 50 ml bag for PEDS (0 g Intravenous Stopped 12/14/24 1634)       Procedures   Procedures     Discussion of Management   Admitting Hospitalist, Dr. Navarro    ED Course   ED Course as of 12/14/24 1745   Sat Dec 14, 2024   1405 I obtained history and examined the patient as noted above.    1609 I rechecked the patient and explained findings. Discussed plan for admission with patient and family member.    1624 I spoke with Dr. Navarro, Hospitalist, regarding the patient's history and presentation in the emergency department today. Accepting patient for admission.       Additional Documentation  None    Medical Decision Making / Diagnosis     CMS Diagnoses: None    MIPS       None    MDM   Brielle Marvin is an 84 year old female who came in for further evaluation of left lower extremity swelling and erythema and concern for recurrent cellulitis.  There was also my concern, as well as the possibility of a recurrent DVT.  I proceeded with the above workup here including the blood work and ultrasound, and she was provided IV ceftriaxone.  Her ultrasound and blood work is all unremarkable, which is reassuring.  She does not appear septic or toxic, but she apparently has not done well in the past with cellulitis and initial outpatient management.  Therefore we will bring her to the hospital for IV antibiotics.  I subsequently spoke with Dr. Navarro,  who will be taking care of her.    Disposition   The patient was admitted to the hospital.     Diagnosis     ICD-10-CM    1. Cellulitis of left lower extremity  L03.116       2. Strep pharyngitis  J02.0            Discharge Medications   Current Discharge Medication List            Scribe Disclosure:  I, Noemi Ambrosio, am serving as a scribe at 2:10 PM on 12/14/2024 to document services personally performed by Yaw Hubbard MD based on my observations and the provider's statements to me.        Yaw Hubbard MD  12/14/24 [id number]

## 2024-12-14 NOTE — PHARMACY-ADMISSION MEDICATION HISTORY
Pharmacist Admission Medication History    Admission medication history is complete. The information provided in this note is only as accurate as the sources available at the time of the update.    Information Source(s): Patient and CareEverywhere/SureScripts via in-person    Pertinent Information: Patient started amoxicillin for strep throat yesterday. She took 1 dose last evening and 2 doses today so far (0430 and 1200).   Erythromycin ointment not started yet. It was filled 12/10 and patient picked it up but reports she lost it. She has been waiting for a refill to be prescribed so she can get it from her pharmacy.     Changes made to PTA medication list:  Added: None  Deleted: doxycyline, triamcinolone  Changed: frequency added to erythromycin (at bedtime)    Allergies reviewed with patient and updates made in EHR: no    Medication History Completed By: Hazel Obrien RPH 12/14/2024 4:51 PM    PTA Med List   Medication Sig Last Dose/Taking    amoxicillin (AMOXIL) 875 MG tablet Take 1 tablet (875 mg) by mouth 2 times daily for 10 days. 12/14/2024 at 12:00 PM    ASPIRIN 81 MG OR TABS Take 81 mg by mouth daily  12/13/2024    calcium 600 MG tablet Take 1 tablet by mouth daily 12/13/2024    Cholecalciferol (VITAMIN D) 2000 UNIT tablet Take 2,000 Units by mouth daily. 12/13/2024    Cyanocobalamin (B-12) 1000 MCG SUBL Place 1,000 mcg under the tongue daily 12/13/2024    metoprolol succinate ER (TOPROL XL) 50 MG 24 hr tablet TAKE 1 TABLET BY MOUTH ONCE DAILY (NOTE CHANGE FROM ATENONOL) 12/13/2024    omeprazole (PRILOSEC) 20 MG DR capsule TAKE 1 CAPSULE  BY MOUTH ONCE DAILY 12/13/2024    oxyBUTYnin ER (DITROPAN XL) 5 MG 24 hr tablet Take 1 tablet (5 mg) by mouth daily. 12/13/2024    pravastatin (PRAVACHOL) 40 MG tablet TAKE ONE TABLET BY MOUTH ONCE DAILY 12/13/2024    VITAMIN C 500 MG OR TABS Take 500 mg by mouth daily. 12/13/2024    zinc gluconate 50 MG tablet Take 50 mg by mouth daily 12/13/2024
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
## 2024-12-14 NOTE — PROGRESS NOTES
Observation goals  PRIOR TO DISCHARGE        Comments:   -diagnostic tests and consults completed and resulted: not met  -vital signs normal or at patient baseline: met   -infection is improving: not met, pt receives IV antibiotics   Nurse to notify provider when observation goals have been met and patient is ready for discharge.

## 2024-12-14 NOTE — ED TRIAGE NOTES
Sent from [facility].  Has redness p/s cellulitis to LLE.  Started AB yesterday for strep.  Has been hospitalized in the past for cellulitis.    Triage Assessment (Adult)       Row Name 12/14/24 7186          Triage Assessment    Airway WDL WDL        Respiratory WDL    Respiratory WDL WDL        Skin Circulation/Temperature WDL    Skin Circulation/Temperature WDL X        Cardiac WDL    Cardiac WDL WDL        Peripheral/Neurovascular WDL    Peripheral Neurovascular WDL WDL        Cognitive/Neuro/Behavioral WDL    Cognitive/Neuro/Behavioral WDL WDL

## 2024-12-15 PROCEDURE — 250N000013 HC RX MED GY IP 250 OP 250 PS 637: Performed by: INTERNAL MEDICINE

## 2024-12-15 PROCEDURE — 96372 THER/PROPH/DIAG INJ SC/IM: CPT | Performed by: INTERNAL MEDICINE

## 2024-12-15 PROCEDURE — 250N000011 HC RX IP 250 OP 636: Performed by: INTERNAL MEDICINE

## 2024-12-15 PROCEDURE — G0378 HOSPITAL OBSERVATION PER HR: HCPCS

## 2024-12-15 PROCEDURE — 99232 SBSQ HOSP IP/OBS MODERATE 35: CPT | Performed by: INTERNAL MEDICINE

## 2024-12-15 PROCEDURE — 96376 TX/PRO/DX INJ SAME DRUG ADON: CPT

## 2024-12-15 RX ADMIN — Medication 50 MCG: at 08:48

## 2024-12-15 RX ADMIN — Medication 1000 MCG: at 08:47

## 2024-12-15 RX ADMIN — METOPROLOL SUCCINATE 50 MG: 50 TABLET, EXTENDED RELEASE ORAL at 08:47

## 2024-12-15 RX ADMIN — OXYCODONE HYDROCHLORIDE AND ACETAMINOPHEN 500 MG: 500 TABLET ORAL at 08:47

## 2024-12-15 RX ADMIN — PANTOPRAZOLE SODIUM 40 MG: 40 TABLET, DELAYED RELEASE ORAL at 08:47

## 2024-12-15 RX ADMIN — ERYTHROMYCIN: 5 OINTMENT OPHTHALMIC at 20:29

## 2024-12-15 RX ADMIN — CEFTRIAXONE SODIUM 2 G: 2 INJECTION, POWDER, FOR SOLUTION INTRAMUSCULAR; INTRAVENOUS at 15:02

## 2024-12-15 RX ADMIN — ZINC SULFATE 220 MG (50 MG) CAPSULE 220 MG: CAPSULE at 08:48

## 2024-12-15 RX ADMIN — Medication 600 MG: at 08:47

## 2024-12-15 RX ADMIN — PRAVASTATIN SODIUM 40 MG: 40 TABLET ORAL at 08:48

## 2024-12-15 RX ADMIN — ASPIRIN 81 MG: 81 TABLET, COATED ORAL at 08:47

## 2024-12-15 RX ADMIN — OXYBUTYNIN CHLORIDE 5 MG: 5 TABLET, EXTENDED RELEASE ORAL at 08:47

## 2024-12-15 RX ADMIN — ENOXAPARIN SODIUM 40 MG: 40 INJECTION SUBCUTANEOUS at 20:29

## 2024-12-15 ASSESSMENT — ACTIVITIES OF DAILY LIVING (ADL)
ADLS_ACUITY_SCORE: 46
ADLS_ACUITY_SCORE: 52
ADLS_ACUITY_SCORE: 52
ADLS_ACUITY_SCORE: 46
ADLS_ACUITY_SCORE: 46
ADLS_ACUITY_SCORE: 52
ADLS_ACUITY_SCORE: 46
ADLS_ACUITY_SCORE: 52
ADLS_ACUITY_SCORE: 46
ADLS_ACUITY_SCORE: 47
ADLS_ACUITY_SCORE: 47
ADLS_ACUITY_SCORE: 52
ADLS_ACUITY_SCORE: 46
ADLS_ACUITY_SCORE: 47
ADLS_ACUITY_SCORE: 52
ADLS_ACUITY_SCORE: 47
ADLS_ACUITY_SCORE: 47
ADLS_ACUITY_SCORE: 52
ADLS_ACUITY_SCORE: 47
ADLS_ACUITY_SCORE: 52
ADLS_ACUITY_SCORE: 47

## 2024-12-15 NOTE — PROGRESS NOTES
"  Observation goals  PRIOR TO DISCHARGE        Comments:   -diagnostic tests and consults completed and resulted: not met  -vital signs normal or at patient baseline: met   -infection is improving: not met, pt receives one dose of  IV antibiotics in the ED. Rocephin schedule every 24 hours  Nurse to notify provider when observation goals have been met and patient is ready for discharge.            /80 (BP Location: Left arm)   Pulse 85   Temp 97.8  F (36.6  C) (Oral)   Resp 18   Ht 1.626 m (5' 4\")   Wt 72.6 kg (160 lb)   LMP 11/08/1991 (Exact Date)   SpO2 93%   BMI 27.46 kg/m      "

## 2024-12-15 NOTE — PROGRESS NOTES
Observation goals  PRIOR TO DISCHARGE        Comments:   -diagnostic tests and consults completed and resulted: not met  -vital signs normal or at patient baseline: met   -infection is improving: not met, pt receives one dose of  IV antibiotics in the ED. Rocephin schedule every 24 hours  Nurse to notify provider when observation goals have been met and patient is ready for discharge.

## 2024-12-15 NOTE — PROGRESS NOTES
[facility]    Medicine Progress Note - Hospitalist Service    Date of Admission:  12/14/2024    Assessment & Plan   Brielle Marvin is a 84 year old female with past medical history of hypertension, hyperlipidemia, cellulitis presents to the ED for evaluation of left leg erythema and swelling.  It started today in the morning.     Cellulitis of the left lower extremity  -no fever, no leucocytosis, LA 0.5  -Area demarcated.    -Not septic at time of admission.   -Blood cultures obtained on admission  -US LLE- no DVT  -started on IV ceftriaxone 2gm daily on admission- continue for now  -Monitor fever curve and white blood cells trend    Hypercapnia.    -Patient may have undiagnosed COPD.    -Venous blood gas showed pCO2 of 55 with compensatory retention with normal bicarb.   -Lungs are clear to auscultation, no wheezes.    -Saturating okay on room air.  -BMP in am    Benign essential hypertension  -Not on medications at home    Dyslipidemia   -Continue PTA statin    Strep pharyngitis.   -recent rapid strep on 12/13 was positive; was started on amoxicillin; symptoms improved  -She is currently on ceftriaxone    GERD/dyspepsia.   -PTA omeprazole switch to PPI    Overactive bladder.   - Continue PTA oxybutynin    History of right lower extremity DVT  -started Lovenox for DVT/px    Hypovitaminosis D.    -Hold PTA supplements given observational        Observation Goals: -diagnostic tests and consults completed and resulted, -vital signs normal or at patient baseline, -infection is improving, Nurse to notify provider when observation goals have been met and patient is ready for discharge.  Diet: Regular Diet Adult    DVT Prophylaxis: Enoxaparin (Lovenox) SQ  Andrews Catheter: Not present  Lines: None     Cardiac Monitoring: None  Code Status: Full Code      Clinically Significant Risk Factors Present on Admission                 # Drug Induced Platelet Defect: home medication list includes an  "antiplatelet medication   # Hypertension: Noted on problem list           # Overweight: Estimated body mass index is 27.46 kg/m  as calculated from the following:    Height as of this encounter: 1.626 m (5' 4\").    Weight as of this encounter: 72.6 kg (160 lb).              Social Drivers of Health    Physical Activity: Insufficiently Active (2/29/2024)    Exercise Vital Sign     Days of Exercise per Week: 2 days     Minutes of Exercise per Session: 20 min   Social Connections: Unknown (2/29/2024)    Social Connection and Isolation Panel [NHANES]     Frequency of Social Gatherings with Friends and Family: More than three times a week          Disposition Plan     Medically Ready for Discharge: Anticipated Tomorrow, 1-2 days, pending clinical improvement            Swati Ortega MD  Hospitalist Service  [facility]  Securely message with [employer] (more info)  Text page via [employer] Paging/Directory   ______________________________________________________________________    Interval History   Doing fine, the area of erythema did not close of the borders but she states that it seems more red today  Denies chest pain, no shortness of breath  No nausea, no vomiting, no abdominal pain    Physical Exam   Vital Signs: Temp: 98.6  F (37  C) Temp src: Oral BP: 139/89 Pulse: 78   Resp: 18 SpO2: 94 % O2 Device: None (Room air)    Weight: 160 lbs 0 oz    General Appearance: Awake/alert, no acute distress  Respiratory: Bilateral air entry, no wheezing, no rales, no crackles  Cardiovascular: S1-S2, RRR, no murmurs, no rubs  GI: Abdominal soft, nontender, bowel sounds are present  Skin: Area of erythema and warmth over anterior -medial aspect of her right lower extremity above her ankle; the area demarcated, erythema does not cross the borders  Neuro: AAOx3, no focal neurological deficits    Medical Decision Making       45 MINUTES SPENT BY ME on the date of service doing chart review, history, exam, " documentation & further activities per the note.      Data     I have personally reviewed the following data over the past 24 hrs:    7.3  \   13.5   / 168     139 101 12.6 /  97   4.2 30 (H) 0.60 \     ALT: 14 AST: 22 AP: 61 TBILI: 0.5   ALB: 4.0 TOT PROTEIN: 7.3 LIPASE: N/A     Procal: N/A CRP: N/A Lactic Acid: 0.5         Imaging results reviewed over the past 24 hrs:   Recent Results (from the past 24 hours)   US Lower Extremity Venous Duplex Left    Narrative    EXAM: US LOWER EXTREMITY VENOUS DUPLEX LEFT  LOCATION: [facility]  DATE: 12/14/2024    INDICATION: redness, swelling, dvt  COMPARISON: Ultrasound 5/27/2021  TECHNIQUE: Venous Duplex ultrasound of the left lower extremity with and without compression, augmentation and duplex. Color flow and spectral Doppler with waveform analysis performed.    FINDINGS: Exam includes the common femoral, femoral, popliteal, and contralateral common femoral veins as well as segmentally visualized deep calf veins and greater saphenous vein.     LEFT: No deep vein thrombosis. No superficial thrombophlebitis. No popliteal cyst.      Impression    IMPRESSION:  1.  No deep venous thrombosis in the left lower extremity.

## 2024-12-15 NOTE — PLAN OF CARE
PRIMARY Concern: left lower extremity erythema and itching - cellulitis   Tests/Procedures for NEXT shift: none  Consults? (Pending/following, signed-off?): none  Safety Risk CONCERNS (fall risk, behaviors, etc.): none      Where is patient from? (Home, TCU, etc.): home  Isolation/Type: none  Other Important info for next shift: none  Anticipated DC date & active delays: pending  SUMMARY NOTE:  Orientation/Cognitive: A/Ox4  Observation Goals (Met/ Not Met): not met   Mobility Level/Assist Equipment: IND  Antibiotics & Plan (IV/po, length of tx left): IV rocephin Q 24 hrs    Pain Management: denies pain  Tele/VS/O2: VSS on room air   ABNL Lab/BG: Venous gases abnormal  Diet: regular  Bowel/Bladder: up to bathroom, independently   Skin Concerns: left lower extremity erythema, +1 edema to BLE  Drains/Devices: IVSL  Patient Stated Goal for Today: to get better

## 2024-12-15 NOTE — PROGRESS NOTES
Admission/Transfer from: ED  2 RN skin assessment completed. Yes  Name of 2nd RN: Susan GONZALEZ RN  Significant findings include: Left ankle redness; base of neck scab  WOC Nurse Consult Ordered? No

## 2024-12-15 NOTE — PROGRESS NOTES
Observation goals  PRIOR TO DISCHARGE        Comments:   -diagnostic tests and consults completed and resulted: Met  -vital signs normal or at patient baseline: met   -infection is improving: not met, will need more iv abx doses  Nurse to notify provider when observation goals have been met and patient is ready for discharge.

## 2024-12-15 NOTE — PROGRESS NOTES
"Summary: Admitted to the Ed today for evaluation of left leg pain, redness, and swelling. Patient presents with left lower extremity erythema and itching.  Cellulitis of the left lower extremity.    Care Plan Summary Note: 12/4/24 [id number]  Orientation: A&Ox4  Observation Goals (met & not met): Not met  Activity Level: SBA/Independent  Fall Risk: No  Behavior & Aggression Tool Color: Green  Pain Management: Denies  ABNL VS/O2: VSS on RA  ABNL Lab/BG: Slightly abnormal gas level  Diet: Regular  Bowel/Bladder: Continent of bowel and bladder  Drains/Devices: Right PIV SL  Tests/Procedures for next shift: None  Anticipated DC date: TBD  Other Important Info: IV Rocephin every 24 hours.    BP (!) 160/86 (BP Location: Left arm)   Pulse 89   Temp 99.3  F (37.4  C) (Oral)   Resp 18   Ht 1.626 m (5' 4\")   Wt 72.6 kg (160 lb)   LMP 11/08/1991 (Exact Date)   SpO2 95%   BMI 27.46 kg/m      "

## 2024-12-15 NOTE — PROGRESS NOTES
"  Observation goals  PRIOR TO DISCHARGE        Comments:   -diagnostic tests and consults completed and resulted: not met  -vital signs normal or at patient baseline: met   -infection is improving: not met, pt receives one dose of  IV antibiotics in the ED. Rocephin schedule every 24 hours  Nurse to notify provider when observation goals have been met and patient is ready for discharge.            /78 (BP Location: Left arm)   Pulse 92   Temp 98.8  F (37.1  C) (Oral)   Resp 18   Ht 1.626 m (5' 4\")   Wt 72.6 kg (160 lb)   LMP 11/08/1991 (Exact Date)   SpO2 94%   BMI 27.46 kg/m      "

## 2024-12-15 NOTE — PROGRESS NOTES
Observation goals  PRIOR TO DISCHARGE        Comments:   -diagnostic tests and consults completed and resulted: Met  -vital signs normal or at patient baseline: met   -infection is improving: not met, will need more iv abx dose  Nurse to notify provider when observation goals have been met and patient is ready for discharge.

## 2024-12-16 ENCOUNTER — APPOINTMENT (OUTPATIENT)
Dept: ULTRASOUND IMAGING | Facility: CLINIC | Age: 84
DRG: 603 | End: 2024-12-16
Payer: COMMERCIAL

## 2024-12-16 LAB
ANION GAP SERPL CALCULATED.3IONS-SCNC: 7 MMOL/L (ref 7–15)
BUN SERPL-MCNC: 11.2 MG/DL (ref 8–23)
CALCIUM SERPL-MCNC: 8.8 MG/DL (ref 8.8–10.4)
CHLORIDE SERPL-SCNC: 104 MMOL/L (ref 98–107)
CREAT SERPL-MCNC: 0.6 MG/DL (ref 0.51–0.95)
CREAT SERPL-MCNC: 0.6 MG/DL (ref 0.51–0.95)
CRP SERPL-MCNC: 31.43 MG/L
CRP SERPL-MCNC: 9.07 MG/L
EGFRCR SERPLBLD CKD-EPI 2021: 89 ML/MIN/1.73M2
EGFRCR SERPLBLD CKD-EPI 2021: 89 ML/MIN/1.73M2
ERYTHROCYTE [DISTWIDTH] IN BLOOD BY AUTOMATED COUNT: 12.2 % (ref 10–15)
EST. AVERAGE GLUCOSE BLD GHB EST-MCNC: 114 MG/DL
GLUCOSE SERPL-MCNC: 105 MG/DL (ref 70–99)
HBA1C MFR BLD: 5.6 %
HCO3 SERPL-SCNC: 28 MMOL/L (ref 22–29)
HCT VFR BLD AUTO: 36.7 % (ref 35–47)
HGB BLD-MCNC: 12.2 G/DL (ref 11.7–15.7)
MCH RBC QN AUTO: 31.5 PG (ref 26.5–33)
MCHC RBC AUTO-ENTMCNC: 33.2 G/DL (ref 31.5–36.5)
MCV RBC AUTO: 95 FL (ref 78–100)
PLATELET # BLD AUTO: 156 10E3/UL (ref 150–450)
POTASSIUM SERPL-SCNC: 4.2 MMOL/L (ref 3.4–5.3)
RBC # BLD AUTO: 3.87 10E6/UL (ref 3.8–5.2)
SODIUM SERPL-SCNC: 139 MMOL/L (ref 135–145)
WBC # BLD AUTO: 6 10E3/UL (ref 4–11)

## 2024-12-16 PROCEDURE — 80048 BASIC METABOLIC PNL TOTAL CA: CPT | Performed by: INTERNAL MEDICINE

## 2024-12-16 PROCEDURE — 83036 HEMOGLOBIN GLYCOSYLATED A1C: CPT

## 2024-12-16 PROCEDURE — G0378 HOSPITAL OBSERVATION PER HR: HCPCS

## 2024-12-16 PROCEDURE — 93971 EXTREMITY STUDY: CPT | Mod: RT

## 2024-12-16 PROCEDURE — 86140 C-REACTIVE PROTEIN: CPT

## 2024-12-16 PROCEDURE — 36415 COLL VENOUS BLD VENIPUNCTURE: CPT | Performed by: INTERNAL MEDICINE

## 2024-12-16 PROCEDURE — 96376 TX/PRO/DX INJ SAME DRUG ADON: CPT

## 2024-12-16 PROCEDURE — 85018 HEMOGLOBIN: CPT | Performed by: INTERNAL MEDICINE

## 2024-12-16 PROCEDURE — 93005 ELECTROCARDIOGRAM TRACING: CPT

## 2024-12-16 PROCEDURE — 250N000013 HC RX MED GY IP 250 OP 250 PS 637: Performed by: INTERNAL MEDICINE

## 2024-12-16 PROCEDURE — 99232 SBSQ HOSP IP/OBS MODERATE 35: CPT

## 2024-12-16 PROCEDURE — 120N000001 HC R&B MED SURG/OB

## 2024-12-16 PROCEDURE — 250N000011 HC RX IP 250 OP 636: Performed by: INTERNAL MEDICINE

## 2024-12-16 PROCEDURE — 93010 ELECTROCARDIOGRAM REPORT: CPT | Performed by: INTERNAL MEDICINE

## 2024-12-16 RX ORDER — HYDRALAZINE HYDROCHLORIDE 20 MG/ML
10 INJECTION INTRAMUSCULAR; INTRAVENOUS EVERY 4 HOURS PRN
Status: DISCONTINUED | OUTPATIENT
Start: 2024-12-16 | End: 2024-12-17 | Stop reason: HOSPADM

## 2024-12-16 RX ADMIN — PRAVASTATIN SODIUM 40 MG: 40 TABLET ORAL at 08:22

## 2024-12-16 RX ADMIN — ASPIRIN 81 MG: 81 TABLET, COATED ORAL at 08:22

## 2024-12-16 RX ADMIN — METOPROLOL SUCCINATE 50 MG: 50 TABLET, EXTENDED RELEASE ORAL at 08:22

## 2024-12-16 RX ADMIN — CEFTRIAXONE SODIUM 2 G: 2 INJECTION, POWDER, FOR SOLUTION INTRAMUSCULAR; INTRAVENOUS at 16:35

## 2024-12-16 RX ADMIN — ERYTHROMYCIN: 5 OINTMENT OPHTHALMIC at 22:42

## 2024-12-16 RX ADMIN — ENOXAPARIN SODIUM 40 MG: 40 INJECTION SUBCUTANEOUS at 20:03

## 2024-12-16 RX ADMIN — OXYBUTYNIN CHLORIDE 5 MG: 5 TABLET, EXTENDED RELEASE ORAL at 08:22

## 2024-12-16 RX ADMIN — PANTOPRAZOLE SODIUM 40 MG: 40 TABLET, DELAYED RELEASE ORAL at 08:22

## 2024-12-16 ASSESSMENT — ACTIVITIES OF DAILY LIVING (ADL)
ADLS_ACUITY_SCORE: 52

## 2024-12-16 NOTE — PLAN OF CARE
PRIMARY Concern: left lower extremity erythema and itching; strep pharyngitis   Tests/Procedures for NEXT shift: continuous pulseox  Consults? (Pending/following, signed-off?): none  Safety Risk CONCERNS (fall risk, behaviors, etc.): none      Where is patient from? (Home, TCU, etc.): home  Isolation/Type: none  Other Important info for next shift: none  Anticipated DC date & active delays: pending  SUMMARY NOTE:  Orientation/Cognitive: A/Ox4  Observation Goals (Met/ Not Met): inpt   Mobility Level/Assist Equipment: IND  Antibiotics & Plan (IV/po, length of tx left): IV rocephin q24h  Pain Management: denies pain  Tele/VS/O2: VSS on room air   ABNL Lab/BG: Venous gases abnormal  Diet: regular  Bowel/Bladder: up to bathroom, independently   Skin Concerns: left lower extremity erythema, +1 trace edema to BLE, slightly pink on the right ankle, compression stockings on and legs elevated  Drains/Devices: IVSL  Patient Stated Goal for Today: to get better

## 2024-12-16 NOTE — PROGRESS NOTES
Observation goals  PRIOR TO DISCHARGE        Comments:   -diagnostic tests and consults completed and resulted: Met  -vital signs normal or at patient baseline: met   -infection is improving: not met, will need more iv abx doses  Nurse to notify provider when observation goals have been met and patient is ready for disch

## 2024-12-16 NOTE — PROGRESS NOTES
Patient is A/O x 4, vss, a-febrile, denies pain, independent in the room, admitted with left lower Extremity cellulitis, is clearing, denies pain, up with SBA, possible discharge to home tomorrow.

## 2024-12-16 NOTE — PROGRESS NOTES
Observation goals  PRIOR TO DISCHARGE        Comments: -diagnostic tests and consults completed and resulted: met   -vital signs normal or at patient baseline: met   -infection is improving: not met, IV antibiotics needed   Nurse to notify provider when observation goals have been met and patient is ready for discharge.

## 2024-12-16 NOTE — PROGRESS NOTES
Observation goals  PRIOR TO DISCHARGE        Comments: -diagnostic tests and consults completed and resulted: not met   -vital signs normal or at patient baseline: met   -infection is improving: not met, IV antibiotics needed   Nurse to notify provider when observation goals have been met and patient is ready for discharge.

## 2024-12-16 NOTE — PLAN OF CARE
Goal Outcome Evaluation:  PRIMARY Concern: left lower extremity erythema and itching - cellulitis   Tests/Procedures for NEXT shift: none  Consults? (Pending/following, signed-off?): none  Safety Risk CONCERNS (fall risk, behaviors, etc.): none      Where is patient from? (Home, TCU, etc.): home  Isolation/Type: none  Other Important info for next shift: none  Anticipated DC date & active delays: pending    SUMMARY NOTE:  Orientation/Cognitive: A/Ox4  Observation Goals (Met/ Not Met): not met   Mobility Level/Assist Equipment: IND  Antibiotics & Plan (IV/po, length of tx left): IV rocephin Q 24 hrs    Pain Management: denies pain  Tele/VS/O2: VSS on room air   ABNL Lab/BG: Venous gases abnormal  Diet: regular  Bowel/Bladder: up to bathroom, independently   Skin Concerns: left lower extremity erythema, +1 edema to BLE  Drains/Devices: IVSL  Patient Stated Goal for Today: Possible discharge today

## 2024-12-16 NOTE — PROGRESS NOTES
[facility]    Medicine Progress Note - Hospitalist Service    Date of Admission:  12/14/2024    Assessment & Plan   Brielle Marvin is a 84 year old female with past medical history significant for hypertension, hyperlipidemia, recurrent left lower extremity cellulitis, mild chronic lower extremity venous insufficiency, TIA and DVT, among others, who presented to the ED 12/14/2024 for further evaluation of left leg erythema and swelling.      Cellulitis of the left lower extremity  History of recurrent LLE cellulitis in setting of chronic LE venous insufficiency with chronic venous stasis changes   *History of recurrent LLE cellulitis. Noted to have sepsis secondary to LLE cellulitis 11/2021. Recurrent treatments with keflex and sometimes requiring Ancef or Rocephin. No history of MRSA. Has been managed with IV cefazolin in past. Left lower extremity swelling, pain and redness developed 12/14/2024 without history of trauma. Has been seen by vascular in past and noted to have mild venous insufficiency on US although advanced venous stasis changes noted in prior notes.   *Upon arrival to ED, patient afebrile. Labs notable for no leukocytosis or elevated lactate.   - Admitted to observation before transitioned to inpatient. Continues to have no evidence of sepsis although has required multiple days of abx. LLE cellulitis with reduction of erythema in previous drawn borders other than slight tracking up medial superior aspect left calf just past border. New RLE symptoms as mentioned below.   - Vitals q 4 hours. I/Os. Weights. Patient remains afebrile.   - Blood cultures obtained on admission with no growth to date. Follow cultures.   - US LLE without DVT.   - Elevate legs.   - Compression stockings.   - Started on IV ceftriaxone 2 mg daily upon admission. Continue.   - Trend CRP.   - Check A1c.   - Continue to monitor with morning labs.     Right lower extremity edema and calf tenderness    *Patient with RLE swelling and tenderness to palpation. Warmth noted upon palpation with very faint areas of erythema concerning for cellulitic changes, as well. Notes hit right foot in recent days.   - US RLE without DVT.   - Continue ceftriaxone, per above.   - Elevate legs and compression stockings, per above.   - Echocardiogram.   - Outpatient follow-up with vascular surgery recommended upon discharge.     Mild-moderate aortic valve stenosis   Mitral valve calcification   LVH   *Echocardiogram 09/2023 with hyperdynamic LV function. EF 65-70%. Calcified aortic valve with mild-moderate aortic stenosis. Mean gradient 12 mmHg. Stroke volume index is 28 ml/m2 (reduced) due to proximal intracavitary obstruction. Mitral valve calcification noted.   *Cardiac MRI without evidence of hypertrophic cardiomyopathy. LVEF 69.5%.   - I/Os. Daily weights.   - BNP.   - Echocardiogram.     Strep pharyngitis   *Positive for Strep A pharyngitis 12/13/2024. Started on amoxicillin and symptoms improved, however, on 12/14/2024 patient developed LLE cellulitic changes.   - Continue ceftriaxone, per above.     Abrasion of left cornea, improving    *Developed left eye pain after twig injury 10/13/2024. Seen in clinic where abrasion noted without evidence of foreign body.   - Continue PTA eyedrops.     Chronic hypercapnia   *Patient with suspected history of undiagnosed COPD. Not on supplemental oxygen at baseline. On examination, lungs with faint crackles. No wheeze.   *CT chest 09/2023 with bilateral pleural parenchymal thickening.   - Continuous pulse oximetry. Oxygen PRN.   - VBG with pCO2 of 55 with compensatory retention with normal bicarb. pH unremarkable consistent with chronic  hypercapnic respiratory failure. Suspect contributed to by untreated/undiagnosed VAUGHN, per below.   - Continue to monitor with morning labs.     History of supsected VAUGHN  *Suspected to have undiagnosed sleep apnea contributing to HTN in past. Has not  followed up outpatient with sleep medicine for further work-up.   - Continuous pulse oximetry. Oxygen PRN overnight.   - Outpatient sleep medicine consultation upon discharge.     Benign essential hypertension  *On PTA metoprolol. Intermittent hypertension despite PTA BB.   - Continue PTA metoprolol daily.   - Hydralazine for SBP>180.     Dyslipidemia   * 02/2024.   - Continue PTA statin.    GERD/dyspepsia.   - Continue PTA PPI.     Overactive bladder.   - Continue PTA oxybutynin.    History of right lower extremity DVT, unprovoked.   *History of unprovoked DVT right peroneal vein (2018). Treated on xarelto three months.   - Started on lovenox upon admission for DVT prophylaxis. Continue lovenox.     Hypovitaminosis D   - Hold PTA supplements upon admission. Resume upon discharge.      Observation Goals: -diagnostic tests and consults completed and resulted, -vital signs normal or at patient baseline, -infection is improving, Nurse to notify provider when observation goals have been met and patient is ready for discharge.  Diet: Regular Diet Adult    DVT Prophylaxis: Enoxaparin (Lovenox) SQ  Andrews Catheter: Not present  Lines: None     Cardiac Monitoring: None  Code Status: Full Code      Social Drivers of Health    Physical Activity: Insufficiently Active (2/29/2024)    Exercise Vital Sign     Days of Exercise per Week: 2 days     Minutes of Exercise per Session: 20 min   Social Connections: Unknown (2/29/2024)    Social Connection and Isolation Panel [NHANES]     Frequency of Social Gatherings with Friends and Family: More than three times a week          Disposition Plan     Medically Ready for Discharge: Anticipated Tomorrow     The patient's care was discussed with the Attending Physician, Dr. Fernando .    Hortencia Lockwood PA-C  Hospitalist Service  [facility]  Securely message with [employer] (more info)  Text page via [employer] Paging/Directory    ______________________________________________________________________    Interval History   No significant events overnight. VSS. Afebrile. Intermittent hypertension (systolics 140s-150s). Well appearing. Left lower extremity with mild improvement of LE cellulitis although small advancement up left on medial aspect superior calf, however, right lower extremity with increased warmth, swelling and tenderness to palpation. Very faint areas of erythema noted upon examination. Pitting edema noted bilaterally. Denies additional symptoms of chest pain, SOB, abdominal pain or distension, N/V, urinary or bowel changes. No pets or recent trauma to LEs.  No additional concerns or complaints at this time.     Physical Exam   Vital Signs: Temp: 98.4  F (36.9  C) Temp src: Oral BP: (!) 152/79 Pulse: 77   Resp: 18 SpO2: 94 % O2 Device: None (Room air)    Weight: 160 lbs 0 oz    GENERAL:  Pleasant, cooperative, alert. Laying in bed comfortably in no acute distress. Daughter at bedside.   HEENT: Normocephalic, atraumatic.  Erythematous oropharynx. No exudates. Uvula midline. Anterior cervical adenopathy noted.   PULMONOLOGY: Diffuse faint crackles bilaterally. No wheeze. No increased work of breathing.   CARDIAC: Regular rate and rhythm.    ABDOMEN: Soft, non distended. Mild LLQ discomfort to palpation.   MUSCULOSKELETAL:  Moving x 4 spontaneously.  Erythema, warmth LLE with reduction from previous drawn borders other than slight tracking up medial superior aspect left calf just past border. Pitting edema noted and diffuse tenderness to palpation noted. RLE with warmth and mild pitting edema. Tenderness to palpation.   NEURO: Alert and oriented x3. Nonfocal exam.      Medical Decision Making       48 MINUTES SPENT BY ME on the date of service doing chart review, history, exam, documentation & further activities per the note.      Data   ------------------------- PAST 24 HR DATA REVIEWED  -----------------------------------------------    I have personally reviewed the following data over the past 24 hrs:    6.0  \   12.2   / 156     139 104 11.2 /  105 (H)   4.2 28 0.60 \     Procal: N/A CRP: 9.07 (H) Lactic Acid: N/A         Imaging results reviewed over the past 24 hrs:   Recent Results (from the past 24 hours)   US Lower Extremity Venous Duplex Right    Narrative    US LOWER EXTREMITY VENOUS DUPLEX RIGHT 12/16/2024 12:37 PM    CLINICAL HISTORY/INDICATION: RLE tenderness; assess for DVT    COMPARISON: None relevant    TECHNIQUE:   Grayscale, color-flow, and spectral waveform analysis were performed  of the deep veins of the right lower extremity    FINDINGS:   The right common femoral vein, femoral vein, popliteal vein and tibial  veins demonstrate normal compressibility, spectral waveform, color  flow and augmentation.    The contralateral left common femoral vein demonstrates normal  compressibility, spectral waveform, color flow and augmentation.      Impression    IMPRESSION:   No evidence of deep venous thrombosis in the right lower extremity    ANDREW ALFRED MD         SYSTEM ID:  [id number]

## 2024-12-16 NOTE — PLAN OF CARE
Goal Outcome Evaluation:      [id number]  diagnostic tests and consults completed and resulted: met   vital signs normal or at patient baseline: met   infection is improving: partially met   Orientation/Cognitive: A/Ox4  Observation Goals (Met/ Not Met): not met   Mobility Level/Assist Equipment: IND  Antibiotics & Plan (IV/po, length of tx left): IV rocephin Q 24 hrs    Pain Management: denies pain  Tele/VS/O2: VSS on room air   ABNL Lab/BG: Venous gases abnormal  Diet: regular  Bowel/Bladder: up to bathroom, independently   Skin Concerns: left lower extremity erythema, +1 edema to BLE  Drains/Devices: IVSL  Patient Stated Goal for Today: to get better

## 2024-12-17 VITALS
OXYGEN SATURATION: 95 % | HEIGHT: 64 IN | HEART RATE: 97 BPM | WEIGHT: 160 LBS | RESPIRATION RATE: 18 BRPM | BODY MASS INDEX: 27.31 KG/M2 | DIASTOLIC BLOOD PRESSURE: 82 MMHG | SYSTOLIC BLOOD PRESSURE: 146 MMHG | TEMPERATURE: 98.1 F

## 2024-12-17 LAB
ANION GAP SERPL CALCULATED.3IONS-SCNC: 9 MMOL/L (ref 7–15)
BUN SERPL-MCNC: 10.1 MG/DL (ref 8–23)
CALCIUM SERPL-MCNC: 8.7 MG/DL (ref 8.8–10.4)
CHLORIDE SERPL-SCNC: 103 MMOL/L (ref 98–107)
CREAT SERPL-MCNC: 0.56 MG/DL (ref 0.51–0.95)
CRP SERPL-MCNC: 4.55 MG/L
EGFRCR SERPLBLD CKD-EPI 2021: 90 ML/MIN/1.73M2
ERYTHROCYTE [DISTWIDTH] IN BLOOD BY AUTOMATED COUNT: 12 % (ref 10–15)
GLUCOSE SERPL-MCNC: 105 MG/DL (ref 70–99)
HCO3 SERPL-SCNC: 27 MMOL/L (ref 22–29)
HCT VFR BLD AUTO: 38.5 % (ref 35–47)
HGB BLD-MCNC: 12.6 G/DL (ref 11.7–15.7)
LVEF ECHO: NORMAL
MCH RBC QN AUTO: 31 PG (ref 26.5–33)
MCHC RBC AUTO-ENTMCNC: 32.7 G/DL (ref 31.5–36.5)
MCV RBC AUTO: 95 FL (ref 78–100)
PLATELET # BLD AUTO: 164 10E3/UL (ref 150–450)
POTASSIUM SERPL-SCNC: 4.2 MMOL/L (ref 3.4–5.3)
RBC # BLD AUTO: 4.06 10E6/UL (ref 3.8–5.2)
SODIUM SERPL-SCNC: 139 MMOL/L (ref 135–145)
WBC # BLD AUTO: 6.4 10E3/UL (ref 4–11)

## 2024-12-17 PROCEDURE — 250N000011 HC RX IP 250 OP 636: Performed by: INTERNAL MEDICINE

## 2024-12-17 PROCEDURE — 82565 ASSAY OF CREATININE: CPT

## 2024-12-17 PROCEDURE — 80048 BASIC METABOLIC PNL TOTAL CA: CPT

## 2024-12-17 PROCEDURE — 36415 COLL VENOUS BLD VENIPUNCTURE: CPT

## 2024-12-17 PROCEDURE — 85027 COMPLETE CBC AUTOMATED: CPT

## 2024-12-17 PROCEDURE — 250N000013 HC RX MED GY IP 250 OP 250 PS 637: Performed by: INTERNAL MEDICINE

## 2024-12-17 PROCEDURE — 255N000002 HC RX 255 OP 636

## 2024-12-17 PROCEDURE — 99239 HOSP IP/OBS DSCHRG MGMT >30: CPT

## 2024-12-17 PROCEDURE — 86140 C-REACTIVE PROTEIN: CPT

## 2024-12-17 RX ORDER — ERYTHROMYCIN 5 MG/G
OINTMENT OPHTHALMIC 4 TIMES DAILY
Qty: 3.5 G | Refills: 0 | Status: SHIPPED | OUTPATIENT
Start: 2024-12-17 | End: 2024-12-21

## 2024-12-17 RX ORDER — CEFDINIR 300 MG/1
300 CAPSULE ORAL 2 TIMES DAILY
Qty: 12 CAPSULE | Refills: 0 | Status: SHIPPED | OUTPATIENT
Start: 2024-12-18

## 2024-12-17 RX ADMIN — OXYBUTYNIN CHLORIDE 5 MG: 5 TABLET, EXTENDED RELEASE ORAL at 08:01

## 2024-12-17 RX ADMIN — PERFLUTREN 1.5 ML: 6.52 INJECTION, SUSPENSION INTRAVENOUS at 13:16

## 2024-12-17 RX ADMIN — CEFTRIAXONE SODIUM 2 G: 2 INJECTION, POWDER, FOR SOLUTION INTRAMUSCULAR; INTRAVENOUS at 15:21

## 2024-12-17 RX ADMIN — ASPIRIN 81 MG: 81 TABLET, COATED ORAL at 08:00

## 2024-12-17 RX ADMIN — PANTOPRAZOLE SODIUM 40 MG: 40 TABLET, DELAYED RELEASE ORAL at 08:01

## 2024-12-17 RX ADMIN — METOPROLOL SUCCINATE 50 MG: 50 TABLET, EXTENDED RELEASE ORAL at 08:00

## 2024-12-17 RX ADMIN — PRAVASTATIN SODIUM 40 MG: 40 TABLET ORAL at 08:00

## 2024-12-17 ASSESSMENT — ACTIVITIES OF DAILY LIVING (ADL)
ADLS_ACUITY_SCORE: 52

## 2024-12-17 NOTE — DISCHARGE SUMMARY
[facility]  Hospitalist Discharge Summary      Date of Admission:  12/14/2024  Date of Discharge:  12/17/2024  5:44 PM  Discharging Provider: Hortencia Lockwood PA-C  Discharge Service: Hospitalist Service    Discharge Diagnoses   Cellulitis of the left lower extremity, improving   History of recurrent LLE cellulitis in setting of chronic LE venous insufficiency with chronic venous stasis changes   Right lower extremity edema and calf tenderness, improving   Mild-moderate aortic valve stenosis   Mitral valve calcification   LVH   Strep pharyngitis   Abrasion of left cornea, improving   Chronic hypercapnia   History of supsected undiagnosed VAUGHN   Benign essential hypertension   Dyslipidemia   GERD/dypepsia   Overactive bladder  History of RLE DVT  Hypovitaminosis D    Follow-ups Needed After Discharge   Follow-up and recommended labs and tests     Follow up with primary care provider, Jared Emmanuel, within 7 days for hospital follow-up.  The following labs/tests are recommended: CBC and BMP.      Follow up with your vascular surgery team for further evaluation of your chronic venous insufficiency upon discharge.    As discussed, you continue to demonstrate labs/symptoms consistent with undiagnosed/untreated obstructive sleep apnea (VAUGHN). You are being discharged with an outpatient sleep medicine referral. [facility] outpatient sleeep medicine team will reach out to you to schedule an appointment in three business days, however, if you haven't heard from them within this time period call [phone] to schedule your appointment.    Finally, follow up with your Cardiology team as previously discussed with your care team for continued evaluation of your cardiac function, particularly your aortic valve stenosis.      Unresulted Labs Ordered in the Past 30 Days of this Admission       Date and Time Order Name Status Description    12/14/2024  2:09 PM Blood Culture Peripheral Blood  Preliminary     12/14/2024  2:09 PM Blood Culture Peripheral Blood Preliminary         [facility]     Discharge Disposition   Discharged to home  Condition at discharge: Stable    Hospital Course   Brielle Marvin is a 84 year old female with past medical history significant for hypertension, hyperlipidemia, recurrent left lower extremity cellulitis, mild chronic lower extremity venous insufficiency, TIA and DVT, among others, who presented to the ED 12/14/2024 for further evaluation of left leg erythema and swelling.      Cellulitis of the left lower extremity  History of recurrent LLE cellulitis in setting of chronic LE venous insufficiency with chronic venous stasis changes   Right lower extremity edema and calf tenderness   *History of recurrent LLE cellulitis. Noted to have sepsis secondary to LLE cellulitis 11/2021. Recurrent treatments with keflex and sometimes requiring Ancef or Rocephin. No history of MRSA. Has been managed with IV cefazolin in past. Left lower extremity swelling, pain and redness developed 12/14/2024 without history of trauma. Has been seen by vascular in past and noted to have mild venous insufficiency on US although advanced venous stasis changes noted in prior notes.   *Upon arrival to ED, patient afebrile. Labs notable for no leukocytosis or elevated lactate. Throughout hospital stay continued to have no evidence of sepsis although has required multiple days of intravenous abx (ceftriaxone). LLE cellulitis with reduction of erythema. New RLE symptoms developed day prior to discharge and improved with compression stocking and elevation of lower extremities. US of left and right LEs negative for DVT. Echocardiogram similar to previous with hyperdynamic systolic function. EF >70%. Suspect venous insufficiency contributing to patient's symptoms and measures to reduce this discussed with patient with daughter at bedside. Patient  transitioned to oral cefdinir upon discharge with close follow-up with PCP. Follow-up with vascular surgery for continued evaluation and monitoring of venous insuffiencey recommended, as well.    Mild-moderate aortic valve stenosis   Mitral valve calcification   LVH   *Echocardiogram 09/2023 with hyperdynamic LV function. EF 65-70%. Calcified aortic valve with mild-moderate aortic stenosis. Mean gradient 12 mmHg. Stroke volume index is 28 ml/m2 (reduced) due to proximal intracavitary obstruction. Mitral valve calcification noted.   *Cardiac MRI without evidence of hypertrophic cardiomyopathy. LVEF 69.5%.   *Patient denied chest pain, SOB, palpitations or dizziness throughout hospitalization. Echocardiogram 12/19/2024 similar to previous with hyperdynamic systolic function. EF >70%.     Strep pharyngitis   *Positive for Strep A pharyngitis 12/13/2024. Started on amoxicillin and symptoms improved, however, on 12/14/2024 patient developed LLE cellulitic changes. Treated with IV antibiotics, which were transitioned to oral cefdinir upon discharge.     Abrasion of left cornea, improving    *Developed left eye pain after twig injury 10/13/2024. Seen in clinic where abrasion noted without evidence of foreign body. Continued PTA eyedrops upon discharge.     Chronic hypercapnia   *Patient with suspected history of undiagnosed COPD. Not on supplemental oxygen at baseline. On examination, lungs with faint crackles. No wheeze.   *CT chest 09/2023 with bilateral pleural parenchymal thickening.   *Denied SOB throughout hospital stay although VBG with pCO2 of 55 with compensatory retention with normal bicarb. pH unremarkable consistent with chronic hypercapnic respiratory failure. Suspect contributed to by untreated/undiagnosed VAUGHN, per below. Outpatient sleep medicine consultation upon discharge.    History of supsected VAUGHN  *Suspected to have undiagnosed sleep apnea contributing to HTN in past. Has not followed up outpatient  with sleep medicine for further work-up. Outpatient sleep medicine consultation recommended upon discharge.     Benign essential hypertension  *On PTA metoprolol. Intermittent hypertension despite PTA BB. Continued PTA metoprolol daily upon discharge.     Dyslipidemia   * 02/2024.   - Continued PTA statin upon discharge.    GERD/dyspepsia.   - Continued PTA PPI upon discharge.     Overactive bladder.   - Continued PTA oxybutynin upon discharge.    History of right lower extremity DVT, unprovoked.   *History of unprovoked DVT right peroneal vein (2018). Treated on xarelto three months.   - Started on lovenox upon admission for DVT prophylaxis. Discontinued upon discharge.    Hypovitaminosis D   - Resumed PTA supplements upon discharge.      Consultations This Hospital Stay   None    Code Status   Prior    Time Spent on this Encounter   I, Hortencia Lockwood PA-C, personally saw the patient today and spent greater than 30 minutes discharging this patient. The patient's care was discussed with attending physician Dr. Ortiz.        Hortencia Lockwood PA-C  [facility] EXTENDED RECOVERY AND SHORT STAY  [address] [place] [id number]  Phone: [phone]  ______________________________________________________________________    Physical Exam   Vital Signs:                    Weight: 160 lbs 0 oz    GENERAL:  Pleasant, cooperative, alert. Laying in bed comfortably in no acute distress. Daughter at bedside.   HEENT: Normocephalic, atraumatic.  Erythematous oropharynx. No exudates. Uvula midline. Anterior cervical adenopathy noted.   PULMONOLOGY: Faint crackles bilaterally. No wheeze. No increased work of breathing.   CARDIAC: Regular rate and rhythm.    ABDOMEN: Soft, non distended.No tenderness to palpation.   MUSCULOSKELETAL:  Moving x 4 spontaneously. Erythema LLE improving with reduction from previous drawn borders. LLE edema noted with diffuse tenderness to palpation.  RLE with minimal edema. Mild tenderness to palpation RLE.   NEURO: Alert and oriented x3. Nonfocal exam.     Primary Care Physician   Jared Emmanuel    Discharge Orders      Compression stockings     Sleep Study Referral      Reason for your hospital stay    You were hospitalized for further evaluation of left lower extremity redness and swelling, which was suspected secondary to cellulitis. Work-up included ultrasounds of your lower extremities that were negative for a deep vein thrombosis (DVT). Your symptoms improved with several days of intravenous antibiotics. You were transitioned to oral antibiotics upon discharge. In addition, you were instructed to wear compression stockings and elevate your legs given your chronic venous insufficiency. This further improved your symptoms.     Activity    Your activity upon discharge: activity as tolerated and no driving for today     Discharge Instructions    You are being discharged on an oral antibiotic called cefdinir. Take one capsule by mouth two times daily for the next six days. Per our discussion, continue your oral antibiotics as prescribed. We recommend you take an over the counter probiotic while on antibiotics or consume one Activia yogurt daily.     In regards to your other home medications, STOP taking your prior to arrival amoxicillin as this did not adequately treat your infection. You may continue to take your other medications as previously prescribed.     Follow-up and recommended labs and tests     Follow up with primary care provider, Jared Emmanuel, within 7 days for hospital follow-up.  The following labs/tests are recommended: CBC and BMP.      Follow up with your vascular surgery team for further evaluation of your chronic venous insufficiency upon discharge.    As discussed, you continue to demonstrate labs/symptoms consistent with undiagnosed/untreated obstructive sleep apnea (VAUGHN). You are being discharged with an outpatient  sleep medicine referral. [facility] outpatient sleeep medicine team will reach out to you to schedule an appointment in three business days, however, if you haven't heard from them within this time period call [phone] to schedule your appointment.    Finally, follow up with your Cardiology team as previously discussed with your care team for continued evaluation of your cardiac function, particularly your aortic valve stenosis.     Diet    Follow this diet upon discharge: Current Diet:Orders Placed This Encounter      Regular Diet Adult       Significant Results and Procedures   Most Recent 3 CBC's:  Recent Labs   Lab Test 12/17/24  0656 12/16/24  0737 12/14/24  1425   WBC 6.4 6.0 7.3   HGB 12.6 12.2 13.5   MCV 95 95 97    156 168     Most Recent 3 BMP's:  Recent Labs   Lab Test 12/17/24  0656 12/16/24  0737 12/14/24  1425    139 139   POTASSIUM 4.2 4.2 4.2   CHLORIDE 103 104 101   CO2 27 28 30*   BUN 10.1 11.2 12.6   CR 0.56 0.60  0.60 0.60   ANIONGAP 9 7 8   NYDIA 8.7* 8.8 8.9   * 105* 97     Most Recent 2 LFT's:  Recent Labs   Lab Test 12/14/24  1425 09/09/23  2324   AST 22 18   ALT 14 15   ALKPHOS 61 49   BILITOTAL 0.5 0.4     Most Recent 3 Hemoglobins:  Recent Labs   Lab Test 12/17/24  0656 12/16/24  0737 12/14/24  1425   HGB 12.6 12.2 13.5   ,   Results for orders placed or performed during the hospital encounter of 12/14/24   US Lower Extremity Venous Duplex Left    Narrative    EXAM: US LOWER EXTREMITY VENOUS DUPLEX LEFT  LOCATION: [facility]  DATE: 12/14/2024    INDICATION: redness, swelling, dvt  COMPARISON: Ultrasound 5/27/2021  TECHNIQUE: Venous Duplex ultrasound of the left lower extremity with and without compression, augmentation and duplex. Color flow and spectral Doppler with waveform analysis performed.    FINDINGS: Exam includes the common femoral, femoral, popliteal, and contralateral common femoral veins as well as segmentally visualized deep  calf veins and greater saphenous vein.     LEFT: No deep vein thrombosis. No superficial thrombophlebitis. No popliteal cyst.      Impression    IMPRESSION:  1.  No deep venous thrombosis in the left lower extremity.   US Lower Extremity Venous Duplex Right    Narrative    US LOWER EXTREMITY VENOUS DUPLEX RIGHT 2024 12:37 PM    CLINICAL HISTORY/INDICATION: RLE tenderness; assess for DVT    COMPARISON: None relevant    TECHNIQUE:   Grayscale, color-flow, and spectral waveform analysis were performed  of the deep veins of the right lower extremity    FINDINGS:   The right common femoral vein, femoral vein, popliteal vein and tibial  veins demonstrate normal compressibility, spectral waveform, color  flow and augmentation.    The contralateral left common femoral vein demonstrates normal  compressibility, spectral waveform, color flow and augmentation.      Impression    IMPRESSION:   No evidence of deep venous thrombosis in the right lower extremity    ANDREW ALFRED MD         SYSTEM ID:  [id number]   Echocardiogram Complete     Value    LVEF  >70%    Narrative    [id number]  [id number]  [id number]  [id number]^SCOTT^LEX^BREE     [facility]  Echocardiography Laboratory  [address] [place] [address]     Name: EDWIN LOZANO  MRN: [id number]  : 1940  Study Date: 2024 11:09 AM  Age: 84 yrs  Gender: Female  Patient Location: [facility]  Reason For Study: Aortic Valve Disorder, Mitral Valve Disorder, LVH  Ordering Physician: LEX CHAVEZ  Performed By: Eboni Camacho     BSA: 1.8 m2  Height: 64 in  Weight: 160 lb  HR: 87  BP: 140/76 mmHg  ______________________________________________________________________________  Procedure  Echocardiogram with two-dimensional, color and spectral Doppler. Definity (NDC  #24552-376) given intravenously. Contrast Definity. Technically  difficult  study.  ______________________________________________________________________________  Interpretation Summary     Normal left ventricular wall thickness with small left ventricular cavity and  hyperdynamic systolic function.  The visual ejection fraction is >70%.  Intracavitary gradient present. Peak instantaneous post Valsalva maneuver  gradient 22 mmHg.  No significant dynamic left ventricular outflow tract obstruction.  Mitral chordal systolic anterior motion with trivial mitral valve  regurgitation.  Normal right ventricular wall thickness with hyperdynamic systolic function.  Aortic valve sclerosis without stenosis.  Normal inferior vena cava.     Compared to prior study, there is no significant change.  ______________________________________________________________________________  Left Ventricle  The left ventricular cavity is small. There is normal left ventricular wall  thickness. Hyperdynamic left ventricular function. The visual ejection  fraction is >70%. An intracavitary gradient is present. Peak instantaneous  post Valsalva maneuver gradient 22 mmHg. Grade I or early diastolic  dysfunction. No regional wall motion abnormalities noted.     Right Ventricle  The right ventricle is normal size. Hyperdynamic right ventricular function.     Atria  Normal left atrial size. Right atrial size is normal. There is no atrial shunt  seen.     Mitral Valve  The mitral valve leaflets appear normal. There is no evidence of stenosis,  fluttering, or prolapse. There is mild mitral annular calcification. There is  systolic anterior motion of the chordal apparatus. There is trace mitral  regurgitation. There is mild mitral stenosis. The mean mitral valve gradient  is 4.2 mmHg. Heart rate 72 bpm.     Tricuspid Valve  Normal tricuspid valve. There is trace tricuspid regurgitation. The right  ventricular systolic pressure is approximated at 32.9 mmHg plus the right  atrial pressure.     Aortic Valve  The aortic  valve is trileaflet. The aortic valve is trileaflet with aortic  valve sclerosis. No aortic regurgitation is present. There is trace aortic  regurgitation. No aortic stenosis is present. Gradient across aortic valve is  a contaminated by the intracavitary gradient.     Pulmonic Valve  There is trace pulmonic valvular regurgitation.     Vessels  The aortic root is normal size. Normal size ascending aorta. The inferior vena  cava is normal.     Pericardium  There is no pericardial effusion.     Rhythm  Sinus rhythm was noted.  ______________________________________________________________________________  MMode/2D Measurements & Calculations     IVSd: 1.2 cm  LVIDd: 2.7 cm  LVIDs: 1.5 cm  LVPWd: 1.1 cm  FS: 45.6 %  LV mass(C)d: 88.8 grams  LV mass(C)dI: 49.9 grams/m2  Ao root diam: 2.9 cm  asc Aorta Diam: 2.9 cm  LVOT diam: 2.2 cm  LVOT area: 3.8 cm2  Ao root diam index Ht(cm/m): 1.8  Ao root diam index BSA (cm/m2): 1.7  Asc Ao diam index BSA (cm/m2): 1.6  Asc Ao diam index Ht(cm/m): 1.8  EF Biplane: 67.1 %  LA Volume (BP): 78.1 ml     LA Volume Index (BP): 43.9 ml/m2  RWT: 0.85  TAPSE: 2.9 cm     Doppler Measurements & Calculations  MV E max savage: 107.0 cm/sec  MV A max savage: 138.4 cm/sec  MV E/A: 0.77  MV max P.5 mmHg  MV mean P.2 mmHg  MV V2 VTI: 37.8 cm  MVA(VTI): 3.1 cm2  MV dec slope: 225.9 cm/sec2  MV dec time: 0.48 sec  LV V1 max P.3 mmHg  LV V1 max: 134.8 cm/sec  LV V1 VTI: 30.5 cm  SV(LVOT): 116.3 ml  SI(LVOT): 65.4 ml/m2  PA acc time: 0.13 sec  TR max savage: 286.7 cm/sec  TR max P.9 mmHg  E/E' av.1     Lateral E/e': 21.9  Medial E/e': 22.4  RV S Savage: 17.1 cm/sec     ______________________________________________________________________________  Report approved by: Dr Kaitlin Cabrera on 2024 03:56 PM             Discharge Medications   Discharge Medication List as of 2024  5:19 PM        START taking these medications    Details   benzocaine-menthol (CHLORASEPTIC) 6-10 MG lozenge  Place 1 lozenge inside cheek every hour as needed for sore throat., Disp-18 lozenge, R-0, E-Prescribe      cefdinir (OMNICEF) 300 MG capsule Take 1 capsule (300 mg) by mouth 2 times daily., Disp-12 capsule, R-0, E-Prescribe           CONTINUE these medications which have CHANGED    Details   erythromycin (ROMYCIN) 5 MG/GM ophthalmic ointment Place into both eyes 4 times daily for 4 days. Place 0.5 inches into the left eye 4 times daily.Disp-3.5 g, [id number]           CONTINUE these medications which have NOT CHANGED    Details   ASPIRIN 81 MG OR TABS Take 81 mg by mouth daily , Historical      calcium 600 MG tablet Take 1 tablet by mouth daily, Historical      Cholecalciferol (VITAMIN D) 2000 UNIT tablet Take 2,000 Units by mouth daily., Historical      Cyanocobalamin (B-12) 1000 MCG SUBL Place 1,000 mcg under the tongue daily, Historical      metoprolol succinate ER (TOPROL XL) 50 MG 24 hr tablet TAKE 1 TABLET BY MOUTH ONCE DAILY (NOTE CHANGE FROM ATENONOL), Disp-90 tablet, R-0, E-Prescribe      omeprazole (PRILOSEC) 20 MG DR capsule TAKE 1 CAPSULE  BY MOUTH ONCE DAILY, Disp-90 capsule, R-0, E-Prescribe      oxyBUTYnin ER (DITROPAN XL) 5 MG 24 hr tablet Take 1 tablet (5 mg) by mouth daily., Disp-90 tablet, R-1, E-Prescribe      pravastatin (PRAVACHOL) 40 MG tablet TAKE ONE TABLET BY MOUTH ONCE DAILY, Disp-90 tablet, R-0, E-Prescribe      VITAMIN C 500 MG OR TABS Take 500 mg by mouth daily., Disp-60, R-0, Historical      zinc gluconate 50 MG tablet Take 50 mg by mouth daily, Historical           STOP taking these medications       amoxicillin (AMOXIL) 875 MG tablet Comments:   Reason for Stopping:             Allergies   Allergies   Allergen Reactions    Codeine Nausea and Vomiting    Morphine Nausea and Vomiting     nausea    Morphine And Codeine Nausea and Vomiting     vomiting    Oxycodone Nausea and Vomiting    Vicodin [Hydrocodone-Acetaminophen] Nausea and Vomiting

## 2024-12-17 NOTE — PLAN OF CARE
Goal Outcome Evaluation:    PRIMARY Concern: left lower extremity erythema and itching- cellulitis   Tests/Procedures for NEXT shift: Echo done  Consults? (Pending/following, signed-off?): none  Safety Risk CONCERNS (fall risk, behaviors, etc.): none      Where is patient from? (Home, TCU, etc.): home  Isolation/Type: none  Other Important info for next shift: none  Anticipated DC date & active delays: TBD  __________________________________________________________________  SUMMARY NOTE:  Orientation/Cognitive: A/Ox4  Observation Goals (Met/ Not Met): Inp   Mobility Level/Assist Equipment: IND  Antibiotics & Plan (IV/po, length of tx left): IV rocephin Q 24 hrs    Pain Management: denies pain  Tele/VS/O2: VSS on room air   ABNL Lab/BG: See results   Diet: Regular  Bowel/Bladder: Continent   Skin Concerns: left lower extremity erythema, +1 edema to BLE  Drains/Devices: None  Patient Stated Goal for Today: Home    Patient discharging home, AVS and discharge meds given. Pt had no further questions. Daughter provided ride home

## 2024-12-17 NOTE — CARE PLAN
PRIMARY Concern: left lower extremity erythema - cellulitis   Tests/Procedures for NEXT shift: Echo, am labs  Consults? (Pending/following, signed-off?): none  Safety Risk CONCERNS (fall risk, behaviors, etc.): none      Where is patient from? (Home, TCU, etc.): home  Isolation/Type: none  Other Important info for next shift: none  Anticipated DC date & active delays: TBD  SUMMARY NOTE:  Orientation/Cognitive: A/Ox4  Observation Goals (Met/ Not Met): not met   Mobility Level/Assist Equipment: IND  Antibiotics & Plan (IV/po, length of tx left): IV rocephin Q 24 hrs    Pain Management: denies pain  Tele/VS/O2: VSS on room air except mild /87   ABNL Lab/BG: Venous gases abnormal  Diet: regular  Bowel/Bladder: up to bathroom, independently   Skin Concerns: left lower extremity erythema, +1 edema to BLE  Drains/Devices: PIVSL  Patient Stated Goal for Today: rest

## 2024-12-18 ENCOUNTER — PATIENT OUTREACH (OUTPATIENT)
Dept: INTERNAL MEDICINE | Facility: CLINIC | Age: 84
End: 2024-12-18
Payer: COMMERCIAL

## 2024-12-19 LAB
ATRIAL RATE - MUSE: 91 BPM
BACTERIA BLD CULT: NO GROWTH
BACTERIA BLD CULT: NO GROWTH
DIASTOLIC BLOOD PRESSURE - MUSE: NORMAL MMHG
INTERPRETATION ECG - MUSE: NORMAL
P AXIS - MUSE: 70 DEGREES
PR INTERVAL - MUSE: 176 MS
QRS DURATION - MUSE: 76 MS
QT - MUSE: 368 MS
QTC - MUSE: 452 MS
R AXIS - MUSE: 66 DEGREES
SYSTOLIC BLOOD PRESSURE - MUSE: NORMAL MMHG
T AXIS - MUSE: 63 DEGREES
VENTRICULAR RATE- MUSE: 91 BPM

## 2024-12-30 DIAGNOSIS — E78.5 HYPERLIPIDEMIA LDL GOAL <100: ICD-10-CM

## 2024-12-30 RX ORDER — PRAVASTATIN SODIUM 40 MG
40 TABLET ORAL DAILY
Qty: 90 TABLET | Refills: 0 | Status: SHIPPED | OUTPATIENT
Start: 2024-12-30

## 2024-12-30 NOTE — TELEPHONE ENCOUNTER
Prescription approved per [facility].  Mary Ngo RN  [facility]

## 2024-12-31 DIAGNOSIS — E78.5 HYPERLIPIDEMIA LDL GOAL <100: ICD-10-CM

## 2024-12-31 RX ORDER — PRAVASTATIN SODIUM 40 MG
40 TABLET ORAL DAILY
Qty: 90 TABLET | Refills: 0 | OUTPATIENT
Start: 2024-12-31

## 2025-01-07 ENCOUNTER — OFFICE VISIT (OUTPATIENT)
Dept: INTERNAL MEDICINE | Facility: CLINIC | Age: 85
End: 2025-01-07
Payer: COMMERCIAL

## 2025-01-07 VITALS
SYSTOLIC BLOOD PRESSURE: 134 MMHG | TEMPERATURE: 97.1 F | HEIGHT: 64 IN | WEIGHT: 156.6 LBS | BODY MASS INDEX: 26.73 KG/M2 | OXYGEN SATURATION: 98 % | HEART RATE: 69 BPM | DIASTOLIC BLOOD PRESSURE: 80 MMHG

## 2025-01-07 DIAGNOSIS — E78.00 HYPERCHOLESTEROLEMIA: ICD-10-CM

## 2025-01-07 DIAGNOSIS — Z23 NEED FOR PROPHYLACTIC VACCINATION AND INOCULATION AGAINST INFLUENZA: ICD-10-CM

## 2025-01-07 DIAGNOSIS — R29.818 SUSPECTED SLEEP APNEA: ICD-10-CM

## 2025-01-07 DIAGNOSIS — N32.81 OAB (OVERACTIVE BLADDER): ICD-10-CM

## 2025-01-07 DIAGNOSIS — I87.2 VENOUS INSUFFICIENCY OF BOTH LOWER EXTREMITIES: ICD-10-CM

## 2025-01-07 DIAGNOSIS — K44.9 HIATAL HERNIA: ICD-10-CM

## 2025-01-07 DIAGNOSIS — J02.0 STREP PHARYNGITIS: ICD-10-CM

## 2025-01-07 DIAGNOSIS — I10 BENIGN ESSENTIAL HYPERTENSION: ICD-10-CM

## 2025-01-07 DIAGNOSIS — R47.9 TRANSIENT SPEECH DISTURBANCE: ICD-10-CM

## 2025-01-07 DIAGNOSIS — G47.30 OBSERVED SLEEP APNEA: ICD-10-CM

## 2025-01-07 DIAGNOSIS — E78.5 HYPERLIPIDEMIA LDL GOAL <100: ICD-10-CM

## 2025-01-07 DIAGNOSIS — Z23 HIGH PRIORITY FOR 2019-NCOV VACCINE: ICD-10-CM

## 2025-01-07 DIAGNOSIS — K21.9 GASTROESOPHAGEAL REFLUX DISEASE WITHOUT ESOPHAGITIS: ICD-10-CM

## 2025-01-07 DIAGNOSIS — L03.116 CELLULITIS OF LEFT LOWER EXTREMITY: Primary | ICD-10-CM

## 2025-01-07 PROCEDURE — 99495 TRANSJ CARE MGMT MOD F2F 14D: CPT | Mod: 25 | Performed by: INTERNAL MEDICINE

## 2025-01-07 PROCEDURE — 90662 IIV NO PRSV INCREASED AG IM: CPT | Performed by: INTERNAL MEDICINE

## 2025-01-07 PROCEDURE — 90480 ADMN SARSCOV2 VAC 1/ONLY CMP: CPT | Performed by: INTERNAL MEDICINE

## 2025-01-07 PROCEDURE — 91320 SARSCV2 VAC 30MCG TRS-SUC IM: CPT | Performed by: INTERNAL MEDICINE

## 2025-01-07 PROCEDURE — G0008 ADMIN INFLUENZA VIRUS VAC: HCPCS | Performed by: INTERNAL MEDICINE

## 2025-01-07 RX ORDER — METOPROLOL SUCCINATE 50 MG/1
50 TABLET, EXTENDED RELEASE ORAL DAILY
Qty: 90 TABLET | Refills: 1 | Status: SHIPPED | OUTPATIENT
Start: 2025-01-07

## 2025-01-07 RX ORDER — OXYBUTYNIN CHLORIDE 5 MG/1
5 TABLET, EXTENDED RELEASE ORAL DAILY
Qty: 90 TABLET | Refills: 1 | Status: SHIPPED | OUTPATIENT
Start: 2025-01-07

## 2025-01-07 RX ORDER — PRAVASTATIN SODIUM 40 MG
40 TABLET ORAL DAILY
Qty: 90 TABLET | Refills: 1 | Status: SHIPPED | OUTPATIENT
Start: 2025-01-07

## 2025-01-07 ASSESSMENT — PATIENT HEALTH QUESTIONNAIRE - PHQ9
10. IF YOU CHECKED OFF ANY PROBLEMS, HOW DIFFICULT HAVE THESE PROBLEMS MADE IT FOR YOU TO DO YOUR WORK, TAKE CARE OF THINGS AT HOME, OR GET ALONG WITH OTHER PEOPLE: NOT DIFFICULT AT ALL
SUM OF ALL RESPONSES TO PHQ QUESTIONS 1-9: 0
SUM OF ALL RESPONSES TO PHQ QUESTIONS 1-9: 0

## 2025-01-07 NOTE — PROGRESS NOTES
Assessment & Plan     (L03.116) Cellulitis of left lower extremity  (primary encounter diagnosis)  Comment: resolved with the treatment proviede, non further antibiotc needed.  Discussed importance of comrpession therapy to manage the venous insufficiency  Plan:     (J02.0) Strep pharyngitis  Comment: resolved with antibiotics.   Plan:     (G47.30) Observed sleep apnea  Comment: was recommended for sleep study referral in October 2023 by Neurologist after her workup for spells of speech difficulties.   In the hospital she was felt to be slightly hypercapneic, and had observed spells of apnea at nighttime.   obstructive sleep apnea could contribute to her lower extremity fluid accumulation and HTN  Recommended to pursue outpatient sleep study  Plan: Adult Sleep Eval & Management Referral            (R29.818) Suspected sleep apnea  Comment: as above   Plan: Adult Sleep Eval & Management Referral            (I10) Benign essential hypertension  Comment: This condition is currently controlled on the current medical regimen.  Continue current therapy.   Plan: metoprolol succinate ER (TOPROL XL) 50 MG 24 hr        tablet            (R47.9) Transient speech disturbance  Comment: This condition is currently controlled on the current medical regimen.  Continue current therapy.   No further new episodes. Referral ordered to sleep clinic   Plan: metoprolol succinate ER (TOPROL XL) 50 MG 24 hr        tablet            (N32.81) OAB (overactive bladder)  Comment: This condition is currently controlled on the current medical regimen.  Continue current therapy.   Plan: oxyBUTYnin ER (DITROPAN XL) 5 MG 24 hr tablet            (E78.5) Hyperlipidemia LDL goal <100  Comment: This condition is currently controlled on the current medical regimen.  Continue current therapy.   Discussed guidelines recommending a statin cholesterol lowering medication for any patient with either diabetes and/or vascular disease, aiming for a LDL goal under  "100 for sure, ideally under 70, using whatever dose of statin tolerated.    Plan: pravastatin (PRAVACHOL) 40 MG tablet            (K44.9) Hiatal hernia  Comment: This condition is currently controlled on the current medical regimen.  Continue current therapy.   Plan: omeprazole (PRILOSEC) 20 MG DR capsule            (K21.9) Gastroesophageal reflux disease without esophagitis  Comment: This condition is currently controlled on the current medical regimen.  Continue current therapy.   Plan: omeprazole (PRILOSEC) 20 MG DR capsule            (E78.00) Hypercholesterolemia  Comment:   Plan:     (Z23) Need for prophylactic vaccination and inoculation against influenza  Comment:   Plan: INFLUENZA HIGH DOSE, TRIVALENT, PF (FLUZONE)            (Z23) High priority for 2019-nCoV vaccine  Comment:   Plan: COVID-19 12+ (PFIZER)              (I87.2) Venous insufficiency of both lower extremities  Comment: continue with compression therapy, werar the compression stocking on both lower legs as best able.   Control dietary sodium.   Plan:            MED REC REQUIRED  Post Medication Reconciliation Status: discharge medications reconciled, continue medications without change  BMI  Estimated body mass index is 26.88 kg/m  as calculated from the following:    Height as of this encounter: 1.626 m (5' 4\").    Weight as of this encounter: 71 kg (156 lb 9.6 oz).             Nancy Hannah is a 84 year old, presenting for the following health issues:  Hospital F/U, Imm/Inj (Flu Shot), and Imm/Inj (COVID-19 VACCINE)    HPI     No swelling since going to the hospital   Hospital Follow-up Visit:    Hospital/Nursing Home/IP Rehab Facility: [facility]  Date of Admission: 12-14-24  Date of Discharge: 12-17-24  Reason(s) for Admission:  Left lower extremity swelling, pain and redness l   Was the patient in the ICU or did the patient experience delirium during hospitalization?  No    Problems taking medications " "regularly:  None  Medication changes since discharge: None  Problems adhering to non-medication therapy:  None    Summary of hospitalization:  [facility] discharge summary reviewed  Diagnostic Tests/Treatments reviewed.  Follow up needed: referral to sleep clinc for sleep study  Other Healthcare Providers Involved in Patient s Care:         None  Update since discharge: improved.         Plan of care communicated with patient           2.)  GERD stable.  Taking meds as ordered, no reported side effects from medicines.  Eating properly to avoid sx.   No melena, no hematochezia, no diarrhea.  No unintended weigth loss.  No dysphagia.  Reports no significant regular difficulties swallowing foods or medications.     3.)  Hyperlipidemia:  Has history of hyperlipidemia.    The patient is taking a medication for this.  Denies any significant side effects from his medication.      Latest labs reviewed:    Recent Labs   Lab Test 02/29/24  0849 09/10/23  0122 01/05/23  1117   CHOL  --  140 164   HDL  --  53 51   * 72 95   TRIG  --  75 90        Lab Results   Component Value Date    AST 22 12/14/2024    AST 19 06/02/2021       4.)  overactive bladder stable on current medications.  No side effects reported.     **I reviewed the information recorded in the patient's EPIC chart (including but not limited to medical history, surgical history, family history, problem list, medication list, and allergy list) and updated the information as indicated based on the patients reported information.         Review of Systems  Constitutional, HEENT, cardiovascular, pulmonary, gi and gu systems are negative, except as otherwise noted.      Objective    /80   Pulse 69   Temp 97.1  F (36.2  C) (Temporal)   Ht 1.626 m (5' 4\")   Wt 71 kg (156 lb 9.6 oz)   LMP 11/08/1991 (Exact Date)   SpO2 98%   BMI 26.88 kg/m    Body mass index is 26.88 kg/m .  Physical Exam   GENERAL alert and no distress  EYES:  Normal " sclera,conjunctiva, EOMI  HENT: oral and posterior pharynx without lesions or erythema, facies symmetric  NECK: Neck supple. No LAD, without thyroidmegaly.  RESP: Clear to ausculation bilaterally without wheezes or crackles. Normal BS in all fields.  CV: RRR normal S1S2 without murmurs, rubs or gallops.  LYMPH: no cervical lymph adenopathy appreciated  MS: extremities- no gross deformities of the visible extremities noted,   EXT:  trace bilateral lower extremity edema, former cellulitis has resolved.   PSYCH: Alert and oriented times 3; speech- coherent  SKIN:  No obvious significant skin lesions on visible portions of face              The longitudinal plan of care for the diagnosis(es)/condition(s) as documented were addressed during this visit. Due to the added complexity in care, I will continue to support Brielle in the subsequent management and with ongoing continuity of care.      Signed Electronically by: Jared Emmanuel MD

## 2025-01-07 NOTE — PATIENT INSTRUCTIONS
"     Continue all medications at the same doses.  Contact your usual pharmacy if you need refills.        Wear the compression stockings on a regular basis to help prevent fluid accumulation in the lower legs.       Keep the sodium intake below 2000 mg per day as best possible.  READ LABELS!!!     Referral to the sleep clinic to discuss testing of possible obstructive sleep apnea.      --[facility] will call you to coordinate your care. If you don't hear from a representative within 2 business days, please call [phone]        Return to see me in 6 months, schedule a follow up visit sooner if needed for anything else.  Use [employer] or Call [phone] to schedule the appointment with me.       5 GOALS TO PREVENT VASCULAR DISEASE:     1.  Aggressive blood pressure control, under 130/80 ideally.  Using medications if needed.    Your blood pressure is under good control    BP Readings from Last 4 Encounters:   01/07/25 134/80   12/17/24 (!) 146/82   12/13/24 (!) 160/88   10/13/24 (!) 144/76       2.  Aggressive LDL cholesterol (\"bad cholesterol\") lowering as indicated.    Your goal is an LDL under 130 for sure, preferably under 100.  (If you have diabetes or previous vascular disease, the the LDL goals would be under 100 for sure, preferably under 70.)    New guidelines identify four high-risk groups who could benefit from statins:   *people with pre-existing heart disease, such as those who have had a heart attack;   *people ages 40 to 75 who have diabetes of any type  *patients ages 40 to 75 with at least a 7.5% risk of developing cardiovascular disease over the next decade, according to a formula described in the guidelines  *patients with the sort of super-high cholesterol that sometimes runs in families, as evidenced by an LDL of 190 milligrams per deciliter or higher    Your cholesterol levels are well controlled.    Recent Labs   Lab Test 02/29/24  0849 09/10/23  0122 01/05/23  1117   CHOL  --  140 " "164   HDL  --  53 51   * 72 95   TRIG  --  75 90       --LDL (\"bad\" cholesterol): normal under 130, ideal under 100.  Best way to lower this is through better food choices ( lower fats, etc.), medication may be considered if indicated  --HDL (\"good\") cholesterol: (normal above 40 for men, above 50 for women).  Best way to improve the HDL level is through regular physical exercise.  There are no medications to raise HDL levels.   --Triglycerides (desirable under 150): triglycerides are more about metabolic issues, best way to improve this is through better diet choices, emphasizing reducing the intake of \"simple carbohydrates\" (e.g. White bread, white rice, pasta, noodles, potatoes, snack foods, regular soda, juices (except fresh squeezed), cakes, cookies, candy, etc.) as best possible. Medications may be considered for triglyceride levels routinely above 400.    3.  Aggressive diabetic prevention, screening and/or management.      You do not have diabetes as of the most recent blood tests.     4.  No smoking    5.  Consider daily preventative aspirin over age 50 if you have enough cardiac risk factors to place you at higher risk for the presence of vascular disease.    If you have any reason not to take aspirin such easy bruising or bleeding, stomach problems, other anticoagulant medications, or any other side effects, then you should not take Aspirin.     --Based on your current cardiac risk factor profile, you should take regular daily Aspirin 81 mg once per day (as long as you do not have any side effects from taking aspirin).               "

## 2025-01-22 ENCOUNTER — ANCILLARY PROCEDURE (OUTPATIENT)
Dept: MAMMOGRAPHY | Facility: CLINIC | Age: 85
End: 2025-01-22
Attending: INTERNAL MEDICINE
Payer: COMMERCIAL

## 2025-01-22 DIAGNOSIS — Z12.31 VISIT FOR SCREENING MAMMOGRAM: ICD-10-CM

## 2025-01-22 PROCEDURE — 77067 SCR MAMMO BI INCL CAD: CPT | Mod: TC | Performed by: RADIOLOGY

## 2025-01-22 PROCEDURE — 77063 BREAST TOMOSYNTHESIS BI: CPT | Mod: TC | Performed by: RADIOLOGY

## 2025-01-29 ENCOUNTER — PATIENT OUTREACH (OUTPATIENT)
Dept: CARE COORDINATION | Facility: CLINIC | Age: 85
End: 2025-01-29
Payer: COMMERCIAL

## 2025-02-12 ENCOUNTER — PATIENT OUTREACH (OUTPATIENT)
Dept: CARE COORDINATION | Facility: CLINIC | Age: 85
End: 2025-02-12
Payer: COMMERCIAL

## 2025-02-17 ENCOUNTER — PATIENT OUTREACH (OUTPATIENT)
Dept: CARE COORDINATION | Facility: CLINIC | Age: 85
End: 2025-02-17
Payer: COMMERCIAL

## 2025-04-07 DIAGNOSIS — N32.81 OAB (OVERACTIVE BLADDER): ICD-10-CM

## 2025-04-08 RX ORDER — OXYBUTYNIN CHLORIDE 5 MG/1
5 TABLET, EXTENDED RELEASE ORAL DAILY
Qty: 90 TABLET | Refills: 1 | OUTPATIENT
Start: 2025-04-08

## 2025-04-12 ENCOUNTER — HEALTH MAINTENANCE LETTER (OUTPATIENT)
Age: 85
End: 2025-04-12
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
## 2025-05-13 ENCOUNTER — OFFICE VISIT (OUTPATIENT)
Dept: SLEEP MEDICINE | Facility: CLINIC | Age: 85
End: 2025-05-13
Attending: INTERNAL MEDICINE
Payer: COMMERCIAL

## 2025-05-13 VITALS
HEART RATE: 79 BPM | BODY MASS INDEX: 26.98 KG/M2 | SYSTOLIC BLOOD PRESSURE: 125 MMHG | HEIGHT: 64 IN | DIASTOLIC BLOOD PRESSURE: 76 MMHG | OXYGEN SATURATION: 96 % | WEIGHT: 158 LBS

## 2025-05-13 DIAGNOSIS — I10 BENIGN ESSENTIAL HYPERTENSION: ICD-10-CM

## 2025-05-13 DIAGNOSIS — R06.83 SNORING: Primary | ICD-10-CM

## 2025-05-13 DIAGNOSIS — R79.81: ICD-10-CM

## 2025-05-13 PROCEDURE — 1126F AMNT PAIN NOTED NONE PRSNT: CPT | Performed by: PHYSICIAN ASSISTANT

## 2025-05-13 PROCEDURE — 99205 OFFICE O/P NEW HI 60 MIN: CPT | Performed by: PHYSICIAN ASSISTANT

## 2025-05-13 PROCEDURE — 3074F SYST BP LT 130 MM HG: CPT | Performed by: PHYSICIAN ASSISTANT

## 2025-05-13 PROCEDURE — 3078F DIAST BP <80 MM HG: CPT | Performed by: PHYSICIAN ASSISTANT

## 2025-05-13 ASSESSMENT — SLEEP AND FATIGUE QUESTIONNAIRES
HOW LIKELY ARE YOU TO NOD OFF OR FALL ASLEEP WHILE LYING DOWN TO REST IN THE AFTERNOON WHEN CIRCUMSTANCES PERMIT: MODERATE CHANCE OF DOZING
HOW LIKELY ARE YOU TO NOD OFF OR FALL ASLEEP WHILE SITTING AND READING: MODERATE CHANCE OF DOZING
HOW LIKELY ARE YOU TO NOD OFF OR FALL ASLEEP IN A CAR, WHILE STOPPED FOR A FEW MINUTES IN TRAFFIC: WOULD NEVER DOZE
HOW LIKELY ARE YOU TO NOD OFF OR FALL ASLEEP WHEN YOU ARE A PASSENGER IN A CAR FOR AN HOUR WITHOUT A BREAK: MODERATE CHANCE OF DOZING
HOW LIKELY ARE YOU TO NOD OFF OR FALL ASLEEP WHILE WATCHING TV: SLIGHT CHANCE OF DOZING
HOW LIKELY ARE YOU TO NOD OFF OR FALL ASLEEP WHILE SITTING INACTIVE IN A PUBLIC PLACE: SLIGHT CHANCE OF DOZING
HOW LIKELY ARE YOU TO NOD OFF OR FALL ASLEEP WHILE SITTING AND TALKING TO SOMEONE: WOULD NEVER DOZE
HOW LIKELY ARE YOU TO NOD OFF OR FALL ASLEEP WHILE SITTING QUIETLY AFTER LUNCH WITHOUT ALCOHOL: MODERATE CHANCE OF DOZING

## 2025-05-13 NOTE — PATIENT INSTRUCTIONS
"          MY TREATMENT INFORMATION FOR SLEEP APNEA-  Brielle Marvin    DOCTOR : Bennett Goltz, PA-C    Am I having a sleep study at a sleep center?  --->Due to normal delays, you will be contacted within 2-4 weeks to schedule    Am I having a home sleep study?  --->Watch the video for the device you are using:    -/drop off device-   https://www.[employer].com/watch?v=yGGFBdELGhk    Frequently asked questions:  1. What is Obstructive Sleep Apnea (VAUGHN)? VAUGHN is the most common type of sleep apnea. Apnea means, \"without breath.\"  Apnea is most often caused by narrowing or collapse of the upper airway as muscles relax during sleep.   Almost everyone has occasional apneas. Most people with sleep apnea have had brief interruptions at night frequently for many years.  The severity of sleep apnea is related to how frequent and severe the events are.   2. What are the consequences of VAUGHN? Symptoms include: feeling sleepy during the day, snoring loudly, gasping or stopping of breathing, trouble sleeping, and occasionally morning headaches or heartburn at night.  Sleepiness can be serious and even increase the risk of falling asleep while driving. Other health consequences may include development of high blood pressure and other cardiovascular disease in persons who are susceptible. Untreated VAUGHN  can contribute to heart disease, stroke and diabetes.   3. What are the treatment options? In most situations, sleep apnea is a lifelong disease that must be managed with daily therapy. Medications are not effective for sleep apnea and surgery is generally not considered until other therapies have been tried. Your treatment is your choice . Continuous Positive Airway (CPAP) works right away and is the therapy that is effective in nearly everyone. An oral device to hold your jaw forward is usually the next most reliable option. Other options include postioning devices (to keep you off your back), weight loss, and surgery including " a tongue pacing device. There is more detail about some of these options below.  4. Are my sleep studies covered by insurance? Although we will request verification of coverage, we advise you also check in advance of the study to ensure there is coverage.    Important tips for those choosing CPAP and similar devices  REMEMBER-IF YOU RECEIVE A CALL FROM  [phone]-->IT IS TO SETUP A DEVICE  For new devices, sign up for device JUAN FRANCISCO to monitor your device for your followup visits  We encourage you to utilize the [employer] juan francisco or website ( [email or web address] ) to monitor your therapy progress and share the data with your healthcare team when you discuss your sleep apnea.                                                    Know your equipment:  CPAP is continuous positive airway pressure that prevents obstructive sleep apnea by keeping the throat from collapsing while you are sleeping. In most cases, the device is  smart  and can slowly self-adjusts if your throat collapses and keeps a record every day of how well you are treated-this information is available to you and your care team.  BPAP is bilevel positive airway pressure that keeps your throat open and also assists each breath with a pressure boost to maintain adequate breathing.  Special kinds of BPAP are used in patients who have inadequate breathing from lung or heart disease. In most cases, the device is  smart  and can slowly self-adjusts to assist breathing. Like CPAP, the device keeps a record of how well you are treated.  Your mask is your connection to the device. You get to choose what feels most comfortable and the staff will help to make sure if fits. Here: are some examples of the different masks that are available: Magnetic mask aids may assist with use but there are safety issues that should be addressed when considering with magnets* ( see end of discussion).       Key points to remember on your journey with sleep apnea:  Sleep study.   PAP devices often need to be adjusted during a sleep study to show that they are effective and adjusted right.  Good tips to remember: Try wearing just the mask during a quiet time during the day so your body adapts to wearing it. A humidifier is recommended for comfort in most cases to prevent drying of your nose and throat. Allergy medication from your provider may help you if you are having nasal congestion.  Getting settled-in. It takes more than one night for most of us to get used to wearing a mask. Try wearing just the mask during a quiet time during the day so your body adapts to wearing it. A humidifier is recommended for comfort in most cases. Our team will work with you carefully on the first day and will be in contact within 4 days and again at 2 and 4 weeks for advice and remote device adjustments. Your therapy is evaluated by the device each day.   Use it every night. The more you are able to sleep naturally for 7-8 hours, the more likely you will have good sleep and to prevent health risks or symptoms from sleep apnea. Even if you use it 4 hours it helps. Occasionally all of us are unable to use a medical therapy, in sleep apnea, it is not dangerous to miss one night.   Communicate. Call our skilled team on the number provided on the first day if your visit for problems that make it difficult to wear the device. Over 2 out of 3 patients can learn to wear the device long-term with help from our team. Remember to call our team or your sleep providers if you are unable to wear the device as we may have other solutions for those who cannot adapt to mask CPAP therapy. It is recommended that you sleep your sleep provider within the first 3 months and yearly after that if you are not having problems.   Use it for your health. We encourage use of CPAP masks during daytime quiet periods to allow your face and brain to adapt to the sensation of CPAP so that it will be a more natural sensation to awaken to at  night or during naps. This can be very useful during the first few weeks or months of adapting to CPAP though it does not help medically to wear CPAP during wakefulness and  should not be used as a strategy just to meet guidelines.  Take care of your equipment. Make sure you clean your mask and tubing using directions every day and that your filter and mask are replaced as recommended or if they are not working.     *Masks with magnets:  Updated Contraindications  Masks with magnetic components are contraindicated for use by patients where they, or anyone in close physical contact while using the mask, have the following:   Active medical implants that interact with magnets (i.e., pacemakers, implantable cardioverter defibrillators (ICD), neurostimulators, cerebrospinal fluid (CSF) shunts, insulin/infusion pumps)   Metallic implants/objects containing ferromagnetic material (i.e., aneurysm clips/flow disruption devices, embolic coils, stents, valves, electrodes, implants to restore hearing or balance with implanted magnets, ocular implants, metallic splinters in the eye)  Updated Warning  Keep the mask magnets at a safe distance of at least 6 inches (150 mm) away from implants or medical devices that may be adversely affected by magnetic interference. This warning applies to you or anyone in close physical contact with your mask. The magnets are in the frame and lower headgear clips, with a magnetic field strength of up to 400mT. When worn, they connect to secure the mask but may inadvertently detach while asleep.  Implants/medical devices, including those listed within contraindications, may be adversely affected if they change function under external magnetic fields or contain ferromagnetic materials that attract/repel to magnetic fields (some metallic implants, e.g., contact lenses with metal, dental implants, metallic cranial plates, screws, shree hole covers, and bone substitute devices). Consult your physician  and  of your implant / other medical device for information on the potential adverse effects of magnetic fields.    BESIDES CPAP, WHAT OTHER THERAPIES ARE THERE?    Positioning Device  Positioning devices are generally used when sleep apnea is mild and only occurs on your back.This example shows a pillow that straps around the waist. It may be appropriate for those whose sleep study shows milder sleep apnea that occurs primarily when lying flat on one's back. Preliminary studies have shown benefit but effectiveness at home may need to be verified by a home sleep test. These devices are generally not covered by medical insurance.  Examples of devices that maintain sleeping on the back to prevent snoring and mild sleep apnea.    Belt type body positioner  http://Magic Wheels.Tasqe/    Electronic reminder  http://nightshifttherapy.com/            Oral Appliance  What is oral appliance therapy?  An oral appliance device fits on your teeth at night like a retainer used after having braces. The device is made by a specialized dentist and requires several visits over 1-2 months before a manufactured device is made to fit your teeth and is adjusted to prevent your sleep apnea. Once an oral device is working properly, snoring should be improved. A home sleep test may be recommended at that time if to determine whether the sleep apnea is adequately treated.       Some things to remember:  -Oral devices are often, but not always, covered by your medical insurance. Be sure to check with your insurance provider.   -If you are referred for oral therapy, you will be given a list of specialized dentists to consider or you may choose to visit the Web site of the American Academy of Dental Sleep Medicine  -Oral devices are less likely to work if you have severe sleep apnea or are extremely overweight.     More detailed information  An oral appliance is a small acrylic device that fits over the upper and lower teeth  (similar to  a retainer or a mouth guard). This device slightly moves jaw forward, which moves the base of the tongue forward, opens the airway, improves breathing for effective treat snoring and obstructive sleep apnea in perhaps 7 out of 10 people .  The best working devices are custom-made by a dental device  after a mold is made of the teeth 1, 2, 3.  When is an oral appliance indicated?  Oral appliance therapy is recommended as a first-line treatment for patients with primary snoring, mild sleep apnea, and for patients with moderate sleep apnea who prefer appliance therapy to use of CPAP4, 5. Severity of sleep apnea is determined by sleep testing and is based on the number of respiratory events per hour of sleep.   How successful is oral appliance therapy?  The success rate of oral appliance therapy in patients with mild sleep apnea is 75-80% while in patients with moderate sleep apnea it is 50-70%. The chance of success in patients with severe sleep apnea is 40-50%. The research also shows that oral appliances have a beneficial effect on the cardiovascular health of VAUGHN patients at the same magnitude as CPAP therapy7.  Oral appliances should be a second-line treatment in cases of severe sleep apnea, but if not completely successful then a combination therapy utilizing CPAP plus oral appliance therapy may be effective. Oral appliances tend to be effective in a broad range of patients although studies show that the patients who have the highest success are females, younger patients, those with milder disease, and less severe obesity. 3, 6.   Finding a dentist that practices dental sleep medicine  Specific training is available through the American Academy of Dental Sleep Medicine for dentists interested in working in the field of sleep. To find a dentist who is educated in the field of sleep and the use of oral appliances, near you, visit the Web site of the American Academy of Dental Sleep  Medicine.    References  1. Gold et al. Objectively measured vs self-reported compliance during oral appliance therapy for sleep-disordered breathing. Chest 2013; 144(5): 4769-5812.  2. Paige et al. Objective measurement of compliance during oral appliance therapy for sleep-disordered breathing. Thorax 2013; 68(1): 91-96.  3. Scout et al. Mandibular advancement devices in 620 men and women with VAUGHN and snoring: tolerability and predictors of treatment success. Chest 2004; 125: 7814-1751.  4. Eileen et al. Oral appliances for snoring and VAUGHN: a review. Sleep 2006; 29: 244-262.  5. Janelle et al. Oral appliance treatment for VAUGHN: an update. J Clin Sleep Med 2014; 10(2): 215-227.  6. Lazarus et al. Predictors of OSAH treatment outcome. J Dent Res 2007; 86: 8798-9646.      Weight Loss:   Your Body mass index is 27.12 kg/m .    Being overweight does not necessarily mean you will have health consequences.  Those who have BMI over 30 or over 27 with existing medical conditions carries greater risk.   Weight loss decreases severity of sleep apnea in most people with obesity. For those with mild obesity who have developed snoring with weight gain, even 15-30 pound weight loss can improve and occasionally milder eliminate sleep apnea.  Structured and life-long dietary and health habits are necessary to lose weight and keep healthier weight levels.     The Comprehensive Weight loss program offers all aspects of weight loss strategies including two Non-Surgical Weight Loss Programs: Medical Weight Management and our 24 Week Healthy Lifestyle Program:  Medical Weight Management: You will meet with a Medical Weight Management Provider, as well as a Registered Dietician. The program may include medication therapy, dietary education, recommended exercise and physical therapy programs, monthly support group meetings, and possible psychological counseling. Follow up visits with the provider or dietician are  scheduled based on your progress and needs.  24 Week Healthy Lifestyle Program: This unique program is designed to give you the support of weekly appointments and activities thru a 24-week period. It may include all of the components of the basic program (above), with the addition of 11 individual Health  Visits, 24-week access to the [employer] website for over 700 online classes, and monthly support group meetings. This program has an out-of-pocket expense of $499 to cover the items that can not be billed to insurance (health coaches and [employer] access), and is non-refundable/non-transferable (you may be able to use a Health Savings Account; ask your HSA provider). There may be an optional meal replacement plan prescribed as well.   Medication therapy has been approved for the treatment of sleep apnea: The FDA approved tirzepatide (ZEPBOUND) for moderate to severe sleep apnea (apnea-hypopnea index greater than or equal to 15) in patients with BMI of greater than or equal to 30, or BMI greater than or equal to 27 with at least 1 weight-related condition such as hypertension or dyslipidemia.  Surgical management achieves meaningful long-term weight loss and improvement in health risks in most patients with more severe obesity.      Sleep Apnea Surgery:    Surgery for obstructive sleep apnea is considered generally only when other therapies fail to work. Surgery may be discussed with you if you are having a difficult time tolerating CPAP and or when there is an abnormal structure that requires surgical correction.  Nose and throat surgeries often enlarge the airway to prevent collapse.  Most of these surgeries create pain for 1-2 weeks and up to half of the most common surgeries are not effective throughout life.  You should carefully discuss the benefits and drawbacks to surgery with your sleep provider and surgeon to determine if it is the best solution for you.   More information  Surgery for VAUGHN is directed  at areas that are responsible for narrowing or complete obstruction of the airway during sleep.  There are a wide range of procedures available to enlarge and/or stabilize the airway to prevent blockage of breathing in the three major areas where it can occur: the palate, tongue, and nasal regions.  Successful surgical treatment depends on the accurate identification of the factors responsible for obstructive sleep apnea in each person.  A personalized approach is required because there is no single treatment that works well for everyone.  Because of anatomic variation, consultation with an examination by a sleep surgeon is a critical first step in determining what surgical options are best for each patient.  In some cases, examination during sedation may be recommended in order to guide the selection of procedures.  Patients will be counseled about risks and benefits as well as the typical recovery course after surgery. Surgery is typically not a cure for a person s VAUGHN.  However, surgery will often significantly improve one s VAUGHN severity (termed  success rate ).  Even in the absence of a cure, surgery will decrease the cardiovascular risk associated with OSA7; improve overall quality of life8 (sleepiness, functionality, sleep quality, etc).  Palate Procedures:  Patients with VAUGHN often have narrowing of their airway in the region of their tonsils and uvula.  The goals of palate procedures are to widen the airway in this region as well as to help the tissues resist collapse.  Modern palate procedure techniques focus on tissue conservation and soft tissue rearrangement, rather than tissue removal.  Often the uvula is preserved in this procedure. Residual sleep apnea is common in patient after pharyngoplasty with an average reduction in sleep apnea events of 33%2.    Tongue Procedures:  ExamWhile patients are awake, the muscles that surround the throat are active and keep this region open for breathing. These muscles  relax during sleep, allowing the tongue and other structures to collapse and block breathing.  There are several different tongue procedures available.  Selection of a tongue base procedure depends on characteristics seen on physical exam.  Generally, procedures are aimed at removing bulky tissues in this area or preventing the back of the tongue from falling back during sleep.  Success rates for tongue surgery range from 50-62%3.  Hypoglossal Nerve Stimulation:  Hypoglossal nerve stimulation has recently received approval from the [place] Food and Drug Administration for the treatment of obstructive sleep apnea.  This is based on research showing that the system was safe and effective in treating sleep apnea6.  Results showed that the median AHI score decreased 68%, from 29.3 to 9.0. This therapy uses an implant system that senses breathing patterns and delivers mild stimulation to airway muscles, which keeps the airway open during sleep.  The system consists of three fully implanted components: a small generator (similar in size to a pacemaker), a breathing sensor, and a stimulation lead.  Using a small handheld remote, a patient turns the therapy on before bed and off upon awakening.    Candidates for this device must be greater than 18 years of age, have moderate to severe obstructive sleep apnea with less than 25% central events  (AHI between 15-65), BMI less than 35, have tried CPAP/oral appliance for at least 8 weeks without success, and have appropriate upper airway anatomy (determined by a sleep endoscopy performed by Dr. Gustabo Harris or Dr. Harman Laguna).     Nasal Procedures:  Nasal obstruction can interfere with nasal breathing during the day and night.  Studies have shown that relief of nasal obstruction can improve the ability of some patients to tolerate positive airway pressure therapy for obstructive sleep apnea1.  Treatment options include medications such as nasal saline, topical  corticosteroid and antihistamine sprays, and oral medications such as antihistamines or decongestants. Non-surgical treatments can include external nasal dilators for selected patients. If these are not successful by themselves, surgery can improve the nasal airway either alone or in combination with these other options.  Combination Procedures:  Combination of surgical procedures and other treatments may be recommended, particularly if patients have more than one area of narrowing or persistent positional disease.  The success rate of combination surgery ranges from 66-80%2,3.    References  Maria Dolores GARCIA. The Role of the Nose in Snoring and Obstructive Sleep Apnoea: An Update.  Eur Arch Otorhinolaryngol. 2011; 268: 1365-73.   Dolly SM; Marquita JA; Horacio JR; Pallanch JF; Desmond MB; Slick SG; Kristin ANTOINE. Surgical modifications of the upper airway for obstructive sleep apnea in adults: a systematic review and meta-analysis. SLEEP 2010;33(10):6880-5604. Pierre SMITH. Hypopharyngeal surgery in obstructive sleep apnea: an evidence-based medicine review.  Arch Otolaryngol Head Neck Surg. 2006 Feb;132(2):206-13.  Devin YH1, Gabbi Y, Torrey LAURA. The efficacy of anatomically based multilevel surgery for obstructive sleep apnea. Otolaryngol Head Neck Surg. 2003 Oct;129(4):327-35.  Pierre SMITH, Goldberg A. Hypopharyngeal Surgery in Obstructive Sleep Apnea: An Evidence-Based Medicine Review. Arch Otolaryngol Head Neck Surg. 2006 Feb;132(2):206-13.  Ervin PJ et al. Upper-Airway Stimulation for Obstructive Sleep Apnea.  N Engl J Med. 2014 Jan 9;370(2):139-49.  Sreena Y et al. Increased Incidence of Cardiovascular Disease in Middle-aged Men with Obstructive Sleep Apnea. Am J Respir Crit Care Med; 2002 166: 159-165  Lombardi EM et al. Studying Life Effects and Effectiveness of Palatopharyngoplasty (SLEEP) study: Subjective Outcomes of Isolated Uvulopalatopharyngoplasty. Otolaryngol Head Neck Surg. 2011; 144: 623-631.    WHAT IF I ONLY  HAVE SNORING?  Mandibular advancement devices, lateral sleep positioning, long-term weight loss and treatment of nasal allergies have been shown to improve snoring.  Exercising tongue muscles with a game (https://The [employer] website.Payward/us/juan francisco/soundly-reduce-snoring/bs1562000262) or stimulating the tongue during the day with a device (https://doi.org/10.3390/vyh11874482) have improved snoring in some individuals.  https://www.Corvalius.[employer]/  https://www.sleepfoundation.org/best-anti-snoring-mouthpieces-and-mouthguards    Remember to Drive Safe... Drive Alive     Sleep health profoundly affects your health, mood, and your safety.  Thirty three percent of the population (one in three of us) is not getting enough sleep and many have a sleep disorder. Not getting enough sleep or having an untreated / undertreated sleep condition may make us sleepy without even knowing it. In fact, our driving could be dramatically impaired due to our sleep health. As your provider, here are some things I would like you to know about driving:     Here are some warning signs for impairment and dangerous drowsy driving:              -Having been awake more than 16 hours               -Looking tired               -Eyelid drooping              -Head nodding (it could be too late at this point)              -Driving for more than 30 minutes     Some things you could do to make the driving safer if you are experiencing some drowsiness:              -Stop driving and rest              -Call for transportation              -Make sure your sleep disorder is adequately treated     Some things that have been shown NOT to work when experiencing drowsiness while driving:              -Turning on the radio              -Opening windows              -Eating any  distracting  /  entertaining  foods (e.g., sunflower seeds, candy, or any other)              -Talking on the phone      Your decision may not only impact your life, but also the life of others.  Please, remember to drive safe for yourself and all of us.

## 2025-05-13 NOTE — NURSING NOTE
"Chief Complaint   Patient presents with    Sleep Problem     Referred from hospital stay       Initial BP (!) 125/7   Pulse 79   Ht 1.626 m (5' 4\")   Wt 71.7 kg (158 lb)   LMP 11/08/1991 (Exact Date)   SpO2 96%   BMI 27.12 kg/m   Estimated body mass index is 27.12 kg/m  as calculated from the following:    Height as of this encounter: 1.626 m (5' 4\").    Weight as of this encounter: 71.7 kg (158 lb).    Medication Reconciliation: complete  ESS   Neck circumference: 37 centimeters.  Denise Barrera MA   "

## 2025-06-02 DIAGNOSIS — I10 BENIGN ESSENTIAL HYPERTENSION: ICD-10-CM

## 2025-06-02 DIAGNOSIS — R47.9 TRANSIENT SPEECH DISTURBANCE: ICD-10-CM

## 2025-06-02 RX ORDER — METOPROLOL SUCCINATE 50 MG/1
50 TABLET, EXTENDED RELEASE ORAL DAILY
Qty: 30 TABLET | Refills: 0 | Status: SHIPPED | OUTPATIENT
Start: 2025-06-02

## 2025-07-05 ENCOUNTER — HEALTH MAINTENANCE LETTER (OUTPATIENT)
Age: 85
End: 2025-07-05

## 2025-08-17 ENCOUNTER — APPOINTMENT (OUTPATIENT)
Dept: ULTRASOUND IMAGING | Facility: CLINIC | Age: 85
End: 2025-08-17
Attending: EMERGENCY MEDICINE
Payer: COMMERCIAL

## 2025-08-17 ENCOUNTER — HOSPITAL ENCOUNTER (OUTPATIENT)
Facility: CLINIC | Age: 85
Setting detail: OBSERVATION
Discharge: HOME | End: 2025-08-19
Attending: EMERGENCY MEDICINE | Admitting: INTERNAL MEDICINE
Payer: COMMERCIAL

## 2025-08-17 DIAGNOSIS — L03.116 BILATERAL LOWER LEG CELLULITIS: Primary | ICD-10-CM

## 2025-08-17 DIAGNOSIS — L03.115 BILATERAL LOWER LEG CELLULITIS: Primary | ICD-10-CM

## 2025-08-17 DIAGNOSIS — L03.116 CELLULITIS OF LEFT LOWER EXTREMITY: ICD-10-CM

## 2025-08-17 LAB
ALBUMIN SERPL BCG-MCNC: 3.8 G/DL (ref 3.5–5.2)
ALP SERPL-CCNC: 60 U/L (ref 40–150)
ALT SERPL W P-5'-P-CCNC: 19 U/L (ref 0–50)
ANION GAP SERPL CALCULATED.3IONS-SCNC: 9 MMOL/L (ref 7–15)
AST SERPL W P-5'-P-CCNC: 28 U/L (ref 0–45)
BASOPHILS # BLD AUTO: 0.04 10E3/UL (ref 0–0.2)
BASOPHILS NFR BLD AUTO: 0.7 %
BILIRUB SERPL-MCNC: 0.3 MG/DL
BUN SERPL-MCNC: 18.6 MG/DL (ref 8–23)
CALCIUM SERPL-MCNC: 9.1 MG/DL (ref 8.8–10.4)
CHLORIDE SERPL-SCNC: 102 MMOL/L (ref 98–107)
CREAT SERPL-MCNC: 0.59 MG/DL (ref 0.51–0.95)
EGFRCR SERPLBLD CKD-EPI 2021: 88 ML/MIN/1.73M2
EOSINOPHIL # BLD AUTO: 0.17 10E3/UL (ref 0–0.7)
EOSINOPHIL NFR BLD AUTO: 3 %
ERYTHROCYTE [DISTWIDTH] IN BLOOD BY AUTOMATED COUNT: 12.3 % (ref 10–15)
GLUCOSE SERPL-MCNC: 103 MG/DL (ref 70–99)
HCO3 SERPL-SCNC: 27 MMOL/L (ref 22–29)
HCT VFR BLD AUTO: 40 % (ref 35–47)
HGB BLD-MCNC: 13.3 G/DL (ref 11.7–15.7)
HOLD SPECIMEN: NORMAL
IMM GRANULOCYTES # BLD: <0.03 10E3/UL
IMM GRANULOCYTES NFR BLD: 0.2 %
LACTATE SERPL-SCNC: 0.7 MMOL/L (ref 0.7–2)
LYMPHOCYTES # BLD AUTO: 1.66 10E3/UL (ref 0.8–5.3)
LYMPHOCYTES NFR BLD AUTO: 29 %
MCH RBC QN AUTO: 32.2 PG (ref 26.5–33)
MCHC RBC AUTO-ENTMCNC: 33.3 G/DL (ref 31.5–36.5)
MCV RBC AUTO: 96.9 FL (ref 78–100)
MONOCYTES # BLD AUTO: 0.89 10E3/UL (ref 0–1.3)
MONOCYTES NFR BLD AUTO: 15.5 %
NEUTROPHILS # BLD AUTO: 2.96 10E3/UL (ref 1.6–8.3)
NEUTROPHILS NFR BLD AUTO: 51.6 %
NRBC # BLD AUTO: <0.03 10E3/UL
NRBC BLD AUTO-RTO: 0 /100
PLATELET # BLD AUTO: 169 10E3/UL (ref 150–450)
POTASSIUM SERPL-SCNC: 4.4 MMOL/L (ref 3.4–5.3)
PROT SERPL-MCNC: 7.2 G/DL (ref 6.4–8.3)
RBC # BLD AUTO: 4.13 10E6/UL (ref 3.8–5.2)
SODIUM SERPL-SCNC: 138 MMOL/L (ref 135–145)
WBC # BLD AUTO: 5.73 10E3/UL (ref 4–11)

## 2025-08-17 PROCEDURE — 36415 COLL VENOUS BLD VENIPUNCTURE: CPT | Performed by: EMERGENCY MEDICINE

## 2025-08-17 PROCEDURE — 250N000011 HC RX IP 250 OP 636: Performed by: INTERNAL MEDICINE

## 2025-08-17 PROCEDURE — 87040 BLOOD CULTURE FOR BACTERIA: CPT | Performed by: EMERGENCY MEDICINE

## 2025-08-17 PROCEDURE — 96372 THER/PROPH/DIAG INJ SC/IM: CPT | Mod: 59 | Performed by: INTERNAL MEDICINE

## 2025-08-17 PROCEDURE — 85025 COMPLETE CBC W/AUTO DIFF WBC: CPT | Performed by: EMERGENCY MEDICINE

## 2025-08-17 PROCEDURE — 96366 THER/PROPH/DIAG IV INF ADDON: CPT

## 2025-08-17 PROCEDURE — 250N000011 HC RX IP 250 OP 636: Performed by: EMERGENCY MEDICINE

## 2025-08-17 PROCEDURE — 96365 THER/PROPH/DIAG IV INF INIT: CPT

## 2025-08-17 PROCEDURE — G0378 HOSPITAL OBSERVATION PER HR: HCPCS

## 2025-08-17 PROCEDURE — 80053 COMPREHEN METABOLIC PANEL: CPT | Performed by: EMERGENCY MEDICINE

## 2025-08-17 PROCEDURE — 99285 EMERGENCY DEPT VISIT HI MDM: CPT | Mod: 25 | Performed by: EMERGENCY MEDICINE

## 2025-08-17 PROCEDURE — 99223 1ST HOSP IP/OBS HIGH 75: CPT | Performed by: INTERNAL MEDICINE

## 2025-08-17 PROCEDURE — 93970 EXTREMITY STUDY: CPT

## 2025-08-17 PROCEDURE — 83605 ASSAY OF LACTIC ACID: CPT | Performed by: EMERGENCY MEDICINE

## 2025-08-17 RX ORDER — CHOLECALCIFEROL (VITAMIN D3) 50 MCG
50 TABLET ORAL DAILY
Status: DISCONTINUED | OUTPATIENT
Start: 2025-08-18 | End: 2025-08-19 | Stop reason: HOSPADM

## 2025-08-17 RX ORDER — CALCIUM CARBONATE 500 MG/1
1000 TABLET, CHEWABLE ORAL 4 TIMES DAILY PRN
Status: DISCONTINUED | OUTPATIENT
Start: 2025-08-17 | End: 2025-08-19 | Stop reason: HOSPADM

## 2025-08-17 RX ORDER — ACETAMINOPHEN 325 MG/1
650 TABLET ORAL EVERY 4 HOURS PRN
Status: DISCONTINUED | OUTPATIENT
Start: 2025-08-17 | End: 2025-08-19 | Stop reason: HOSPADM

## 2025-08-17 RX ORDER — OXYBUTYNIN CHLORIDE 5 MG/1
5 TABLET, EXTENDED RELEASE ORAL DAILY
Status: DISCONTINUED | OUTPATIENT
Start: 2025-08-18 | End: 2025-08-19 | Stop reason: HOSPADM

## 2025-08-17 RX ORDER — ONDANSETRON 2 MG/ML
4 INJECTION INTRAMUSCULAR; INTRAVENOUS EVERY 6 HOURS PRN
Status: DISCONTINUED | OUTPATIENT
Start: 2025-08-17 | End: 2025-08-19 | Stop reason: HOSPADM

## 2025-08-17 RX ORDER — ACETAMINOPHEN 650 MG/1
650 SUPPOSITORY RECTAL EVERY 4 HOURS PRN
Status: DISCONTINUED | OUTPATIENT
Start: 2025-08-17 | End: 2025-08-19 | Stop reason: HOSPADM

## 2025-08-17 RX ORDER — LIDOCAINE 40 MG/G
CREAM TOPICAL
Status: DISCONTINUED | OUTPATIENT
Start: 2025-08-17 | End: 2025-08-19 | Stop reason: HOSPADM

## 2025-08-17 RX ORDER — ENOXAPARIN SODIUM 100 MG/ML
40 INJECTION SUBCUTANEOUS EVERY 24 HOURS
Status: DISCONTINUED | OUTPATIENT
Start: 2025-08-17 | End: 2025-08-19 | Stop reason: HOSPADM

## 2025-08-17 RX ORDER — PANTOPRAZOLE SODIUM 40 MG/1
40 TABLET, DELAYED RELEASE ORAL DAILY
Status: DISCONTINUED | OUTPATIENT
Start: 2025-08-18 | End: 2025-08-19 | Stop reason: HOSPADM

## 2025-08-17 RX ORDER — METOPROLOL SUCCINATE 50 MG/1
50 TABLET, EXTENDED RELEASE ORAL DAILY
Status: DISCONTINUED | OUTPATIENT
Start: 2025-08-18 | End: 2025-08-19 | Stop reason: HOSPADM

## 2025-08-17 RX ORDER — PRAVASTATIN SODIUM 20 MG
40 TABLET ORAL DAILY
Status: DISCONTINUED | OUTPATIENT
Start: 2025-08-18 | End: 2025-08-19 | Stop reason: HOSPADM

## 2025-08-17 RX ORDER — AMOXICILLIN 250 MG
2 CAPSULE ORAL 2 TIMES DAILY PRN
Status: DISCONTINUED | OUTPATIENT
Start: 2025-08-17 | End: 2025-08-19 | Stop reason: HOSPADM

## 2025-08-17 RX ORDER — ZINC SULFATE 50(220)MG
220 CAPSULE ORAL DAILY
Status: DISCONTINUED | OUTPATIENT
Start: 2025-08-18 | End: 2025-08-19 | Stop reason: HOSPADM

## 2025-08-17 RX ORDER — CEFTRIAXONE 2 G/1
2 INJECTION, POWDER, FOR SOLUTION INTRAMUSCULAR; INTRAVENOUS EVERY 24 HOURS
Status: DISCONTINUED | OUTPATIENT
Start: 2025-08-18 | End: 2025-08-19 | Stop reason: HOSPADM

## 2025-08-17 RX ORDER — ONDANSETRON 4 MG/1
4 TABLET, ORALLY DISINTEGRATING ORAL EVERY 6 HOURS PRN
Status: DISCONTINUED | OUTPATIENT
Start: 2025-08-17 | End: 2025-08-19 | Stop reason: HOSPADM

## 2025-08-17 RX ORDER — CEFDINIR 300 MG/1
300 CAPSULE ORAL 2 TIMES DAILY
COMMUNITY
Start: 2025-08-15 | End: 2025-08-21

## 2025-08-17 RX ORDER — AMOXICILLIN 250 MG
1 CAPSULE ORAL 2 TIMES DAILY PRN
Status: DISCONTINUED | OUTPATIENT
Start: 2025-08-17 | End: 2025-08-19 | Stop reason: HOSPADM

## 2025-08-17 RX ORDER — CEFTRIAXONE 2 G/1
2 INJECTION, POWDER, FOR SOLUTION INTRAMUSCULAR; INTRAVENOUS ONCE
Status: COMPLETED | OUTPATIENT
Start: 2025-08-17 | End: 2025-08-17

## 2025-08-17 RX ORDER — ASCORBIC ACID 500 MG
500 TABLET ORAL DAILY
Status: DISCONTINUED | OUTPATIENT
Start: 2025-08-18 | End: 2025-08-19 | Stop reason: HOSPADM

## 2025-08-17 RX ADMIN — ENOXAPARIN SODIUM 40 MG: 40 INJECTION SUBCUTANEOUS at 22:48

## 2025-08-17 RX ADMIN — CEFTRIAXONE SODIUM 2 G: 2 INJECTION, POWDER, FOR SOLUTION INTRAMUSCULAR; INTRAVENOUS at 19:12

## 2025-08-17 ASSESSMENT — ACTIVITIES OF DAILY LIVING (ADL)
ADLS_ACUITY_SCORE: 58
ADLS_ACUITY_SCORE: 58
ADLS_ACUITY_SCORE: 52
ADLS_ACUITY_SCORE: 58
ADLS_ACUITY_SCORE: 52

## 2025-08-17 ASSESSMENT — COLUMBIA-SUICIDE SEVERITY RATING SCALE - C-SSRS
1. IN THE PAST MONTH, HAVE YOU WISHED YOU WERE DEAD OR WISHED YOU COULD GO TO SLEEP AND NOT WAKE UP?: NO
6. HAVE YOU EVER DONE ANYTHING, STARTED TO DO ANYTHING, OR PREPARED TO DO ANYTHING TO END YOUR LIFE?: NO
2. HAVE YOU ACTUALLY HAD ANY THOUGHTS OF KILLING YOURSELF IN THE PAST MONTH?: NO

## 2025-08-18 PROCEDURE — 96372 THER/PROPH/DIAG INJ SC/IM: CPT | Performed by: INTERNAL MEDICINE

## 2025-08-18 PROCEDURE — G0378 HOSPITAL OBSERVATION PER HR: HCPCS

## 2025-08-18 PROCEDURE — 250N000013 HC RX MED GY IP 250 OP 250 PS 637: Performed by: INTERNAL MEDICINE

## 2025-08-18 PROCEDURE — 99232 SBSQ HOSP IP/OBS MODERATE 35: CPT | Performed by: INTERNAL MEDICINE

## 2025-08-18 PROCEDURE — 999N000128 HC STATISTIC PERIPHERAL IV START W/O US GUIDANCE

## 2025-08-18 PROCEDURE — 250N000011 HC RX IP 250 OP 636: Performed by: INTERNAL MEDICINE

## 2025-08-18 RX ADMIN — PRAVASTATIN SODIUM 40 MG: 20 TABLET ORAL at 08:11

## 2025-08-18 RX ADMIN — ENOXAPARIN SODIUM 40 MG: 40 INJECTION SUBCUTANEOUS at 22:16

## 2025-08-18 RX ADMIN — METOPROLOL SUCCINATE 50 MG: 50 TABLET, EXTENDED RELEASE ORAL at 08:11

## 2025-08-18 RX ADMIN — ZINC SULFATE 220 MG (50 MG) CAPSULE 220 MG: CAPSULE at 08:11

## 2025-08-18 RX ADMIN — OXYBUTYNIN CHLORIDE 5 MG: 5 TABLET, EXTENDED RELEASE ORAL at 08:11

## 2025-08-18 RX ADMIN — Medication 50 MCG: at 08:12

## 2025-08-18 RX ADMIN — PANTOPRAZOLE SODIUM 40 MG: 40 TABLET, DELAYED RELEASE ORAL at 08:11

## 2025-08-18 RX ADMIN — OXYCODONE HYDROCHLORIDE AND ACETAMINOPHEN 500 MG: 500 TABLET ORAL at 08:11

## 2025-08-18 ASSESSMENT — ACTIVITIES OF DAILY LIVING (ADL)
ADLS_ACUITY_SCORE: 52

## 2025-08-19 VITALS
OXYGEN SATURATION: 94 % | WEIGHT: 154.9 LBS | BODY MASS INDEX: 26.44 KG/M2 | SYSTOLIC BLOOD PRESSURE: 140 MMHG | HEIGHT: 64 IN | TEMPERATURE: 98.2 F | RESPIRATION RATE: 16 BRPM | DIASTOLIC BLOOD PRESSURE: 75 MMHG | HEART RATE: 74 BPM

## 2025-08-19 LAB
ERYTHROCYTE [DISTWIDTH] IN BLOOD BY AUTOMATED COUNT: 12.2 % (ref 10–15)
HCT VFR BLD AUTO: 39.4 % (ref 35–47)
HGB BLD-MCNC: 12.8 G/DL (ref 11.7–15.7)
HOLD SPECIMEN: NORMAL
MCH RBC QN AUTO: 31.8 PG (ref 26.5–33)
MCHC RBC AUTO-ENTMCNC: 32.5 G/DL (ref 31.5–36.5)
MCV RBC AUTO: 98 FL (ref 78–100)
PLATELET # BLD AUTO: 171 10E3/UL (ref 150–450)
RBC # BLD AUTO: 4.02 10E6/UL (ref 3.8–5.2)
WBC # BLD AUTO: 5.57 10E3/UL (ref 4–11)

## 2025-08-19 PROCEDURE — 99238 HOSP IP/OBS DSCHRG MGMT 30/<: CPT | Performed by: INTERNAL MEDICINE

## 2025-08-19 PROCEDURE — 85014 HEMATOCRIT: CPT | Performed by: INTERNAL MEDICINE

## 2025-08-19 PROCEDURE — G0378 HOSPITAL OBSERVATION PER HR: HCPCS

## 2025-08-19 PROCEDURE — 36415 COLL VENOUS BLD VENIPUNCTURE: CPT | Performed by: INTERNAL MEDICINE

## 2025-08-19 PROCEDURE — 250N000013 HC RX MED GY IP 250 OP 250 PS 637: Performed by: INTERNAL MEDICINE

## 2025-08-19 RX ORDER — ACETAMINOPHEN 325 MG/1
650 TABLET ORAL EVERY 4 HOURS PRN
COMMUNITY
Start: 2025-08-19

## 2025-08-19 RX ADMIN — ZINC SULFATE 220 MG (50 MG) CAPSULE 220 MG: CAPSULE at 08:17

## 2025-08-19 RX ADMIN — METOPROLOL SUCCINATE 50 MG: 50 TABLET, EXTENDED RELEASE ORAL at 08:17

## 2025-08-19 RX ADMIN — PRAVASTATIN SODIUM 40 MG: 20 TABLET ORAL at 08:17

## 2025-08-19 RX ADMIN — OXYBUTYNIN CHLORIDE 5 MG: 5 TABLET, EXTENDED RELEASE ORAL at 08:17

## 2025-08-19 RX ADMIN — OXYCODONE HYDROCHLORIDE AND ACETAMINOPHEN 500 MG: 500 TABLET ORAL at 08:17

## 2025-08-19 RX ADMIN — Medication 50 MCG: at 08:17

## 2025-08-19 RX ADMIN — PANTOPRAZOLE SODIUM 40 MG: 40 TABLET, DELAYED RELEASE ORAL at 08:17

## 2025-08-19 ASSESSMENT — ACTIVITIES OF DAILY LIVING (ADL)
ADLS_ACUITY_SCORE: 52

## 2025-08-21 ENCOUNTER — PATIENT OUTREACH (OUTPATIENT)
Dept: CARE COORDINATION | Facility: CLINIC | Age: 85
End: 2025-08-21
Payer: COMMERCIAL

## 2025-08-21 LAB
BACTERIA SPEC CULT: NORMAL
BACTERIA SPEC CULT: NORMAL

## 2025-08-23 DIAGNOSIS — R47.9 TRANSIENT SPEECH DISTURBANCE: ICD-10-CM

## 2025-08-23 DIAGNOSIS — I10 BENIGN ESSENTIAL HYPERTENSION: ICD-10-CM

## 2025-08-25 RX ORDER — METOPROLOL SUCCINATE 50 MG/1
50 TABLET, EXTENDED RELEASE ORAL DAILY
Qty: 90 TABLET | Refills: 0 | Status: SHIPPED | OUTPATIENT
Start: 2025-08-25

## 2025-08-27 ENCOUNTER — PATIENT OUTREACH (OUTPATIENT)
Dept: CARE COORDINATION | Facility: CLINIC | Age: 85
End: 2025-08-27
Payer: COMMERCIAL